# Patient Record
Sex: MALE | Race: AMERICAN INDIAN OR ALASKA NATIVE
[De-identification: names, ages, dates, MRNs, and addresses within clinical notes are randomized per-mention and may not be internally consistent; named-entity substitution may affect disease eponyms.]

---

## 2017-03-21 ENCOUNTER — HOSPITAL ENCOUNTER (INPATIENT)
Dept: HOSPITAL 42 - ED | Age: 22
LOS: 2 days | Discharge: HOME | DRG: 895 | End: 2017-03-23
Attending: INTERNAL MEDICINE | Admitting: INTERNAL MEDICINE
Payer: MEDICAID

## 2017-03-21 VITALS — RESPIRATION RATE: 20 BRPM

## 2017-03-21 VITALS — BODY MASS INDEX: 17.8 KG/M2

## 2017-03-21 DIAGNOSIS — K68.12: ICD-10-CM

## 2017-03-21 DIAGNOSIS — K50.90: ICD-10-CM

## 2017-03-21 DIAGNOSIS — D50.9: ICD-10-CM

## 2017-03-21 DIAGNOSIS — K63.2: ICD-10-CM

## 2017-03-21 DIAGNOSIS — K65.1: Primary | ICD-10-CM

## 2017-03-21 DIAGNOSIS — Z87.891: ICD-10-CM

## 2017-03-21 LAB
ADD MANUAL DIFF?: NO
ALBUMIN/GLOB SERPL: 0.9 {RATIO} (ref 1.1–1.8)
ALP SERPL-CCNC: 83 U/L (ref 38–133)
ALT SERPL-CCNC: 18 U/L (ref 7–56)
APTT BLD: 33.1 SECONDS (ref 23.7–30.8)
AST SERPL-CCNC: 46 U/L (ref 15–59)
BASOPHILS # BLD AUTO: 0.02 K/MM3 (ref 0–2)
BASOPHILS NFR BLD: 0.3 % (ref 0–3)
BILIRUB SERPL-MCNC: 0.4 MG/DL (ref 0.2–1.3)
BUN SERPL-MCNC: 8 MG/DL (ref 7–21)
CALCIUM SERPL-MCNC: 9 MG/DL (ref 8.4–10.5)
CHLORIDE SERPL-SCNC: 105 MMOL/L (ref 98–107)
CO2 SERPL-SCNC: 27 MMOL/L (ref 21–33)
EOSINOPHIL # BLD: 0.2 10*3/UL (ref 0–0.7)
EOSINOPHIL NFR BLD: 3.3 % (ref 1.5–5)
ERYTHROCYTE [DISTWIDTH] IN BLOOD BY AUTOMATED COUNT: 19 % (ref 11.5–14.5)
GLOBULIN SER-MCNC: 4 GM/DL
GLUCOSE SERPL-MCNC: 79 MG/DL (ref 70–110)
GRANULOCYTES # BLD: 3.74 10*3/UL (ref 1.4–6.5)
GRANULOCYTES NFR BLD: 64.5 % (ref 50–68)
HCT VFR BLD CALC: 26.7 % (ref 42–52)
INR PPP: 1.13 (ref 0.93–1.08)
LIPASE SERPL-CCNC: 111 U/L (ref 23–300)
LYMPHOCYTES # BLD: 1 10*3/UL (ref 1.2–3.4)
LYMPHOCYTES NFR BLD AUTO: 17.1 % (ref 22–35)
MCH RBC QN AUTO: 22 PG (ref 25–35)
MCHC RBC AUTO-ENTMCNC: 31.1 G/DL (ref 31–37)
MCV RBC AUTO: 70.8 FL (ref 80–105)
MONOCYTES # BLD AUTO: 0.9 10*3/UL (ref 0.1–0.6)
MONOCYTES NFR BLD: 14.8 % (ref 1–6)
PLATELET # BLD: 668 10^3/UL (ref 120–450)
PMV BLD AUTO: 8.2 FL (ref 7–11)
POTASSIUM SERPL-SCNC: 4.6 MMOL/L (ref 3.6–5)
PROT SERPL-MCNC: 7.5 G/DL (ref 5.8–8.3)
SODIUM SERPL-SCNC: 145 MMOL/L (ref 132–148)
WBC # BLD AUTO: 5.8 10^3/UL (ref 4.5–11)

## 2017-03-21 RX ADMIN — CEFTRIAXONE SCH MLS/HR: 1 INJECTION, SOLUTION INTRAVENOUS at 19:27

## 2017-03-21 RX ADMIN — HYDROMORPHONE HYDROCHLORIDE PRN MG: 1 INJECTION, SOLUTION INTRAMUSCULAR; INTRAVENOUS; SUBCUTANEOUS at 21:30

## 2017-03-21 RX ADMIN — METRONIDAZOLE SCH MLS/HR: 500 INJECTION, SOLUTION INTRAVENOUS at 19:59

## 2017-03-21 RX ADMIN — METRONIDAZOLE SCH MLS/HR: 500 INJECTION, SOLUTION INTRAVENOUS at 22:05

## 2017-03-21 NOTE — CP.PCM.HP
<Bryce Wan - Last Filed: 03/21/17 19:35>





History of Present Illness





- History of Present Illness


History of Present Illness: 


21 year old  male with a pmh of Crohn's and Fe def anemia 

presents to the the ED with three purulent draining wounds on the right lower 

quadrant and one wound on his right flank that appeared like a drain site from 

a previous procedure. This pt. has had multiple suregeris/procedures starting 

in Mar 2015 for abdominal abscesses, possibly 2/2 to his Crohn's. He currently 

not on any Crohn's regimine nor does he follow a GI doc for it. He has been to 

[facility] for his previous surgeries/procedures. His latest onset started 

approximately 2 weeks ago. He did not want to go to [facility] any longer, as they 

are no longer able to care for him.





PMD: Dr. Gary Colby


Surgeon from [facility]: Dr. Carter





PMH: Crohn's and Fe Def Anemia


PSH: 2 abdominal surgeries and 1 IR Incision and Drainage for abdominal 

abscesses


FH: sig for HTN and Arthritis


SH:


-lives with family


-does not work, is disables


-not recently sick


-Tob quit in 2015, used to smoke 5cigs/day/2years


-Alc denies


-Drugs denies


All: Shellfish


Meds: Fe pills





Present on Admission





- Present on Admission


Any Indicators Present on Admission: No





Review of Systems





- Constitutional


Constitutional: Chills.  absent: Fever, Headache





- EENT


Eyes: absent: Change in Vision


Ears: absent: Decreased Hearing


Nose/Mouth/Throat: Dry Mouth, Sore Throat, Neck Pain.  absent: Epistaxis, Nasal 

Congestion





- Cardiovascular


Cardiovascular: absent: Chest Pain, Chest Pain at Rest, Dyspnea





- Respiratory


Respiratory: absent: Cough, Dyspnea, Hemoptysis





- Gastrointestinal


Gastrointestinal: Abdominal Pain


Additional comments: 


3 RLQ wounds that are draining yellowish purulent fluid, 1 non healing wound on 

right flank, possibly site of drain port from previous surgery





- Genitourinary


Genitourinary: absent: Difficulty Urinating, Dysuria, Hematuria, Pyuria, 

Urinary Incontinence





- Musculoskeletal


Musculoskeletal: Back Pain, Neck Pain.  absent: Muscle Weakness, Numbness, 

Tingling





- Integumentary


Integumentary: Non-Healing Lesions (right flank), Wounds (3 RLQ yellowish, 

purulent wounds)





- Endocrine


Endocrine: Cold Intolorance.  absent: Fatigue, Heat Intolorance, Palpitations, 

Polydipsia, Polyphagia, Polyuria





Past Patient History





- Past Social History


Smoking Status: Former Smoker (quit in 2015, used to smoke 5cigs/day/2years)


Chewing Tobacco Use: No


Cigar Use: No


Alcohol: None


Drugs: Denies


Home Situation {Lives}: With Family





- CARDIAC


Hx Cardiac Disorders: No





- PULMONARY


Hx Respiratory Disorders: No





- NEUROLOGICAL


Hx Neurological Disorder: No





- HEENT


Hx HEENT Problems: No





- RENAL


Hx Chronic Kidney Disease: No





- ENDOCRINE/METABOLIC


Hx Endocrine Disorders: No





- HEMATOLOGICAL/ONCOLOGICAL


Hx Blood Disorders: No


Hx Anemia: Yes (Fe Def Anemia)





- INTEGUMENTARY


Hx Dermatological Problems: No





- MUSCULOSKELETAL/RHEUMATOLOGICAL


Hx Musculoskeletal Disorders: No





- GASTROINTESTINAL


Hx Crohn's Disease: Yes





- GENITOURINARY/GYNECOLOGICAL


Hx Genitourinary Disorders: No





- PSYCHIATRIC


Hx Psychophysiologic Disorder: No


Hx Substance Use: No





- SURGICAL HISTORY


Hx Surgeries: No





- ANESTHESIA


Hx Anesthesia: No





Meds


Allergies/Adverse Reactions: 


 Allergies











Allergy/AdvReac Type Severity Reaction Status Date / Time


 


FISH Allergy   Verified 03/12/17 13:50


 


shellfish derived AdvReac  ANGIOEDEMA Verified 03/21/17 10:53














Physical Exam





- Constitutional


Appears: No Acute Distress





- Head Exam


Head Exam: ATRAUMATIC





- Eye Exam


Eye Exam: EOMI





- ENT Exam


ENT Exam: Mucous Membranes Moist





- Neck Exam


Neck exam: Positive for: Full Rom.  Negative for: Lymphadenopathy





- Respiratory Exam


Respiratory Exam: Clear to Auscultation Bilateral, NORMAL BREATHING PATTERN.  

absent: Rhonchi, Wheezes





- Cardiovascular Exam


Cardiovascular Exam: REGULAR RHYTHM, RRR, +S1, +S2.  absent: JVD





- GI/Abdominal Exam


GI & Abdominal Exam: Normal Bowel Sounds, Tenderness


Additional comments: 


3 RLQ purulent abdominal wounds, 1 right flank wound, non healing, likely prior 

site of drain port 





- Extremities Exam


Extremities exam: Positive for: full ROM.  Negative for: joint swelling, pedal 

edema, tenderness





- Back Exam


Back exam: absent: CVA tenderness (L), CVA tenderness (R)





- Neurological Exam


Neurological exam: Alert, CN II-XII Intact, Oriented x3





Results





- Vital Signs


Recent Vital Signs: 





 Last Vital Signs











Temp  97.9 F   03/21/17 13:42


 


Pulse  76   03/21/17 17:13


 


Resp  18   03/21/17 17:13


 


BP  118/68   03/21/17 17:13


 


Pulse Ox  100   03/21/17 17:13














- Labs


Result Diagrams: 


 03/21/17 13:30





 03/21/17 13:30


Labs: 





 Laboratory Results - last 24 hr











  03/21/17 03/21/17 03/21/17





  13:30 13:51 14:55


 


WBC  5.8  


 


RBC  3.77  


 


Hgb  8.3 L  


 


Hct  26.7 L  


 


MCV  70.8 L  


 


MCH  22.0 L  


 


MCHC  31.1  


 


RDW  19.0 H  


 


Plt Count  668 H  


 


MPV  8.2  


 


Gran %  64.5  


 


Lymph % (Auto)  17.1 L  


 


Mono % (Auto)  14.8 H  


 


Eos % (Auto)  3.3  


 


Baso % (Auto)  0.3  


 


Gran #  3.74  


 


Lymph #  1.0 L  


 


Mono #  0.9 H  


 


Eos #  0.2  


 


Baso #  0.02  


 


PT  Cancelled   12.2 H


 


INR  Cancelled   1.13 H


 


APTT  Cancelled   33.1 H


 


Sodium  145  


 


Potassium  4.6  


 


Chloride  105  


 


Carbon Dioxide  27  


 


Anion Gap  18  


 


BUN  8  


 


Creatinine  0.8  


 


Est GFR ( Amer)  > 60  


 


Est GFR (Non-Af Amer)  > 60  


 


Random Glucose  79  


 


Calcium  9.0  


 


Total Bilirubin  0.4  


 


AST  46  


 


ALT  18  


 


Alkaline Phosphatase  83  


 


Total Protein  7.5  


 


Albumin  3.5  


 


Globulin  4.0  


 


Albumin/Globulin Ratio  0.9 L  


 


Lipase  111  


 


Blood Type  A POSITIVE  


 


Blood Type Confirm   A POSITIVE 


 


Antibody Screen  Negative  


 


BBK History Checked  No verified bt  














Assessment & Plan





- Assessment and Plan (Free Text)


Assessment: 


21M presents with untreated Crohn's and a abscesses/fistula malfomations in his 

right lower abdomen.


Plan: 


Abscesses/fistula


   -Surgical Consult: Dr. Snow


   -IR Consult: Dr. Gregory


   -Wound Culture


   -Blood Culture


   -CT Abd/Pelvis


   -suspicious for abscess formation at and adjacent to the right psoas


   muscle extednign ot the right lower abdomen and right pelvis/pelvic sidewall 

muscles


   -suspicious for fistula to the anterior abdominal wall to the right of the 

midline


   -Ceftriaxone


   -Flagyl


   -Dilaudid


   -IV NS


   -NPO





Anemia


   -Fr, Ferritin, TIBC


   -B12, Folate


   -UDS, UA


   -CBC, CMP





PPX


   -Nausea Zofran


   -GI Pepcid


   -DVT SCD's








- Date & Time


Date: 03/21/17


Time: 05:00





<Trever Buitrago - Last Filed: 03/22/17 16:16>





Results





- Vital Signs


Recent Vital Signs: 





 Last Vital Signs











Temp  99.2 F   03/22/17 08:33


 


Pulse  88   03/22/17 08:33


 


Resp  20   03/22/17 08:33


 


BP  115/66   03/22/17 08:33


 


Pulse Ox  100   03/22/17 08:33














- Labs


Result Diagrams: 


 03/22/17 07:00





 03/22/17 07:00


Labs: 





 Laboratory Results - last 24 hr











  03/22/17 03/22/17





  07:00 07:15


 


WBC  10.3  D 


 


RBC  4.01 


 


Hgb  8.7 L 


 


Hct  28.1 L 


 


MCV  70.1 L 


 


MCH  21.7 L 


 


MCHC  31.0 


 


RDW  18.7 H 


 


Plt Count  673 H 


 


MPV  8.1 


 


Gran %  78.8 H 


 


Lymph % (Auto)  8.8 L 


 


Mono % (Auto)  9.0 H 


 


Eos % (Auto)  3.1 


 


Baso % (Auto)  0.3 


 


Gran #  8.09 H 


 


Lymph #  0.9 L 


 


Mono #  0.9 H 


 


Eos #  0.3 


 


Baso #  0.03 


 


Sodium  141 


 


Potassium  3.2 L 


 


Chloride  99 


 


Carbon Dioxide  27 


 


Anion Gap  18 


 


BUN  5 L 


 


Creatinine  0.9 


 


Est GFR ( Amer)  > 60 


 


Est GFR (Non-Af Amer)  > 60 


 


Random Glucose  80 


 


Calcium  8.5 


 


Iron   10 L


 


TIBC   269


 


% Saturation   4 L


 


Ferritin  23.0 


 


Total Bilirubin  0.3 


 


AST  51 


 


ALT  17 


 


Alkaline Phosphatase  89 


 


Total Protein  7.7 


 


Albumin  3.4 


 


Globulin  4.3 


 


Albumin/Globulin Ratio  0.8 L 


 


Vitamin B12  729 


 


Folate  13.4 














Assessment & Plan





- Assessment and Plan (Free Text)


Assessment: 


attending note;





Patient seen and examined with resident in ER.





Patient is a 21 year old  male with a pmh of Crohn's ,multiple 

surgeries, recurrent abscess drainage, Fistula  and iron deficiency anemia


Is admitted to [facility] for the first time for recurrent nonhealing Crohn's/fistula 

infection.





Started on IV Rocephin and Flagyl.


Wound culture, blood culture sent.


CT showed abscess. Case discussed with surgery in detail.


Case discussed with IR Dr. Gregory in detail.





Will order CT with by mouth contrast tomorrow. Possible drainage by IR.





GI evaluation requested. Patient used to get Remicade injection as per patient. 

Currently not on any treatment.





 chronic iron deficiency anemia; iron studies ordered.





upon discharge the patient will follow-up with PMD .





patient needs close follow-up with GI and surgery as outpatient.





Diagnosis and follow-up plan discussed with patient and patient's father in 

detail.





Attending/Attestation





- Attestation


I have personally seen and examined this patient.: Yes


I have fully participated in the care of the patient.: Yes


I have reviewed all pertinent clinical information: Yes

## 2017-03-21 NOTE — CP.PCM.CON
<Adolfo Doshi - Last Filed: 03/21/17 17:01>





History of Present Illness





- History of Present Illness


History of Present Illness: 


Surgery: Dr. Knapp





CC: Abd pain and drainage





HPI: 21M w. pmh of Crohn's dx in 2015 and multiple abd operations for intra-

abdominal abscesses, presents w. diffuse abd pain with drainage for the past 2 

weeks. Pt states that he has had at least 7 operations for drainage of intra-

abdominal abscesses. The last one [place] [facility] Pt states 

that he was doing relatively well until 2 weeks ago when he began to develop R 

sided abd pain that was constant and described as a cramp/stabbing pain.  He 

states that the pain is worse w. activity.  The abd eventually began to drain 

pus at the umbilicus, RUQ, and RLQ. He reports having fevers and chills. He 

reports having diarrhea, which is non-bloody. He denies any changes in 

appetite. No N/V. He denies HA/blurred vision, no CP/palpitation, no SOB/cough, 

he reports some difficulty urinating, no hematuria. He states that he has bone 

pain in his legs, and has general feeling of fatigue. Pt was seeing Dr. Singh, 

GI doc at [facility] [place] time, but has not seen him since February. He 

currently is not taking any medication to manage his crohn's





PMH: crohn's


PSH: Multiple drainage's of intra-abdominal abscesses, questionable small bowel 

resection


Meds: none


ALL: shell fish


Social: No ETOH/tobacco/drugs


Fhx: none





Review of Systems





- Review of Systems


All systems: reviewed and no additional remarkable complaints except (HPI)





Past Patient History





- Past Social History


Smoking Status: Never Smoked





- CARDIAC


Hx Cardiac Disorders: No





- PULMONARY


Hx Respiratory Disorders: No





- NEUROLOGICAL


Hx Neurological Disorder: No





- HEENT


Hx HEENT Problems: No





- RENAL


Hx Chronic Kidney Disease: No





- ENDOCRINE/METABOLIC


Hx Endocrine Disorders: No





- HEMATOLOGICAL/ONCOLOGICAL


Hx Blood Disorders: No





- INTEGUMENTARY


Hx Dermatological Problems: No





- MUSCULOSKELETAL/RHEUMATOLOGICAL


Hx Musculoskeletal Disorders: No





- GASTROINTESTINAL


Hx Crohn's Disease: Yes





- GENITOURINARY/GYNECOLOGICAL


Hx Genitourinary Disorders: No





- PSYCHIATRIC


Hx Psychophysiologic Disorder: No


Hx Substance Use: No





- SURGICAL HISTORY


Hx Surgeries: No





- ANESTHESIA


Hx Anesthesia: No





Meds


Allergies/Adverse Reactions: 


 Allergies











Allergy/AdvReac Type Severity Reaction Status Date / Time


 


FISH Allergy   Verified 03/12/17 13:50


 


shellfish derived AdvReac  ANGIOEDEMA Verified 03/21/17 10:53














Physical Exam





- Constitutional


Appears: Non-toxic, No Acute Distress





- Head Exam


Head Exam: ATRAUMATIC, NORMOCEPHALIC





- Eye Exam


Eye Exam: EOMI.  absent: Scleral icterus





- ENT Exam


ENT Exam: Mucous Membranes Moist, Normal External Ear Exam





- Neck Exam


Neck exam: Positive for: Full Rom





- Respiratory Exam


Respiratory Exam: NORMAL BREATHING PATTERN.  absent: Accessory Muscle Use, 

Respiratory Distress





- GI/Abdominal Exam


Additional comments: 


soft, tender RUQ, RLQ, and umblical area, there is drainage of purulent fluid 

in the previously mentioned areas, drainage has no odor. No guarding, rebound, 

or rigidity





- Extremities Exam


Extremities exam: Negative for: calf tenderness, pedal edema





- Back Exam


Back exam: CVA tenderness (R).  absent: CVA tenderness (L)





- Neurological Exam


Neurological exam: Alert, Oriented x3





- Psychiatric Exam


Psychiatric exam: Normal Affect, Normal Mood





- Skin


Skin Exam: Dry, Normal Color, Warm





Results





- Vital Signs


Recent Vital Signs: 


 Last Vital Signs











Temp  97.9 F   03/21/17 13:42


 


Pulse  79   03/21/17 15:36


 


Resp  18   03/21/17 15:36


 


BP  121/70   03/21/17 15:36


 


Pulse Ox  100   03/21/17 15:36














- Labs


Result Diagrams: 


 03/21/17 13:30





 03/21/17 13:30


Labs: 


 Laboratory Results - last 24 hr











  03/21/17 03/21/17 03/21/17





  13:30 13:51 14:55


 


WBC  5.8  


 


RBC  3.77  


 


Hgb  8.3 L  


 


Hct  26.7 L  


 


MCV  70.8 L  


 


MCH  22.0 L  


 


MCHC  31.1  


 


RDW  19.0 H  


 


Plt Count  668 H  


 


MPV  8.2  


 


Gran %  64.5  


 


Lymph % (Auto)  17.1 L  


 


Mono % (Auto)  14.8 H  


 


Eos % (Auto)  3.3  


 


Baso % (Auto)  0.3  


 


Gran #  3.74  


 


Lymph #  1.0 L  


 


Mono #  0.9 H  


 


Eos #  0.2  


 


Baso #  0.02  


 


PT  Cancelled   12.2 H


 


INR  Cancelled   1.13 H


 


APTT  Cancelled   33.1 H


 


Sodium  145  


 


Potassium  4.6  


 


Chloride  105  


 


Carbon Dioxide  27  


 


Anion Gap  18  


 


BUN  8  


 


Creatinine  0.8  


 


Est GFR ( Amer)  > 60  


 


Est GFR (Non-Af Amer)  > 60  


 


Random Glucose  79  


 


Calcium  9.0  


 


Total Bilirubin  0.4  


 


AST  46  


 


ALT  18  


 


Alkaline Phosphatase  83  


 


Total Protein  7.5  


 


Albumin  3.5  


 


Globulin  4.0  


 


Albumin/Globulin Ratio  0.9 L  


 


Lipase  111  


 


Blood Type  A POSITIVE  


 


Blood Type Confirm   A POSITIVE 


 


Antibody Screen  Negative  


 


BBK History Checked  No verified bt  














- Imaging and Cardiology


  ** CT scan - abdomen


Status: Image reviewed by me, Report reviewed by me





Assessment & Plan





- Assessment and Plan (Free Text)


Assessment: 


21M w. Crohn's and intr-abdominal abscesses 


-recommend IR drainage, case d/w Dr. Gregory, will repeat CT in AM w. PO contrast


-recommend GI consult


-IVF


-pain meds


-abx


-NPO


-GI/DVT prophylaxis


-case d.w Dr. Knapp





Zemait[facility] PGY2





<Reji Knapp - Last Filed: 03/22/17 16:33>





Meds





- Medications


Medications: 


 Current Medications





Famotidine (Pepcid)  20 mg IVP Q12 [facility]


   Last Admin: 03/22/17 09:38 Dose:  20 mg


Hydromorphone HCl (Dilaudid)  0.5 mg IVP Q3H PRN


   PRN Reason: Pain, moderate (4-7)


   Last Admin: 03/22/17 15:05 Dose:  0.5 mg


Ceftriaxone Sodium (Rocephin 1 Gram Ivpb)  100 mls @ 100 mls/hr IVPB DAILY [facility]


   PRN Reason: Protocol


   Last Admin: 03/22/17 09:38 Dose:  100 mls/hr


Metronidazole (Flagyl)  100 mls @ 100 mls/hr IVPB Q8 [facility]


   PRN Reason: Protocol


   Last Admin: 03/22/17 14:13 Dose:  100 mls/hr


Sodium Chloride (Sodium Chloride 0.9%)  1,000 mls @ 100 mls/hr IV .Q10H [facility]


   Last Admin: 03/22/17 12:45 Dose:  100 mls/hr


Multivitamins/Minerals (Therapeutic-M Tab)  1 tab PO DAILY [facility]


   Last Admin: 03/22/17 12:06 Dose:  1 tab


Ondansetron HCl (Zofran Inj)  4 mg IVP Q4H PRN


   PRN Reason: Nausea/Vomiting











Results





- Vital Signs


Recent Vital Signs: 


 Last Vital Signs











Temp  99.2 F   03/22/17 08:33


 


Pulse  88   03/22/17 08:33


 


Resp  20   03/22/17 08:33


 


BP  115/66   03/22/17 08:33


 


Pulse Ox  100   03/22/17 08:33














- Labs


Result Diagrams: 


 03/22/17 07:00





 03/22/17 07:00


Labs: 


 Laboratory Results - last 24 hr











  03/22/17 03/22/17





  07:00 07:15


 


WBC  10.3  D 


 


RBC  4.01 


 


Hgb  8.7 L 


 


Hct  28.1 L 


 


MCV  70.1 L 


 


MCH  21.7 L 


 


MCHC  31.0 


 


RDW  18.7 H 


 


Plt Count  673 H 


 


MPV  8.1 


 


Gran %  78.8 H 


 


Lymph % (Auto)  8.8 L 


 


Mono % (Auto)  9.0 H 


 


Eos % (Auto)  3.1 


 


Baso % (Auto)  0.3 


 


Gran #  8.09 H 


 


Lymph #  0.9 L 


 


Mono #  0.9 H 


 


Eos #  0.3 


 


Baso #  0.03 


 


Sodium  141 


 


Potassium  3.2 L 


 


Chloride  99 


 


Carbon Dioxide  27 


 


Anion Gap  18 


 


BUN  5 L 


 


Creatinine  0.9 


 


Est GFR ( Amer)  > 60 


 


Est GFR (Non-Af Amer)  > 60 


 


Random Glucose  80 


 


Calcium  8.5 


 


Iron   10 L


 


TIBC   269


 


% Saturation   4 L


 


Ferritin  23.0 


 


Total Bilirubin  0.3 


 


AST  51 


 


ALT  17 


 


Alkaline Phosphatase  89 


 


Total Protein  7.7 


 


Albumin  3.4 


 


Globulin  4.3 


 


Albumin/Globulin Ratio  0.8 L 


 


Vitamin B12  729 


 


Folate  13.4 














Attending/Attestation





- Attestation


I have personally seen and examined this patient.: Yes


I have fully participated in the care of the patient.: Yes


I have reviewed all pertinent clinical information: Yes


Notes (Text): 





03/22/17 16:31


Pt was seen and examined at bedside on 03/22/17


Agree with above note and assessment.


Pt with Crohns dis and Intra abdominal abscess


IR for Drainage of abscess


C/w IV antibiotics


Plan d.w pt in detail


We will f.u

## 2017-03-21 NOTE — ED PDOC
Arrival/HPI





- General


Historian: Patient





- History of Present Illness


Time/Duration: Other (2 weeks)


Quality: Aching


Context: Home





<Shana Schofield P - Last Filed: 03/23/17 15:47>





<Lashon Bernal - Last Filed: 03/23/17 16:16>





- General


Chief Complaint: Abnormal Skin Integrity


Time Seen by Provider: 03/21/17 11:31





- History of Present Illness


Narrative History of Present Illness (Text): 


03/21/17 12:15   


This 22 yo male with pmh crohn's disease, anemia, presents to this ED c/o 

abdominal wound pain x 2 days.  Patient stated he seen drainage from incisional 

wound x 2 weeks.  Patient stated he had abdominal surgery in July 2016.  

Patient denies sob, cp, streaking erythema, fever, n/v/d, rectal bleeding, 

melena, hematochezia, urinary symptoms, dizziness, or abnormal gait.


Patient noted he has been seen @ [facility] [place] for previous visit.  Patient had I&D done 

3 weeks ago for right lower back abscess, and he was taking 10 days ABX 

treatment. (Shana Schofield)








Past Medical History





- Provider Review


Nursing Documentation Reviewed: Yes





- Cardiac


Hx Cardiac Disorders: No





- Pulmonary


Hx Respiratory Disorders: No





- Neurological


Hx Neurological Disorder: No





- HEENT


Hx HEENT Disorder: No





- Renal


Hx Renal Disorder: No





- Endocrine/Metabolic


Hx Endocrine Disorders: No





- Hematological/Oncological


Hx Blood Disorders: No





- Integumentary


Hx Dermatological Disorder: No





- Musculoskeletal/Rheumatological


Hx Musculoskeletal Disorders: No





- Gastrointestinal


Hx Crohn's Disease: Yes





- Genitourinary/Gynecological


Hx Genitourinary Disorders: No





- Psychiatric


Hx Psychophysiologic Disorder: No


Hx Substance Use: No





- Anesthesia


Hx Anesthesia: No





<Shana Schofield P - Last Filed: 03/23/17 15:47>





Family/Social History





- Physician Review


Nursing Documentation Reviewed: Yes


Family/Social History: No Known Family HX


Smoking Status: Never Smoked


Hx Alcohol Use: No


Hx Substance Use: No





<Shana Schofield P - Last Filed: 03/23/17 15:47>





Allergies/Home Meds





<Shana Schofield P - Last Filed: 03/23/17 15:47>





<Lashon Bernal - Last Filed: 03/23/17 16:16>


Allergies/Adverse Reactions: 


Allergies





FISH Allergy (Verified 03/12/17 13:50)


 


shellfish derived Adverse Reaction (Verified 03/21/17 10:53)


 ANGIOEDEMA











Review of Systems





- Review of Systems


Constitutional: Normal.  absent: Fatigue, Weight Change, Fevers, Night Sweats


Eyes: Normal


ENT: Normal


Respiratory: Normal.  absent: SOB, Cough


Cardiovascular: Normal


Gastrointestinal: Abdominal Pain, Other (no rectal bleeding).  absent: Stool 

Changes, Constipation, Diarrhea, Nausea, Vomiting, Appetite Changes, 

Hematochezia, Hematemesis, Food Intolerance


Genitourinary Male: Normal


Musculoskeletal: Normal


Skin: Abscess


Neurological: Normal


Endocrine: Normal


Hemo/Lymphatic: Normal


Psychiatric: Normal





<Schofield,Nahim P - Last Filed: 03/23/17 15:47>





Physical Exam


Temperature: Afebrile


Blood Pressure: Normal


Pulse: Regular


Respiratory Rate: Normal


Appearance: Positive for: Well-Appearing, Non-Toxic, Comfortable


Pain Distress: None


Mental Status: Positive for: Alert and Oriented X 3





- Systems Exam


Head: Present: Atraumatic, Normocephalic


Pupils: Present: PERRL


Extroacular Muscles: Present: EOMI


Conjunctiva: Present: Normal


Mouth: Present: Moist Mucous Membranes


Neck: Present: Normal Range of Motion.  No: Meningeal Signs


Respiratory/Chest: Present: Clear to Auscultation, Good Air Exchange.  No: 

Respiratory Distress, Accessory Muscle Use


Cardiovascular: Present: Regular Rate and Rhythm, Normal S1, S2.  No: Murmurs


Abdomen: Present: Tenderness (Mild incisional wound tenderness.  (+) mild 

purulent drainage), Normal Bowel Sounds.  No: Distention, Peritoneal Signs, 

Rebound, Guarding


Back: Present: Normal Inspection


Upper Extremity: Present: Normal Inspection, Normal ROM, Neurovascularly Intact

, Capillary Refill < 2s.  No: Cyanosis, Edema


Lower Extremity: Present: Normal Inspection, NORMAL PULSES, Normal ROM.  No: 

Edema


Neurological: Present: GCS=15, CN II-XII Intact, Speech Normal


Skin: Present: Warm, Dry, Normal Color.  No: Rashes


Psychiatric: Present: Alert, Oriented x 3, Normal Insight, Normal Concentration





<Schofield,Nahim P - Last Filed: 03/23/17 15:47>





Vital Signs











  Temp Pulse Resp BP Pulse Ox


 


 03/21/17 17:13   76  18  118/68  100


 


 03/21/17 15:36   79  18  121/70  100


 


 03/21/17 13:42  97.9 F  86  18  114/69  100


 


 03/21/17 12:00   95 H  18  112/68  100


 


 03/21/17 11:33   106 H  18  110/65  100


 


 03/21/17 10:49  97.2 F L  98 H  18  112/61  100

















Medical Decision Making


Re-evaluation Time: 17:54


Reassessment Condition: Re-examined, Improving,but remains with symptoms





- Lab Interpretations


I have reviewed the lab results: Yes


Interpretation: Abnormal lab values (abnormal PT/INR/PTT)





<Shana Schofield - Last Filed: 03/23/17 15:47>





<Lashon Bernal - Last Filed: 03/23/17 16:16>


ED Course and Treatment: 


03/21/17 13:20


I spoke with Dr. Bernal ED physician regarding abdominal abscess x 2 week.  

Patient was treated with lower back abscess x 3 weeks ago.  Patient has mild 

tenderness on incisional wound abscess.  Patient appears non-toxic, requesting 

pain medication.  


She recommended labs, UA, and CT abdomen / pelvis with IV contrast to better 

appreciate abscess.





03/21/17 16:53


Surgical resident examined patient and he recommended admission.  I spoke with 

Dr. Buitrago Hospitalist regarding patient complain, labs result, and ct scan 

report.  She agrees with admission.


 (Shana Schofield)








- Lab Interpretations


Microbiology Results: 





Microbiology Results





03/21/17 13:30   Blood   Blood Culture - Preliminary


                            NO GROWTH AFTER 48 HOURS


03/21/17 13:30   Blood   Blood Culture - Preliminary


                            NO GROWTH AFTER 48 HOURS











Lab Results: 











 03/21/17 13:30 





 03/21/17 13:30 





 Lab Results





03/21/17 14:55: PT 12.2 H, INR 1.13 H, APTT 33.1 H


03/21/17 13:51: Blood Type Confirm A POSITIVE


03/21/17 13:30: WBC 5.8, RBC 3.77, Hgb 8.3 L, Hct 26.7 L, MCV 70.8 L, MCH 22.0 L

, MCHC 31.1, RDW 19.0 H, Plt Count 668 H, MPV 8.2, Gran % 64.5, Lymph % (Auto) 

17.1 L, Mono % (Auto) 14.8 H, Eos % (Auto) 3.3, Baso % (Auto) 0.3, Gran # 3.74, 

Lymph # 1.0 L, Mono # 0.9 H, Eos # 0.2, Baso # 0.02, PT Cancelled, INR Cancelled

, APTT Cancelled, Sodium 145, Potassium 4.6, Chloride 105, Carbon Dioxide 27, 

Anion Gap 18, BUN 8, Creatinine 0.8, Est GFR (African Amer) > 60, Est GFR (Non-

Af Amer) > 60, Random Glucose 79, Calcium 9.0, Total Bilirubin 0.4, AST 46, ALT 

18, Alkaline Phosphatase 83, Total Protein 7.5, Albumin 3.5, Globulin 4.0, 

Albumin/Globulin Ratio 0.9 L, Lipase 111, Blood Type A POSITIVE, Antibody 

Screen Negative, BBK History Checked No verified bt














- RAD Interpretation


Narrative RAD Interpretations (Text): 





03/21/17 15:47


Patient Name / ID : BRENDA LUND  / [id number]


Exam Date : 03/21/2017 14:28:16 ( Approved )


Study Comment : 


Sex / Age : M  / 21Y





Creator : Brittani Michael


Dictator : Brittani Michael


 : 


Approver : Brittani Michael


Approver2 : 





Report Date : 03/21/2017 15:35:10


My Comment : 


********************************************************************************

***





PROCEDURE:  CT Abdomen and Pelvis with contrast





HISTORY:


abdominal drainage. 21 years old man with history of Crohn disease history of 

prior bowel surgery. 





COMPARISON:


None.





TECHNIQUE:


Contrast dose: 100 mL Omnipaque 350





Radiation dose:





Total exam DLP = 234.14 mGy-cm.





FINDINGS:





LOWER THORAX:


Unremarkable. 





LIVER:


Mild-to-moderate hepatomegaly seen.  No evidence of acute pathology or mass 

lesion in the liver. 





GALLBLADDER AND BILE DUCTS:


Unremarkable. 





PANCREAS:


Unremarkable. No gross lesion or ductal dilatation.





SPLEEN:


Unremarkable. 





ADRENALS:


Unremarkable. No mass. 





KIDNEYS AND URETERS:


Unremarkable. No hydronephrosis. No solid mass. 





VASCULATURE:


Unremarkable. No aortic aneurysm. 





BOWEL:


Suboptimal assessment of the GI without oral contrast administration.  There 

are surgical staples seen at the right mid and lower abdomen suggestive of 

prior bowel resection and bowel anastomosis. There is mild thickening and 

enhancement of the small and large bowel loops at the right mid and lower 

abdomen. No evidence of high-grade bowel obstruction. Mildly distended large 

bowel loops in the left mid and lower abdomen.





APPENDIX:


The appendix is not visualized. 





PERITONEUM:


There is fluid collection and possible abscess at and adjacent to the right 

psoas muscle extending to the right lower abdomen and right pelvic muscle.  

There is moderate enlargement of the right pelvic sidewall muscles likely due 

to intramuscular collection.  There are foci of air and foci of enhancing fluid 

collection contains fluid and air at the right mid and lower abdomen.  Findings 

suspicious for abscess formations. There is suspicious for fistula to the 

anterior abdominal wall to the right and slightly lower to the level of the 

umbilicus. The possibility of bowel skin fistula should be considered. 





LYMPH NODES:


Large enhancing lymph nodes seen at the right lower abdomen and right groin.  

Mildly enlarged lymph nodes seen at the left groin. 





BLADDER:


The bladder is mildly distended demonstrate mild-to-moderate wall thickening. 





REPRODUCTIVE:


Unremarkable. 





BONES:


No acute fracture. 





OTHER FINDINGS:


There is small amount of free fluid in the pelvis.





IMPRESSION:


Suboptimal assessment of the GI system without oral contrast administration.





Findings suspicious for abscess formation at and adjacent to the right psoas 

muscle extending to the right lower abdomen and right pelvis/pelvic sidewall 

muscles. 





Suspicious for fistula to the anterior abdominal wall to the right of the 

midline. 





Postsurgical changes at the right mid and lower abdomen. Enhancing slightly 

thick wall of the small and large bowel loops at the right mid and lower 

abdomen may represent enteritis/colitis. 





No evidence of high-grade bowel obstruction.  Mild-to-moderate constipation and 

dilated large bowel loops at the left abdomen. 





Hepatomegaly.








 (Shana Schofield)





Radiology Orders: 











03/21/17 12:25


ABD & PELVIS IV CONTRAST ONLY [CT] Stat 





03/22/17 07:00


ABD PELVIS PO & IV CONTRAST [CT] Routine 

















- Medication Orders


Current Medication Orders: 














Discontinued Medications





Barium Sulfate (Readi-Cat 2) Confirm Administered Dose 900 ml PO .STK-MED ONE


   Stop: 03/22/17 09:46


Diphenhydramine HCl (Benadryl)  25 mg IVP STAT STA


   Stop: 03/21/17 12:32


   Last Admin: 03/21/17 13:26  Dose: 25 MG





IVP Administration


 Document     03/21/17 13:26  OCS  (Rec: 03/21/17 13:27  OCS  [id number])


     Charges for Administration


      # of IVP Administrations                   1





Famotidine (Pepcid)  20 mg IVP Q12 BARBARA


   Last Admin: 03/23/17 09:04  Dose: 20 MG





IVP Administration


 Document     03/23/17 09:04  MLK  (Rec: 03/23/17 09:04  MLK  [id number])


     Charges for Administration


      # of IVP Administrations                   1





Home Med (*Refrigerator Open) Confirm Administered Dose 1 unit XX .STK-MED ONE


   Stop: 03/22/17 05:55


Home Med (*Refrigerator Open) Confirm Administered Dose 1 unit XX .STK-MED ONE


   Stop: 03/22/17 06:00


Home Med (*Refrigerator Open) Confirm Administered Dose 1 unit XX .STK-MED ONE


   Stop: 03/23/17 05:41


Hydromorphone HCl (Dilaudid)  1 mg IVP STAT STA


   Stop: 03/21/17 16:47


   Last Admin: 03/21/17 17:08  Dose: 1 MG





IVP Administration


 Document     03/21/17 17:08  OCS  (Rec: 03/21/17 17:08  OCS  [id number])


     Charges for Administration


      # of IVP Administrations                   1





Hydromorphone HCl (Dilaudid)  0.5 mg IVP Q4H PRN


   PRN Reason: Pain, moderate (4-7)


   Last Admin: 03/22/17 09:04  Dose: 0.5 MG





MAR Pain Assessment


 Document     03/22/17 09:04  CLR  (Rec: 03/22/17 09:04  CLR  [id number])


     Pain Reassessment


      Is this a pain reassessment?               No


     Presence of Pain


      Presence of Pain                           Yes


     Pain Scale Used


      Pain Scale Used                            Numeric


     Location


      Left, Right or Bilateral                   Right


      Upper or Lower                             Lower


      Pain Location Body Site                    Abdomen


                                                 Back


     Description


      Description                                Constant


      Intensity of Pain at present               7


      Aggravating Factors                        ADL's


                                                 Changing Position


      Alleviating Factors/Management             Medication


       Techniques                                


IVP Administration


 Document     03/22/17 09:04  CLR  (Rec: 03/22/17 09:04  CLR  [id number])


     Charges for Administration


      # of IVP Administrations                   1


Re-Assess: MAR Pain Assessment


 Document     03/22/17 10:04  CLR  (Rec: 03/22/17 11:31  CLR  [id number])


     Pain Reassessment


      Is this a pain reassessment?               Yes


     Presence of Pain


      Presence of Pain                           Yes


     Description


      Description                                Constant


      Intensity of Pain at present               5





Hydromorphone HCl (Dilaudid)  0.5 mg IVP Q3H PRN


   PRN Reason: Pain, moderate (4-7)


   Last Admin: 03/23/17 12:47  Dose: 0.5 MG





MAR Pain Assessment


 Document     03/23/17 12:47  MLK  (Rec: 03/23/17 12:47  MLK  [id number])


     Pain Reassessment


      Is this a pain reassessment?               No


     Presence of Pain


      Presence of Pain                           Yes


     Location


      Pain Location Body Site                    Abdomen


     Description


      Pain Behavior                              Irritability


      Alleviating Factors/Management             Medication


       Techniques                                


IVP Administration


 Document     03/23/17 12:47  MLK  (Rec: 03/23/17 12:47  [facility]K  [id number]


     Charges for Administration


      # of IVP Administrations                   1


Re-Assess: MAR Pain Assessment


 Document     03/23/17 13:47  MLK  (Rec: 03/23/17 14:08  MLK  [id number])


     Pain Reassessment


      Is this a pain reassessment?               Yes


     Presence of Pain


      Presence of Pain                           No





Sodium Chloride (Sodium Chloride 0.9%)  1,000 mls @ 1,000 mls/hr IV .Q1H STA


   Stop: 03/21/17 13:23


   Last Admin: 03/21/17 13:26  Dose: 1,000 MLS/HR





eMAR Start Stop


 Document     03/21/17 13:26  OCS  (Rec: 03/21/17 13:26  OCS  [id number])


     Intravenous Solution


      Start Date                                 03/21/17


      Start Time                                 13:26





Ceftriaxone Sodium (Rocephin 1 Gram Ivpb)  100 mls @ 100 mls/hr IVPB DAILY BARBARA


   PRN Reason: Protocol


   Last Admin: 03/23/17 09:05  Dose: 100 MLS/HR





eMAR Start Stop


 Document     03/23/17 09:05  MLK  (Rec: 03/23/17 09:05  MLK  [id number])


     Intravenous Solution


      Start Date                                 03/23/17


      Start Time                                 09:05


      End Date                                   03/23/17


      End time                                   10:05


      Total Infusion Time                        60





Metronidazole (Flagyl)  100 mls @ 100 mls/hr IVPB Q8 BARBARA


   PRN Reason: Protocol


   Last Admin: 03/23/17 05:28  Dose: 100 MLS/HR





eMAR Start Stop


 Document     03/23/17 05:28  SD  (Rec: 03/23/17 05:29  SD  [id number])


     Intravenous Solution


      Start Date                                 03/23/17


      Start Time                                 05:29





Sodium Chloride (Sodium Chloride 0.9%)  1,000 mls @ 100 mls/hr IV .Q10H BARBARA


   Last Admin: 03/23/17 08:00  Dose: 100 MLS/HR





eMAR Start Stop


 Document     03/23/17 08:00  MLK  (Rec: 03/23/17 12:27  MLK  [id number])


     Intravenous Solution


      Start Date                                 03/23/17


      Start Time                                 08:00


      End Date                                   03/23/17


      End time                                   18:00


      Total Infusion Time                        600





Iron Sucrose 200 mg/ Sodium (Chloride)  110 mls @ 110 mls/hr IVPB ONCE ONE


   Stop: 03/22/17 11:59


   Last Admin: 03/22/17 12:06  Dose: 110 MLS/HR





eMAR Start Stop


 Document     03/22/17 12:06  CLR  (Rec: 03/22/17 12:06  CLR  [id number])


     Intravenous Solution


      Start Date                                 03/22/17


      Start Time                                 12:06


      End Date                                   03/22/17


      End time                                   13:06


      Total Infusion Time                        60





Iohexol (Omnipaque 350 100 Ml) Confirm Administered Dose 350 mg .ROUTE .STK-MED 

ONE


   Stop: 03/21/17 13:54


Iohexol (Omnipaque 350 100 Ml) Confirm Administered Dose 350 mg .ROUTE .STK-MED 

ONE


   Stop: 03/22/17 09:49


Morphine Sulfate (Morphine)  2 mg IVP STAT STA


   Stop: 03/21/17 12:32


   Last Admin: 03/21/17 13:27  Dose: 2 MG





MAR Pain Assessment


 Document     03/21/17 13:27  OCS  (Rec: 03/21/17 13:27  OCS  [id number])


     Pain Reassessment


      Is this a pain reassessment?               Yes


     Sleep


      Is patient sleeping during reassessment?   No


     Presence of Pain


      Presence of Pain                           Yes


     Pain Scale Used


      Pain Scale Used                            Numeric


     Description


      Description                                Constant


      Intensity of Pain at present               10


IVP Administration


 Document     03/21/17 13:27  OCS  (Rec: 03/21/17 13:27  OCS  [id number])


     Charges for Administration


      # of IVP Administrations                   1





Multivitamins/Minerals (Therapeutic-M Tab)  1 tab PO DAILY BARBARA


   Last Admin: 03/23/17 12:07  Dose: 1 TAB





Ondansetron HCl (Zofran Inj)  4 mg IVP Q4H PRN


   PRN Reason: Nausea/Vomiting


Oxycodone/Acetaminophen (Percocet 5/325 Mg Tab)  1 tab PO Q4H PRN


   PRN Reason: Pain, moderate (4-7)


   Stop: 03/26/17 10:10


   Last Admin: 03/23/17 12:07  Dose: 1 TAB





MAR Pain Assessment


 Document     03/23/17 12:07  MLK  (Rec: 03/23/17 12:07  MLK  [id number])


     Pain Reassessment


      Is this a pain reassessment?               No


     Presence of Pain


      Presence of Pain                           Yes


     Location


      Pain Location Body Site                    Abdomen


     Description


      Pain Behavior                              Facial Grimacing


      Alleviating Factors/Management             Medication


       Techniques                                


Re-Assess: MAR Pain Assessment


 Document     03/23/17 13:07  MLK  (Rec: 03/23/17 14:08  MLK  [id number])


     Pain Reassessment


      Is this a pain reassessment?               Yes


     Presence of Pain


      Presence of Pain                           Yes





Potassium Chloride (K-Dur 20 Meq Er Tab)  20 meq PO ONCE ONE


   Stop: 03/22/17 08:27


   Last Admin: 03/22/17 09:38  Dose: 20 MEQ





Potassium Chloride (K-Dur 20 Meq Er Tab)  20 meq PO ONCE ONE


   Stop: 03/22/17 08:28


   Last Admin: 03/22/17 09:38  Dose: 20 MEQ





Potassium Chloride (K-Dur 20 Meq Er Tab)  40 meq PO STAT STA


   Stop: 03/22/17 10:51


   Last Admin: 03/22/17 11:30  Dose:  














- PA / NP / Resident Statement


MD/DO has examined the patient and agrees with the treatment plan.





<Lashon Bernal - Last Filed: 03/23/17 16:16>





Disposition/Present on Arrival





- Present on Arrival


Any Indicators Present on Arrival: No


History of DVT/PE: No


History of Uncontrolled Diabetes: No


Urinary Catheter: No


History of Decub. Ulcer: No


History Surgical Site Infection Following: None





- Disposition


Have Diagnosis and Disposition been Completed?: Yes


Disposition Time: 17:42


Patient Plan: Admission





<Shana Schofield - Last Filed: 03/23/17 15:47>





<Lashon Bernal - Last Filed: 03/23/17 16:16>





- Disposition


Diagnosis: 


 Intra-abdominal abscess


Disposition: HOSPITALIZED


Condition: STABLE

## 2017-03-21 NOTE — CT
PROCEDURE:  CT Abdomen and Pelvis with contrast



HISTORY:

abdominal drainage. 21 years old man with history of Crohn disease 

history of prior bowel surgery. 



COMPARISON:

None.



TECHNIQUE:

Contrast dose: 100 mL Omnipaque 350



Radiation dose:



Total exam DLP = 234.14 mGy-cm.



FINDINGS:



LOWER THORAX:

Unremarkable. 



LIVER:

Mild-to-moderate hepatomegaly seen.  No evidence of acute pathology 

or mass lesion in the liver. 



GALLBLADDER AND BILE DUCTS:

Unremarkable. 



PANCREAS:

Unremarkable. No gross lesion or ductal dilatation.



SPLEEN:

Unremarkable. 



ADRENALS:

Unremarkable. No mass. 



KIDNEYS AND URETERS:

Unremarkable. No hydronephrosis. No solid mass. 



VASCULATURE:

Unremarkable. No aortic aneurysm. 



BOWEL:

Suboptimal assessment of the GI without oral contrast administration. 

 There are surgical staples seen at the right mid and lower abdomen 

suggestive of prior bowel resection and bowel anastomosis. There is 

mild thickening and enhancement of the small and large bowel loops at 

the right mid and lower abdomen. No evidence of high-grade bowel 

obstruction. Mildly distended large bowel loops in the left mid and 

lower abdomen.



APPENDIX:

The appendix is not visualized. 



PERITONEUM:

There is fluid collection and possible abscess at and adjacent to the 

right psoas muscle extending to the right lower abdomen and right 

pelvic muscle.  There is moderate enlargement of the right pelvic 

sidewall muscles likely due to intramuscular collection.  There are 

foci of air and foci of enhancing fluid collection contains fluid and 

air at the right mid and lower abdomen.  Findings suspicious for 

abscess formations. There is suspicious for fistula to the anterior 

abdominal wall to the right and slightly lower to the level of the 

umbilicus. The possibility of bowel skin fistula should be 

considered. 



LYMPH NODES:

Large enhancing lymph nodes seen at the right lower abdomen and right 

groin.  Mildly enlarged lymph nodes seen at the left groin. 



BLADDER:

The bladder is mildly distended demonstrate mild-to-moderate wall 

thickening. 



REPRODUCTIVE:

Unremarkable. 



BONES:

No acute fracture. 



OTHER FINDINGS:

There is small amount of free fluid in the pelvis.



IMPRESSION:

Suboptimal assessment of the GI system without oral contrast 

administration.



Findings suspicious for abscess formation at and adjacent to the 

right psoas muscle extending to the right lower abdomen and right 

pelvis/pelvic sidewall muscles. 



Suspicious for fistula to the anterior abdominal wall to the right of 

the midline. 



Postsurgical changes at the right mid and lower abdomen. Enhancing 

slightly thick wall of the small and large bowel loops at the right 

mid and lower abdomen may represent enteritis/colitis. 



No evidence of high-grade bowel obstruction.  Mild-to-moderate 

constipation and dilated large bowel loops at the left abdomen. 



Hepatomegaly.

## 2017-03-22 VITALS — HEART RATE: 88 BPM

## 2017-03-22 LAB
ADD MANUAL DIFF?: NO
ALBUMIN/GLOB SERPL: 0.8 {RATIO} (ref 1.1–1.8)
ALP SERPL-CCNC: 89 U/L (ref 38–133)
ALT SERPL-CCNC: 17 U/L (ref 7–56)
AST SERPL-CCNC: 51 U/L (ref 15–59)
BASOPHILS # BLD AUTO: 0.03 K/MM3 (ref 0–2)
BASOPHILS NFR BLD: 0.3 % (ref 0–3)
BILIRUB SERPL-MCNC: 0.3 MG/DL (ref 0.2–1.3)
BUN SERPL-MCNC: 5 MG/DL (ref 7–21)
CALCIUM SERPL-MCNC: 8.5 MG/DL (ref 8.4–10.5)
CHLORIDE SERPL-SCNC: 99 MMOL/L (ref 98–107)
CO2 SERPL-SCNC: 27 MMOL/L (ref 21–33)
EOSINOPHIL # BLD: 0.3 10*3/UL (ref 0–0.7)
EOSINOPHIL NFR BLD: 3.1 % (ref 1.5–5)
ERYTHROCYTE [DISTWIDTH] IN BLOOD BY AUTOMATED COUNT: 18.7 % (ref 11.5–14.5)
FOLATE SERPL-MCNC: 13.4 NG/ML
GLOBULIN SER-MCNC: 4.3 GM/DL
GLUCOSE SERPL-MCNC: 80 MG/DL (ref 70–110)
GRANULOCYTES # BLD: 8.09 10*3/UL (ref 1.4–6.5)
GRANULOCYTES NFR BLD: 78.8 % (ref 50–68)
HCT VFR BLD CALC: 28.1 % (ref 42–52)
IRON SERPL-MCNC: 10 UG/DL (ref 45–180)
LYMPHOCYTES # BLD: 0.9 10*3/UL (ref 1.2–3.4)
LYMPHOCYTES NFR BLD AUTO: 8.8 % (ref 22–35)
MCH RBC QN AUTO: 21.7 PG (ref 25–35)
MCHC RBC AUTO-ENTMCNC: 31 G/DL (ref 31–37)
MCV RBC AUTO: 70.1 FL (ref 80–105)
MONOCYTES # BLD AUTO: 0.9 10*3/UL (ref 0.1–0.6)
MONOCYTES NFR BLD: 9 % (ref 1–6)
PLATELET # BLD: 673 10^3/UL (ref 120–450)
PMV BLD AUTO: 8.1 FL (ref 7–11)
POTASSIUM SERPL-SCNC: 3.2 MMOL/L (ref 3.6–5)
PROT SERPL-MCNC: 7.7 G/DL (ref 5.8–8.3)
SODIUM SERPL-SCNC: 141 MMOL/L (ref 132–148)
WBC # BLD AUTO: 10.3 10^3/UL (ref 4.5–11)

## 2017-03-22 RX ADMIN — HYDROMORPHONE HYDROCHLORIDE PRN MG: 1 INJECTION, SOLUTION INTRAMUSCULAR; INTRAVENOUS; SUBCUTANEOUS at 12:07

## 2017-03-22 RX ADMIN — HYDROMORPHONE HYDROCHLORIDE PRN MG: 1 INJECTION, SOLUTION INTRAMUSCULAR; INTRAVENOUS; SUBCUTANEOUS at 15:05

## 2017-03-22 RX ADMIN — METRONIDAZOLE SCH MLS/HR: 500 INJECTION, SOLUTION INTRAVENOUS at 14:13

## 2017-03-22 RX ADMIN — METRONIDAZOLE SCH MLS/HR: 500 INJECTION, SOLUTION INTRAVENOUS at 05:00

## 2017-03-22 RX ADMIN — HYDROMORPHONE HYDROCHLORIDE PRN MG: 1 INJECTION, SOLUTION INTRAMUSCULAR; INTRAVENOUS; SUBCUTANEOUS at 21:16

## 2017-03-22 RX ADMIN — HYDROMORPHONE HYDROCHLORIDE PRN MG: 1 INJECTION, SOLUTION INTRAMUSCULAR; INTRAVENOUS; SUBCUTANEOUS at 09:04

## 2017-03-22 RX ADMIN — MULTIPLE VITAMINS W/ MINERALS TAB SCH TAB: TAB at 12:06

## 2017-03-22 RX ADMIN — HYDROMORPHONE HYDROCHLORIDE PRN MG: 1 INJECTION, SOLUTION INTRAMUSCULAR; INTRAVENOUS; SUBCUTANEOUS at 18:08

## 2017-03-22 RX ADMIN — HYDROMORPHONE HYDROCHLORIDE PRN MG: 1 INJECTION, SOLUTION INTRAMUSCULAR; INTRAVENOUS; SUBCUTANEOUS at 04:58

## 2017-03-22 RX ADMIN — CEFTRIAXONE SCH MLS/HR: 1 INJECTION, SOLUTION INTRAVENOUS at 09:38

## 2017-03-22 RX ADMIN — HYDROMORPHONE HYDROCHLORIDE PRN MG: 1 INJECTION, SOLUTION INTRAMUSCULAR; INTRAVENOUS; SUBCUTANEOUS at 01:28

## 2017-03-22 RX ADMIN — METRONIDAZOLE SCH MLS/HR: 500 INJECTION, SOLUTION INTRAVENOUS at 21:17

## 2017-03-22 NOTE — CT
PROCEDURE:  CT Abdomen and Pelvis with contrast



HISTORY:

abscess



COMPARISON:

None.



TECHNIQUE:

Contrast dose: 1 bilateral Redi cat



Radiation dose:



Total exam DLP = 216 mGy-cm.



FINDINGS:



LOWER THORAX:

Unremarkable. 



LIVER:

The mild prominence of the liver is as before 



GALLBLADDER AND BILE DUCTS:

Unremarkable. 



PANCREAS:

Unremarkable. No gross lesion or ductal dilatation.



SPLEEN:

7 mm splenic cyst and/or hemangioma is suggested -unchanged 



ADRENALS:

Unremarkable. No mass. 



KIDNEYS AND URETERS:

Unremarkable. No hydronephrosis. No solid mass. 



VASCULATURE:

Unremarkable. No aortic aneurysm. 



BOWEL:

Again limited evaluation given the un even oral contrast 

administration. The prior right mid and lower abdominal surgical 

staples are noted consistent with prior bowel resection and 

anastomosis. No obstruction at this point appreciated. 



Right pericolic gutter fluid with blending phlegmons and coalescing 

small abscesses are suggested. The phlegmon extends into the right 

psoas musculature as before (gas droplet here is present) and into 

the posterior subcutaneous soft tissues at the iliac crest level 

where skin fistulization is suggested. (axial series 2, image 102) . 

The amorphous intramuscular phlegmonous  inflammatory process  

extends into the right iliacus muscle . Right gluteal subcutaneous 

coalescent small abscesses are suggested (axial series 2, image 124) 

. A discrete drainable abscess is not clearly defined 



The phlegmonous muscular inflammatory processes contiguous with the 

right lumbar vertebrae no gross intracanicular extension noted.  No 

gross lumbar osseous destruction appreciated. 



APPENDIX:

Not identified 



PERITONEUM:

Free-fluid



No free air. Please note above bowel description 



LYMPH NODES:

Unremarkable. No enlarged lymph nodes. 



BLADDER:

Distended 



REPRODUCTIVE:

Unremarkable. 



BONES:

No acute fracture. 



OTHER FINDINGS:

None.



IMPRESSION:

Extensive right geremias abdominal/geremias pelvic phlegmonous Clement tarry 

process with coalescing small abscesses. Extensions as detailed 

above. 



A discrete drainable abscess is difficult to discern. 



Postsurgical bowel changes. No high-grade bowel obstruction. The few 

extra luminal gas droplets present appear related to the phlegmon/ 

micro abscesses present.  No gross free air appreciated .

## 2017-03-22 NOTE — CP.PCM.PN
<Mable Chavez - Last Filed: 03/22/17 12:50>





Subjective





- Date & Time of Evaluation


Date of Evaluation: 03/22/17


Time of Evaluation: 08:28





- Subjective


Subjective: 


SURGERY PROGRESS NOTE FOR DR. KNAPP 





Pt is seen and examined at bedside.  Pt c/o of abdominal pain that is 

uncontrolled with the current analgesics regimen.  Pt also c/o of 2 episodes of 

diarrhea.  Denies having any fevers, chills N/V.  He is tolerating the liquid 

diet and would like it advanced.  Patient has pus draining from umbilical area 

and right lower back at site of yesterday's IR drainage.  





Objective





- Vital Signs/Intake and Output


Vital Signs (last 24 hours): 


 











Temp Pulse Resp BP Pulse Ox


 


 99.6 F   100 H  20   141/90   100 


 


 03/21/17 22:09  03/21/17 22:09  03/21/17 22:09  03/21/17 22:09  03/21/17 17:13








Intake and Output: 


 











 03/22/17 03/22/17





 06:59 18:59


 


Intake Total 180 1340


 


Output Total 2 


 


Balance 178 1340














- Medications


Medications: 


 Current Medications





Famotidine (Pepcid)  20 mg IVP Q12 [facility]


   Last Admin: 03/21/17 22:05 Dose:  20 mg


Hydromorphone HCl (Dilaudid)  0.5 mg IVP Q4H PRN


   PRN Reason: Pain, moderate (4-7)


   Last Admin: 03/22/17 04:58 Dose:  0.5 mg


Ceftriaxone Sodium (Rocephin 1 Gram Ivpb)  100 mls @ 100 mls/hr IVPB DAILY [facility]


   PRN Reason: Protocol


   Last Admin: 03/21/17 19:27 Dose:  100 mls/hr


Metronidazole (Flagyl)  100 mls @ 100 mls/hr IVPB Q8 [facility]


   PRN Reason: Protocol


   Last Admin: 03/22/17 05:00 Dose:  100 mls/hr


Sodium Chloride (Sodium Chloride 0.9%)  1,000 mls @ 100 mls/hr IV .Q10H [facility]


   Last Admin: 03/22/17 04:59 Dose:  100 mls/hr


Ondansetron HCl (Zofran Inj)  4 mg IVP Q4H PRN


   PRN Reason: Nausea/Vomiting


Potassium Chloride (K-Dur 20 Meq Er Tab)  20 meq PO ONCE ONE


   Stop: 03/22/17 08:27











- Labs


Labs: 


 





 03/22/17 07:00 





 03/22/17 07:00 





 











PT  12.2 Seconds (9.9-11.8)  H  03/21/17  14:55    


 


INR  1.13  (0.93-1.08)  H  03/21/17  14:55    


 


APTT  33.1 Seconds (23.7-30.8)  H  03/21/17  14:55    














- Constitutional


Appears: Non-toxic, No Acute Distress





- Head Exam


Head Exam: ATRAUMATIC, NORMAL INSPECTION





- ENT Exam


ENT Exam: Mucous Membranes Moist





- Respiratory Exam


Respiratory Exam: absent: Accessory Muscle Use, Respiratory Distress





- GI/Abdominal Exam


GI & Abdominal Exam: Guarding, Soft, Tenderness.  absent: Distended, Firm


Additional comments: 


purulent drainage from umblilicus and from R lower back region.  





- Extremities Exam


Extremities Exam: absent: Pedal Edema, Tenderness





- Neurological Exam


Neurological Exam: Alert, Awake, Oriented x3





- Psychiatric Exam


Psychiatric exam: Normal Affect, Normal Mood





- Skin


Skin Exam: Dry, Intact, Normal Color, Warm





Assessment and Plan





- Assessment and Plan (Free Text)


Assessment: 


21M w. Crohn's and intra-abdominal abscesses.  CT yesterday showed abscess on 

right psoas muscle extending to right lower abdomen and right pelvic side wall 

muscles.  Fistula to anterior abdominal wall to right of midline.  





-Will repeat CT of abd with PO and IV consult


-IR is consulted.     


-f/u GI recs 


-IVF


-pain management 


-abx


-tolerating diet 


-GI/DVT prophylaxis





Will d/w Dr. Knapp for further recs


Mable Chavez PGY1





<Reji Knapp B - Last Filed: 03/22/17 16:35>





Objective





- Vital Signs/Intake and Output


Vital Signs (last 24 hours): 


 











Temp Pulse Resp BP Pulse Ox


 


 99.2 F   88   20   115/66   100 


 


 03/22/17 08:33  03/22/17 08:33  03/22/17 08:33  03/22/17 08:33  03/22/17 08:33








Intake and Output: 


 











 03/22/17 03/22/17





 06:59 18:59


 


Intake Total 180 1940


 


Output Total 2 


 


Balance 178 1940














- Medications


Medications: 


 Current Medications





Famotidine (Pepcid)  20 mg IVP Q12 [facility]


   Last Admin: 03/22/17 09:38 Dose:  20 mg


Hydromorphone HCl (Dilaudid)  0.5 mg IVP Q3H PRN


   PRN Reason: Pain, moderate (4-7)


   Last Admin: 03/22/17 15:05 Dose:  0.5 mg


Ceftriaxone Sodium (Rocephin 1 Gram Ivpb)  100 mls @ 100 mls/hr IVPB DAILY [facility]


   PRN Reason: Protocol


   Last Admin: 03/22/17 09:38 Dose:  100 mls/hr


Metronidazole (Flagyl)  100 mls @ 100 mls/hr IVPB Q8 [facility]


   PRN Reason: Protocol


   Last Admin: 03/22/17 14:13 Dose:  100 mls/hr


Sodium Chloride (Sodium Chloride 0.9%)  1,000 mls @ 100 mls/hr IV .Q10H [facility]


   Last Admin: 03/22/17 12:45 Dose:  100 mls/hr


Multivitamins/Minerals (Therapeutic-M Tab)  1 tab PO DAILY [facility]


   Last Admin: 03/22/17 12:06 Dose:  1 tab


Ondansetron HCl (Zofran Inj)  4 mg IVP Q4H PRN


   PRN Reason: Nausea/Vomiting











- Labs


Labs: 


 





 03/22/17 07:00 





 03/22/17 07:00 





 











PT  12.2 Seconds (9.9-11.8)  H  03/21/17  14:55    


 


INR  1.13  (0.93-1.08)  H  03/21/17  14:55    


 


APTT  33.1 Seconds (23.7-30.8)  H  03/21/17  14:55    














Attending/Attestation





- Attestation


I have personally seen and examined this patient.: Yes


I have fully participated in the care of the patient.: Yes


I have reviewed all pertinent clinical information, including history, physical 

exam and plan: Yes


Notes (Text): 





03/22/17 16:34


Pt was seen and examined at bedside on 03/22/17


Agree with above note and assessment.


Pl see consult for more detail.


C/w current mx

## 2017-03-22 NOTE — CP.PCM.PN
<Bryce Wan - Last Filed: 03/22/17 17:46>





Subjective





- Date & Time of Evaluation


Date of Evaluation: 03/22/17


Time of Evaluation: 07:50





- Subjective


Subjective: 


21 year old  male with a pmh of Crohn's and Fe def anemia 

presents to the the ED with three purulent draining wounds on the right lower 

quadrant and one wound on his right flank that appeared like a drain site from 

a previous procedure. Today he complains of neck/back pain and diarrhea. He 

denies headache, dizziness, chest pain, shortness of breath, nausea or vomiting.





Objective





- Vital Signs/Intake and Output


Vital Signs (last 24 hours): 


 











Temp Pulse Resp BP Pulse Ox


 


 99.2 F   88   20   115/66   100 


 


 03/22/17 08:33  03/22/17 08:33  03/22/17 08:33  03/22/17 08:33  03/22/17 08:33








Intake and Output: 


 











 03/22/17 03/22/17





 06:59 18:59


 


Intake Total 180 1340


 


Output Total 2 


 


Balance 178 1340














- Medications


Medications: 


 Current Medications





Famotidine (Pepcid)  [facility]


   Last Admin: 03/22/17 09:38 Dose:  20 mg


Hydromorphone HCl (Dilaudid)  0.5 mg IVP Q3H PRN


   PRN Reason: Pain, moderate (4-7)


Ceftriaxone Sodium (Rocephin 1 Gram Ivpb)  100 mls @ 100 mls/hr IVPB DAILY [facility]


   PRN Reason: Protocol


   Last Admin: 03/22/17 09:38 Dose:  100 mls/hr


Metronidazole (Flagyl)  100 mls @ 100 mls/hr IVPB Q8 [facility]


   PRN Reason: Protocol


   Last Admin: 03/22/17 05:00 Dose:  100 mls/hr


Sodium Chloride (Sodium Chloride 0.9%)  1,000 mls @ 100 mls/hr IV [facility]


   Last Admin: 03/22/17 04:59 Dose:  100 mls/hr


Ondansetron HCl (Zofran Inj)  4 mg IVP Q4H PRN


   PRN Reason: Nausea/Vomiting











- Labs


Labs: 


 





 03/22/17 07:00 





 03/22/17 07:00 





 











PT  12.2 Seconds (9.9-11.8)  H  03/21/17  14:55    


 


INR  1.13  (0.93-1.08)  H  03/21/17  14:55    


 


APTT  33.1 Seconds (23.7-30.8)  H  03/21/17  14:55    














- Constitutional


Appears: Non-toxic, No Acute Distress





- Head Exam


Head Exam: ATRAUMATIC, NORMOCEPHALIC





- Eye Exam


Eye Exam: EOMI





- ENT Exam


ENT Exam: Mucous Membranes Moist





- Neck Exam


Neck Exam: Full ROM





- Respiratory Exam


Respiratory Exam: Clear to Ausculation Bilateral, NORMAL BREATHING PATTERN.  

absent: Rhonchi, Wheezes





- Cardiovascular Exam


Cardiovascular Exam: REGULAR RHYTHM, RRR, +S1, +S2.  absent: JVD





- GI/Abdominal Exam


GI & Abdominal Exam: Soft, Tenderness, Normal Bowel Sounds


Additional comments: 


3 Non-Healing Lesions in the RLQ of the abdomen have been cleaned from previous 

day.





- Extremities Exam


Extremities Exam: Full ROM.  absent: Joint Swelling, Tenderness





- Neurological Exam


Neurological Exam: Alert, Awake, Oriented x3





- Psychiatric Exam


Psychiatric exam: Normal Affect, Normal Mood





- Skin


Skin Exam: Dry, Intact, Normal Color, Warm





Assessment and Plan





- Assessment and Plan (Free Text)


Assessment: 


21M presents with untreated Crohn's and a abscesses/fistula malfomations in his 

right lower abdomen.


Plan: 


Abscesses/fistula


   -Surgical Consult: Dr. Snow


   -IR Consult: Dr. Gregory


   -possible procedure tomorrow


   -GI Consult: Dr. Conklin


   -rec surg/IR consult


   -benefit from steroid therapy and ultimately from immunosuppressive therapy 

if he follows up as outpatient


   -Wound Culture


   -Blood Culture [id number]


   -CT Abd/Pelvis 3/21


   -suspicious for abscess formation at and adjacent to the right psoas


   muscle extednign ot the right lower abdomen and right pelvis/pelvic sidewall 

muscles


   -suspicious for fistula to the anterior abdominal wall to the right of the 

midline


   -please see full report


   --CT Abd/Pelvis 3/22


   -extensive right geremias abdominal/geremias pelvic phlegmonous Clement tarry 

process with coalescing small abscesses


   -please see full report


   -Ceftriaxone


   -Flagyl


   -Dilaudid


   -IV NS


   -NPO





Anemia


   -Fe low


   -%Sat low


   -Ferritin normal


   -TIBC normal


   -B12 & Folate normal


   -UDS, UA


   -CBC, CMP





Hypokalemia


   -KCl given





PPX


   -Nausea Zofran


   -GI Pepcid


   -DVT SCD's





<Trever Buitrago - Last Filed: 03/22/17 18:19>





Objective





- Vital Signs/Intake and Output


Vital Signs (last 24 hours): 


 











Temp Pulse Resp BP Pulse Ox


 


 99.2 F   88   20   115/66   100 


 


 03/22/17 08:33  03/22/17 08:33  03/22/17 08:33  03/22/17 08:33  03/22/17 08:33








Intake and Output: 


 











 03/22/17 03/22/17





 06:59 18:59


 


Intake Total 180 1940


 


Output Total 2 


 


Balance 178 1940














- Medications


Medications: 


 Current Medications





Famotidine (Pepcid)  [facility] IVP [facility]


   Last Admin: 03/22/17 09:38 Dose:  20 mg


Hydromorphone HCl (Dilaudid)  0.5 mg IVP Q3H PRN


   PRN Reason: Pain, moderate (4-7)


   Last Admin: 03/22/17 18:08 Dose:  0.5 mg


Ceftriaxone Sodium (Rocephin 1 Gram Ivpb)  100 mls @ 100 mls/hr IVPB DAILY [facility]


   PRN Reason: Protocol


   Last Admin: 03/22/17 09:38 Dose:  100 mls/hr


Metronidazole (Flagyl)  100 mls @ 100 mls/hr IVPB Q8 [facility]


   PRN Reason: Protocol


   Last Admin: 03/22/17 14:13 Dose:  100 mls/hr


Sodium Chloride (Sodium Chloride 0.9%)  1,000 mls @ 100 mls/hr IV .Q10H [facility]


   Last Admin: 03/22/17 12:45 Dose:  100 mls/hr


Multivitamins/Minerals (Therapeutic-M Tab)  1 tab PO [facility]ILY [facility]


   Last Admin: 03/22/17 12:06 Dose:  1 tab


Ondansetron HCl (Zofran Inj)  4 mg IVP Q4H PRN


   PRN Reason: Nausea/Vomiting











- Labs


Labs: 


 





 03/22/17 07:00 





 03/22/17 07:00 





 











PT  12.2 Seconds (9.9-11.8)  H  03/21/17  14:55    


 


INR  1.13  (0.93-1.08)  H  03/21/17  14:55    


 


APTT  33.1 Seconds (23.7-30.8)  H  03/21/17  14:55    














Assessment and Plan





- Assessment and Plan (Free Text)


Assessment: 


attending note;





Patient seen and examined with resident.





Patient is a 21 year old  male with a pmh of Crohn's ,multiple 

surgeries, recurrent abscess drainage, Fistula  and iron deficiency anemia


Is admitted to [facility] for the first time for recurrent nonhealing Crohn's/fistula 

infection.





Started on IV Rocephin and Flagyl.


blood culture is negative so far. Patient is afebrile and nontoxic.


CT showed  multiple abscess  with chronic changes. Case discussed with surgery 

in detail.


Case discussed with IR Dr. Gregory in detail.





Will review the CT with intervention radiology tomorrow for possible need for 

drainage.





GI evaluation appreciated.





 chronic iron deficiency anemia; started on IV iron. Dietitian evaluation 

requested.





upon discharge the patient will follow-up with PMD .





patient needs close follow-up with GI and surgery as outpatient.











Attending/Attestation





- Attestation


I have personally seen and examined this patient.: Yes


I have fully participated in the care of the patient.: Yes


I have reviewed all pertinent clinical information, including history, physical 

exam and plan: Yes

## 2017-03-22 NOTE — CP.PCM.CON
<Ted Emanuel - Last Filed: 03/22/17 14:05>





History of Present Illness





- History of Present Illness


History of Present Illness: 


PGY4 GI Fellow Consult Note


Patient is a 22yo male with PMHx significant for Crohn's disease, diagnosed in 

2015, not currently on therapy who presented to the ED with complaint of 

abdominal pain and purulent abdominal wounds. The patient has an extensive 

history of abdominal surgeries for fistulas/abscesses related to his long 

standing CD with most interventions performed at [facility]. He is a poor historian 

and cannot recall his last endoscopy, believing it to have been ~8 months ago 

and cannot state what medications he was previously on (questionable history of 

Remicade infusions?). Patient has been nonadherent to therapy and currently 

does not follow with a GI physician. More concerning is his avidity for 

narcotic medications and frequent ER visits requesting prescription narcotics 

with history of early refills when looking in the [place] Rx registry.





The patient presented to our facility with complaint of abdominal pain for the 

past two weeks. He also notes purulent discharge from multiple open wounds on 

his abdomen. States he was previously evaluated and treated at [facility] but is 

seeking new opinions/physicians to care for him. He admits to fever/chills and 

diarrhea. Denies any weight loss, vision changes, rashes, hematochezia, melena.





PMHx: See HPI


PSHx: 7+ procedures for abscess/fistula formation, ? bowel resection (noted on 

CT but denied by patient)


FHx: Mother - OA, Father - HTN


Social: Denies tobacco, EtOH or illicit drug use


Endo: Unclear, admits to possibly having had colonoscopy 8 months ago





Review of Systems





- Constitutional


Constitutional: Chills, Fever, Malaise





- EENT


Eyes: absent: Change in Vision


Nose/Mouth/Throat: absent: Sore Throat





- Cardiovascular


Cardiovascular: absent: Chest Pain, Edema, Palpitations





- Respiratory


Respiratory: absent: Cough, Dyspnea, Excessive Mucous Production





- Gastrointestinal


Gastrointestinal: Abdominal Pain, Cramping, Loose Stools.  absent: Constipation

, Dyspepsia, Dysphagia, Hematemesis, Hematochezia, Melena, Nausea, Vomiting





- Genitourinary


Genitourinary: absent: Dysuria, Urinary Frequency, Urinary Urgency





- Musculoskeletal


Musculoskeletal: Back Pain.  absent: Neck Pain





- Integumentary


Integumentary: Wounds.  absent: New Lesions, Skin Pain





- Neurological


Neurological: absent: Dizziness, Numbness, Focal Weakness





- Psychiatric


Psychiatric: absent: Anxiety, Depression





- Endocrine


Endocrine: absent: Polydipsia, Polyphagia, Polyuria





- Hematologic/Lymphatic


Hematologic: absent: Easy Bleeding, Easy Bruising, Lymphadenopathy





Past Patient History





- Past Social History


Smoking Status: Never Smoked





- CARDIAC


Hx Cardiac Disorders: No





- PULMONARY


Hx Respiratory Disorders: No





- NEUROLOGICAL


Hx Neurological Disorder: No





- HEENT


Hx HEENT Problems: No





- RENAL


Hx Chronic Kidney Disease: No





- ENDOCRINE/METABOLIC


Hx Endocrine Disorders: No





- HEMATOLOGICAL/ONCOLOGICAL


Hx Blood Disorders: No


Hx Anemia: Yes (Fe Def Anemia)





- INTEGUMENTARY


Hx Dermatological Problems: No





- MUSCULOSKELETAL/RHEUMATOLOGICAL


Hx Falls: No





- GASTROINTESTINAL


Hx Crohn's Disease: Yes





- GENITOURINARY/GYNECOLOGICAL


Hx Genitourinary Disorders: No





- PSYCHIATRIC


Hx Psychophysiologic Disorder: No


Hx Substance Use: No





- SURGICAL HISTORY


Hx Surgeries: Yes


Other/Comment: Abd surg. for intraabd. abscesses x 7 last surgery 2/2017 at [facility]





- ANESTHESIA


Hx Anesthesia: No





Meds


Allergies/Adverse Reactions: 


 Allergies











Allergy/AdvReac Type Severity Reaction Status Date / Time


 


FISH Allergy   Verified 03/12/17 13:50


 


shellfish derived AdvReac  ANGIOEDEMA Verified 03/21/17 10:53














- Medications


Medications: 


 Current Medications





Famotidine (Pepcid)  20 mg IVP Q12 [facility]


   Last Admin: 03/22/17 09:38 Dose:  20 mg


Hydromorphone HCl (Dilaudid)  0.5 mg IVP Q4H PRN


   PRN Reason: Pain, moderate (4-7)


   Last Admin: 03/22/17 09:04 Dose:  0.5 mg


Ceftriaxone Sodium (Rocephin 1 Gram Ivpb)  100 mls @ 100 mls/hr IVPB DAILY [facility]


   PRN Reason: Protocol


   Last Admin: 03/22/17 09:38 Dose:  100 mls/hr


Metronidazole (Flagyl)  100 mls @ 100 mls/hr IVPB Q8 [facility]


   PRN Reason: Protocol


   Last Admin: 03/22/17 05:00 Dose:  100 mls/hr


Sodium Chloride (Sodium Chloride 0.9%)  1,000 mls @ 100 mls/hr IV .Q10H [facility]


   Last Admin: 03/22/17 04:59 Dose:  100 mls/hr


Ondansetron HCl (Zofran Inj)  4 mg IVP Q4H PRN


   PRN Reason: Nausea/Vomiting











Physical Exam





- Constitutional


Appears: Non-toxic, No Acute Distress





- Eye Exam


Eye Exam: EOMI, PERRL





- ENT Exam


ENT Exam: Mucous Membranes Moist





- Respiratory Exam


Respiratory Exam: Clear to Auscultation Bilateral.  absent: Rales, Rhonchi, 

Wheezes





- Cardiovascular Exam


Cardiovascular Exam: RRR, +S1, +S2





- GI/Abdominal Exam


GI & Abdominal Exam: Normal Bowel Sounds, Soft, Tenderness.  absent: Distended, 

Firm, Guarding


Additional comments: 


open sores on abdomen with purulent discharge





- Extremities Exam


Extremities exam: Positive for: normal inspection.  Negative for: pedal edema





- Neurological Exam


Neurological exam: Alert, Oriented x3





- Psychiatric Exam


Psychiatric exam: Normal Affect, Normal Mood





- Skin


Skin Exam: Dry, Warm





Results





- Vital Signs


Recent Vital Signs: 


 Last Vital Signs











Temp  99.2 F   03/22/17 08:33


 


Pulse  88   03/22/17 08:33


 


Resp  20   03/22/17 08:33


 


BP  115/66   03/22/17 08:33


 


Pulse Ox  100   03/22/17 08:33














- Labs


Result Diagrams: 


 03/22/17 07:00





 03/22/17 07:00


Labs: 


 Laboratory Results - last 24 hr











  03/22/17 03/22/17





  07:00 07:15


 


WBC  10.3  D 


 


RBC  4.01 


 


Hgb  8.7 L 


 


Hct  28.1 L 


 


MCV  70.1 L 


 


MCH  21.7 L 


 


MCHC  31.0 


 


RDW  18.7 H 


 


Plt Count  673 H 


 


MPV  8.1 


 


Gran %  78.8 H 


 


Lymph % (Auto)  8.8 L 


 


Mono % (Auto)  9.0 H 


 


Eos % (Auto)  3.1 


 


Baso % (Auto)  0.3 


 


Gran #  8.09 H 


 


Lymph #  0.9 L 


 


Mono #  0.9 H 


 


Eos #  0.3 


 


Baso #  0.03 


 


Sodium  141 


 


Potassium  3.2 L 


 


Chloride  99 


 


Carbon Dioxide  27 


 


Anion Gap  18 


 


BUN  5 L 


 


Creatinine  0.9 


 


Est GFR ( Amer)  > 60 


 


Est GFR (Non-Af Amer)  > 60 


 


Random Glucose  80 


 


Calcium  8.5 


 


Iron   10 L


 


TIBC   269


 


% Saturation   4 L


 


Total Bilirubin  0.3 


 


AST  51 


 


ALT  17 


 


Alkaline Phosphatase  89 


 


Total Protein  7.7 


 


Albumin  3.4 


 


Globulin  4.3 


 


Albumin/Globulin Ratio  0.8 L 














Assessment & Plan





- Assessment and Plan (Free Text)


Assessment: 


Patient is a 22yo male with PMHx significant for Crohn's disease, diagnosed in 

2015, not currently on therapy who presented to the ED with complaint of 

abdominal pain and purulent abdominal wounds.


-Crohn's disease, uncontrolled, nonadherent to therapy and outpatient follow up


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


-Psoas abscess


-Cheilitis


-JUVENTINO


Plan: 


-Recommend IR and surgical evaluations for fistulizing CD/abscess formation


-CT A/P with IV/PO ordered


-Patient has been nonadherent to therapy and does not follow up


-Pain seeking behavior previously, cautious use of narcotic medications


-Cheilitis possibly related to iron deficiency


-Patient would not benefit from steroid therapy given suspicion of ongoing 

infectious process; will not heal fistulous disease


-Would ultimately benefit from immunosuppressive therapy if he follows up as 

outpatient





- Date & Time


Date: 03/22/17


Time: 07:20





<Ban Conklin MD - Last Filed: 03/22/17 17:58>





Meds





- Medications


Medications: 


 Current Medications





Famotidine (Pepcid)  20 mg IVP Q12 [facility]


   Last Admin: 03/22/17 09:38 Dose:  20 mg


Hydromorphone HCl (Dilaudid)  0.5 mg IVP Q3H PRN


   PRN Reason: Pain, moderate (4-7)


   Last Admin: 03/22/17 15:05 Dose:  0.5 mg


Ceftriaxone Sodium (Rocephin 1 Gram Ivpb)  100 mls @ 100 mls/hr IVPB DAILY [facility]


   PRN Reason: Protocol


   Last Admin: 03/22/17 09:38 Dose:  100 mls/hr


Metronidazole (Flagyl)  100 mls @ 100 mls/hr IVPB Q8 [facility]


   PRN Reason: Protocol


   Last Admin: 03/22/17 14:13 Dose:  100 mls/hr


Sodium Chloride (Sodium Chloride 0.9%)  1,000 mls @ 100 mls/hr IV .Q10H [facility]


   Last Admin: 03/22/17 12:45 Dose:  100 mls/hr


Multivitamins/Minerals (Therapeutic-M Tab)  1 tab PO DAILY [facility]


   Last Admin: 03/22/17 12:06 Dose:  1 tab


Ondansetron HCl (Zofran Inj)  4 mg IVP Q4H PRN


   PRN Reason: Nausea/Vomiting











Results





- Vital Signs


Recent Vital Signs: 


 Last Vital Signs











Temp  99.2 F   03/22/17 08:33


 


Pulse  88   03/22/17 08:33


 


Resp  20   03/22/17 08:33


 


BP  115/66   03/22/17 08:33


 


Pulse Ox  100   03/22/17 08:33














- Labs


Result Diagrams: 


 03/22/17 07:00





 03/22/17 07:00


Labs: 


 Laboratory Results - last 24 hr











  03/22/17 03/22/17





  07:00 07:15


 


WBC  10.3  D 


 


RBC  4.01 


 


Hgb  8.7 L 


 


Hct  28.1 L 


 


MCV  70.1 L 


 


MCH  21.7 L 


 


MCHC  31.0 


 


RDW  18.7 H 


 


Plt Count  673 H 


 


MPV  8.1 


 


Gran %  78.8 H 


 


Lymph % (Auto)  8.8 L 


 


Mono % (Auto)  9.0 H 


 


Eos % (Auto)  3.1 


 


Baso % (Auto)  0.3 


 


Gran #  8.09 H 


 


Lymph #  0.9 L 


 


Mono #  0.9 H 


 


Eos #  0.3 


 


Baso #  0.03 


 


Sodium  141 


 


Potassium  3.2 L 


 


Chloride  99 


 


Carbon Dioxide  27 


 


Anion Gap  18 


 


BUN  5 L 


 


Creatinine  0.9 


 


Est GFR ( Amer)  > 60 


 


Est GFR (Non-Af Amer)  > 60 


 


Random Glucose  80 


 


Calcium  8.5 


 


Iron   10 L


 


TIBC   269


 


% Saturation   4 L


 


Ferritin  23.0 


 


Total Bilirubin  0.3 


 


AST  51 


 


ALT  17 


 


Alkaline Phosphatase  89 


 


Total Protein  7.7 


 


Albumin  3.4 


 


Globulin  4.3 


 


Albumin/Globulin Ratio  0.8 L 


 


Vitamin B12  729 


 


Folate  13.4 














Attending/Attestation





- Attestation


I have personally seen and examined this patient.: Yes


I have fully participated in the care of the patient.: Yes


I have reviewed all pertinent clinical information: Yes


Notes (Text): 





03/22/17 17:56


Pt seen and examined with GI fellow on rounds. This is a 22 yo male with PMHx 

significant for Crohn's disease, diagnosed in 2015, not currently on therapy 

who presented to the ED with complaint of abdominal pain and purulent abdominal 

wounds. CT abdomen with po contrast and drainage of fistula. Continue broad 

spectrum antibiotics. Will benefit from biologicals if he establishes 

outpatient care. Needs small bowel imaging- CTE griffith MRE and repeat colonoscopy. 

Will follow

## 2017-03-23 VITALS — OXYGEN SATURATION: 98 % | TEMPERATURE: 98.7 F | SYSTOLIC BLOOD PRESSURE: 131 MMHG | DIASTOLIC BLOOD PRESSURE: 73 MMHG

## 2017-03-23 RX ADMIN — HYDROMORPHONE HYDROCHLORIDE PRN MG: 1 INJECTION, SOLUTION INTRAMUSCULAR; INTRAVENOUS; SUBCUTANEOUS at 06:03

## 2017-03-23 RX ADMIN — HYDROMORPHONE HYDROCHLORIDE PRN MG: 1 INJECTION, SOLUTION INTRAMUSCULAR; INTRAVENOUS; SUBCUTANEOUS at 12:47

## 2017-03-23 RX ADMIN — HYDROMORPHONE HYDROCHLORIDE PRN MG: 1 INJECTION, SOLUTION INTRAMUSCULAR; INTRAVENOUS; SUBCUTANEOUS at 03:02

## 2017-03-23 RX ADMIN — METRONIDAZOLE SCH MLS/HR: 500 INJECTION, SOLUTION INTRAVENOUS at 05:28

## 2017-03-23 RX ADMIN — HYDROMORPHONE HYDROCHLORIDE PRN MG: 1 INJECTION, SOLUTION INTRAMUSCULAR; INTRAVENOUS; SUBCUTANEOUS at 09:04

## 2017-03-23 RX ADMIN — MULTIPLE VITAMINS W/ MINERALS TAB SCH TAB: TAB at 12:07

## 2017-03-23 RX ADMIN — CEFTRIAXONE SCH MLS/HR: 1 INJECTION, SOLUTION INTRAVENOUS at 09:05

## 2017-03-23 NOTE — PN
DATE: 03/23/2017



I was asked to review the patient's previous 2 abdominal/pelvic CT scans for 
possible abscess drainage.



The oral contrast remains suboptimal on the second scan.  There is incomplete 
bowel opacification.  There is enlargement and inflammatory change in the right 
iliopsoas area.  No obvious drainable abscess is seen.  No signs of free air or 
obstruction are appreciated.  There may be a punctate area of air in the right 
psoas muscle.



Given the appearance at this time, the patient is not a candidate for 
percutaneous drainage.  He may benefit from evaluation by a colorectal 
specialist and compliance in the treatment of his Crohn's disease.





__________________________________________

Jorge Luis Gregory MD







cc:   



DD: 03/23/2017 09:04:33  [id number]

TT: 03/23/2017 09:46:04

Confirmation # [id number]

Dictation # [id number]

tn

MTDD

## 2017-03-23 NOTE — CP.PCM.PN
Subjective





- Date & Time of Evaluation


Date of Evaluation: 03/23/17


Time of Evaluation: 09:45





Objective





- Vital Signs/Intake and Output


Vital Signs (last 24 hours): 


 











Temp Pulse Resp BP Pulse Ox


 


 98.7 F   88   20   131/73   98 


 


 03/23/17 09:00  03/23/17 09:00  03/23/17 09:00  03/23/17 09:00  03/23/17 09:00








Intake and Output: 


 











 03/23/17 03/23/17





 06:59 18:59


 


Intake Total 600 


 


Balance 600 














- Medications


Medications: 


 Current Medications





Famotidine (Pepcid)  20 mg IVP Q12 [facility]


   Last Admin: 03/23/17 09:04 Dose:  20 mg


Hydromorphone HCl (Dilaudid)  0.5 mg IVP Q3H PRN


   PRN Reason: Pain, moderate (4-7)


   Last Admin: 03/23/17 09:04 Dose:  0.5 mg


Ceftriaxone Sodium (Rocephin 1 Gram Ivpb)  100 mls @ 100 mls/hr IVPB DAILY [facility]


   PRN Reason: Protocol


   Last Admin: 03/23/17 09:05 Dose:  100 mls/hr


Metronidazole (Flagyl)  100 mls @ 100 mls/hr IVPB Q8 [facility]


   PRN Reason: Protocol


   Last Admin: 03/23/17 05:28 Dose:  100 mls/hr


Sodium Chloride (Sodium Chloride 0.9%)  1,000 mls @ 100 mls/hr IV .Q10H [facility]


   Last Admin: 03/22/17 23:30 Dose:  100 mls/hr


Multivitamins/Minerals (Therapeutic-M Tab)  1 tab PO DAILY [facility]


   Last Admin: 03/22/17 12:06 Dose:  1 tab


Ondansetron HCl (Zofran Inj)  4 mg IVP Q4H PRN


   PRN Reason: Nausea/Vomiting











- Labs


Labs: 


 





 03/22/17 07:00 





 03/22/17 07:00 





 











PT  12.2 Seconds (9.9-11.8)  H  03/21/17  14:55    


 


INR  1.13  (0.93-1.08)  H  03/21/17  14:55    


 


APTT  33.1 Seconds (23.7-30.8)  H  03/21/17  14:55    














- Constitutional


Appears: Non-toxic, No Acute Distress





- Head Exam


Head Exam: ATRAUMATIC, NORMOCEPHALIC





- Eye Exam


Eye Exam: EOMI





- ENT Exam


ENT Exam: Mucous Membranes Moist





- Neck Exam


Neck Exam: Full ROM





- Respiratory Exam


Respiratory Exam: Clear to Ausculation Bilateral, NORMAL BREATHING PATTERN





- Cardiovascular Exam


Cardiovascular Exam: REGULAR RHYTHM, RRR, +S1, +S2.  absent: JVD





- GI/Abdominal Exam


GI & Abdominal Exam: Firm (right side), Soft (left side), Tenderness (right side

), Normal Bowel Sounds


Additional comments: 


clean dressings applied to his wounds today





- Extremities Exam


Extremities Exam: Full ROM.  absent: Joint Swelling, Pedal Edema, Tenderness





- Neurological Exam


Neurological Exam: Alert, Awake





- Psychiatric Exam


Psychiatric exam: Normal Affect, Normal Mood





- Skin


Skin Exam: Dry, Intact, Normal Color, Warm

## 2017-03-23 NOTE — CP.PCM.DIS
Provider





- Provider


Date of Admission: 


03/21/17 17:08





Attending physician: 


Trever Buitrago MD





Primary care physician: 


Gary Colby MD





Time Spent in preparation of Discharge (in minutes): 35





Hospital Course





- Lab Results


Lab Results: 


 Micro Results





03/22/17 18:38   Abdomen   Gram Stain - Final





 Most Recent Lab Values











WBC  10.3 10^3/ul (4.5-11.0)  D 03/22/17  07:00    


 


RBC  4.01 10^6/uL (3.5-6.1)   03/22/17  07:00    


 


Hgb  8.7 gm/dL (14.0-18.0)  L  03/22/17  07:00    


 


Hct  28.1 % (42.0-52.0)  L  03/22/17  07:00    


 


MCV  70.1 fL (80.0-105.0)  L  03/22/17  07:00    


 


MCH  21.7 pg (25.0-35.0)  L  03/22/17  07:00    


 


MCHC  31.0 g/dl (31.0-37.0)   03/22/17  07:00    


 


RDW  18.7 % (11.5-14.5)  H  03/22/17  07:00    


 


Plt Count  673 10^3/uL (120.0-450.0)  H  03/22/17  07:00    


 


MPV  8.1 fl (7.0-11.0)   03/22/17  07:00    


 


Gran %  78.8 % (50.0-68.0)  H  03/22/17  07:00    


 


Lymph % (Auto)  8.8 % (22.0-35.0)  L  03/22/17  07:00    


 


Mono % (Auto)  9.0 % (1.0-6.0)  H  03/22/17  07:00    


 


Eos % (Auto)  3.1 % (1.5-5.0)   03/22/17  07:00    


 


Baso % (Auto)  0.3 % (0.0-3.0)   03/22/17  07:00    


 


Gran #  8.09  (1.4-6.5)  H  03/22/17  07:00    


 


Lymph #  0.9  (1.2-3.4)  L  03/22/17  07:00    


 


Mono #  0.9  (0.1-0.6)  H  03/22/17  07:00    


 


Eos #  0.3  (0.0-0.7)   03/22/17  07:00    


 


Baso #  0.03 K/mm3 (0.0-2.0)   03/22/17  07:00    


 


PT  12.2 Seconds (9.9-11.8)  H  03/21/17  14:55    


 


INR  1.13  (0.93-1.08)  H  03/21/17  14:55    


 


APTT  33.1 Seconds (23.7-30.8)  H  03/21/17  14:55    


 


Sodium  141 mmol/L (132-148)   03/22/17  07:00    


 


Potassium  3.2 mmol/L (3.6-5.0)  L  03/22/17  07:00    


 


Chloride  99 mmol/L ()   03/22/17  07:00    


 


Carbon Dioxide  27 mmol/L (21-33)   03/22/17  07:00    


 


Anion Gap  18  (10-20)   03/22/17  07:00    


 


BUN  5 mg/dL (7-21)  L  03/22/17  07:00    


 


Creatinine  0.9 mg/dL (0.5-1.4)   03/22/17  07:00    


 


Est GFR ( Amer)  > 60   03/22/17  07:00    


 


Est GFR (Non-Af Amer)  > 60   03/22/17  07:00    


 


POC Glucose (mg/dL)  117 mg/dL ()  H  03/23/17  07:32    


 


Random Glucose  80 mg/dL ()   03/22/17  07:00    


 


Calcium  8.5 mg/dL (8.4-10.5)   03/22/17  07:00    


 


Iron  10 ug/dL ()  L  03/22/17  07:15    


 


TIBC  269 ug/dL (261-462)   03/22/17  07:15    


 


% Saturation  4 % (20-55)  L  03/22/17  07:15    


 


Ferritin  23.0 ng/mL  03/22/17  07:00    


 


Total Bilirubin  0.3 mg/dL (0.2-1.3)   03/22/17  07:00    


 


AST  51 U/L (15-59)   03/22/17  07:00    


 


ALT  17 U/L (7-56)   03/22/17  07:00    


 


Alkaline Phosphatase  89 U/L ()   03/22/17  07:00    


 


Total Protein  7.7 g/dL (5.8-8.3)   03/22/17  07:00    


 


Albumin  3.4 g/dL (3.0-4.8)   03/22/17  07:00    


 


Globulin  4.3 gm/dL  03/22/17  07:00    


 


Albumin/Globulin Ratio  0.8  (1.1-1.8)  L  03/22/17  07:00    


 


Lipase  111 U/L ()   03/21/17  13:30    


 


Vitamin B12  729 pg/mL (239-931)   03/22/17  07:00    


 


Folate  13.4 ng/mL  03/22/17  07:00    


 


Blood Type  A POSITIVE   03/21/17  13:30    


 


Blood Type Confirm  A POSITIVE   03/21/17  13:51    


 


Antibody Screen  Negative   03/21/17  13:30    


 


BBK History Checked  No verified bt   03/21/17  13:30    














- Date & Time of H&P


Date of H&P: 03/23/17


Time of H&P: 10:00





Discharge Exam





- Head Exam


Head Exam: ATRAUMATIC, NORMAL INSPECTION





Discharge Plan





- Discharge Medications


Prescriptions: 


Amoxicillin/Clavulanate [Augmentin 875 MG-125 MG] 1 tab PO BID #10 tab


oxyCODONE/Acetaminophen [Percocet 5/325 mg Tab] 1 ea PO Q6 #10 tab





- Follow Up Plan


Condition: STABLE


Disposition: HOME/ ROUTINE

## 2017-03-23 NOTE — CP.PCM.PN
<Ted Emanuel - Last Filed: 03/23/17 12:13>





Subjective





- Date & Time of Evaluation


Date of Evaluation: 03/23/17


Time of Evaluation: 07:30





- Subjective


Subjective: 


PGY4 GI Fellow Progress Note - LATE ENTRY


Patient seen and examined bedside this morning. The patient continues to 

endorse right lower back pain and abdominal cramping despite tolerating diet 

without issue and requesting more food. Denies fever, chills, nausea, vomiting.





12 system ROS performed and negative except where stated.





Objective





- Vital Signs/Intake and Output


Vital Signs (last 24 hours): 


 











Temp Pulse Resp BP Pulse Ox


 


 98.7 F   88   20   131/73   98 


 


 03/23/17 09:00  03/23/17 09:00  03/23/17 09:00  03/23/17 09:00  03/23/17 09:00








Intake and Output: 


 











 03/23/17 03/23/17





 06:59 18:59


 


Intake Total 600 


 


Balance 600 














- Medications


Medications: 


 Current Medications





Famotidine (Pepcid)  20 [facility]P [facility]


   Last Admin: 03/23/17 09:04 Dose:  20 mg


Hydromorphone HCl (Dilaudid)  0.5 mg IVP Q3H PRN


   PRN Reason: Pain, moderate (4-7)


   Last Admin: 03/23/17 09:04 Dose:  0.5 mg


Ceftriaxone Sodium (Rocephin 1 Gram Ivpb)  100 mls @ 100 mls/hr IVPB DAILY [facility]


   PRN Reason: Protocol


   Last Admin: 03/23/17 09:05 Dose:  100 mls/hr


Metronidazole (Flagyl)  100 mls @ 100 mls/hr IVPB Q8 [facility]


   PRN Reason: Protocol


   Last Admin: 03/23/17 05:28 Dose:  100 mls/hr


Sodium Chloride (Sodium Chloride 0.9%)  1,000 mls @ 100 mls/hr IV [facility]


   Last Admin: 03/22/17 23:30 Dose:  100 mls/hr


Multivitamins/Minerals (Therapeutic-M Tab)  1 tab PO DAILY [facility]


   Last Admin: 03/22/17 12:06 Dose:  1 tab


Ondansetron HCl (Zofran Inj)  4 mg IVP Q4H PRN


   PRN Reason: Nausea/Vomiting


Oxycodone/Acetaminophen (Percocet 5/325 Mg Tab)  1 tab PO Q4H PRN


   PRN Reason: Pain, moderate (4-7)


   Stop: 03/26/17 10:10











- Labs


Labs: 


 





 03/22/17 07:00 





 03/22/17 07:00 





 











PT  12.2 Seconds (9.9-11.8)  H  03/21/17  14:55    


 


INR  1.13  (0.93-1.08)  H  03/21/17  14:55    


 


APTT  33.1 Seconds (23.7-30.8)  H  03/21/17  14:55    














- Constitutional


Appears: Non-toxic, No Acute Distress





- Eye Exam


Eye Exam: EOMI, PERRL





- ENT Exam


ENT Exam: Mucous Membranes Dry


Additional comments: 


cheilitis





- Respiratory Exam


Respiratory Exam: Clear to Ausculation Bilateral.  absent: Rales, Rhonchi, 

Wheezes





- Cardiovascular Exam


Cardiovascular Exam: RRR, +S1, +S2





- GI/Abdominal Exam


GI & Abdominal Exam: Soft, Tenderness (diffuse), Normal Bowel Sounds.  absent: 

Distended, Firm, Guarding, Rigid, Organomegaly


Additional comments: 


open wounds with purulent drainage





- Extremities Exam


Extremities Exam: Normal Inspection.  absent: Pedal Edema





- Neurological Exam


Neurological Exam: Alert, Awake, Oriented x3





- Psychiatric Exam


Psychiatric exam: Normal Affect, Normal Mood





- Skin


Skin Exam: Warm


Additional comments: 


multiple abdominal wounds





Assessment and Plan





- Assessment and Plan (Free Text)


Assessment: 


Patient is a 20yo male with PMHx significant for Crohn's disease, diagnosed in 

2015, not currently on therapy who presented to the ED with complaint of 

abdominal pain and purulent abdominal wounds.


-Crohn's disease, uncontrolled, nonadherent to therapy and outpatient follow up


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


-Psoas abscess


-Cheilitis


-JUVENTINO


Plan: 


-Patient to be D/C on antibiotics per primary service


-IR and surgery have evaluated patient; recommend colorectal surgical 

evaluation which is not available at [facility] [place]


-We too recommend that patient follow up at [facility] for ongoing care of his CD 

and strongly urge patient to comply with outpatient follow up and initiation of 

immunosuppresive medications to treat his disease





<Slava Amezcua - Last Filed: 03/23/17 15:38>





Objective





- Vital Signs/Intake and Output


Vital Signs (last 24 hours): 


 











Temp Pulse Resp BP Pulse Ox


 


 98.7 F   88   20   131/73   98 


 


 03/23/17 09:00  03/23/17 09:00  03/23/17 09:00  03/23/17 09:00  03/23/17 09:00








Intake and Output: 


 











 03/23/17 03/23/17





 06:59 18:59


 


Intake Total 600 900


 


Output Total  1200


 


Balance 600 -300














- Labs


Labs: 


 





 03/22/17 07:00 





 03/22/17 07:00 





 











PT  12.2 Seconds (9.9-11.8)  H  03/21/17  14:55    


 


INR  1.13  (0.93-1.08)  H  03/21/17  14:55    


 


APTT  33.1 Seconds (23.7-30.8)  H  03/21/17  14:55    














Attending/Attestation





- Attestation


I have personally seen and examined this patient.: Yes


I have fully participated in the care of the patient.: Yes


I have reviewed all pertinent clinical information, including history, physical 

exam and plan: Yes


Notes (Text): 


Patient seen and examined with GI fellow.  Agree with his note as documented 

above with the following additions/exceptions.  This is a 20 yo male with PMHx 

significant for fistulizing Crohn's disease not currently on therapy who 

presented to the ED with complaint of abdominal pain and purulent abdominal 

wounds.  CT with PO/IV contrast with small abscesses, possible enterocutaneous 

fistula.  IR/surgery following, no drainable collection.  Continue antibiotic 

therapy.  Patient is tolerating diet without nausea or vomiting.  Pain control 

as needed, antiemetic therapy as needed.  He will likely require biologic 

therapy for penetrating Crohn's disease, but will need to maintain good 

outpatient follow up.  





03/23/17 15:34

## 2017-03-25 ENCOUNTER — HOSPITAL ENCOUNTER (INPATIENT)
Dept: HOSPITAL 42 - ED | Age: 22
LOS: 4 days | Discharge: LEFT BEFORE DISCHARGE | DRG: 584 | End: 2017-03-29
Attending: HOSPITALIST | Admitting: INTERNAL MEDICINE
Payer: MEDICAID

## 2017-03-25 VITALS — BODY MASS INDEX: 17.8 KG/M2

## 2017-03-25 DIAGNOSIS — Z16.21: ICD-10-CM

## 2017-03-25 DIAGNOSIS — K50.913: ICD-10-CM

## 2017-03-25 DIAGNOSIS — Z76.5: ICD-10-CM

## 2017-03-25 DIAGNOSIS — K65.1: ICD-10-CM

## 2017-03-25 DIAGNOSIS — A41.9: Primary | ICD-10-CM

## 2017-03-25 DIAGNOSIS — D50.9: ICD-10-CM

## 2017-03-25 DIAGNOSIS — Z91.19: ICD-10-CM

## 2017-03-25 DIAGNOSIS — B95.2: ICD-10-CM

## 2017-03-25 DIAGNOSIS — Z91.14: ICD-10-CM

## 2017-03-25 DIAGNOSIS — L02.212: ICD-10-CM

## 2017-03-25 LAB
ADD MANUAL DIFF?: NO
ALBUMIN/GLOB SERPL: 0.8 {RATIO} (ref 1.1–1.8)
ALP SERPL-CCNC: 80 U/L (ref 38–133)
ALT SERPL-CCNC: 14 U/L (ref 7–56)
APTT BLD: 33.8 SECONDS (ref 23.7–30.8)
AST SERPL-CCNC: 60 U/L (ref 15–59)
BASOPHILS # BLD AUTO: 0.01 K/MM3 (ref 0–2)
BASOPHILS NFR BLD: 0.1 % (ref 0–3)
BILIRUB SERPL-MCNC: 0.3 MG/DL (ref 0.2–1.3)
BUN SERPL-MCNC: 5 MG/DL (ref 7–21)
CALCIUM SERPL-MCNC: 8.5 MG/DL (ref 8.4–10.5)
CHLORIDE SERPL-SCNC: 95 MMOL/L (ref 98–107)
CO2 SERPL-SCNC: 32 MMOL/L (ref 21–33)
EOSINOPHIL # BLD: 0.1 10*3/UL (ref 0–0.7)
EOSINOPHIL NFR BLD: 2.1 % (ref 1.5–5)
ERYTHROCYTE [DISTWIDTH] IN BLOOD BY AUTOMATED COUNT: 18.8 % (ref 11.5–14.5)
GLOBULIN SER-MCNC: 4.1 GM/DL
GLUCOSE SERPL-MCNC: 84 MG/DL (ref 70–110)
GRANULOCYTES # BLD: 4.45 10*3/UL (ref 1.4–6.5)
GRANULOCYTES NFR BLD: 65.8 % (ref 50–68)
HCT VFR BLD CALC: 24.9 % (ref 42–52)
INR PPP: 1.14 (ref 0.93–1.08)
LIPASE SERPL-CCNC: 26 U/L (ref 23–300)
LYMPHOCYTES # BLD: 1.4 10*3/UL (ref 1.2–3.4)
LYMPHOCYTES NFR BLD AUTO: 20.8 % (ref 22–35)
MCH RBC QN AUTO: 21.3 PG (ref 25–35)
MCHC RBC AUTO-ENTMCNC: 30.5 G/DL (ref 31–37)
MCV RBC AUTO: 69.9 FL (ref 80–105)
MONOCYTES # BLD AUTO: 0.8 10*3/UL (ref 0.1–0.6)
MONOCYTES NFR BLD: 11.2 % (ref 1–6)
PLATELET # BLD: 672 10^3/UL (ref 120–450)
PMV BLD AUTO: 8 FL (ref 7–11)
POTASSIUM SERPL-SCNC: 3.8 MMOL/L (ref 3.6–5)
PROT SERPL-MCNC: 7.4 G/DL (ref 5.8–8.3)
SODIUM SERPL-SCNC: 137 MMOL/L (ref 132–148)
WBC # BLD AUTO: 6.8 10^3/UL (ref 4.5–11)

## 2017-03-25 NOTE — ED PDOC
Arrival/HPI





- General


Historian: Patient





- History of Present Illness


Time/Duration: Other (see HPI)


Context: Home





<Shana Schofield - Last Filed: 03/25/17 23:16>





<Liu Meléndez - Last Filed: 03/26/17 04:10>





- General


Chief Complaint: Abdominal Pain


Time Seen by Provider: 03/25/17 22:25





- History of Present Illness


Narrative History of Present Illness (Text): 


03/25/17 22:59


This 22 yo male with pmh crohn's disease, presents to this ED c/o exacerbation 

of abdominal pain since this morning.  Patient stated he was seen in this ED x 

5 days ago, and he was admitted for 2 days.   Patient was recommended to /u 

[facility] clinic.  He was prescribed Augmentin, and Percocet.   He said he is 

taking his medication but they don't seem to be working.  Patient has not made 

arrangement to f/[facility] clinic, because this hospital is too far for him.   (

Shana Schofield)








Past Medical History





- Provider Review


Nursing Documentation Reviewed: Yes





- Infectious Disease


Hx of Infectious Diseases: None





- Cardiac


Hx Cardiac Disorders: No





- Pulmonary


Hx Respiratory Disorders: No





- Neurological


Hx Neurological Disorder: No





- HEENT


Hx HEENT Disorder: No





- Renal


Hx Renal Disorder: No





- Endocrine/Metabolic


Hx Endocrine Disorders: No





- Hematological/Oncological


Hx Blood Disorders: No





- Integumentary


Hx Dermatological Disorder: No





- Musculoskeletal/Rheumatological


Hx Musculoskeletal Disorders: No





- Gastrointestinal


Hx Crohn's Disease: Yes





- Genitourinary/Gynecological


Hx Genitourinary Disorders: No





- Psychiatric


Hx Psychophysiologic Disorder: No


Hx Substance Use: No





- Surgical History


Other/Comment: Abd surg. for intraabd. abscesses x 7 last surgery 2/2017 at [facility]





- Anesthesia


Hx Anesthesia: No


Hx Anesthesia Reactions: No


Hx Malignant Hyperthermia: No





- Suicidal Assessment


Feels Threatened In Home Enviroment: No





<Shana Schofield - Last Filed: 03/25/17 23:16>





Family/Social History





- Physician Review


Nursing Documentation Reviewed: Yes


Family/Social History: No Known Family HX


Smoking Status: Never Smoked


Hx Alcohol Use: No


Hx Substance Use: No





<Shana Schofield - Last Filed: 03/25/17 23:16>





Allergies/Home Meds





<Shana Schofield - Last Filed: 03/25/17 23:16>





<Liu Meléndez - Last Filed: 03/26/17 04:10>


Allergies/Adverse Reactions: 


Allergies





FISH Allergy (Verified 03/12/17 13:50)


 


shellfish derived Adverse Reaction (Verified 03/21/17 10:53)


 ANGIOEDEMA











Review of Systems





- Review of Systems


Constitutional: Normal.  absent: Fatigue, Fevers


Eyes: Normal


ENT: Normal


Respiratory: Normal.  absent: SOB


Cardiovascular: Normal.  absent: Chest Pain, Palpitations


Gastrointestinal: Abdominal Pain.  absent: Nausea, Vomiting


Genitourinary Male: Normal.  absent: Dysuria, Frequency, Hematuria


Musculoskeletal: Normal


Skin: Normal


Neurological: Normal.  absent: Headache, Focal Weakness, Gait Changes


Endocrine: Normal


Hemo/Lymphatic: Normal


Psychiatric: Normal





<Shana Schofield - Last Filed: 03/25/17 23:16>





Physical Exam


Temperature: Afebrile


Blood Pressure: Normal


Pulse: Regular


Respiratory Rate: Normal


Appearance: Positive for: Well-Appearing, Non-Toxic, Comfortable


Pain Distress: None


Mental Status: Positive for: Alert and Oriented X 3





- Systems Exam


Head: Present: Atraumatic, Normocephalic


Pupils: Present: PERRL


Extroacular Muscles: Present: EOMI


Conjunctiva: Present: Normal


Mouth: Present: Moist Mucous Membranes


Neck: Present: Normal Range of Motion


Respiratory/Chest: Present: Clear to Auscultation, Good Air Exchange.  No: 

Respiratory Distress, Accessory Muscle Use


Cardiovascular: Present: Regular Rate and Rhythm, Normal S1, S2.  No: Murmurs


Abdomen: Present: Tenderness ((+) mild generalized tenderness.  No discharge 

visualized.  No ), Normal Bowel Sounds.  No: Distention, Peritoneal Signs


Back: Present: Normal Inspection


Upper Extremity: Present: Normal Inspection, Normal ROM, NORMAL PULSES, 

Capillary Refill < 2s.  No: Cyanosis, Edema


Lower Extremity: Present: Normal Inspection, NORMAL PULSES, Normal ROM, 

Neurovascularly Intact, Capillary Refill < 2 s.  No: Edema


Neurological: Present: GCS=15, CN II-XII Intact, Speech Normal


Skin: Present: Warm, Dry, Normal Color.  No: Rashes


Psychiatric: Present: Alert, Oriented x 3





<Shana Schofield - Last Filed: 03/25/17 23:16>


Vital Signs











  Temp Pulse Resp BP Pulse Ox


 


 03/26/17 03:36  97.8 F  88  17  114/57 L  100


 


 03/25/17 22:27  98.5 F  98 H  18  125/64  99

















Medical Decision Making





<Shana Schofield - Last Filed: 03/25/17 23:16>





<Liu Meléndez - Last Filed: 03/26/17 04:10>


ED Course and Treatment: 


03/26/17 03:57


Case discussed with Dr. Spence, who is aware and agrees with plan. Accepts pt in 

to hospitalist service. Pt will go to [facility] observation for abdominal pain, 

intraabdominal abscesses, and Crohn's disease. Medical resident notified.








 (Liu Meléndez)








- Lab Interpretations


Lab Results: 








 03/25/17 23:20 





 03/25/17 23:20 





 Lab Results





03/26/17 02:03: Urine Color Yellow, Urine Appearance Clear, Urine pH 7.0, Ur 

Specific Gravity 1.010, Urine Protein 30 H, Urine Glucose (UA) Negative, Urine 

Ketones Negative, Urine Blood Negative, Urine Nitrate Negative, Urine Bilirubin 

Negative, Urine Urobilinogen 0.2, Ur Leukocyte Esterase Negative, Urine RBC 0 - 

2, Urine WBC 0 - 2, Ur Epithelial Cells 0 - 2, Urine Bacteria Rare


03/25/17 23:20: WBC 6.8  D, RBC 3.56, Hgb 7.6 L, Hct 24.9 L, MCV 69.9 L, MCH 

21.3 L, MCHC 30.5 L, RDW 18.8 H, Plt Count 672 H, MPV 8.0, Gran % 65.8, Lymph % 

(Auto) 20.8 L, Mono % (Auto) 11.2 H, Eos % (Auto) 2.1, Baso % (Auto) 0.1, Gran 

# 4.45, Lymph # 1.4, Mono # 0.8 H, Eos # 0.1, Baso # 0.01, PT 12.3 H, INR 1.14 H

, APTT 33.8 H, Sodium 137, Potassium 3.8, Chloride 95 L, Carbon Dioxide 32, 

Anion Gap 14, BUN 5 L, Creatinine 0.7, Est GFR (African Amer) > 60, Est GFR (Non

-Af Amer) > 60, Random Glucose 84, Calcium 8.5, Total Bilirubin 0.3, AST 60 H, 

ALT 14, Alkaline Phosphatase 80, Total Protein 7.4, Albumin 3.3, Globulin 4.1, 

Albumin/Globulin Ratio 0.8 L, Lipase 26














- RAD Interpretation


Radiology Orders: 








03/25/17 22:56


ABD PELVIS PO & IV CONTRAST [CT] Stat 

















- Medication Orders


Current Medication Orders: 








Piperacillin Sod/Tazobactam Sod (Zosyn 3.375 In Ns 100ml)  100 mls @ 200 mls/hr 

IV STAT STA


   PRN Reason: Protocol


   Stop: 03/26/17 04:33


Metronidazole (Flagyl)  100 mls @ 100 mls/hr IVPB STAT STA


   PRN Reason: Protocol


   Stop: 03/26/17 05:04





Discontinued Medications





Sodium Chloride (Sodium Chloride 0.9%)  1,000 mls @ 1,000 mls/hr IV .Q1H STA


   Stop: 03/25/17 23:55


   Last Admin: 03/25/17 23:32  Dose: 1,000 MLS/HR





eMAR Start Stop


 Document     03/25/17 23:32    (Rec: 03/25/17 23:33  [facility])


     Intravenous Solution


      Start Date                                 03/25/17


      Start Time                                 23:32


      End Date                                   03/26/17


      End time                                   00:32


      Total Infusion Time                        60





Iohexol (Omnipaque 240 (50 Ml)) Confirm Administered Dose 50 ml .ROUTE .STK-MED 

ONE


   Stop: 03/25/17 23:20


Iohexol (Omnipaque 350 150 Ml) Confirm Administered Dose 150 ml .ROUTE .STK-MED 

ONE


   Stop: 03/26/17 01:19


Iohexol (Omnipaque 350 100 Ml) Confirm Administered Dose 350 mg .ROUTE .STK-MED 

ONE


   Stop: 03/26/17 01:20


Morphine Sulfate (Morphine)  2 mg IVP STAT STA


   Stop: 03/25/17 22:59


   Last Admin: 03/25/17 23:33  Dose: 2 MG





MAR Pain Assessment


 Document     03/25/17 23:33    (Rec: 03/25/17 23:33  [facility])


     Pain Reassessment


      Is this a pain reassessment?               No


     Sleep


      Is patient sleeping during reassessment?   No


     Presence of Pain


      Presence of Pain                           Yes


     Pain Scale Used


      Pain Scale Used                            Numeric


     Location


      Left, Right or Bilateral                   Right


      Upper or Lower                             Lower


      Pain Location Body Site                    Abdomen


                                                 Back


     Description


      Description                                Stabbing


      Intensity of Pain at present               10


      Pain Behavior                              Withdrawal from Touch


                                                 Rubbing Site


                                                 Facial Grimacing


      Aggravating Factors                        ADL's


                                                 Changing Position


                                                 Sitting


                                                 Walking


      Alleviating Factors/Management             Medication


       Techniques                                


      Alleviating Factors                        Medication


IVP Administration


 Document     03/25/17 23:33  MR  (Rec: 03/25/17 23:33    [facility]


     Charges for Administration


      # of IVP Administrations                   1





Morphine Sulfate (Morphine)  2 mg IVP STAT STA


   Stop: 03/26/17 02:17


   Last Admin: 03/26/17 02:43  Dose: 2 MG





MAR Pain Assessment


 Document     03/26/17 02:43  MR  (Rec: 03/26/17 02:43    [facility]


     Pain Reassessment


      Is this a pain reassessment?               Yes


     Sleep


      Is patient sleeping during reassessment?   No


     Presence of Pain


      Presence of Pain                           Yes


     Pain Scale Used


      Pain Scale Used                            Numeric


     Location


      Left, Right or Bilateral                   Right


      Upper or Lower                             Lower


      Pain Location Body Site                    Back


     Description


      Description                                Sharp


      Intensity of Pain at present               9


      Pain Behavior                              Facial Grimacing


      Alleviating Factors/Management             Medication


       Techniques                                


      Alleviating Factors                        Medication


IVP Administration


 Document     03/26/17 02:43  MR  (Rec: 03/26/17 02:43    [facility]


     Charges for Administration


      # of IVP Administrations                   1

















- PA / NP / Resident Statement


MD/ has reviewed & agrees with the documentation as recorded.


MD/ has examined the patient and agrees with the treatment plan.





<Liu Meléndez - Last Filed: 03/26/17 04:10>





Disposition/Present on Arrival





- Present on Arrival


History of DVT/PE: No


History of Uncontrolled Diabetes: No


Urinary Catheter: No


History of Decub. Ulcer: No


History Surgical Site Infection Following: None





<Shana Schofield - Last Filed: 03/25/17 23:16>





- Present on Arrival


Any Indicators Present on Arrival: No


History of DVT/PE: No


History of Uncontrolled Diabetes: No


Urinary Catheter: No


History of Decub. Ulcer: No


History Surgical Site Infection Following: None





- Disposition


Have Diagnosis and Disposition been Completed?: Yes


Disposition Time: 04:08


Patient Plan: Observation





<Liu Meléndez - Last Filed: 03/26/17 04:10>





- Disposition


Diagnosis: 


 Intra-abdominal abscess, Crohn's disease, Abdominal pain


Disposition: HOSPITALIZED


Patient Problems: 


 Current Active Problems











Problem Status Diagnosed


 


Abdominal pain Acute 


 


Crohn's disease Acute 


 


Intra-abdominal abscess Acute 











Referrals: 


PCP,NO [Primary Care Provider] - Follow up with primary

## 2017-03-26 LAB
ADD MANUAL DIFF?: NO
ALBUMIN/GLOB SERPL: 0.8 {RATIO} (ref 1.1–1.8)
ALP SERPL-CCNC: 85 U/L (ref 38–133)
ALT SERPL-CCNC: 19 U/L (ref 7–56)
APPEARANCE UR: CLEAR
AST SERPL-CCNC: 45 U/L (ref 15–59)
BACTERIA #/AREA URNS HPF: (no result) /[HPF]
BASOPHILS # BLD AUTO: 0.01 K/MM3 (ref 0–2)
BASOPHILS NFR BLD: 0.2 % (ref 0–3)
BILIRUB SERPL-MCNC: 0.3 MG/DL (ref 0.2–1.3)
BILIRUB UR-MCNC: NEGATIVE MG/DL
BUN SERPL-MCNC: 3 MG/DL (ref 7–21)
CALCIUM SERPL-MCNC: 8.2 MG/DL (ref 8.4–10.5)
CHLORIDE SERPL-SCNC: 101 MMOL/L (ref 98–107)
CO2 SERPL-SCNC: 26 MMOL/L (ref 21–33)
COLOR UR: YELLOW
EOSINOPHIL # BLD: 0.2 10*3/UL (ref 0–0.7)
EOSINOPHIL NFR BLD: 2.8 % (ref 1.5–5)
EPI CELLS #/AREA URNS HPF: (no result) /HPF (ref 0–5)
ERYTHROCYTE [DISTWIDTH] IN BLOOD BY AUTOMATED COUNT: 19 % (ref 11.5–14.5)
FOLATE SERPL-MCNC: 17.5 NG/ML
GLOBULIN SER-MCNC: 3.9 GM/DL
GLUCOSE SERPL-MCNC: 87 MG/DL (ref 70–110)
GLUCOSE UR STRIP-MCNC: NEGATIVE MG/DL
GRANULOCYTES # BLD: 3.84 10*3/UL (ref 1.4–6.5)
GRANULOCYTES NFR BLD: 66.6 % (ref 50–68)
HCT VFR BLD CALC: 24.9 % (ref 42–52)
IRON SERPL-MCNC: 13 UG/DL (ref 45–180)
KETONES UR STRIP-MCNC: NEGATIVE MG/DL
LEUKOCYTE ESTERASE UR-ACNC: NEGATIVE LEU/UL
LYMPHOCYTES # BLD: 1.2 10*3/UL (ref 1.2–3.4)
LYMPHOCYTES NFR BLD AUTO: 20.5 % (ref 22–35)
MAGNESIUM SERPL-MCNC: 2 MG/DL (ref 1.7–2.2)
MCH RBC QN AUTO: 21.2 PG (ref 25–35)
MCHC RBC AUTO-ENTMCNC: 30.5 G/DL (ref 31–37)
MCV RBC AUTO: 69.4 FL (ref 80–105)
MONOCYTES # BLD AUTO: 0.6 10*3/UL (ref 0.1–0.6)
MONOCYTES NFR BLD: 9.9 % (ref 1–6)
PH UR STRIP: 7 [PH] (ref 4.7–8)
PHOSPHATE SERPL-MCNC: 3.7 MG/DL (ref 2.5–4.5)
PLATELET # BLD: 645 10^3/UL (ref 120–450)
PMV BLD AUTO: 7.8 FL (ref 7–11)
POTASSIUM SERPL-SCNC: 4 MMOL/L (ref 3.6–5)
PROT SERPL-MCNC: 6.9 G/DL (ref 5.8–8.3)
PROT UR STRIP-MCNC: 30 MG/DL
RBC # UR STRIP: NEGATIVE /UL
RBC #/AREA URNS HPF: (no result) /HPF (ref 0–2)
SODIUM SERPL-SCNC: 136 MMOL/L (ref 132–148)
SP GR UR STRIP: 1.01 (ref 1–1.03)
UROBILINOGEN UR STRIP-ACNC: 0.2 E.U./DL
WBC # BLD AUTO: 5.8 10^3/UL (ref 4.5–11)
WBC #/AREA URNS HPF: (no result) /HPF (ref 0–6)

## 2017-03-26 RX ADMIN — MORPHINE SULFATE PRN MG: 2 INJECTION, SOLUTION INTRAMUSCULAR; INTRAVENOUS at 11:27

## 2017-03-26 RX ADMIN — MORPHINE SULFATE PRN MG: 4 INJECTION, SOLUTION INTRAMUSCULAR; INTRAVENOUS at 15:36

## 2017-03-26 RX ADMIN — PIPERACILLIN AND TAZOBACTAM SCH MLS/HR: 3; .375 INJECTION, POWDER, LYOPHILIZED, FOR SOLUTION INTRAVENOUS; PARENTERAL at 23:55

## 2017-03-26 RX ADMIN — PIPERACILLIN AND TAZOBACTAM SCH MLS/HR: 3; .375 INJECTION, POWDER, LYOPHILIZED, FOR SOLUTION INTRAVENOUS; PARENTERAL at 12:02

## 2017-03-26 RX ADMIN — MORPHINE SULFATE PRN MG: 4 INJECTION, SOLUTION INTRAMUSCULAR; INTRAVENOUS at 19:57

## 2017-03-26 RX ADMIN — MORPHINE SULFATE PRN MG: 2 INJECTION, SOLUTION INTRAMUSCULAR; INTRAVENOUS at 07:25

## 2017-03-26 RX ADMIN — PIPERACILLIN AND TAZOBACTAM SCH MLS/HR: 3; .375 INJECTION, POWDER, LYOPHILIZED, FOR SOLUTION INTRAVENOUS; PARENTERAL at 18:12

## 2017-03-26 RX ADMIN — MORPHINE SULFATE PRN MG: 4 INJECTION, SOLUTION INTRAMUSCULAR; INTRAVENOUS at 23:56

## 2017-03-26 NOTE — CP.PCM.CON
History of Present Illness





- History of Present Illness


History of Present Illness: 


Surgery: Dr. Mcnally





CC: Abd pain





HPI: 21M w. pmh of Crohn's dx in 2015, who is non-compliant w. medical 

management, and subsequently has had multiple abd operations for intra-

abdominal abscesses.  Pt states that he has had at least 7 operations for 

drainage of intra-abdominal abscesses in the past. The last one being in 

[place] [facility]. Pt also has drug seeking behavior. Pt presents to ED w. R 

sided abd / flank pain w. multiple draining enterocutaneous fistulas. Pt 

initially presented to ED on 3/21 for similar complaints w. CT showing R sided 

psoas abscess/collection. Pt underwent workup by surgery, IR, and GI. Per IR 

collection was not drainable. Per GI the pt needs to start medical management 

of his Crohn's and needs to be compliant. From surgical standpoint, it was 

recommended that pt follow up w. colorectal specialist at [facility]. Pt was D/C on 3

/23, he did not follow up on recommendations for medical management. He states 

that he was unable to arrange transportation, and as a result he has returned 

to [facility].





PMH: crohn's


PSH: Multiple drainage's of intra-abdominal abscesses, questionable small bowel 

resection


Meds: none


ALL: shell fish


Social: No ETOH/tobacco, drug seeking behavior


Fhx: none





Review of Systems





- Review of Systems


All systems: reviewed and no additional remarkable complaints except (HPI)





Past Patient History





- Infectious Disease


Hx of Infectious Diseases: None





- Past Social History


Smoking Status: Never Smoked





- CARDIAC


Hx Cardiac Disorders: No


Hx Peripheral Vascular Disease: No





- PULMONARY


Hx Respiratory Disorders: No


Hx Tuberculosis: No





- NEUROLOGICAL


Hx Neurological Disorder: No


Hx Transient Ischemic Attacks (TIA): No





- HEENT


Hx HEENT Problems: No


Hx Macular Degeneration: No





- RENAL


Hx Chronic Kidney Disease: No


Hx Renal Failure: No





- ENDOCRINE/METABOLIC


Hx Endocrine Disorders: No


Hx Systemic Lupus Erythematosus: No





- HEMATOLOGICAL/ONCOLOGICAL


Hx Blood Disorders: No


Hx Unexplained Bleeding: No





- INTEGUMENTARY


Hx Dermatological Problems: No


Hx Squamous Cell: No





- MUSCULOSKELETAL/RHEUMATOLOGICAL


Hx Musculoskeletal Disorders: No


Hx Falls: No


Hx Unsteady Gait: No





- GASTROINTESTINAL


Hx Crohn's Disease: Yes





- GENITOURINARY/GYNECOLOGICAL


Hx Genitourinary Disorders: No


Hx Urinary Tract Infection: No





- PSYCHIATRIC


Hx Psychophysiologic Disorder: No


Hx Sexual Abuse: No





- SURGICAL HISTORY


Hx Surgeries: Yes (abscess drain)





- ANESTHESIA


Hx Anesthesia: No


Hx Anesthesia Reactions: No


Hx Malignant Hyperthermia: No





Meds


Allergies/Adverse Reactions: 


 Allergies











Allergy/AdvReac Type Severity Reaction Status Date / Time


 


FISH Allergy   Verified 03/12/17 13:50


 


shellfish derived AdvReac  ANGIOEDEMA Verified 03/21/17 10:53














- Medications


Medications: 


 Current Medications





Sodium Chloride (Sodium Chloride 0.9%)  1,000 mls @ 100 mls/hr IV .Q10H [facility]


   Last Admin: 03/26/17 09:24 Dose:  100 mls/hr


Piperacillin Sod/Tazobactam Sod (Zosyn 3.375 In Ns 100ml)  100 mls @ 200 mls/hr 

IVPB Q6 [facility]BARA


   PRN Reason: Protocol


   Stop: 04/09/17 12:01


   Last Admin: 03/26/17 12:02 Dose:  200 mls/hr


Metronidazole (Flagyl)  500 mg PO Q8 [facility]BARA


   PRN Reason: Protocol


   Stop: 04/09/17 14:01


   Last Admin: 03/26/17 13:17 Dose:  500 mg


Morphine Sulfate (Morphine)  4 mg IVP Q4H PRN


   PRN Reason: Pain, severe (8-10)


   Last Admin: 03/26/17 15:36 Dose:  4 mg


Oxycodone/Acetaminophen (Percocet 5/325 Mg Tab)  1 tab PO Q6H PRN


   PRN Reason: Pain, moderate (4-7)


   Stop: 03/29/17 14:05


Pantoprazole Sodium (Protonix Inj)  40 mg IVP Q12 [facility]


   Last Admin: 03/26/17 09:28 Dose:  40 mg











Physical Exam





- Constitutional


Appears: Non-toxic, No Acute Distress, Other (underweight)





- Head Exam


Head Exam: ATRAUMATIC, NORMOCEPHALIC





- Eye Exam


Eye Exam: EOMI.  absent: Scleral icterus





- ENT Exam


ENT Exam: Mucous Membranes Moist, Normal External Ear Exam





- Neck Exam


Neck exam: Positive for: Full Rom





- Respiratory Exam


Respiratory Exam: NORMAL BREATHING PATTERN.  absent: Accessory Muscle Use, 

Respiratory Distress





- GI/Abdominal Exam


GI & Abdominal Exam: Soft, Tenderness (R side / Flank).  absent: Distended, Firm

, Guarding, Rebound


Additional comments: 


fistula w. purulent drainage at umbilicus, RLQ, RUQ, and R flank, no odor noted

, tender to palpation





- Extremities Exam


Extremities exam: Negative for: calf tenderness, pedal edema





- Neurological Exam


Neurological exam: Alert, Oriented x3





Results





- Vital Signs


Recent Vital Signs: 


 Last Vital Signs











Temp  98.5 F   03/26/17 07:49


 


Pulse  92 H  03/26/17 07:49


 


Resp  17   03/26/17 13:29


 


BP  110/63   03/26/17 07:49


 


Pulse Ox  100   03/26/17 07:49














- Labs


Result Diagrams: 


 03/26/17 07:30





 03/26/17 07:30


Labs: 


 Laboratory Results - last 24 hr











  03/26/17 03/26/17





  05:30 07:30


 


WBC   5.8


 


RBC   3.59


 


Hgb   7.6 L


 


Hct   24.9 L


 


MCV   69.4 L


 


MCH   21.2 L


 


MCHC   30.5 L


 


RDW   19.0 H


 


Plt Count   645 H


 


MPV   7.8


 


Gran %   66.6


 


Lymph % (Auto)   20.5 L


 


Mono % (Auto)   9.9 H


 


Eos % (Auto)   2.8


 


Baso % (Auto)   0.2


 


Gran #   3.84


 


Lymph #   1.2


 


Mono #   0.6


 


Eos #   0.2


 


Baso #   0.01


 


Sodium   136


 


Potassium   4.0


 


Chloride   101


 


Carbon Dioxide   26


 


Anion Gap   13


 


BUN   3 L


 


Creatinine   0.7


 


Est GFR ( Amer)   > 60


 


Est GFR (Non-Af Amer)   > 60


 


Random Glucose   87


 


Calcium   8.2 L


 


Phosphorus   3.7


 


Magnesium   2.0


 


Iron   13 L


 


TIBC   223 L


 


% Saturation   6 L


 


Ferritin   140.0


 


Total Bilirubin   0.3


 


AST   45


 


ALT   19


 


Alkaline Phosphatase   85


 


Total Protein   6.9


 


Albumin   3.0


 


Globulin   3.9


 


Albumin/Globulin Ratio   0.8 L


 


Folate   17.5


 


Blood Type  A POSITIVE 


 


Antibody Screen  Negative 


 


BBK History Checked  Patient has bt 














- Imaging and Cardiology


  ** CT scan - abdomen


Status: Image reviewed by me, Report reviewed by me





Assessment & Plan





- Assessment and Plan (Free Text)


Assessment: 


21M w. 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

s non-compliance


-abx per ID


-pain management


-no plans for surgical intervention


-strongly recommend that pt follow up w. specialist at [facility]


-will d/w attending





[place] PGY2

## 2017-03-26 NOTE — CT
EXAM:

  CT Abdomen and Pelvis With Intravenous Contrast.



CLINICAL HISTORY:

  21 years old, male; Pain; Abdominal pain; Generalized; Prior surgery; Surgery 

type: Surgery for crohns disease; Additional info: Diffused abdominal pain



TECHNIQUE:

  Axial computed tomography images of the abdomen and pelvis with intravenous 

contrast.  This CT exam was performed using one or more of the following dose 

reduction techniques:  automated exposure control, adjustment of the mA and/or 

kV according to patient size, and/or use of iterative reconstruction technique.

  Coronal and sagittal reformatted images were created and reviewed.



CONTRAST:

  100 mL of omni 350 administered intravenously.



EXAM DATE/TIME:

  Exam ordered 3/25/2017 10:56 PM



COMPARISON:

  CT - ABD PELVIS PO   IV CONTRAST 3/22/2017 2:35:57 PM



FINDINGS:

  Lower thorax: No acute findings.



 ABDOMEN:

  Liver:  Unremarkable.  No mass.

  Gallbladder and bile ducts:  Gallbladder is collapsed.  No biliary dilatation 

seen.  No calcified stones.

  Pancreas:  Unremarkable.  No mass.  No ductal dilation.

  Spleen:  Redemonstration of small hypoattenuating finding in the spleen, not 

fully characterized.

  Adrenals:  Unremarkable.  No mass.

  Kidneys and ureters:  Unremarkable.  No solid mass.  No hydronephrosis.

  Stomach and bowel:  There is redemonstration of a bowel anastomosis.  There 

is no finding to suggest bowel obstruction.  There is no specific finding of 

bowel perforation.  There is diffuse thickening of the colon from the distal 

sigmoid to the rectum, this could be related to Crohn's flare versus 

inflammatory change secondary to the other abdominal pathology, and other such 

as antibiotic associated colitis also in the differential.

  Appendix:  No findings to suggest acute appendicitis.



 PELVIS:

  Bladder:  Bladder is partly collapsed limiting evaluation, favor wall is 

thick, correlate for infection.

  Reproductive:  Unremarkable as visualized.



 ABDOMEN and PELVIS:

  Intraperitoneal space: There is no definite free intraperitoneal air and no 

evidence to suggest intraperitoneal contrast.  There is redemonstration of free 

fluid in the right paracolic gutter, and inseparable from the phlegmon and 

fluid associated with the right psoas and right lateral abdominal wall.

  Bones/joints:  No acute fracture.  No dislocation.

  Soft tissues:  Series 2 image [id number], in the right lower abdomen, new dots of 

air are seen in the fluid between the layers of the abdominal wall musculature. 

 The posterior right subcutaneous tissues show fluid tracking  to the skin 

surface series 2 image 150, less well defined than previously, again associated 

with fluid tracking deeper / laterally as well as deeper and medial   but 

without a definite enhancing rim.  Please note that because of poor enhancement 

it is believed that this does not exclude a drainable component   particularly 

in the posterior off midline on the right.



 [id number], im84, new dots of air within right psoas where there was a previous 

microabscess.  



Favor that there is interval increase in edema of the right posterior 

paraspinous muscles, noting that tissue contrast is somewhat less clear on the 

present study than on the previous study.







  Vasculature:  The portal venous system is noted patent, there is no evidence 

to suggest portal vein thrombosis complicating the present pathology.  No 

abdominal aortic aneurysm.

  Lymph nodes:  Enlarged right inguinal and right external iliac nodes are 

favored to be reactive.  Clarification after the present acute process is 

resolved is recommended.

  Other findings:  As described previously, there is not definitive extension 

into the spinal canal, noting that this study is not optimal for that purpose.



IMPRESSION:     

  Redemonstration of previous extensive abnormalities in the pelvis, with 

pelvic ascites presumed infected, phlegmon and microabscesses in the right 

pelvis, psoas and right paraspinous muscles, and both anterior and posterior 

subcutaneous tissues, with apparent skin fistulization.



  There are new dots of air as described within   previously seen abscesses.  



  Noted that although there is not a discrete rim-enhancing collection, 

contrast opacification is suboptimal, and drainable collections particularly 

where there is now air, and in the right anterior abdominal wall, not excluded. 

 



  New colonic wall thickening, differential as above.



  New bladder wall thickening, consider infection.

## 2017-03-26 NOTE — CON
DATE: 03/26/2017



The patient is seen earlier today in room 570, bed 2.



CHIEF COMPLAINT:  Abdominal pain times several days.



HISTORY OF PRESENT ILLNESS:  This is a 21-year-old male with past medical history significant for 
hn's disease.  The patient was generally followed at [facility] in [place] and the patient wa
s also seen in [facility] for multiple admissions and patient also had been going to 
[facility] for multiple admissions and has had multiple abdominal surgeries for his Crohn's dise
ase and has had multiple intra-abdominal abscesses, mostly followed at [facility].  H
e states he was unhappy with the care and the amount of pain medications he was receiving and therefo
re he has come to the Emergency Room and [facility] seeking further care for his Crohn's 
disease or abdominal pain.  In the Emergency Room, the patient said that his abdominal pain had been 
worse, it is diffuse in nature.  He has not had any nausea or vomiting, no fevers or chills and no ch
est pain.  No diarrhea or constipation, it is new.



PAST MEDICAL HISTORY:  Significant for Crohn's disease, iron deficiency anemia.



PAST SURGICAL HISTORY:  Significant for multiple abdominal surgeries.



ALLERGIES:  FISH, AND SHELLFISH.



MEDICATIONS AT HOME:  Include oxycodone, naproxen and patient was on Augmentin.



PHYSICAL EXAMINATION:

GENERAL:  He is in bed, appearing chronically ill, cachectic.

VITAL SIGNS:  Temperature of 98, heart rate of also 92-98, respiratory rate of 18-20, blood pressure 
is 114/50.

HEENT:  Unremarkable.

NECK:  Supple.

LUNGS:  Have decreased breath sounds.

HEART:  Normal S1, S2.

ABDOMEN:  Soft, nontender.  No rebound, no guarding.  There is diffuse mild tenderness, but not on de
ep palpation.



LABORATORY EXAMINATION:  Reveals a white count of 6.8, hemoglobin of 7.6, platelets of 672.  Coagulat
ion is noted.  Chemistries reveal a BUN of 5, creatinine of 0.7.  Urinalysis reveals unremarkable.  



The Emergency Room chart is reviewed.  Dr. Lemus ____history and physical examination is also reviewed
.  The patient had a CAT scan of the abdomen and pelvis which revealed pelvic ascites, presumed infec
artis phlegmon microabscesses and right pelvis psoas in the right paraspinous muscles in both anterior 
and posterior subcutaneous tissue, fistula formation and new colonic wall thickening and new bladder 
wall thickening.



Review of the orders reveals the patient's blood cultures have been ordered, wound culture has been o
rdered, not collected.  The patient is on Flagyl and ceftriaxone.  The patient was given a dose of Zy
vox and a dose of Zosyn.



ASSESSMENT AND PLAN:  This is a 21-year-old with iron deficiency anemia and Crohn's disease, now pres
enting with abdominal pain with Crohn's disease with fistula and possible intra-abdominal abscesses. 
 GI consultation, surgical consultation.  We will start the patient on IV Zosyn and p.o..  And furthe
r recommendations pending GI and surgical consultation regarding patient's care.  We will follow with
 you.





__________________________________________

Chirag Mckee MD







cc:



DD: 03/26/2017 11:07:39  350

TT: 03/26/2017 11:35:20

Confirmation # [id number]

Dictation # [id number]

fabricio

## 2017-03-26 NOTE — CP.PCM.CON
<Ted Emanuel - Last Filed: 03/26/17 13:46>





History of Present Illness





- History of Present Illness


History of Present Illness: 


PGY4 GI Fellow Consult Note


Patient is a 20yo male with PMHx significant for Crohn's disease, diagnosed in 

2015, not currently on therapy who presented to the ED with complaint of 

abdominal pain and purulent abdominal wounds. The patient has an extensive 

history of abdominal surgeries for fistulas/abscesses related to his long 

standing CD with most interventions performed at [facility]. He is a poor historian 

and cannot recall his last endoscopy, believing it to have been ~8 months ago 

and cannot state what medications he was previously on (Remicade infusions). 

Patient has been nonadherent to therapy and currently does not follow with a GI 

physician. More concerning is his avidity for narcotic medications and frequent 

ER visits requesting prescription narcotics with history of early refills when 

looking in the [place] Rx registry.





The patient was recently admitted to our facility for identical complaints 

pertaining to abdominal cramping pain and abdominal wound drainage and was 

subsequently discharged with instructions to follow up at [facility] for ongoing 

care given the complicated nature of his disease. He was given prescription for 

Augmentin and narcotic pain medications at time of D/C and states he has run 

out of them. He admits to right flank and back pain along with persistent 

drainage from abdominal wounds/fistulas. He has not followed up with any 

physicians in the interim. Of note, culture of his abdominal wound did reveal 

VRE. At present, he is requesting more pain medication and a regular diet order.





PMHx: See HPI


PSHx: 7+ procedures for abscess/fistula formation, ? bowel resection (noted on 

CT but denied by patient)


FHx: Mother - OA, Father - HTN


Social: Denies tobacco, EtOH or illicit drug use


Endo: Unclear, admits to possibly having had colonoscopy 8 months ago





Review of Systems





- Constitutional


Constitutional: Fatigue, Malaise.  absent: Anorexia, Chills, Fever, Weight Loss





- EENT


Eyes: absent: Change in Vision


Nose/Mouth/Throat: absent: Sore Throat





- Cardiovascular


Cardiovascular: absent: Chest Pain, Dyspnea, Dyspnea on Exertion





- Respiratory


Respiratory: absent: Cough, Dyspnea, Excessive Mucous Production





- Gastrointestinal


Gastrointestinal: Abdominal Pain, Bloating, Cramping.  absent: Constipation, 

Diarrhea, Dyspepsia, Dysphagia, Hematemesis, Hematochezia, Melena, Nausea, 

Vomiting





- Genitourinary


Genitourinary: absent: Dysuria, Urinary Frequency, Urinary Urgency





- Integumentary


Integumentary: absent: New Lesions, Rash





- Neurological


Neurological: absent: Dizziness, Numbness, Focal Weakness





- Psychiatric


Psychiatric: absent: Anxiety, Depression





- Endocrine


Endocrine: absent: Polydipsia, Polyphagia, Polyuria





- Hematologic/Lymphatic


Hematologic: absent: Easy Bleeding, Easy Bruising, Lymphadenopathy





Past Patient History





- Infectious Disease


Hx of Infectious Diseases: None





- Past Social History


Smoking Status: Never Smoked





- CARDIAC


Hx Cardiac Disorders: No


Hx Peripheral Vascular Disease: No





- PULMONARY


Hx Respiratory Disorders: No


Hx Tuberculosis: No





- NEUROLOGICAL


Hx Neurological Disorder: No


Hx Transient Ischemic Attacks (TIA): No





- HEENT


Hx HEENT Problems: No


Hx Macular Degeneration: No





- RENAL


Hx Chronic Kidney Disease: No


Hx Renal Failure: No





- ENDOCRINE/METABOLIC


Hx Endocrine Disorders: No


Hx Systemic Lupus Erythematosus: No





- HEMATOLOGICAL/ONCOLOGICAL


Hx Blood Disorders: No


Hx Unexplained Bleeding: No





- INTEGUMENTARY


Hx Dermatological Problems: No


Hx Squamous Cell: No





- MUSCULOSKELETAL/RHEUMATOLOGICAL


Hx Musculoskeletal Disorders: No


Hx Falls: No


Hx Unsteady Gait: No





- GASTROINTESTINAL


Hx Crohn's Disease: Yes





- GENITOURINARY/GYNECOLOGICAL


Hx Genitourinary Disorders: No


Hx Urinary Tract Infection: No





- PSYCHIATRIC


Hx Psychophysiologic Disorder: No


Hx Sexual Abuse: No





- SURGICAL HISTORY


Hx Surgeries: Yes (abscess drain)





- ANESTHESIA


Hx Anesthesia: No


Hx Anesthesia Reactions: No


Hx Malignant Hyperthermia: No





Meds


Allergies/Adverse Reactions: 


 Allergies











Allergy/AdvReac Type Severity Reaction Status Date / Time


 


FISH Allergy   Verified 03/12/17 13:50


 


shellfish derived AdvReac  ANGIOEDEMA Verified 03/21/17 10:53














- Medications


Medications: 


 Current Medications





Sodium Chloride (Sodium Chloride 0.9%)  1,000 mls @ 100 mls/hr IV .Q10H BARBARA


   Last Admin: 03/26/17 09:24 Dose:  100 mls/hr


Piperacillin Sod/Tazobactam Sod (Zosyn 3.375 In Ns 100ml)  100 mls @ 200 mls/hr 

IVPB Q6 BARBARA


   PRN Reason: Protocol


   Stop: 04/09/17 12:01


   Last Admin: 03/26/17 12:02 Dose:  200 mls/hr


Metronidazole (Flagyl)  500 mg PO Q8 BARBARA


   PRN Reason: Protocol


   Stop: 04/09/17 14:01


   Last Admin: 03/26/17 13:17 Dose:  500 mg


Morphine Sulfate (Morphine)  2 mg IVP Q4H PRN


   PRN Reason: Pain, severe (8-10)


   Last Admin: 03/26/17 11:27 Dose:  2 mg


Pantoprazole Sodium (Protonix Inj)  40 mg IVP Q12 BARBARA


   Last Admin: 03/26/17 09:28 Dose:  40 mg











Physical Exam





- Constitutional


Appears: Non-toxic, No Acute Distress





- Eye Exam


Eye Exam: EOMI, PERRL





- ENT Exam


ENT Exam: Mucous Membranes Moist





- Respiratory Exam


Respiratory Exam: Clear to Auscultation Bilateral.  absent: Rales, Rhonchi, 

Wheezes





- Cardiovascular Exam


Cardiovascular Exam: RRR, +S1, +S2





- GI/Abdominal Exam


GI & Abdominal Exam: Normal Bowel Sounds, Soft, Tenderness (RLQ, RUQ).  absent: 

Distended, Firm, Guarding, Rigid


Additional comments: 


draining lesions on abdominal wall





- Extremities Exam


Extremities exam: Positive for: normal inspection.  Negative for: pedal edema





- Neurological Exam


Neurological exam: Alert, Oriented x3





- Psychiatric Exam


Psychiatric exam: Normal Affect, Normal Mood





- Skin


Skin Exam: Dry, Warm





Results





- Vital Signs


Recent Vital Signs: 


 Last Vital Signs











Temp  98.5 F   03/26/17 07:49


 


Pulse  92 H  03/26/17 07:49


 


Resp  17   03/26/17 13:29


 


BP  110/63   03/26/17 07:49


 


Pulse Ox  100   03/26/17 07:49














- Labs


Result Diagrams: 


 03/26/17 07:30





 03/26/17 07:30


Labs: 


 Laboratory Results - last 24 hr











  03/26/17 03/26/17





  05:30 07:30


 


WBC   5.8


 


RBC   3.59


 


Hgb   7.6 L


 


Hct   24.9 L


 


MCV   69.4 L


 


MCH   21.2 L


 


MCHC   30.5 L


 


RDW   19.0 H


 


Plt Count   645 H


 


MPV   7.8


 


Gran %   66.6


 


Lymph % (Auto)   20.5 L


 


Mono % (Auto)   9.9 H


 


Eos % (Auto)   2.8


 


Baso % (Auto)   0.2


 


Gran #   3.84


 


Lymph #   1.2


 


Mono #   0.6


 


Eos #   0.2


 


Baso #   0.01


 


Sodium   136


 


Potassium   4.0


 


Chloride   101


 


Carbon Dioxide   26


 


Anion Gap   13


 


BUN   3 L


 


Creatinine   0.7


 


Est GFR ( Amer)   > 60


 


Est GFR (Non-Af Amer)   > 60


 


Random Glucose   87


 


Calcium   8.2 L


 


Phosphorus   3.7


 


Magnesium   2.0


 


Iron   13 L


 


TIBC   223 L


 


% Saturation   6 L


 


Ferritin   140.0


 


Total Bilirubin   0.3


 


AST   45


 


ALT   19


 


Alkaline Phosphatase   85


 


Total Protein   6.9


 


Albumin   3.0


 


Globulin   3.9


 


Albumin/Globulin Ratio   0.8 L


 


Folate   17.5


 


Blood Type  A POSITIVE 


 


Antibody Screen  Negative 


 


BBK History Checked  Patient has bt 














Assessment & Plan





- Assessment and Plan (Free Text)


Assessment: 


Patient is a 20yo male with PMHx significant for Crohn's disease, diagnosed in 

2015, not currently on therapy who presented to the ED with complaint of 

abdominal pain and purulent abdominal wounds.


-Fistulizing Crohn's disease, uncontrolled, nonadherent to therapy and 

outpatient follow up


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


-Pelvic fluid collection, concern for abscess


-Cheilitis


Plan: 


-CT repeated on admission, results noted


-ID following, apreciate antibiotic recommendations in setting of recent VRE+ 

culture


-Cannot initiate steroid therapy given ongoing infections; would not heal 

fistulous disease


-Patient has been nonadherent to outpatient TNFa therapy which has been shown 

to improve fistulous CD


-Recommend re-evaluation by surgery and IR


-No plan for endoscopic intervention at this time


-Will follow





- Date & Time


Date: 03/26/17


Time: 07:30





<Slava Amezcua - Last Filed: 03/26/17 14:35>





Meds





- Medications


Medications: 


 Current Medications





Sodium Chloride (Sodium Chloride 0.9%)  1,000 mls @ 100 mls/hr IV .Q10H [facility]


   Last Admin: 03/26/17 09:24 Dose:  100 mls/hr


Piperacillin Sod/Tazobactam Sod (Zosyn 3.375 In Ns 100ml)  100 mls @ 200 mls/hr 

IVPB Q6 BARBARA


   PRN Reason: Protocol


   Stop: 04/09/17 12:01


   Last Admin: 03/26/17 12:02 Dose:  200 mls/hr


Metronidazole (Flagyl)  500 mg PO Q8 BARBARA


   PRN Reason: Protocol


   Stop: 04/09/17 14:01


   Last Admin: 03/26/17 13:17 Dose:  500 mg


Morphine Sulfate (Morphine)  4 mg IVP Q4H PRN


   PRN Reason: Pain, severe (8-10)


Oxycodone/Acetaminophen (Percocet 5/325 Mg Tab)  1 tab PO Q6H PRN


   PRN Reason: Pain, moderate (4-7)


   Stop: 03/29/17 14:05


Pantoprazole Sodium (Protonix Inj)  40 mg IVP Q12 BARBARA


   Last Admin: 03/26/17 09:28 Dose:  40 mg











Results





- Vital Signs


Recent Vital Signs: 


 Last Vital Signs











Temp  98.5 F   03/26/17 07:49


 


Pulse  92 H  03/26/17 07:49


 


Resp  17   03/26/17 13:29


 


BP  110/63   03/26/17 07:49


 


Pulse Ox  100   03/26/17 07:49














- Labs


Result Diagrams: 


 03/26/17 07:30





 03/26/17 07:30


Labs: 


 Laboratory Results - last 24 hr











  03/26/17 03/26/17





  05:30 07:30


 


WBC   5.8


 


RBC   3.59


 


Hgb   7.6 L


 


Hct   24.9 L


 


MCV   69.4 L


 


MCH   21.2 L


 


MCHC   30.5 L


 


RDW   19.0 H


 


Plt Count   645 H


 


MPV   7.8


 


Gran %   66.6


 


Lymph % (Auto)   20.5 L


 


Mono % (Auto)   9.9 H


 


Eos % (Auto)   2.8


 


Baso % (Auto)   0.2


 


Gran #   3.84


 


Lymph #   1.2


 


Mono #   0.6


 


Eos #   0.2


 


Baso #   0.01


 


Sodium   136


 


Potassium   4.0


 


Chloride   101


 


Carbon Dioxide   26


 


Anion Gap   13


 


BUN   3 L


 


Creatinine   0.7


 


Est GFR ( Amer)   > 60


 


Est GFR (Non-Af Amer)   > 60


 


Random Glucose   87


 


Calcium   8.2 L


 


Phosphorus   3.7


 


Magnesium   2.0


 


Iron   13 L


 


TIBC   223 L


 


% Saturation   6 L


 


Ferritin   140.0


 


Total Bilirubin   0.3


 


AST   45


 


ALT   19


 


Alkaline Phosphatase   85


 


Total Protein   6.9


 


Albumin   3.0


 


Globulin   3.9


 


Albumin/Globulin Ratio   0.8 L


 


Folate   17.5


 


Blood Type  A POSITIVE 


 


Antibody Screen  Negative 


 


BBK History Checked  Patient has bt 














Attending/Attestation





- Attestation


I have personally seen and examined this patient.: Yes


I have fully participated in the care of the patient.: Yes


I have reviewed all pertinent clinical information: Yes


Notes (Text): 


Patient seen and examined with GI fellow.  Agree with his note as documented 

above with the following additions/exceptions.  This is a 21 year old male with 

h/o fistulizing Crohn's disease not currently on any maintenance medication, h/

o poor follow up/non compliance who presents to hospital with complaint of 

worsening abdominal pain.  He was recently at [facility] with similar complaint found 

to have possible enterocutaneous fistula, pelvic fluid collection not amenable 

to IR guided drainage.  Continue antibiotic therapy as per infectious disease. 

Follow up cultures, +VRE.  Surgery/IR evaluation.  Will make recommendations 

pending clinical course.





03/26/17 14:29

## 2017-03-26 NOTE — CP.PCM.HP
<Ted Olivera - Last Filed: 03/26/17 04:48>





History of Present Illness





- History of Present Illness


History of Present Illness: 


CC: Abdominal pain





HPI: This is a 22 yo AA M with PMH of Crohn's, Iron-deficiency anemia, and 

possible drug-seeking behavior who presents with complaint of lower abdominal 

pain and multiple draining abscesses on abdomen and back.  Patient reports that 

the abdominal pain acutely worsened today, and he feels that the medications he 

was given after his recent discharge from [facility] (on 3/23/17, Augmentin and 

Percocet) are not helping.  He was instructed to follow at [facility] after last 

admission, but admits he has not done so because he has not been able to 

arrange for transportation there.  He also states that if he represented to [facility]

, he would be able to have his abscesses drained, but review of Surgery's and IR

's notes at the time do not mention this.  Patient reports 1x episode of NBNB 

emesis 1 day prior, but remains able to tolerate PO intake since that time.  

Complains of abdominal pain diffusely and at site of his two draining abscesses

, as well as at site on draining abscess on the back.  Denies bloody drainage, 

chest pain, shortness of breath, dizziness/lightheadedness/vertiginous sx.  

Admits to chronic watery diarrhea, denies any bloody diarrhea.





PMH: as above


PSH: 2 abdominal surgeries and 1 IR Incision and Drainage for abdominal 

abscesses


FH: HTN, Arthritis


SH: lives with family, disabled/not working


      Admits former cigarette use, (1/4 ppd x 2 years, quit 2 years prior), 

denies EtOH/Illicits





Present on Admission





- Present on Admission


Any Indicators Present on Admission: No


History of DVT/PE: No


History of Uncontrolled Diabetes: No


Urinary Catheter: No





Review of Systems





- Constitutional


Constitutional: Chills, Fever (subjective, never measured), Malaise.  absent: 

Weakness





- EENT


Eyes: absent: Blurred Vision, Change in Vision, Loss of Vision


Ears: absent: Dizziness


Nose/Mouth/Throat: absent: Dysphagia, Neck Pain





- Cardiovascular


Cardiovascular: absent: Chest Pain, Dyspnea, Pain Radiating to Arm/Neck/Jaw, 

Lightheadedness, Palpitations, Rapid Heart Rate, Syncope





- Respiratory


Respiratory: absent: Cough, Dyspnea, Hemoptysis





- Gastrointestinal


Gastrointestinal: Abdominal Pain (diffuse, worse at sites of drainage), 

Diarrhea (watery, denies bloody), Vomiting (1x episode 1 day prior, NBNB).  

absent: Constipation, Dysphagia, Nausea





- Genitourinary


Genitourinary: Dysuria.  absent: Difficulty Urinating, Flank Pain, Hematuria





- Musculoskeletal


Musculoskeletal: Back Pain (at site of drainage).  absent: Muscle Weakness, 

Neck Pain, Numbness, Radiating Pain into Limb





- Integumentary


Integumentary: absent: Pruritus, Rash


Additional comments: 


multiple draining abscess, non-bloody drainage, sites acutely painful, 2 on abd 

and 1 on back





- Neurological


Neurological: absent: Dizziness, Numbness, Focal Weakness, Loss of Vision, 

Syncope, Vertigo, Weakness, Other Visual Disturbances





- Psychiatric


Psychiatric: absent: Anxiety





- Endocrine


Endocrine: absent: Fatigue, Palpitations





Past Patient History





- Infectious Disease


Hx of Infectious Diseases: None





- Past Social History


Smoking Status: Never Smoked





- CARDIAC


Hx Cardiac Disorders: No





- PULMONARY


Hx Respiratory Disorders: No





- NEUROLOGICAL


Hx Neurological Disorder: No





- HEENT


Hx HEENT Problems: No





- RENAL


Hx Chronic Kidney Disease: No





- ENDOCRINE/METABOLIC


Hx Endocrine Disorders: No





- HEMATOLOGICAL/ONCOLOGICAL


Hx Blood Disorders: No





- INTEGUMENTARY


Hx Dermatological Problems: No





- MUSCULOSKELETAL/RHEUMATOLOGICAL


Hx Musculoskeletal Disorders: No





- GASTROINTESTINAL


Hx Crohn's Disease: Yes





- GENITOURINARY/GYNECOLOGICAL


Hx Genitourinary Disorders: No





- PSYCHIATRIC


Hx Psychophysiologic Disorder: No


Hx Substance Use: No





- SURGICAL HISTORY


Other/Comment: Abd surg. for intraabd. abscesses x 7 last surgery 2/2017 at [facility]





- ANESTHESIA


Hx Anesthesia: No


Hx Anesthesia Reactions: No


Hx Malignant Hyperthermia: No





Meds


Allergies/Adverse Reactions: 


 Allergies











Allergy/AdvReac Type Severity Reaction Status Date / Time


 


FISH Allergy   Verified 03/12/17 13:50


 


shellfish derived AdvReac  ANGIOEDEMA Verified 03/21/17 10:53














Physical Exam





- Constitutional


Appears: Non-toxic, No Acute Distress, Chronically Ill





- Head Exam


Head Exam: ATRAUMATIC, NORMAL INSPECTION, NORMOCEPHALIC





- Eye Exam


Eye Exam: EOMI, Normal appearance.  absent: Conjunctival injection, Scleral 

icterus


Pupil Exam: absent: Irregular, Unequal





- ENT Exam


ENT Exam: Mucous Membranes Moist





- Neck Exam


Neck exam: Positive for: Full Rom





- Respiratory Exam


Respiratory Exam: Clear to Auscultation Bilateral, NORMAL BREATHING PATTERN.  

absent: Accessory Muscle Use, Chest Wall Tenderness, Decreased Breath Sounds, 

Rales, Rhonchi, Wheezes





- Cardiovascular Exam


Cardiovascular Exam: REGULAR RHYTHM, RRR, +S1, +S2.  absent: Bradycardia, 

Tachycardia, Clicks, Diastolic murmur, Irregular Rhythm, +S4, Systolic Murmur





- GI/Abdominal Exam


GI & Abdominal Exam: Diminished Bowel Sounds, Soft (except at sites of drainage

, as detailed in integumentary exam).  absent: Hyperactive Bowel Sounds, 

Hypoactive Bowel Sounds, Normal Bowel Sounds


Additional comments: 


surgical scar running laterally 4-5cm, terminating at umbilicus





- Rectal Exam


Rectal Exam: NORMAL INSPECTION


Additional comments: 


no external hemorrhoids visualized, no active bloody or pus discharge from anus

, no visible fissure





- Extremities Exam


Extremities exam: Positive for: normal inspection, pedal pulses present.  

Negative for: calf tenderness, pedal edema, tenderness





- Back Exam


Back exam: absent: CVA tenderness (L), CVA tenderness (R)


Additional comments: 


site of drainage along right lateral mid-back, as described in integumentary 

exam





- Neurological Exam


Neurological exam: Alert, Oriented x3


Additional comments: 


moving extremities spontaneously, able to follow all commands





- Psychiatric Exam


Psychiatric exam: Anxious, Normal Affect





- Skin


Additional comments: 


3x draining abscesses


1) Right lateral mid back, clear drainage, drainage site ~1cm in circumference 

with 2-3 cm circumeference of induration surrounding, acutely tender to touch


2) Right abd, 4-5cm lateral to umbilicus, clear drainage, drainage site ~2cm in 

circumference with 4-5 cm circumeference of induration surrounding, acutely 

tender to touch


3) Right groin, 2-3 cm above and lateral to genital region, clear/whitish 

discharge, ~1cm in circumference with 1-2 cm circumeference of induration 

surrounding, acutely tender to touch





Results





- Vital Signs


Recent Vital Signs: 





 Last Vital Signs











Temp  97.8 F   03/26/17 03:36


 


Pulse  88   03/26/17 03:36


 


Resp  17   03/26/17 03:36


 


BP  114/57 L  03/26/17 03:36


 


Pulse Ox  100   03/26/17 03:36














- Labs


Result Diagrams: 


 03/25/17 23:20





 03/25/17 23:20





Assessment & Plan





- Assessment and Plan (Free Text)


Assessment: 


This is a 22 yo AA M with PMH of Crohn's, Iron-deficiency anemia, and possible 

drug-seeking behavior who presents with complaint of lower abdominal pain and 

multiple draining abscesses on abdomen and back.  He is presenting for 

worsening abdominal pain and pain with urination.


Plan: 


1) Worsening abdominal pain


-Untreated Crohn's with Abscesses/fistula vs UTI, may be both


-Surgery and GI consulted, appreciate any recs


-CT abd/pelvis notes new colonic wall thickening and bladder wall thickening


-Covering for abdominal infxns and UTI with IV rocephin and flagyl, got 1x 

Zosyn in ED


-UA not indicative of UTI, but given untreated Crohns, risk for possible 

fistula to bladder


-NPO, IVF NS 150cc/hr


-Pain control with Morphine IV q4 PRN


-Was previously instructed to follow up at [facility], has not done so





2) Anemia


-Iron deficiency likely 2/2 Crohn's, vs bleeding lesions 2/2 crohn's


-Iron, Ferritin, TIBC ordered, Folate ordered


-Type and screen ordered





3) Draining abscesses


-likely 2/2 Crohns, non-compliance with follow up


-Wound cultures ordered


-Surgery consulted, appreciate any recs





Dispo: Med/Surg obs, pending GI and Surgery's input


FEN: NPO, NS IVF 150cc/hr


Access: Peripheral IV


Consults: GI, Surgery


Ppx: Protonix for GI, SCDs for DVT





Patient reviewed and discussed with attending, Dr. Spence.





- Date & Time


Date: 03/26/17


Time: 05:34





Decision To Admit





- Pt Status Changed To:


Hospital Disposition Of: Observation





- .


Bed Request Type: Med/Surg





<Alva Spence - Last Filed: 03/26/17 21:00>





Results





- Vital Signs


Recent Vital Signs: 





 Last Vital Signs











Temp  98.6 F   03/26/17 16:48


 


Pulse  88   03/26/17 16:48


 


Resp  18   03/26/17 16:48


 


BP  103/60   03/26/17 16:48


 


Pulse Ox  100   03/26/17 16:48














- Labs


Result Diagrams: 


 03/26/17 07:30





 03/26/17 07:30


Labs: 





 Laboratory Results - last 24 hr











  03/26/17 03/26/17





  05:30 07:30


 


WBC   5.8


 


RBC   3.59


 


Hgb   7.6 L


 


Hct   24.9 L


 


MCV   69.4 L


 


MCH   21.2 L


 


MCHC   30.5 L


 


RDW   19.0 H


 


Plt Count   645 H


 


MPV   7.8


 


Gran %   66.6


 


Lymph % (Auto)   20.5 L


 


Mono % (Auto)   9.9 H


 


Eos % (Auto)   2.8


 


Baso % (Auto)   0.2


 


Gran #   3.84


 


Lymph #   1.2


 


Mono #   0.6


 


Eos #   0.2


 


Baso #   0.01


 


Sodium   136


 


Potassium   4.0


 


Chloride   101


 


Carbon Dioxide   26


 


Anion Gap   13


 


BUN   3 L


 


Creatinine   0.7


 


Est GFR ( Amer)   > 60


 


Est GFR (Non-Af Amer)   > 60


 


Random Glucose   87


 


Calcium   8.2 L


 


Phosphorus   3.7


 


Magnesium   2.0


 


Iron   13 L


 


TIBC   223 L


 


% Saturation   6 L


 


Ferritin   140.0


 


Total Bilirubin   0.3


 


AST   45


 


ALT   19


 


Alkaline Phosphatase   85


 


Total Protein   6.9


 


Albumin   3.0


 


Globulin   3.9


 


Albumin/Globulin Ratio   0.8 L


 


Folate   17.5


 


Blood Type  A POSITIVE 


 


Antibody Screen  Negative 


 


BBK History Checked  Patient has bt 














Attending/Attestation





- Attestation


I have personally seen and examined this patient.: Yes


I have fully participated in the care of the patient.: Yes


I have reviewed all pertinent clinical information: Yes


Notes (Text): 





03/26/17 20:59


Agree with history, physical examination, assessment and plan.


Patient was seen when he was in the ER.

## 2017-03-27 LAB
ADD MANUAL DIFF?: NO
BASOPHILS # BLD AUTO: 0.03 K/MM3 (ref 0–2)
BASOPHILS NFR BLD: 0.2 % (ref 0–3)
BUN SERPL-MCNC: 3 MG/DL (ref 7–21)
CALCIUM SERPL-MCNC: 8.7 MG/DL (ref 8.4–10.5)
CHLORIDE SERPL-SCNC: 103 MMOL/L (ref 95–110)
CO2 SERPL-SCNC: 27 MMOL/L (ref 21–33)
EOSINOPHIL # BLD: 0.1 10*3/UL (ref 0–0.7)
EOSINOPHIL NFR BLD: 0.4 % (ref 1.5–5)
ERYTHROCYTE [DISTWIDTH] IN BLOOD BY AUTOMATED COUNT: 19 % (ref 11.5–14.5)
GLUCOSE SERPL-MCNC: 79 MG/DL (ref 70–110)
GRANULOCYTES # BLD: 9.88 10*3/UL (ref 1.4–6.5)
GRANULOCYTES NFR BLD: 77.9 % (ref 50–68)
HCT VFR BLD CALC: 26.2 % (ref 42–52)
LYMPHOCYTES # BLD: 1.6 10*3/UL (ref 1.2–3.4)
LYMPHOCYTES NFR BLD AUTO: 12.7 % (ref 22–35)
MCH RBC QN AUTO: 21.8 PG (ref 25–35)
MCHC RBC AUTO-ENTMCNC: 31.3 G/DL (ref 31–37)
MCV RBC AUTO: 69.5 FL (ref 80–105)
MONOCYTES # BLD AUTO: 1.1 10*3/UL (ref 0.1–0.6)
MONOCYTES NFR BLD: 8.8 % (ref 1–6)
PLATELET # BLD: 820 10^3/UL (ref 120–450)
PMV BLD AUTO: 7.9 FL (ref 7–11)
POTASSIUM SERPL-SCNC: 3.9 MMOL/L (ref 3.6–5)
SODIUM SERPL-SCNC: 142 MMOL/L (ref 132–148)
WBC # BLD AUTO: 12.7 10^3/UL (ref 4.5–11)

## 2017-03-27 RX ADMIN — HYDROMORPHONE HYDROCHLORIDE PRN MG: 1 INJECTION, SOLUTION INTRAMUSCULAR; INTRAVENOUS; SUBCUTANEOUS at 21:12

## 2017-03-27 RX ADMIN — PIPERACILLIN AND TAZOBACTAM SCH MLS/HR: 3; .375 INJECTION, POWDER, LYOPHILIZED, FOR SOLUTION INTRAVENOUS; PARENTERAL at 23:46

## 2017-03-27 RX ADMIN — MORPHINE SULFATE PRN MG: 4 INJECTION, SOLUTION INTRAMUSCULAR; INTRAVENOUS at 03:58

## 2017-03-27 RX ADMIN — MORPHINE SULFATE PRN MG: 4 INJECTION, SOLUTION INTRAMUSCULAR; INTRAVENOUS at 08:10

## 2017-03-27 RX ADMIN — OXYCODONE HYDROCHLORIDE AND ACETAMINOPHEN PRN TAB: 5; 325 TABLET ORAL at 20:41

## 2017-03-27 RX ADMIN — PIPERACILLIN AND TAZOBACTAM SCH MLS/HR: 3; .375 INJECTION, POWDER, LYOPHILIZED, FOR SOLUTION INTRAVENOUS; PARENTERAL at 11:58

## 2017-03-27 RX ADMIN — Medication SCH ML: at 17:02

## 2017-03-27 RX ADMIN — PIPERACILLIN AND TAZOBACTAM SCH MLS/HR: 3; .375 INJECTION, POWDER, LYOPHILIZED, FOR SOLUTION INTRAVENOUS; PARENTERAL at 17:03

## 2017-03-27 RX ADMIN — HYDROMORPHONE HYDROCHLORIDE PRN MG: 1 INJECTION, SOLUTION INTRAMUSCULAR; INTRAVENOUS; SUBCUTANEOUS at 13:02

## 2017-03-27 RX ADMIN — PIPERACILLIN AND TAZOBACTAM SCH MLS/HR: 3; .375 INJECTION, POWDER, LYOPHILIZED, FOR SOLUTION INTRAVENOUS; PARENTERAL at 05:29

## 2017-03-27 RX ADMIN — LINEZOLID SCH MLS/HR: 600 INJECTION, SOLUTION INTRAVENOUS at 09:34

## 2017-03-27 RX ADMIN — HYDROMORPHONE HYDROCHLORIDE PRN MG: 1 INJECTION, SOLUTION INTRAMUSCULAR; INTRAVENOUS; SUBCUTANEOUS at 17:02

## 2017-03-27 RX ADMIN — LINEZOLID SCH MLS/HR: 600 INJECTION, SOLUTION INTRAVENOUS at 21:15

## 2017-03-27 RX ADMIN — MORPHINE SULFATE PRN MG: 4 INJECTION, SOLUTION INTRAMUSCULAR; INTRAVENOUS at 11:59

## 2017-03-27 NOTE — CP.PCM.PN
Subjective





- Date & Time of Evaluation


Date of Evaluation: 03/27/17


Time of Evaluation: 07:53





- Subjective


Subjective: 


SURGERY PROGRESS NOTE FOR DR. BAUGH





Patient is seen and examined at bedside.  No acute events overnight.  Patient 

is still c/o RLQ abd pain although slightly improved from before.  Denies 

having any N/V/D/C, fevers, chills.  Purulent drainage still present from 

umbilicus.  





Objective





- Vital Signs/Intake and Output


Vital Signs (last 24 hours): 


 











Temp Pulse Resp BP Pulse Ox


 


 98.6 F   88   18   103/60   100 


 


 03/26/17 16:48  03/26/17 16:48  03/26/17 16:48  03/26/17 16:48  03/26/17 16:48








Intake and Output: 


 











 03/27/17 03/27/17





 06:59 18:59


 


Intake Total 1440 


 


Output Total 1100 


 


Balance 340 














- Medications


Medications: 


 Current Medications





Sodium Chloride (Sodium Chloride 0.9%)  1,000 mls @ 100 mls/hr IV .Q10H [facility]


   Last Admin: 03/27/17 05:36 Dose:  100 mls/hr


Piperacillin Sod/Tazobactam Sod (Zosyn 3.375 In Ns 100ml)  100 mls @ 200 mls/hr 

IVPB Q6 [facility]


   PRN Reason: Protocol


   Stop: 04/09/17 12:01


   Last Admin: 03/27/17 05:29 Dose:  200 mls/hr


Metronidazole (Flagyl)  500 mg PO Q8 [facility]


   PRN Reason: Protocol


   Stop: 04/09/17 14:01


   Last Admin: 03/27/17 05:29 Dose:  500 mg


Morphine Sulfate (Morphine)  4 mg IVP Q4H PRN


   PRN Reason: Pain, severe (8-10)


   Last Admin: 03/27/17 03:58 Dose:  4 mg


Oxycodone/Acetaminophen (Percocet 5/325 Mg Tab)  1 tab PO Q6H PRN


   PRN Reason: Pain, moderate (4-7)


   Stop: 03/29/17 14:05


Pantoprazole Sodium (Protonix Inj)  40 mg IVP Q12 [facility]


   Last Admin: 03/26/17 21:40 Dose:  40 mg











- Labs


Labs: 


 





 03/27/17 06:45 





 03/26/17 07:30 





 











PT  12.3 Seconds (9.9-11.8)  H  03/25/17  23:20    


 


INR  1.14  (0.93-1.08)  H  03/25/17  23:20    


 


APTT  33.8 Seconds (23.7-30.8)  H  03/25/17  23:20    














- Constitutional


Appears: Non-toxic, No Acute Distress





- Head Exam


Head Exam: ATRAUMATIC, NORMAL INSPECTION





- ENT Exam


ENT Exam: Mucous Membranes Moist





- Respiratory Exam


Respiratory Exam: absent: Accessory Muscle Use, Chest Wall Tenderness, 

Respiratory Distress





- GI/Abdominal Exam


GI & Abdominal Exam: Soft, Tenderness (RLQ).  absent: Distended, Firm


Additional comments: 


drainage 





- Neurological Exam


Neurological Exam: Alert, Awake, Oriented x3





- Psychiatric Exam


Psychiatric exam: Normal Affect, Normal Mood





- Skin


Skin Exam: Dry, Normal Color, Warm





Assessment and Plan





- Assessment and Plan (Free Text)


Assessment: 


21M w. 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

s non-compliance


-abx per ID


-GI is consulted 


-pain management


-no plans for surgical intervention


-strongly recommend that pt follow up w. specialist at [facility]





Will discuss with attending Dr. Uli Chavez PGY1

## 2017-03-27 NOTE — CP.PCM.PN
<Ted Emanuel - Last Filed: 03/27/17 08:56>





Subjective





- Date & Time of Evaluation


Date of Evaluation: 03/27/17


Time of Evaluation: 07:25





- Subjective


Subjective: 


PGY4 GI Fellow Progress Note


Patient seen and examined bedside this morning. The patient denies any new 

complaints today and continues to have abdominal/back discomfort. Eating well 

with no pain during/following meals. No nausea, vomiting. Passing 2-3 stool per 

day.





12 system ROS performed and negative except where stated.





Objective





- Vital Signs/Intake and Output


Vital Signs (last 24 hours): 


 











Temp Pulse Resp BP Pulse Ox


 


 98.6 F   88   18   103/60   100 


 


 03/26/17 16:48  03/26/17 16:48  03/26/17 16:48  03/26/17 16:48  03/26/17 16:48








Intake and Output: 


 











 03/27/17 03/27/17





 06:59 18:59


 


Intake Total 1440 


 


Output Total 1100 


 


Balance 340 














- Medications


Medications: 


 Current Medications





Sodium Chloride (Sodium Chloride 0.9%)  1,000 mls @ 100 mls/hr IV .Q10H [facility]


   Last Admin: 03/27/17 05:36 Dose:  100 mls/hr


Piperacillin Sod/Tazobactam Sod (Zosyn 3.375 In Ns 100ml)  100 mls @ 200 mls/hr 

IVPB Q6 [facility]


   PRN Reason: Protocol


   Stop: 04/09/17 12:01


   Last Admin: 03/27/17 05:29 Dose:  200 mls/hr


Metronidazole (Flagyl)  500 mg PO Q8 [facility]


   PRN Reason: Protocol


   Stop: 04/09/17 14:01


   Last Admin: 03/27/17 05:29 Dose:  500 mg


Morphine Sulfate (Morphine)  4 mg IVP Q4H PRN


   PRN Reason: Pain, severe (8-10)


   Last Admin: 03/27/17 03:58 Dose:  4 mg


Oxycodone/Acetaminophen (Percocet 5/325 Mg Tab)  1 tab PO Q6H PRN


   PRN Reason: Pain, moderate (4-7)


   Stop: 03/29/17 14:05


Pantoprazole Sodium (Protonix Inj)  40 mg IVP Q12 [facility]


   Last Admin: 03/26/17 21:40 Dose:  40 mg











- Labs


Labs: 


 





 03/26/17 07:30 





 03/26/17 07:30 





 











PT  12.3 Seconds (9.9-11.8)  H  03/25/17  23:20    


 


INR  1.14  (0.93-1.08)  H  03/25/17  23:20    


 


APTT  33.8 Seconds (23.7-30.8)  H  03/25/17  23:20    














- Constitutional


Appears: Non-toxic, No Acute Distress





- Eye Exam


Eye Exam: EOMI, PERRL





- ENT Exam


ENT Exam: Mucous Membranes Moist





- Respiratory Exam


Respiratory Exam: Clear to Ausculation Bilateral.  absent: Rales, Rhonchi, 

Wheezes





- Cardiovascular Exam


Cardiovascular Exam: RRR, +S1, +S2





- GI/Abdominal Exam


GI & Abdominal Exam: Soft, Tenderness, Normal Bowel Sounds.  absent: Distended, 

Firm, Guarding, Rigid


Additional comments: 


draining abdominal wounds





- Extremities Exam


Extremities Exam: Normal Inspection.  absent: Pedal Edema





- Neurological Exam


Neurological Exam: Alert, Awake, Oriented x3





- Psychiatric Exam


Psychiatric exam: Normal Affect, Normal Mood





- Skin


Skin Exam: Dry, Warm





Assessment and Plan





- Assessment and Plan (Free Text)


Assessment: 


Patient is a 22yo male with PMHx significant for Crohn's disease, diagnosed in 

2015, not currently on therapy who presented to the ED with complaint of 

abdominal pain and purulent abdominal wounds.


-Fistulizing Crohn's disease, uncontrolled, nonadherent to therapy and 

outpatient follow up


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


-Pelvic fluid collection, concern for abscess


-Cheilitis


Plan: 


-Case discussed with ID, ABX to be changed given culture positive for VRE on 

prior admission


-Cannot initiate steroid therapy given ongoing infections; would not heal 

fistulous disease


-Patient has been nonadherent to outpatient TNFa therapy which has been shown 

to improve fistulous CD


-Surgical consultation noted, no intervention planned


-Patient agrees to follow up with his primary GI doctor in [place] on D/C; 

recommend scheduling an appointment prior to D/C to ensure continuity of care


-No plan for endoscopic intervention at this time





<[place] - Last Filed: 03/27/17 10:19>





Objective





- Vital Signs/Intake and Output


Vital Signs (last 24 hours): 


 











Temp Pulse Resp BP Pulse Ox


 


 98.8 F   89   18   104/64   100 


 


 03/27/17 07:30  03/27/17 07:30  03/27/17 07:30  03/27/17 07:30  03/27/17 07:30








Intake and Output: 


 











 03/27/17 03/27/17





 06:59 18:59


 


Intake Total 1440 


 


Output Total 1100 


 


Balance 340 














- Medications


Medications: 


 Current Medications





Sodium Chloride (Sodium Chloride 0.9%)  1,000 mls @ 100 mls/hr IV .Q10H [facility]


   Last Admin: 03/27/17 05:36 Dose:  100 mls/hr


Piperacillin Sod/Tazobactam Sod (Zosyn 3.375 In Ns 100ml)  100 mls @ 200 mls/hr 

IVPB Q6 [facility]


   PRN Reason: Protocol


   Stop: 04/09/17 12:01


   Last Admin: 03/27/17 05:29 Dose:  200 mls/hr


Linezolid (Zyvox 600mg/300ml D5w)  300 mls @ 200 mls/hr IVPB Q12 [facility]


   PRN Reason: Protocol


   Stop: 04/03/17 10:01


   Last Admin: 03/27/17 09:34 Dose:  200 mls/hr


Metronidazole (Flagyl)  500 mg PO Q8 [facility]


   PRN Reason: Protocol


   Stop: 04/09/17 14:01


   Last Admin: 03/27/17 05:29 Dose:  500 mg


Morphine Sulfate (Morphine)  4 mg IVP Q4H PRN


   PRN Reason: Pain, severe (8-10)


   Last Admin: 03/27/17 08:10 Dose:  4 mg


Oxycodone/Acetaminophen (Percocet 5/325 Mg Tab)  1 tab PO Q6H PRN


   PRN Reason: Pain, moderate (4-7)


   Stop: 03/29/17 14:05


Pantoprazole Sodium (Protonix Inj)  40 mg IVP Q12 [facility]


   Last Admin: 03/27/17 09:34 Dose:  40 mg











- Labs


Labs: 


 





 03/27/17 06:45 





 03/27/17 06:45 





 











PT  12.3 Seconds (9.9-11.8)  H  03/25/17  23:20    


 


INR  1.14  (0.93-1.08)  H  03/25/17  23:20    


 


APTT  33.8 Seconds (23.7-30.8)  H  03/25/17  23:20    














Attending/Attestation





- Attestation


I have personally seen and examined this patient.: Yes


I have fully participated in the care of the patient.: Yes


I have reviewed all pertinent clinical information, including history, physical 

exam and plan: Yes


Notes (Text): 


03/27/17 10:12


I have seen and examined patient with GI fellow.  No acute events overnight.  

He continues to endorse generalized abdominal discomfort though had two normal 

bowel movements yesterday.  No reported vomiting, fever/chills.  Tolerating PO 

diet without difficulty, appetite good.  





Crohn's disease with fistulizing component


Sepsis, +VRE


Pelvic fluid collection, abscess





- Diet as tolerated


- Continue with antibiotic therapy as per ID, would suggest broadening coverage 

given presence of VRE


- Follow up surgical recommendations


- Patient will require additional biologic therapy given presence of 

fistulizing disease, however he has been non-compliant with therapy in the 

past.  Upon hospital discharge he will follow up with his primary 

gastroenterologist Dr. Meraz for further management.  Discussion had with 

patient regarding importance of timely follow up and need for close monitoring 

of aggressive disease, he understands and agrees with plan.


- No planned GI intervention, will sign off case.  Please reconsult as necessary

, thank you.

## 2017-03-27 NOTE — CP.PCM.PN
Subjective





- Date & Time of Evaluation


Date of Evaluation: 03/27/17


Time of Evaluation: 11:35





- Subjective


Subjective: 


Comfortable, afebrile, less pain the abdomen.





Objective





- Vital Signs/Intake and Output


Vital Signs (last 24 hours): 


 











Temp Pulse Resp BP Pulse Ox


 


 98.8 F   89   18   104/64   100 


 


 03/27/17 07:30  03/27/17 07:30  03/27/17 07:30  03/27/17 07:30  03/27/17 07:30











- Medications


Medications: 


 Current Medications





Ascorbic Acid (Vitamin C 500 Mg Tab)  500 mg PO DAILY [facility]


Ferrous Sulfate (Feosol)  324 mg PO TID [facility]


   Last Admin: 03/27/17 14:00 Dose:  Not Given


Hydromorphone HCl (Dilaudid)  0.5 mg IVP Q4H PRN


   PRN Reason: Pain, severe (8-10)


   Last Admin: 03/27/17 13:02 Dose:  0.5 mg


Sodium Chloride (Sodium Chloride 0.9%)  1,000 mls @ 100 mls/hr IV .Q10H [facility]


   Last Admin: 03/27/17 05:36 Dose:  100 mls/hr


Piperacillin Sod/Tazobactam Sod (Zosyn 3.375 In Ns 100ml)  100 mls @ 200 mls/hr 

IVPB Q6 [facility]


   PRN Reason: Protocol


   Stop: 04/09/17 12:01


   Last Admin: 03/27/17 11:58 Dose:  200 mls/hr


Linezolid (Zyvox 600mg/300ml D5w)  300 mls @ 200 mls/hr IVPB Q12 [facility]


   PRN Reason: Protocol


   Stop: 04/03/17 10:01


   Last Admin: 03/27/17 09:34 Dose:  200 mls/hr


Metronidazole (Flagyl)  500 mg PO Q8 [facility]


   PRN Reason: Protocol


   Stop: 04/09/17 14:01


   Last Admin: 03/27/17 13:02 Dose:  500 mg


Multivitamins/Vitamin C (Multi-Delyn Liquid)  15 ml PO DAILY [facility]


Oxycodone/Acetaminophen (Percocet 5/325 Mg Tab)  1 tab PO Q6H PRN


   PRN Reason: Pain, moderate (4-7)


   Stop: 03/29/17 14:05


Pantoprazole Sodium (Protonix Inj)  40 mg IVP Q12 [facility]


   Last Admin: 03/27/17 09:34 Dose:  40 mg











- Labs


Labs: 


 











PT  12.3 Seconds (9.9-11.8)  H  03/25/17  23:20    


 


INR  1.14  (0.93-1.08)  H  03/25/17  23:20    


 


APTT  33.8 Seconds (23.7-30.8)  H  03/25/17  23:20    














- Constitutional


Appears: Non-toxic, No Acute Distress





- Head Exam


Head Exam: NORMAL INSPECTION





- ENT Exam


ENT Exam: Mucous Membranes Moist





- Neck Exam


Neck Exam: absent: Lymphadenopathy, Meningismus





- Respiratory Exam


Respiratory Exam: Decreased Breath Sounds





- Cardiovascular Exam


Cardiovascular Exam: +S1, +S2





- GI/Abdominal Exam


GI & Abdominal Exam: Soft.  absent: Tenderness


Additional comments: 


multiple areas with wounds





Assessment and Plan





- Assessment and Plan (Free Text)


Plan: 


Assessment


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 

Enterococcus faecium, in a patient with poorly-controlled Crohn's disease, with 

associated pelvic abscess





Plan


Added Zyvox on top of Zosyn; follow up plan for the pelvic abscess


Discussed with GI fellow


Will monitor clinically

## 2017-03-27 NOTE — CP.PCM.PN
<Bryce Wan - Last Filed: 03/27/17 18:36>





Subjective





- Date & Time of Evaluation


Date of Evaluation: 03/27/17


Time of Evaluation: 09:20





- Subjective


Subjective: 


22 yo AA M with PMH of Crohn's, Iron-deficiency anemia, and possible drug-

seeking behavior who presents with complaint of lower abdominal pain and 

multiple draining abscesses on abdomen and back.  Today he is only complaining 

of his abdominal pain. He denies headache, fever, chills, chest pain, dyspnea, 

nausea, vomiting, diarrhea. 








Objective





- Vital Signs/Intake and Output


Vital Signs (last 24 hours): 


 











Temp Pulse Resp BP Pulse Ox


 


 98.8 F   89   18   104/64   100 


 


 03/27/17 07:30  03/27/17 07:30  03/27/17 07:30  03/27/17 07:30  03/27/17 07:30








Intake and Output: 


 











 03/27/17 03/27/17





 06:59 18:59


 


Intake Total 1440 


 


Output Total 1100 


 


Balance 340 














- Medications


Medications: 


 Current Medications





Hydromorphone HCl (Dilaudid)  0.5 mg IVP Q4H PRN


   PRN Reason: Pain, severe (8-10)


   Last Admin: 03/27/17 13:02 Dose:  0.5 mg


Sodium Chloride (Sodium Chloride 0.9%)  1,000 mls @ 100 mls/hr IV .Q10H [facility]


   Last Admin: 03/27/17 05:36 Dose:  100 mls/hr


Piperacillin Sod/Tazobactam Sod (Zosyn 3.375 In Ns 100ml)  100 mls @ 200 mls/hr 

IVPB Q6 [facility]


   PRN Reason: Protocol


   Stop: 04/09/17 12:01


   Last Admin: 03/27/17 11:58 Dose:  200 mls/hr


Linezolid (Zyvox 600mg/300ml D5w)  300 mls @ 200 mls/hr IVPB Q12 [facility]


   PRN Reason: Protocol


   Stop: 04/03/17 10:01


   Last Admin: 03/27/17 09:34 Dose:  200 mls/hr


Metronidazole (Flagyl)  500 mg PO Q8 [facility]


   PRN Reason: Protocol


   Stop: 04/09/17 14:01


   Last Admin: 03/27/17 13:02 Dose:  500 mg


Oxycodone/Acetaminophen (Percocet 5/325 Mg Tab)  1 tab PO Q6H PRN


   PRN Reason: Pain, moderate (4-7)


   Stop: 03/29/17 14:05


Pantoprazole Sodium (Protonix Inj)  40 mg IVP Q12 [facility]


   Last Admin: 03/27/17 09:34 Dose:  40 mg











- Labs


Labs: 


 





 03/27/17 06:45 





 03/27/17 06:45 





 











PT  12.3 Seconds (9.9-11.8)  H  03/25/17  23:20    


 


INR  1.14  (0.93-1.08)  H  03/25/17  23:20    


 


APTT  33.8 Seconds (23.7-30.8)  H  03/25/17  23:20    














- Constitutional


Appears: Non-toxic, No Acute Distress





- Head Exam


Head Exam: ATRAUMATIC, NORMOCEPHALIC





- Eye Exam


Eye Exam: EOMI





- ENT Exam


ENT Exam: Mucous Membranes Moist





- Neck Exam


Neck Exam: Full ROM.  absent: Lymphadenopathy





- Respiratory Exam


Respiratory Exam: Clear to Ausculation Bilateral, NORMAL BREATHING PATTERN





- Cardiovascular Exam


Cardiovascular Exam: REGULAR RHYTHM, RRR, +S1, +S2.  absent: JVD





- GI/Abdominal Exam


GI & Abdominal Exam: Guarding, Soft, Tenderness, Normal Bowel Sounds


Additional comments: 


multiple draingin abscesses in RLQ





- Extremities Exam


Extremities Exam: Full ROM.  absent: Joint Swelling





- Back Exam


Back Exam: CVA tenderness (R).  absent: CVA tenderness (L), rash noted





- Neurological Exam


Neurological Exam: Alert, Awake





- Psychiatric Exam


Psychiatric exam: Normal Affect, Normal Mood





- Skin


Skin Exam: Dry, Intact, Normal Color, Warm





Assessment and Plan





- Assessment and Plan (Free Text)


Assessment: 


This is a 22 yo AA M with PMH of Crohn's, Iron-deficiency anemia, and possible 

drug-seeking behavior who presents with complaint of lower abdominal pain and 

multiple draining abscesses on abdomen and back. 





Plan: 


Worsening abdominal pain likely 2/2 untreated Crohn's with Abscesses/fistula vs 

UTI, may be both:


-Was previously instructed to follow up at [facility], has not done so


-Consult Surgery - Dr. Mcnally appreciate recs


   -abx per ID


   -pain management


   -no plans for surgical intervention


   -strongly recommend that pt follow up w. specialist at [facility]


   -recommend Flagyl





-Consult GI - Dr. Amezcua


   -Case discussed with ID, ABX to be changed given culture positive for VRE on 

prior admission


   -Cannot initiate steroid therapy given ongoing infections; would not heal 

fistulous disease


   -Patient has been nonadherent to outpatient TNFa therapy which has been 

shown to improve fistulous CD


   -Surgical consultation noted, no intervention planned


   -follow up with his primary GI doctor in [place] on D/C


   -No plan for endoscopic intervention at this time





-Consult ID - Dr. Mckee


   -Flagyl 500mg po q8


   -Zosyn 3.375 IV q6


   -Linezolid 600mg IV Q12





-Wound cultures No Growth after 24hrs


-Urine Cultures - ordered


-CT abd/pelvis 


   -notes new colonic wall thickening and bladder wall thickening


-NPO, IVF NS 150cc/hr


-Pain control with Percocet 5/325 for mod pain


-Pain control with Diluadid .5mg q4 prn for severe pain





Anemia:


-Iron deficiency likely 2/2 Crohn's, vs bleeding lesions 2/2 crohn's


-Iron, Ferritin, TIBC ordered, both low


-Folate normal


-Type and screen ordered


-Feosol TID


-Multivitamin


-Vit C





PPX: 


Protonix for GI, SCDs for DVT








<Belia Graham - Last Filed: 03/29/17 14:51>





Objective





- Vital Signs/Intake and Output


Vital Signs (last 24 hours): 


 











Temp Pulse Resp BP Pulse Ox


 


 97.9 F   93 H  20   115/46 L  94 L


 


 03/29/17 07:30  03/29/17 07:30  03/29/17 07:30  03/29/17 07:30  03/29/17 07:30








Intake and Output: 


 











 03/29/17 03/29/17





 06:59 18:59


 


Intake Total 1680 


 


Output Total 1000 


 


Balance 680 














- Labs


Labs: 


 





 03/28/17 07:00 





 03/28/17 07:00 





 











PT  12.3 Seconds (9.9-11.8)  H  03/25/17  23:20    


 


INR  1.14  (0.93-1.08)  H  03/25/17  23:20    


 


APTT  33.8 Seconds (23.7-30.8)  H  03/25/17  23:20    














Attending/Attestation





- Attestation


I have personally seen and examined this patient.: Yes


I have fully participated in the care of the patient.: Yes


I have reviewed all pertinent clinical information, including history, physical 

exam and plan: Yes


Notes (Text): 


I have seen and examined patient with the resident. I agree with the above note 

with the following additions/ exceptions: 22 yo AA M with PMH of Crohn's disease

, non compliance with medications and possible drug-seeking behavior who 

presents with complaint of lower abdominal pain and multiple draining abscesses

/ Fistula on abdomen and inguinal area.  Today he is only complaining of his 

abdominal pain. He denies headache, fever, chills, chest pain, dyspnea, nausea, 

vomiting, diarrhea. GI and surgery recommended to follow up with outpatient GI 

and Colorectal surgeon in [facility].  Wound cultures are growing VRE. ID on board 

who recommended zosyn and zyvox. 


Dr Belia Graham

## 2017-03-28 VITALS — RESPIRATION RATE: 20 BRPM

## 2017-03-28 LAB
ADD MANUAL DIFF?: NO
ALBUMIN/GLOB SERPL: 0.8 {RATIO} (ref 1.1–1.8)
ALP SERPL-CCNC: 83 U/L (ref 38–133)
ALT SERPL-CCNC: 8 U/L (ref 7–56)
AST SERPL-CCNC: 35 U/L (ref 15–59)
BASOPHILS # BLD AUTO: 0.02 K/MM3 (ref 0–2)
BASOPHILS NFR BLD: 0.2 % (ref 0–3)
BILIRUB SERPL-MCNC: 0.3 MG/DL (ref 0.2–1.3)
BUN SERPL-MCNC: 4 MG/DL (ref 7–21)
CALCIUM SERPL-MCNC: 8.5 MG/DL (ref 8.4–10.5)
CHLORIDE SERPL-SCNC: 101 MMOL/L (ref 95–110)
CO2 SERPL-SCNC: 26 MMOL/L (ref 21–33)
EOSINOPHIL # BLD: 0.1 10*3/UL (ref 0–0.7)
EOSINOPHIL NFR BLD: 0.5 % (ref 1.5–5)
ERYTHROCYTE [DISTWIDTH] IN BLOOD BY AUTOMATED COUNT: 19.1 % (ref 11.5–14.5)
GLOBULIN SER-MCNC: 4.2 GM/DL
GLUCOSE SERPL-MCNC: 80 MG/DL (ref 70–110)
GRANULOCYTES # BLD: 9.63 10*3/UL (ref 1.4–6.5)
GRANULOCYTES NFR BLD: 73.6 % (ref 50–68)
HCT VFR BLD CALC: 25.8 % (ref 42–52)
LYMPHOCYTES # BLD: 1.8 10*3/UL (ref 1.2–3.4)
LYMPHOCYTES NFR BLD AUTO: 13.5 % (ref 22–35)
MCH RBC QN AUTO: 21.5 PG (ref 25–35)
MCHC RBC AUTO-ENTMCNC: 31 G/DL (ref 31–37)
MCV RBC AUTO: 69.4 FL (ref 80–105)
MONOCYTES # BLD AUTO: 1.6 10*3/UL (ref 0.1–0.6)
MONOCYTES NFR BLD: 12.2 % (ref 1–6)
PLATELET # BLD: 790 10^3/UL (ref 120–450)
PMV BLD AUTO: 8.1 FL (ref 7–11)
POTASSIUM SERPL-SCNC: 3.8 MMOL/L (ref 3.6–5)
PROT SERPL-MCNC: 7.4 G/DL (ref 5.8–8.3)
SODIUM SERPL-SCNC: 139 MMOL/L (ref 132–148)
WBC # BLD AUTO: 13.1 10^3/UL (ref 4.5–11)

## 2017-03-28 RX ADMIN — PIPERACILLIN AND TAZOBACTAM SCH MLS/HR: 3; .375 INJECTION, POWDER, LYOPHILIZED, FOR SOLUTION INTRAVENOUS; PARENTERAL at 12:52

## 2017-03-28 RX ADMIN — LINEZOLID SCH MLS/HR: 600 INJECTION, SOLUTION INTRAVENOUS at 22:31

## 2017-03-28 RX ADMIN — PANTOPRAZOLE SODIUM SCH MG: 40 TABLET, DELAYED RELEASE ORAL at 18:27

## 2017-03-28 RX ADMIN — OXYCODONE HYDROCHLORIDE AND ACETAMINOPHEN PRN TAB: 5; 325 TABLET ORAL at 08:33

## 2017-03-28 RX ADMIN — PIPERACILLIN AND TAZOBACTAM SCH MLS/HR: 3; .375 INJECTION, POWDER, LYOPHILIZED, FOR SOLUTION INTRAVENOUS; PARENTERAL at 05:23

## 2017-03-28 RX ADMIN — HYDROMORPHONE HYDROCHLORIDE PRN MG: 1 INJECTION, SOLUTION INTRAMUSCULAR; INTRAVENOUS; SUBCUTANEOUS at 05:22

## 2017-03-28 RX ADMIN — HYDROMORPHONE HYDROCHLORIDE PRN MG: 1 INJECTION, SOLUTION INTRAMUSCULAR; INTRAVENOUS; SUBCUTANEOUS at 22:31

## 2017-03-28 RX ADMIN — HYDROMORPHONE HYDROCHLORIDE PRN MG: 1 INJECTION, SOLUTION INTRAMUSCULAR; INTRAVENOUS; SUBCUTANEOUS at 09:49

## 2017-03-28 RX ADMIN — HYDROMORPHONE HYDROCHLORIDE PRN MG: 1 INJECTION, SOLUTION INTRAMUSCULAR; INTRAVENOUS; SUBCUTANEOUS at 14:04

## 2017-03-28 RX ADMIN — PIPERACILLIN AND TAZOBACTAM SCH MLS/HR: 3; .375 INJECTION, POWDER, LYOPHILIZED, FOR SOLUTION INTRAVENOUS; PARENTERAL at 18:32

## 2017-03-28 RX ADMIN — OXYCODONE HYDROCHLORIDE AND ACETAMINOPHEN PRN TAB: 5; 325 TABLET ORAL at 21:08

## 2017-03-28 RX ADMIN — OXYCODONE HYDROCHLORIDE AND ACETAMINOPHEN PRN TAB: 5; 325 TABLET ORAL at 15:04

## 2017-03-28 RX ADMIN — HYDROMORPHONE HYDROCHLORIDE PRN MG: 1 INJECTION, SOLUTION INTRAMUSCULAR; INTRAVENOUS; SUBCUTANEOUS at 01:24

## 2017-03-28 RX ADMIN — HYDROMORPHONE HYDROCHLORIDE PRN MG: 1 INJECTION, SOLUTION INTRAMUSCULAR; INTRAVENOUS; SUBCUTANEOUS at 18:44

## 2017-03-28 RX ADMIN — Medication SCH ML: at 11:15

## 2017-03-28 RX ADMIN — LINEZOLID SCH MLS/HR: 600 INJECTION, SOLUTION INTRAVENOUS at 11:00

## 2017-03-28 RX ADMIN — OXYCODONE HYDROCHLORIDE AND ACETAMINOPHEN PRN TAB: 5; 325 TABLET ORAL at 02:50

## 2017-03-28 NOTE — CP.PCM.PN
<WanBryce - Last Filed: 03/28/17 17:16>





Subjective





- Date & Time of Evaluation


Date of Evaluation: 03/28/17


Time of Evaluation: 07:40





- Subjective


Subjective: 


22 yo AA M with PMH of Crohn's, Iron-deficiency anemia, and possible drug-

seeking behavior who presented with complaint of lower abdominal pain and 

multiple draining abscesses on his abdomen and back. Today his abdominal pain 

is slightly better. He denies headache, fever, chills, chest pain, dyspnea, 

nausea, vomiting, or diarrhea. 





Objective





- Vital Signs/Intake and Output


Vital Signs (last 24 hours): 


 











Temp Pulse Resp BP Pulse Ox


 


 98.7 F   86   20   101/59 L  100 


 


 03/28/17 07:30  03/28/17 07:30  03/28/17 07:30  03/28/17 07:30  03/28/17 07:30








Intake and Output: 


 











 03/28/17 03/28/17





 06:59 18:59


 


Intake Total 1200 


 


Balance 1200 














- Medications


Medications: 


 Current Medications





Ascorbic Acid (Vitamin C 500 Mg Tab)  [facility]ILY [facility]


   Last Admin: 03/28/17 10:59 Dose:  500 mg


Ferrous Sulfate (Feosol)  324 mg PO TID [facility]


   Last Admin: 03/28/17 10:59 Dose:  324 mg


Hydromorphone HCl (Dilaudid)  0.5 mg IVP Q4H PRN


   PRN Reason: Pain, severe (8-10)


   Last Admin: 03/28/17 09:49 Dose:  0.5 mg


Sodium Chloride (Sodium Chloride 0.9%)  1,000 mls @ 100 mls/hr IV .Q10H [facility]


   Last Admin: 03/28/17 01:27 Dose:  100 mls/hr


Piperacillin Sod/Tazobactam Sod (Zosyn 3.375 In Ns 100ml)  100 mls @ 200 mls/hr 

IVPB Q6 [facility]


   PRN Reason: Protocol


   Stop: 04/09/17 12:01


   Last Admin: 03/28/17 12:52 Dose:  200 mls/hr


Linezolid (Zyvox 600mg/300ml D5w)  300 mls @ 200 mls/hr IVPB Q12 [facility]


   PRN Reason: Protocol


   Stop: 04/03/17 10:01


   Last Admin: 03/28/17 11:00 Dose:  200 mls/hr


Micafungin Sodium 100 mg/ (Sodium Chloride)  100 mls @ 100 mls/hr IV DAILY [facility]


   PRN Reason: Protocol


   Stop: 04/04/17 13:31


Multivitamins/Vitamin C (Multi-Delyn Liquid)  15 ml PO DAILY [facility]


   Last Admin: 03/28/17 11:15 Dose:  15 ml


Oxycodone/Acetaminophen (Percocet 5/325 Mg Tab)  1 tab PO Q6H PRN


   PRN Reason: Pain, moderate (4-7)


   Stop: 03/29/17 14:05


   Last Admin: 03/28/17 08:33 Dose:  1 tab


Pantoprazole Sodium (Protonix Ec Tab)  40 mg PO 0730,1630 [facility]











- Labs


Labs: 


 





 03/28/17 07:00 





 03/28/17 07:00 





 











PT  12.3 Seconds (9.9-11.8)  H  03/25/17  23:20    


 


INR  1.14  (0.93-1.08)  H  03/25/17  23:20    


 


APTT  33.8 Seconds (23.7-30.8)  H  03/25/17  23:20    














- Constitutional


Appears: Non-toxic, No Acute Distress





- Head Exam


Head Exam: ATRAUMATIC, NORMOCEPHALIC





- Eye Exam


Eye Exam: EOMI





- ENT Exam


ENT Exam: Mucous Membranes Moist





- Neck Exam


Neck Exam: Full ROM, Normal Inspection





- Respiratory Exam


Respiratory Exam: Clear to Ausculation Bilateral, NORMAL BREATHING PATTERN





- Cardiovascular Exam


Cardiovascular Exam: REGULAR RHYTHM





- GI/Abdominal Exam


GI & Abdominal Exam: Guarding, Soft, Tenderness, Normal Bowel Sounds





- Extremities Exam


Extremities Exam: Full ROM.  absent: Joint Swelling





- Back Exam


Back Exam: CVA tenderness (R), tenderness





- Neurological Exam


Neurological Exam: Alert, Awake





- Psychiatric Exam


Psychiatric exam: Normal Affect, Normal Mood





- Skin


Skin Exam: Dry, Intact, Normal Color, Warm


Additional comments: 


multiple draining abdominal abscesses in RLQ





Assessment and Plan





- Assessment and Plan (Free Text)


Assessment: 


This is a 22 yo AA M with PMH of Crohn's, Iron-deficiency anemia, and possible 

drug-seeking behavior who presents with complaint of lower abdominal pain and 

multiple draining abscesses on abdomen and back. 


Plan: 


Worsening abdominal pain likely 2/2 untreated Crohn's with Abscesses/fistula vs 

UTI, may be both:


-Was previously instructed to follow up at [facility], has not done so


   -will follow up with them this Thursday


   -has appt with his primary care next week


-Consult Surgery - Dr. Mcnally appreciate recs


   -abx per ID


   -pain management


   -strongly recommend that pt follow up w. specialist at [facility]


   -recommend Flagyl


   -no plans for surgical intervention





-Consult GI - Dr. Amezcua


   -Case discussed with ID, ABX to be changed given culture positive for VRE on 

prior admission


   -Cannot initiate steroid therapy given ongoing infections; would not heal 

fistulous disease


   -Patient has been nonadherent to outpatient TNFa therapy which has been 

shown to improve fistulous CD


   -Surgical consultation noted, no intervention planned


   -follow up with his primary GI doctor in [place] on D/C


   -No plan for endoscopic intervention at this time





-Consult ID - Dr. Mckee


   -recommends IV antibiotics


   -Flagyl 500mg po q8


   -Zosyn 3.375 IV q6


   -Linezolid 600mg IV Q12


   -added Mycamine





-Wound cultures 


   -Gram Positive Cocci light growth


   -Yeast Species light growth





-Urine Cultures - received


-Stool Cultures - uncollected


-CT abd/pelvis 


   -notes new colonic wall thickening and bladder wall thickening


-normal diet


-IVF NS decreased to 100cc/hr


-Pain control with Percocet 5/325 for mod pain


-Pain control with Diluadid .5mg q4 prn for severe pain





Anemia:


-Iron deficiency likely 2/2 Crohn's, vs bleeding lesions 2/2 crohn's


-Iron, Ferritin, TIBC ordered, both low


-Folate normal


-Type and screen received


-Feosol TID


-Multivitamin


-Vit C





PPX: 


Protonix for GI, SCDs for DVT








<Belia Graham - Last Filed: 03/29/17 14:55>





Objective





- Vital Signs/Intake and Output


Vital Signs (last 24 hours): 


 











Temp Pulse Resp BP Pulse Ox


 


 97.9 F   93 H  20   115/46 L  94 L


 


 03/29/17 07:30  03/29/17 07:30  03/29/17 07:30  03/29/17 07:30  03/29/17 07:30








Intake and Output: 


 











 03/29/17 03/29/17





 06:59 18:59


 


Intake Total 1680 


 


Output Total 1000 


 


Balance 680 














- Labs


Labs: 


 





 03/28/17 07:00 





 03/28/17 07:00 





 











PT  12.3 Seconds (9.9-11.8)  H  03/25/17  23:20    


 


INR  1.14  (0.93-1.08)  H  03/25/17  23:20    


 


APTT  33.8 Seconds (23.7-30.8)  H  03/25/17  23:20    














Attending/Attestation





- Attestation


I have personally seen and examined this patient.: Yes


I have fully participated in the care of the patient.: Yes


I have reviewed all pertinent clinical information, including history, physical 

exam and plan: Yes


Notes (Text): 


I have seen and examined patient with the resident. I agree with the above note 

with the following additions/ exceptions: 22 yo AA M with PMH of Crohn's disease

, non compliance with medications and possible drug-seeking behavior who got 

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 

which is growing VRE and yeast. Denies any new complaints. Requesting to be 

discharged tomorrow morning. States that he has a ride to go to [facility] tomorrow. 

GI and surgery recommended to follow up with outpatient GI and Colorectal 

surgeon in [facility].  Wound cultures are growing VRE. ID on board who recommended 

zosyn and zyvox. 


Dr Belia Graham

## 2017-03-28 NOTE — CP.PCM.PN
Subjective





- Date & Time of Evaluation


Date of Evaluation: 03/28/17


Time of Evaluation: 07:51





- Subjective


Subjective: 


SURGICAL PROGRESS NOTE FOR DR. BAUGH





Patient is seen and examined at bedside.  No acute events overnight.  Patient 

states pain is improving. He is tolerating diet.  Patient states that he is 

passing gas and had a BM.  Denies having dysuria, fevers or chills.  





Objective





- Vital Signs/Intake and Output


Vital Signs (last 24 hours): 


 











Temp Pulse Resp BP Pulse Ox


 


 97 F L  84   18   98/72 L  100 


 


 03/27/17 16:00  03/27/17 16:00  03/27/17 16:00  03/27/17 16:00  03/27/17 16:00








Intake and Output: 


 











 03/28/17 03/28/17





 06:59 18:59


 


Intake Total 1200 


 


Balance 1200 














- Medications


Medications: 


 Current Medications





Ascorbic Acid (Vitamin C 500 Mg Tab)  500 mg PO DAILY [facility]


   Last Admin: 03/27/17 17:01 Dose:  500 mg


Ferrous Sulfate (Feosol)  324 mg PO TID [facility]


   Last Admin: 03/27/17 17:01 Dose:  324 mg


Hydromorphone HCl (Dilaudid)  0.5 mg IVP Q4H PRN


   PRN Reason: Pain, severe (8-10)


   Last Admin: 03/28/17 05:22 Dose:  0.5 mg


Sodium Chloride (Sodium Chloride 0.9%)  1,000 mls @ 100 mls/hr IV .Q10H [facility]


   Last Admin: 03/28/17 01:27 Dose:  100 mls/hr


Piperacillin Sod/Tazobactam Sod (Zosyn 3.375 In Ns 100ml)  100 mls @ 200 mls/hr 

IVPB Q6 [facility]


   PRN Reason: Protocol


   Stop: 04/09/17 12:01


   Last Admin: 03/28/17 05:23 Dose:  200 mls/hr


Linezolid (Zyvox 600mg/300ml D5w)  300 mls @ 200 mls/hr IVPB Q12 [facility]


   PRN Reason: Protocol


   Stop: 04/03/17 10:01


   Last Admin: 03/27/17 21:15 Dose:  200 mls/hr


Multivitamins/Vitamin C (Multi-Delyn Liquid)  15 ml PO DAILY [facility]


   Last Admin: 03/27/17 17:02 Dose:  15 ml


Oxycodone/Acetaminophen (Percocet 5/325 Mg Tab)  1 tab PO Q6H PRN


   PRN Reason: Pain, moderate (4-7)


   Stop: 03/29/17 14:05


   Last Admin: 03/28/17 02:50 Dose:  1 tab


Pantoprazole Sodium (Protonix Inj)  40 mg IVP Q12 [facility]


   Last Admin: 03/27/17 21:13 Dose:  40 mg











- Labs


Labs: 


 





 03/28/17 07:00 





 03/28/17 07:00 





 











PT  12.3 Seconds (9.9-11.8)  H  03/25/17  23:20    


 


INR  1.14  (0.93-1.08)  H  03/25/17  23:20    


 


APTT  33.8 Seconds (23.7-30.8)  H  03/25/17  23:20    














- Constitutional


Appears: Non-toxic, No Acute Distress





- Head Exam


Head Exam: ATRAUMATIC





- ENT Exam


ENT Exam: Mucous Membranes Moist





- GI/Abdominal Exam


GI & Abdominal Exam: Soft, Tenderness.  absent: Distended, Firm, Guarding


Additional comments: 


umbilical fistula is still draining 





- Neurological Exam


Neurological Exam: Alert, Awake, Oriented x3





- Psychiatric Exam


Psychiatric exam: Normal Affect, Normal Mood





- Skin


Skin Exam: Dry, Intact, Normal Color, Warm





Assessment and Plan





- Assessment and Plan (Free Text)


Assessment: 


21M w. 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

s non-compliance





-recommend PO flagyl


-GI is following 


-pain management: dilaudid 


-no plans for surgical intervention


-strongly recommend that pt follow up w. specialist at [facility]


-Surgery will sign off.  Please feel free to consult again if necessary. 





Will discuss with attending Dr. Uli Chavez PGY1

## 2017-03-28 NOTE — CP.PCM.PN
Subjective





- Date & Time of Evaluation


Date of Evaluation: 03/28/17


Time of Evaluation: 10:00





- Subjective


Subjective: 


Comfortable in bed, less abdominal pain, afebrile overnight.





Objective





- Vital Signs/Intake and Output


Vital Signs (last 24 hours): 


 











Temp Pulse Resp BP Pulse Ox


 


 97 F L  84   18   98/72 L  100 


 


 03/27/17 16:00  03/27/17 16:00  03/27/17 16:00  03/27/17 16:00  03/27/17 16:00








Intake and Output: 


 











 03/28/17 03/28/17





 06:59 18:59


 


Intake Total 1200 


 


Balance 1200 














- Medications


Medications: 


 Current Medications





Ascorbic Acid (Vitamin C 500 Mg Tab)  500 mg PO DAILY [facility]


   Last Admin: 03/27/17 17:01 Dose:  500 mg


Ferrous Sulfate (Feosol)  324 mg PO TID [facility]


   Last Admin: 03/27/17 17:01 Dose:  324 mg


Hydromorphone HCl (Dilaudid)  0.5 mg IVP Q4H PRN


   PRN Reason: Pain, severe (8-10)


   Last Admin: 03/28/17 05:22 Dose:  0.5 mg


Sodium Chloride (Sodium Chloride 0.9%)  1,000 mls @ 100 mls/hr IV .Q10H [facility]


   Last Admin: 03/28/17 01:27 Dose:  100 mls/hr


Piperacillin Sod/Tazobactam Sod (Zosyn 3.375 In Ns 100ml)  100 mls @ 200 mls/hr 

IVPB Q6 [facility]


   PRN Reason: Protocol


   Stop: 04/09/17 12:01


   Last Admin: 03/28/17 05:23 Dose:  200 mls/hr


Linezolid (Zyvox 600mg/300ml D5w)  300 mls @ 200 mls/hr IVPB Q12 [facility]


   PRN Reason: Protocol


   Stop: 04/03/17 10:01


   Last Admin: 03/27/17 21:15 Dose:  200 mls/hr


Multivitamins/Vitamin C (Multi-Delyn Liquid)  15 ml PO DAILY [facility]


   Last Admin: 03/27/17 17:02 Dose:  15 ml


Oxycodone/Acetaminophen (Percocet 5/325 Mg Tab)  1 tab PO Q6H PRN


   PRN Reason: Pain, moderate (4-7)


   Stop: 03/29/17 14:05


   Last Admin: 03/28/17 02:50 Dose:  1 tab


Pantoprazole Sodium (Protonix Inj)  40 mg IVP Q12 [facility]


   Last Admin: 03/27/17 21:13 Dose:  40 mg











- Labs


Labs: 


 





 03/28/17 07:00 





 











PT  12.3 Seconds (9.9-11.8)  H  03/25/17  23:20    


 


INR  1.14  (0.93-1.08)  H  03/25/17  23:20    


 


APTT  33.8 Seconds (23.7-30.8)  H  03/25/17  23:20    














- Constitutional


Appears: Non-toxic, No Acute Distress





- Head Exam


Head Exam: NORMAL INSPECTION





- ENT Exam


ENT Exam: Mucous Membranes Moist





- Neck Exam


Neck Exam: absent: Lymphadenopathy, Meningismus





- Respiratory Exam


Respiratory Exam: Decreased Breath Sounds





- Cardiovascular Exam


Cardiovascular Exam: +S1, +S2





- GI/Abdominal Exam


GI & Abdominal Exam: Soft.  absent: Tenderness





Assessment and Plan





- Assessment and Plan (Free Text)


Plan: 


Assessment


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 

and microabscesses, growing Vancomycin-resistant Enterococcus faecium from 3/23

, growing gram positive cocci and yeast from 3/26, in a patient with poorly-

controlled Crohn's disease





Plan


Added Zyvox on top of Zosyn as well as Mycamine; follow up identification and 

sensitivities of the gram positive cocci and yeast in the new cultures; since 

these are fistulas, patient will need long-term antibiotic therapy (ideally 

until the fistulas heal, but as long as no more drainage occurs, then it can be 

observed carefully)


Discussed with GI fellow


Will continue to monitor clinically

## 2017-03-29 VITALS
OXYGEN SATURATION: 94 % | HEART RATE: 93 BPM | SYSTOLIC BLOOD PRESSURE: 115 MMHG | DIASTOLIC BLOOD PRESSURE: 46 MMHG | TEMPERATURE: 97.9 F

## 2017-03-29 RX ADMIN — HYDROMORPHONE HYDROCHLORIDE PRN MG: 1 INJECTION, SOLUTION INTRAMUSCULAR; INTRAVENOUS; SUBCUTANEOUS at 07:00

## 2017-03-29 RX ADMIN — HYDROMORPHONE HYDROCHLORIDE PRN MG: 1 INJECTION, SOLUTION INTRAMUSCULAR; INTRAVENOUS; SUBCUTANEOUS at 02:54

## 2017-03-29 RX ADMIN — PIPERACILLIN AND TAZOBACTAM SCH MLS/HR: 3; .375 INJECTION, POWDER, LYOPHILIZED, FOR SOLUTION INTRAVENOUS; PARENTERAL at 00:32

## 2017-03-29 RX ADMIN — PIPERACILLIN AND TAZOBACTAM SCH MLS/HR: 3; .375 INJECTION, POWDER, LYOPHILIZED, FOR SOLUTION INTRAVENOUS; PARENTERAL at 05:31

## 2017-03-29 RX ADMIN — PANTOPRAZOLE SODIUM SCH MG: 40 TABLET, DELAYED RELEASE ORAL at 08:08

## 2017-03-29 RX ADMIN — OXYCODONE HYDROCHLORIDE AND ACETAMINOPHEN PRN TAB: 5; 325 TABLET ORAL at 03:23

## 2017-03-29 NOTE — CP.PCM.DIS
<WanBryce - Last Filed: 03/29/17 16:59>





Provider





- Provider


Date of Admission: 


03/27/17 15:25





Attending physician: 


Belia Graham MD





Primary care physician: 


NO PRIMARY CARE PROVIDER





Time Spent in preparation of Discharge (in minutes): 35





Diagnosis





- Discharge Diagnosis


(1) Crohn's disease


Status: Acute





(2) Intra-abdominal abscess


Status: Acute








Hospital Course





- Lab Results


Lab Results: 


 Micro Results





03/28/17 01:20   Urine   Urine Culture - Final


                            No Growth (<1,000 CFU/ML)





 Most Recent Lab Values











WBC  13.1 10^3/ul (4.5-11.0)  H  03/28/17  07:00    


 


RBC  3.72 10^6/uL (3.5-6.1)   03/28/17  07:00    


 


Hgb  8.0 gm/dL (14.0-18.0)  L  03/28/17  07:00    


 


Hct  25.8 % (42.0-52.0)  L  03/28/17  07:00    


 


MCV  69.4 fL (80.0-105.0)  L  03/28/17  07:00    


 


MCH  21.5 pg (25.0-35.0)  L  03/28/17  07:00    


 


MCHC  31.0 g/dl (31.0-37.0)   03/28/17  07:00    


 


RDW  19.1 % (11.5-14.5)  H  03/28/17  07:00    


 


Plt Count  790 10^3/uL (120.0-450.0)  H*  03/28/17  07:00    


 


MPV  8.1 fl (7.0-11.0)   03/28/17  07:00    


 


Gran %  73.6 % (50.0-68.0)  H  03/28/17  07:00    


 


Lymph % (Auto)  13.5 % (22.0-35.0)  L  03/28/17  07:00    


 


Mono % (Auto)  12.2 % (1.0-6.0)  H  03/28/17  07:00    


 


Eos % (Auto)  0.5 % (1.5-5.0)  L  03/28/17  07:00    


 


Baso % (Auto)  0.2 % (0.0-3.0)   03/28/17  07:00    


 


Gran #  9.63  (1.4-6.5)  H  03/28/17  07:00    


 


Lymph #  1.8  (1.2-3.4)   03/28/17  07:00    


 


Mono #  1.6  (0.1-0.6)  H  03/28/17  07:00    


 


Eos #  0.1  (0.0-0.7)   03/28/17  07:00    


 


Baso #  0.02 K/mm3 (0.0-2.0)   03/28/17  07:00    


 


PT  12.3 Seconds (9.9-11.8)  H  03/25/17  23:20    


 


INR  1.14  (0.93-1.08)  H  03/25/17  23:20    


 


APTT  33.8 Seconds (23.7-30.8)  H  03/25/17  23:20    


 


Sodium  139 mmol/L (132-148)   03/28/17  07:00    


 


Potassium  3.8 mmol/L (3.6-5.0)   03/28/17  07:00    


 


Chloride  101 mmol/L ()   03/28/17  07:00    


 


Carbon Dioxide  26 mmol/L (21-33)   03/28/17  07:00    


 


Anion Gap  16  (10-20)   03/28/17  07:00    


 


BUN  4 mg/dL (7-21)  L  03/28/17  07:00    


 


Creatinine  0.8 mg/dL (0.5-1.4)   03/28/17  07:00    


 


Est GFR ( Amer)  > 60   03/28/17  07:00    


 


Est GFR (Non-Af Amer)  > 60   03/28/17  07:00    


 


Random Glucose  80 mg/dL ()   03/28/17  07:00    


 


Calcium  8.5 mg/dL (8.4-10.5)   03/28/17  07:00    


 


Phosphorus  3.7 mg/dL (2.5-4.5)   03/26/17  07:30    


 


Magnesium  2.0 mg/dL (1.7-2.2)   03/26/17  07:30    


 


Iron  13 ug/dL ()  L  03/26/17  07:30    


 


TIBC  223 ug/dL (261-462)  L  03/26/17  07:30    


 


% Saturation  6 % (20-55)  L  03/26/17  07:30    


 


Ferritin  140.0 ng/mL  03/26/17  07:30    


 


Total Bilirubin  0.3 mg/dL (0.2-1.3)   03/28/17  07:00    


 


AST  35 U/L (15-59)   03/28/17  07:00    


 


ALT  8 U/L (7-56)   03/28/17  07:00    


 


Alkaline Phosphatase  83 U/L ()   03/28/17  07:00    


 


Total Protein  7.4 g/dL (5.8-8.3)   03/28/17  07:00    


 


Albumin  3.2 g/dL (3.0-4.8)   03/28/17  07:00    


 


Globulin  4.2 gm/dL  03/28/17  07:00    


 


Albumin/Globulin Ratio  0.8  (1.1-1.8)  L  03/28/17  07:00    


 


Lipase  26 U/L ()   03/25/17  23:20    


 


Folate  17.5 ng/mL  03/26/17  07:30    


 


Urine Color  Yellow  (YELLOW)   03/26/17  02:03    


 


Urine Appearance  Clear  (CLEAR)   03/26/17  02:03    


 


Urine pH  7.0  (4.7-8.0)   03/26/17  02:03    


 


Ur Specific Gravity  1.010  (1.005-1.035)   03/26/17  02:03    


 


Urine Protein  30 mg/dL (<30 mg/dL)  H  03/26/17  02:03    


 


Urine Glucose (UA)  Negative mg/dL (NEGATIVE)   03/26/17  02:03    


 


Urine Ketones  Negative mg/dL (NEGATIVE)   03/26/17  02:03    


 


Urine Blood  Negative  (NEGATIVE)   03/26/17  02:03    


 


Urine Nitrate  Negative  (NEGATIVE)   03/26/17  02:03    


 


Urine Bilirubin  Negative  (NEGATIVE)   03/26/17  02:03    


 


Urine Urobilinogen  0.2 E.U./dL (<1 E.U./dL)   03/26/17  02:03    


 


Ur Leukocyte Esterase  Negative Robson/uL (NEGATIVE)   03/26/17  02:03    


 


Urine RBC  0 - 2 /hpf (0-2)   03/26/17  02:03    


 


Urine WBC  0 - 2 /hpf (0-6)   03/26/17  02:03    


 


Ur Epithelial Cells  0 - 2 /hpf (0-5)   03/26/17  02:03    


 


Urine Bacteria  Rare  (NEG)   03/26/17  02:03    


 


HIV 1&2 Ag/Ab, 4th Gen  Nonreactive  (Nonreactive)   03/26/17  11:20    


 


Blood Type  A POSITIVE   03/26/17  05:30    


 


Antibody Screen  Negative   03/26/17  05:30    


 


BBK History Checked  Patient has bt   03/26/17  05:30    














- Hospital Course


Hospital Course: 


21M with pmh of Crohn's and multiple draining abdominal abscesses/fistulas 

likely 2/2 to his untreated Crohn's disease, was being treated for his 

infection. His wound cultures were growing VRE and fungus. He was on IV 

Vancomycin, Linezolid, Zosyn and Micafungin with symptoms improving. Today, pt. 

was feeling better compared to yesterday and wanted to leave. It was explained 

that he needed to stay in the hospital to receive appropriate IV anitbiotics to 

treat his infection and he signed out AMA. He was given a prescription for 

Augmentin and Zyvox before he left. 





- Date & Time of H&P


Date of H&P: 03/29/17


Time of H&P: 07:15





Discharge Exam





- Head Exam


Head Exam: ATRAUMATIC, NORMOCEPHALIC





- Eye Exam


Eye Exam: EOMI





- ENT Exam


ENT Exam: Mucous Membranes Moist





- Neck Exam


Neck exam: Full Rom





- Respiratory Exam


Respiratory Exam: Clear to PA & Lateral, NORMAL BREATHING PATTERN, UNREMARKABLE





- Cardiovascular Exam


Cardiovascular Exam: REGULAR RHYTHM





- GI/Abdominal Exam


GI & Abdominal Exam: Normal Bowel Sounds, Unremarkable





- Extremities Exam


Extremities exam: full ROM





- Back Exam


Back exam: FULL ROM.  absent: rash noted





- Neurological Exam


Neurological exam: Alert, Oriented x3





- Psychiatric Exam


Psychiatric exam: Normal Affect, Normal Mood





- Skin


Skin Exam: Dry, Intact, Normal Color, Warm





Discharge Plan





- Follow Up Plan


Condition: GOOD


Disposition: AGAINST MEDICAL ADVICE


Instructions:  Abscess (GEN)





<Belia Graham - Last Filed: 03/30/17 14:59>





Provider





- Provider


Date of Admission: 


03/27/17 15:25





Attending physician: 


Belia Graham MD





Primary care physician: 


NO PRIMARY CARE PROVIDER








Hospital Course





- Lab Results


Lab Results: 


 Micro Results





03/28/17 01:20   Urine   Urine Culture - Final


                            No Growth (<1,000 CFU/ML)





 Most Recent Lab Values











WBC  13.1 10^3/ul (4.5-11.0)  H  03/28/17  07:00    


 


RBC  3.72 10^6/uL (3.5-6.1)   03/28/17  07:00    


 


Hgb  8.0 gm/dL (14.0-18.0)  L  03/28/17  07:00    


 


Hct  25.8 % (42.0-52.0)  L  03/28/17  07:00    


 


MCV  69.4 fL (80.0-105.0)  L  03/28/17  07:00    


 


MCH  21.5 pg (25.0-35.0)  L  03/28/17  07:00    


 


MCHC  31.0 g/dl (31.0-37.0)   03/28/17  07:00    


 


RDW  19.1 % (11.5-14.5)  H  03/28/17  07:00    


 


Plt Count  790 10^3/uL (120.0-450.0)  H*  03/28/17  07:00    


 


MPV  8.1 fl (7.0-11.0)   03/28/17  07:00    


 


Gran %  73.6 % (50.0-68.0)  H  03/28/17  07:00    


 


Lymph % (Auto)  13.5 % (22.0-35.0)  L  03/28/17  07:00    


 


Mono % (Auto)  12.2 % (1.0-6.0)  H  03/28/17  07:00    


 


Eos % (Auto)  0.5 % (1.5-5.0)  L  03/28/17  07:00    


 


Baso % (Auto)  0.2 % (0.0-3.0)   03/28/17  07:00    


 


Gran #  9.63  (1.4-6.5)  H  03/28/17  07:00    


 


Lymph #  1.8  (1.2-3.4)   03/28/17  07:00    


 


Mono #  1.6  (0.1-0.6)  H  03/28/17  07:00    


 


Eos #  0.1  (0.0-0.7)   03/28/17  07:00    


 


Baso #  0.02 K/mm3 (0.0-2.0)   03/28/17  07:00    


 


PT  12.3 Seconds (9.9-11.8)  H  03/25/17  23:20    


 


INR  1.14  (0.93-1.08)  H  03/25/17  23:20    


 


APTT  33.8 Seconds (23.7-30.8)  H  03/25/17  23:20    


 


Sodium  139 mmol/L (132-148)   03/28/17  07:00    


 


Potassium  3.8 mmol/L (3.6-5.0)   03/28/17  07:00    


 


Chloride  101 mmol/L ()   03/28/17  07:00    


 


Carbon Dioxide  26 mmol/L (21-33)   03/28/17  07:00    


 


Anion Gap  16  (10-20)   03/28/17  07:00    


 


BUN  4 mg/dL (7-21)  L  03/28/17  07:00    


 


Creatinine  0.8 mg/dL (0.5-1.4)   03/28/17  07:00    


 


Est GFR ( Amer)  > 60   03/28/17  07:00    


 


Est GFR (Non-Af Amer)  > 60   03/28/17  07:00    


 


Random Glucose  80 mg/dL ()   03/28/17  07:00    


 


Calcium  8.5 mg/dL (8.4-10.5)   03/28/17  07:00    


 


Phosphorus  3.7 mg/dL (2.5-4.5)   03/26/17  07:30    


 


Magnesium  2.0 mg/dL (1.7-2.2)   03/26/17  07:30    


 


Iron  13 ug/dL ()  L  03/26/17  07:30    


 


TIBC  223 ug/dL (261-462)  L  03/26/17  07:30    


 


% Saturation  6 % (20-55)  L  03/26/17  07:30    


 


Ferritin  140.0 ng/mL  03/26/17  07:30    


 


Total Bilirubin  0.3 mg/dL (0.2-1.3)   03/28/17  07:00    


 


AST  35 U/L (15-59)   03/28/17  07:00    


 


ALT  8 U/L (7-56)   03/28/17  07:00    


 


Alkaline Phosphatase  83 U/L ()   03/28/17  07:00    


 


Total Protein  7.4 g/dL (5.8-8.3)   03/28/17  07:00    


 


Albumin  3.2 g/dL (3.0-4.8)   03/28/17  07:00    


 


Globulin  4.2 gm/dL  03/28/17  07:00    


 


Albumin/Globulin Ratio  0.8  (1.1-1.8)  L  03/28/17  07:00    


 


Lipase  26 U/L ()   03/25/17  23:20    


 


Folate  17.5 ng/mL  03/26/17  07:30    


 


Urine Color  Yellow  (YELLOW)   03/26/17  02:03    


 


Urine Appearance  Clear  (CLEAR)   03/26/17  02:03    


 


Urine pH  7.0  (4.7-8.0)   03/26/17  02:03    


 


Ur Specific Gravity  1.010  (1.005-1.035)   03/26/17  02:03    


 


Urine Protein  30 mg/dL (<30 mg/dL)  H  03/26/17  02:03    


 


Urine Glucose (UA)  Negative mg/dL (NEGATIVE)   03/26/17  02:03    


 


Urine Ketones  Negative mg/dL (NEGATIVE)   03/26/17  02:03    


 


Urine Blood  Negative  (NEGATIVE)   03/26/17  02:03    


 


Urine Nitrate  Negative  (NEGATIVE)   03/26/17  02:03    


 


Urine Bilirubin  Negative  (NEGATIVE)   03/26/17  02:03    


 


Urine Urobilinogen  0.2 E.U./dL (<1 E.U./dL)   03/26/17  02:03    


 


Ur Leukocyte Esterase  Negative Robson/uL (NEGATIVE)   03/26/17  02:03    


 


Urine RBC  0 - 2 /hpf (0-2)   03/26/17  02:03    


 


Urine WBC  0 - 2 /hpf (0-6)   03/26/17  02:03    


 


Ur Epithelial Cells  0 - 2 /hpf (0-5)   03/26/17  02:03    


 


Urine Bacteria  Rare  (NEG)   03/26/17  02:03    


 


HIV 1&2 Ag/Ab, 4th Gen  Nonreactive  (Nonreactive)   03/26/17  11:20    


 


Blood Type  A POSITIVE   03/26/17  05:30    


 


Antibody Screen  Negative   03/26/17  05:30    


 


BBK History Checked  Patient has bt   03/26/17  05:30    














Attending/Attestation





- Attestation


I have personally seen and examined this patient.: Yes


I have fully participated in the care of the patient.: Yes


I have reviewed all pertinent clinical information, including history, physical 

exam and plan: Yes


Notes (Text): 


I have seen and examined patient with the resident. I agree with the above note 

with the following additions/ exceptions: 20 yo AA M with PMH of Crohn's disease

, non compliance with medications and possible drug-seeking behavior who got 

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 

which is growing VRE and yeast. Denies any new complaints. Requesting to be 

discharged today. States that he has a ride to go to [facility] today. No further 

work up planned by GI or surgery. Wound cultures are growing VRE. ID on board 

who recommended zosyn and zyvox however patient wants to leave AMA. Patient was 

advised to stay for IV antibiotics. All the risks and complications were 

explained to him and he verbalized understanding. Upon discharge patient will 

follow up with outpatient GI and Colorectal surgeon in [facility].  


Dr Belia Graham

## 2017-04-02 ENCOUNTER — HOSPITAL ENCOUNTER (INPATIENT)
Dept: HOSPITAL 42 - ED | Age: 22
LOS: 4 days | Discharge: HOME | DRG: 584 | End: 2017-04-06
Attending: INTERNAL MEDICINE | Admitting: HOSPITALIST
Payer: MEDICAID

## 2017-04-02 VITALS — BODY MASS INDEX: 17.8 KG/M2

## 2017-04-02 DIAGNOSIS — A41.9: Primary | ICD-10-CM

## 2017-04-02 DIAGNOSIS — B95.2: ICD-10-CM

## 2017-04-02 DIAGNOSIS — K50.913: ICD-10-CM

## 2017-04-02 DIAGNOSIS — D50.9: ICD-10-CM

## 2017-04-02 DIAGNOSIS — Z87.891: ICD-10-CM

## 2017-04-02 DIAGNOSIS — Z16.21: ICD-10-CM

## 2017-04-02 DIAGNOSIS — K65.1: ICD-10-CM

## 2017-04-02 LAB
ADD MANUAL DIFF?: NO
ALBUMIN/GLOB SERPL: 0.8 {RATIO} (ref 1.1–1.8)
ALP SERPL-CCNC: 109 U/L (ref 38–133)
ALT SERPL-CCNC: 8 U/L (ref 7–56)
APTT BLD: 31.9 SECONDS (ref 23.7–30.8)
AST SERPL-CCNC: 37 U/L (ref 15–59)
BASOPHILS # BLD AUTO: 0.02 K/MM3 (ref 0–2)
BASOPHILS NFR BLD: 0.2 % (ref 0–3)
BILIRUB SERPL-MCNC: 0.4 MG/DL (ref 0.2–1.3)
BUN SERPL-MCNC: 4 MG/DL (ref 7–21)
CALCIUM SERPL-MCNC: 9.3 MG/DL (ref 8.4–10.5)
CHLORIDE SERPL-SCNC: 99 MMOL/L (ref 98–107)
CO2 SERPL-SCNC: 26 MMOL/L (ref 21–33)
EOSINOPHIL # BLD: 0.1 10*3/UL (ref 0–0.7)
EOSINOPHIL NFR BLD: 0.6 % (ref 1.5–5)
ERYTHROCYTE [DISTWIDTH] IN BLOOD BY AUTOMATED COUNT: 20 % (ref 11.5–14.5)
GLOBULIN SER-MCNC: 4.8 GM/DL
GLUCOSE SERPL-MCNC: 81 MG/DL (ref 70–110)
GRANULOCYTES # BLD: 7.84 10*3/UL (ref 1.4–6.5)
GRANULOCYTES NFR BLD: 81.7 % (ref 50–68)
HCT VFR BLD CALC: 26.9 % (ref 42–52)
INR PPP: 1.13 (ref 0.93–1.08)
LIPASE SERPL-CCNC: 52 U/L (ref 23–300)
LYMPHOCYTES # BLD: 1.1 10*3/UL (ref 1.2–3.4)
LYMPHOCYTES NFR BLD AUTO: 11 % (ref 22–35)
MCH RBC QN AUTO: 21.6 PG (ref 25–35)
MCHC RBC AUTO-ENTMCNC: 31.2 G/DL (ref 31–37)
MCV RBC AUTO: 69.3 FL (ref 80–105)
MONOCYTES # BLD AUTO: 0.6 10*3/UL (ref 0.1–0.6)
MONOCYTES NFR BLD: 6.5 % (ref 1–6)
PLATELET # BLD: 962 10^3/UL (ref 120–450)
PMV BLD AUTO: 7.9 FL (ref 7–11)
POTASSIUM SERPL-SCNC: 3.6 MMOL/L (ref 3.6–5)
PROT SERPL-MCNC: 8.7 G/DL (ref 5.8–8.3)
SODIUM SERPL-SCNC: 139 MMOL/L (ref 132–148)
WBC # BLD AUTO: 9.6 10^3/UL (ref 4.5–11)

## 2017-04-02 RX ADMIN — OXYCODONE HYDROCHLORIDE AND ACETAMINOPHEN PRN TAB: 5; 325 TABLET ORAL at 21:03

## 2017-04-02 RX ADMIN — LINEZOLID SCH MLS/HR: 600 INJECTION, SOLUTION INTRAVENOUS at 22:09

## 2017-04-02 RX ADMIN — PIPERACILLIN AND TAZOBACTAM SCH MLS/HR: 3; .375 INJECTION, POWDER, LYOPHILIZED, FOR SOLUTION INTRAVENOUS; PARENTERAL at 23:29

## 2017-04-02 RX ADMIN — PIPERACILLIN AND TAZOBACTAM SCH MLS/HR: 3; .375 INJECTION, POWDER, LYOPHILIZED, FOR SOLUTION INTRAVENOUS; PARENTERAL at 17:34

## 2017-04-02 RX ADMIN — HYDROMORPHONE HYDROCHLORIDE PRN MG: 1 INJECTION, SOLUTION INTRAMUSCULAR; INTRAVENOUS; SUBCUTANEOUS at 16:26

## 2017-04-02 RX ADMIN — HYDROMORPHONE HYDROCHLORIDE PRN MG: 1 INJECTION, SOLUTION INTRAMUSCULAR; INTRAVENOUS; SUBCUTANEOUS at 20:15

## 2017-04-02 NOTE — ED PDOC
Arrival/HPI





- General


Chief Complaint: Abdominal Pain


Time Seen by Provider: 04/02/17 12:59


Historian: Patient





- History of Present Illness


Narrative History of Present Illness (Text): 


04/02/17 13:27


Lawrence Wen is a 21 year old male, with a history of Crohn's disease, 

presents to the emergency department for evaluation of multiple purulent 

draining abscesses on abdomen and back. Patient was admitted to the hospital 

for these symptoms for IV antibiotics, but left against medical advice 3/29/17. 

Reports of pain to the surrounding area and is requesting pain medication. Also 

notes of subjective fever. Denies headache, difficulty breathing, nausea, 

vomiting, diarrhea, urinary symptoms or any other complaints at this time.








Time/Duration: > month


Symptom Onset: Gradual


Symptom Course: Worsening


Severity Level: Moderate


Activities at Onset: Light





Past Medical History





- Provider Review


Nursing Documentation Reviewed: Yes





- Infectious Disease


Hx of Infectious Diseases: None





- Cardiac


Hx Cardiac Disorders: No





- Pulmonary


Hx Respiratory Disorders: No





- Neurological


Hx Neurological Disorder: No





- HEENT


Hx HEENT Disorder: No





- Renal


Hx Renal Disorder: No





- Endocrine/Metabolic


Hx Endocrine Disorders: No





- Hematological/Oncological


Hx Blood Disorders: No





- Integumentary


Hx Dermatological Disorder: No





- Musculoskeletal/Rheumatological


Hx Musculoskeletal Disorders: No





- Gastrointestinal


Hx Crohn's Disease: Yes





- Genitourinary/Gynecological


Hx Genitourinary Disorders: No





- Psychiatric


Hx Psychophysiologic Disorder: No


Hx Substance Use: No





- Surgical History


Other/Comment: Abd surg. for intraabd. abscesses x 7 last surgery 2/2017 at [facility] [place]





- Anesthesia


Hx Anesthesia: No


Hx Anesthesia Reactions: No


Hx Malignant Hyperthermia: No





- Suicidal Assessment


Feels Threatened In Home Enviroment: No





Family/Social History





- Physician Review


Nursing Documentation Reviewed: Yes


Family/Social History: No Known Family HX


Smoking Status: Never Smoked


Hx Alcohol Use: No


Hx Substance Use: No





Allergies/Home Meds


Allergies/Adverse Reactions: 


Allergies





FISH Allergy (Verified 04/02/17 13:24)


 RASH


shellfish derived Adverse Reaction (Verified 04/02/17 13:24)


 ANGIOEDEMA








Home Medications: 


 Home Meds











 Medication  Instructions  Recorded  Confirmed


 


Cephalexin [cephalexin] 500 mg PO BID 04/02/17 04/02/17














Review of Systems





- Review of Systems


Constitutional: Fevers


Respiratory: absent: SOB, Cough


Cardiovascular: absent: Chest Pain


Gastrointestinal: Abdominal Pain (pain surrounding drainining abscesses. 

Purulent draining abscesses in abdomen and back. ).  absent: Diarrhea, Nausea, 

Vomiting


Musculoskeletal: Back Pain


Neurological: Normal


Psychiatric: Normal





Physical Exam


Vital Signs Reviewed: Yes


Vital Signs











  Temp Pulse Resp BP Pulse Ox


 


 04/02/17 13:20  98 F  102 H  20  123/80  99


 


 04/02/17 13:19  98.5 F  86  18  126/73  97











Temperature: Afebrile


Blood Pressure: Normal


Pulse: Regular


Respiratory Rate: Normal


Appearance: Positive for: Non-Toxic


Pain Distress: None


Mental Status: Positive for: Alert and Oriented X 3





- Systems Exam


Head: Present: Atraumatic, Normocephalic


Pupils: Present: PERRL


Conjunctiva: Present: Normal


Respiratory/Chest: Present: Clear to Auscultation, Good Air Exchange.  No: 

Respiratory Distress, Accessory Muscle Use


Cardiovascular: Present: Regular Rate and Rhythm, Normal S1, S2.  No: Murmurs


Abdomen: Present: Normal Bowel Sounds, Other (multiple purulent draining open 

wound around the umbilicus. ).  No: Distention, Peritoneal Signs


Neurological: Present: GCS=15, CN II-XII Intact, Speech Normal


Skin: Present: Warm, Dry, Normal Color.  No: Rashes


Psychiatric: Present: Alert, Oriented x 3, Normal Insight, Normal Concentration





Medical Decision Making


ED Course and Treatment: 


04/02/17 13:35


Impression: A 21 year old male who presents to the ed complaining of multiple 

purulent draining abscesses on abdomen and back. pt refusing ct imaging. noted 

recent ama








Plan:


-- Labs


-- Toradol


-- Vancomycin


-- Zosyn


-- Blood culture


-- wound culture


-- Urinalysis


-- Reassess and disposition








Progress Notes:


04/02/17 15:13


Patient is refusing CT scan. Case discussed with hospitalist, Dr. Graham who 

accepts patient to hospital under his service. 








04/02/17 17:35








- Lab Interpretations


Lab Results: 








 04/02/17 14:00 





 04/02/17 14:00 





 Lab Results





04/02/17 14:00: WBC 9.6  D, RBC 3.88, Hgb 8.4 L, Hct 26.9 L, MCV 69.3 L, MCH 

21.6 L, MCHC 31.2, RDW 20.0 H, Plt Count 962 H* D, MPV 7.9, Gran % 81.7 H, 

Lymph % (Auto) 11.0 L, Mono % (Auto) 6.5 H, Eos % (Auto) 0.6 L, Baso % (Auto) 

0.2, Gran # 7.84 H, Lymph # 1.1 L, Mono # 0.6, Eos # 0.1, Baso # 0.02, PT 12.2 H

, INR 1.13 H, APTT 31.9 H, Sodium 139, Potassium 3.6, Chloride 99, Carbon 

Dioxide 26, Anion Gap 18, BUN 4 L, Creatinine 0.8, Est GFR (African Amer) > 60, 

Est GFR (Non-Af Amer) > 60, Random Glucose 81, Calcium 9.3, Total Bilirubin 0.4

, AST 37, ALT 8, Alkaline Phosphatase 109, Total Protein 8.7 H, Albumin 3.9, 

Globulin 4.8, Albumin/Globulin Ratio 0.8 L, Lipase 52











- Medication Orders


Current Medication Orders: 








Enoxaparin Sodium (Lovenox)  40 mg SC DAILY [facility]


   PRN Reason: Protocol


Ferrous Sulfate (Feosol)  324 mg PO TID [facility]


   Last Admin: 04/02/17 17:34  Dose: 324 MG





Hydromorphone HCl (Dilaudid)  0.5 mg IVP Q4H PRN


   PRN Reason: Pain, moderate (4-7)


   Last Admin: 04/02/17 16:26  Dose: 0.5 MG





MAR Pain Assessment


 Document     04/02/17 16:26  ML  (Rec: 04/02/17 16:27  ML  [id number])


     Pain Reassessment


      Is this a pain reassessment?               No


     Presence of Pain


      Presence of Pain                           Yes


     Location


      Left, Right or Bilateral                   Bilateral


      Pain Location Body Site                    Abdomen


     Description


      Description                                Constant


      Intensity of Pain at present               9


IVP Administration


 Document     04/02/17 16:26  ML  (Rec: 04/02/17 16:27  ML  [id number])


     Charges for Administration


      # of IVP Administrations                   1





Sodium Chloride (Sodium Chloride 0.9%)  1,000 mls @ 100 mls/hr IV .Q10H BARBARA


Micafungin Sodium 100 mg/ (Sodium Chloride)  100 mls @ 100 mls/hr IV DAILY [facility]


   PRN Reason: Protocol


   Stop: 04/13/17 10:01


Linezolid (Zyvox 600mg/300ml D5w)  300 mls @ 200 mls/hr IVPB Q12 BARBARA


   PRN Reason: Protocol


   Stop: 04/16/17 22:01


Piperacillin Sod/Tazobactam Sod (Zosyn 3.375 In Ns 100ml)  100 mls @ 200 mls/hr 

IVPB Q6 BARBARA


   PRN Reason: Protocol


   Stop: 04/16/17 18:01


   Last Admin: 04/02/17 17:34  Dose: 200 MLS/HR





eMAR Start Stop


 Document     04/02/17 17:34  MLK  (Rec: 04/02/17 17:34  MLK  [id number])


     Intravenous Solution


      Start Date                                 04/02/17


      Start Time                                 17:34


      End Date                                   04/02/17


      End time                                   18:04


      Total Infusion Time                        30





Ondansetron HCl (Zofran Inj)  4 mg IVP Q4H PRN


   PRN Reason: Nausea/Vomiting


   Last Admin: 04/02/17 16:26  Dose: 4 MG





IVP Administration


 Document     04/02/17 16:26  ML  (Rec: 04/02/17 16:26  ML  [id number])


     Charges for Administration


      # of IVP Administrations                   1





Pantoprazole Sodium (Protonix Inj)  40 mg IVP DAILY BARBARA





Discontinued Medications





Piperacillin Sod/Tazobactam Sod (Zosyn 3.375 In Ns 100ml)  100 mls @ 200 mls/hr 

IVPB STAT STA


   PRN Reason: Protocol


   Stop: 04/02/17 13:46


   Last Admin: 04/02/17 14:15  Dose: 200 MLS/HR





eMAR Start Stop


 Document     04/02/17 14:15  KASIE  (Rec: 04/02/17 14:19  KASIE  [id number])


     Intravenous Solution


      Start Date                                 04/02/17


      Start Time                                 14:15


      End Date                                   04/02/17


      End time                                   14:45


      Total Infusion Time                        30





Vancomycin HCl (Vancomycin 1gm)  250 mls @ 167 mls/hr IVPB STAT STA


   PRN Reason: Protocol


   Stop: 04/02/17 14:46


   Last Admin: 04/02/17 15:00  Dose: 167 MLS/HR





eMAR Start Stop


 Document     04/02/17 15:00  KASIE  (Rec: 04/02/17 15:04  KASIE  [id number])


     Intravenous Solution


      Start Date                                 04/02/17


      Start Time                                 15:00


      End Date                                   04/02/17


      End time                                   16:30


      Total Infusion Time                        90





Ketorolac Tromethamine (Toradol)  30 mg IVP STAT STA


   Stop: 04/02/17 13:20


   Last Admin: 04/02/17 14:10  Dose: 30 MG





IVP Administration


 Document     04/02/17 14:10  KASIE  (Rec: 04/02/17 14:19  KASIE  [id number])


     Charges for Administration


      # of IVP Administrations                   1














- Scribe Statement


The provider has reviewed the documentation as recorded by the Amy Marroquin 


Provider Attestation: 





All medical record entries made by the Amy were at my direction and 

personally dictated by me. I have reviewed the chart and agree that the record 

accurately reflects my personal performance of the history, physical exam, 

medical decision making, and the department course for this patient. I have 

also personally directed, reviewed, and agree with the discharge instructions 

and disposition.





Disposition/Present on Arrival





- Present on Arrival


Any Indicators Present on Arrival: No


History of DVT/PE: No


History of Uncontrolled Diabetes: No


Urinary Catheter: No


History of Decub. Ulcer: No


History Surgical Site Infection Following: None





- Disposition


Have Diagnosis and Disposition been Completed?: Yes


Diagnosis: 


 Intra-abdominal abscess


Disposition: HOSPITALIZED


Disposition Time: 01:00


Patient Problems: 


 Current Active Problems











Problem Status Diagnosed


 


Abdominal pain Acute 


 


Crohn's disease Acute 


 


Intra-abdominal abscess Acute 











Condition: FAIR

## 2017-04-02 NOTE — CP.PCM.HP
History of Present Illness





- History of Present Illness


History of Present Illness: 


20 y/o M with PMH of Crohn's disease, iron-def anemia, and drug-seeking 

behavior presents to the ED with abdominal pain for the past 4 days. Pt was 

recently admitted for multiple microabscesses in lower right pelvic area, 

culture positive for VRE and Candida albicans. Pt was placed on abx at time, 

but signed out AMA. Pt was told at that time to follow up with [facility], but did 

not. Pt returns today with right sided abdominal pain that has been worsening 

over the past 4 days. Pt states he has not taken any medication for it. Pt 

states pain is made worse by movement. He reports having multiple bouts of 

diarrhea, nonbloody. Pt denies CP, SOB, N/V, headache, fever, chills, dizziness

, dysuria. 





PMH: As above


PSH: Abdominal surgery x2, I and D


FH: HTN, Arthritis


Allergies: Fish, shellfish


Meds: None





Present on Admission





- Present on Admission


Any Indicators Present on Admission: No





Review of Systems





- Constitutional


Constitutional: Fatigue, Lethargy





- EENT


Eyes: absent: Blurred Vision, Change in Vision


Nose/Mouth/Throat: absent: Nasal Congestion, Nasal Discharge





- Cardiovascular


Cardiovascular: absent: Chest Pain, Irregular Heart Rhythm





- Respiratory


Respiratory: absent: Cough, Dyspnea





- Gastrointestinal


Gastrointestinal: Abdominal Pain, Diarrhea.  absent: Vomiting





- Genitourinary


Genitourinary: absent: Dysuria, Hematuria





- Integumentary


Integumentary: absent: New Lesions, Rash





- Neurological


Neurological: absent: Syncope, Tingling, Tremor





- Psychiatric


Psychiatric: absent: Anxiety, Depression





- Hematologic/Lymphatic


Hematologic: absent: Easy Bleeding, Easy Bruising





Past Patient History





- Infectious Disease


Hx of Infectious Diseases: None





- Past Social History


Smoking Status: Never Smoked





- CARDIAC


Hx Cardiac Disorders: No





- PULMONARY


Hx Respiratory Disorders: No





- NEUROLOGICAL


Hx Neurological Disorder: No





- HEENT


Hx HEENT Problems: No





- RENAL


Hx Chronic Kidney Disease: No





- ENDOCRINE/METABOLIC


Hx Endocrine Disorders: No





- HEMATOLOGICAL/ONCOLOGICAL


Hx Blood Disorders: No





- INTEGUMENTARY


Hx Dermatological Problems: No





- MUSCULOSKELETAL/RHEUMATOLOGICAL


Hx Musculoskeletal Disorders: No





- GASTROINTESTINAL


Hx Crohn's Disease: Yes





- GENITOURINARY/GYNECOLOGICAL


Hx Genitourinary Disorders: No





- PSYCHIATRIC


Hx Psychophysiologic Disorder: No


Hx Substance Use: No





- SURGICAL HISTORY


Other/Comment: Abd surg. for intraabd. abscesses x 7 last surgery 2/2017 at [facility] [place]





- ANESTHESIA


Hx Anesthesia: No


Hx Anesthesia Reactions: No


Hx Malignant Hyperthermia: No





Meds


Allergies/Adverse Reactions: 


 Allergies











Allergy/AdvReac Type Severity Reaction Status Date / Time


 


FISH Allergy  RASH Verified 04/02/17 13:24


 


shellfish derived AdvReac  ANGIOEDEMA Verified 04/02/17 13:24














Physical Exam





- Constitutional


Appears: Well, No Acute Distress





- Head Exam


Head Exam: ATRAUMATIC, NORMAL INSPECTION, NORMOCEPHALIC





- Eye Exam


Eye Exam: EOMI, Normal appearance, PERRL





- ENT Exam


ENT Exam: Mucous Membranes Moist, Normal Exam





- Neck Exam


Neck exam: Positive for: Normal Inspection.  Negative for: Lymphadenopathy





- Respiratory Exam


Respiratory Exam: Clear to Auscultation Bilateral, NORMAL BREATHING PATTERN.  

absent: Rhonchi, Wheezes





- Cardiovascular Exam


Cardiovascular Exam: RRR, +S1, +S2





- GI/Abdominal Exam


GI & Abdominal Exam: Normal Bowel Sounds, Soft, Tenderness.  absent: Distended, 

Guarding


Additional comments: 


Tenderness to palpation. Open wound, no draining. 





- Extremities Exam


Extremities exam: Positive for: normal inspection.  Negative for: calf 

tenderness, pedal edema





- Neurological Exam


Neurological exam: Alert, CN II-XII Intact, Oriented x3





- Psychiatric Exam


Psychiatric exam: Normal Affect, Normal Mood





- Skin


Skin Exam: Normal Color, Warm


Additional comments: 


Open wound on left lower abdomen. 





Results





- Vital Signs


Recent Vital Signs: 





 Last Vital Signs











Temp  98 F   04/02/17 13:20


 


Pulse  102 H  04/02/17 13:20


 


Resp  20   04/02/17 13:20


 


BP  123/80   04/02/17 13:20


 


Pulse Ox  99   04/02/17 13:20














- Labs


Result Diagrams: 


 04/02/17 14:00





 04/02/17 14:00





Assessment & Plan





- Assessment and Plan (Free Text)


Plan: 


20 y/o M with PMH of Crohn's disease, iron-def anemia, and drug-seeking 

behavior presents to the ED with abdominal pain for the past 4 days. Pt 

recently admitted for same complaints and was started on appropriate abx. Pt 

will have cultures drawn and will continue abx at this time. ID is consulted. 





1. Pelvic Microabscesses/Fistula


- Wound culture 


- Blood culture


- Procal ordered


- Continue ABX: Linezolid, Zosyn, Micafungin, 


- IVF


- Pain control Dilaudid 0.5 mg q4h


- ID consulted, Dr. Tejada





2. Iron-def anemia


- Continue IV iron


- Monitor Hg


- Monitor for constipation





3. PPX


- Zofran


- Protonix


- Dilaudid


- Lovenox





Seen, reviewed, and discussed with attending.





Meryl, PGY-1

## 2017-04-03 LAB
ADD MANUAL DIFF?: NO
BASOPHILS # BLD AUTO: 0.02 K/MM3 (ref 0–2)
BASOPHILS NFR BLD: 0.2 % (ref 0–3)
BUN SERPL-MCNC: 7 MG/DL (ref 7–21)
CALCIUM SERPL-MCNC: 8.7 MG/DL (ref 8.4–10.5)
CHLORIDE SERPL-SCNC: 99 MMOL/L (ref 95–110)
CO2 SERPL-SCNC: 34 MMOL/L (ref 21–33)
EOSINOPHIL # BLD: 0.1 10*3/UL (ref 0–0.7)
EOSINOPHIL NFR BLD: 1 % (ref 1.5–5)
ERYTHROCYTE [DISTWIDTH] IN BLOOD BY AUTOMATED COUNT: 20.2 % (ref 11.5–14.5)
GLUCOSE SERPL-MCNC: 93 MG/DL (ref 70–110)
GRANULOCYTES # BLD: 8.88 10*3/UL (ref 1.4–6.5)
GRANULOCYTES NFR BLD: 78.4 % (ref 50–68)
HCT VFR BLD CALC: 25.7 % (ref 42–52)
LYMPHOCYTES # BLD: 1.2 10*3/UL (ref 1.2–3.4)
LYMPHOCYTES NFR BLD AUTO: 10.9 % (ref 22–35)
MCH RBC QN AUTO: 21.5 PG (ref 25–35)
MCHC RBC AUTO-ENTMCNC: 30.7 G/DL (ref 31–37)
MCV RBC AUTO: 69.8 FL (ref 80–105)
MONOCYTES # BLD AUTO: 1.1 10*3/UL (ref 0.1–0.6)
MONOCYTES NFR BLD: 9.5 % (ref 1–6)
PLATELET # BLD: 873 10^3/UL (ref 120–450)
PMV BLD AUTO: 7.8 FL (ref 7–11)
POTASSIUM SERPL-SCNC: 3.8 MMOL/L (ref 3.6–5)
SODIUM SERPL-SCNC: 143 MMOL/L (ref 132–148)
WBC # BLD AUTO: 11.3 10^3/UL (ref 4.5–11)

## 2017-04-03 RX ADMIN — HYDROMORPHONE HYDROCHLORIDE PRN MG: 1 INJECTION, SOLUTION INTRAMUSCULAR; INTRAVENOUS; SUBCUTANEOUS at 00:33

## 2017-04-03 RX ADMIN — LINEZOLID SCH MLS/HR: 600 INJECTION, SOLUTION INTRAVENOUS at 10:17

## 2017-04-03 RX ADMIN — HYDROMORPHONE HYDROCHLORIDE PRN MG: 1 INJECTION, SOLUTION INTRAMUSCULAR; INTRAVENOUS; SUBCUTANEOUS at 04:11

## 2017-04-03 RX ADMIN — HYDROMORPHONE HYDROCHLORIDE PRN MG: 1 INJECTION, SOLUTION INTRAMUSCULAR; INTRAVENOUS; SUBCUTANEOUS at 19:58

## 2017-04-03 RX ADMIN — ENOXAPARIN SODIUM SCH MG: 40 INJECTION SUBCUTANEOUS at 10:16

## 2017-04-03 RX ADMIN — HYDROMORPHONE HYDROCHLORIDE PRN MG: 1 INJECTION, SOLUTION INTRAMUSCULAR; INTRAVENOUS; SUBCUTANEOUS at 08:13

## 2017-04-03 RX ADMIN — PIPERACILLIN AND TAZOBACTAM SCH MLS/HR: 3; .375 INJECTION, POWDER, LYOPHILIZED, FOR SOLUTION INTRAVENOUS; PARENTERAL at 12:26

## 2017-04-03 RX ADMIN — OXYCODONE HYDROCHLORIDE AND ACETAMINOPHEN PRN TAB: 5; 325 TABLET ORAL at 13:53

## 2017-04-03 RX ADMIN — PIPERACILLIN AND TAZOBACTAM SCH MLS/HR: 3; .375 INJECTION, POWDER, LYOPHILIZED, FOR SOLUTION INTRAVENOUS; PARENTERAL at 18:53

## 2017-04-03 RX ADMIN — ENOXAPARIN SODIUM SCH: 40 INJECTION SUBCUTANEOUS at 10:30

## 2017-04-03 RX ADMIN — OXYCODONE HYDROCHLORIDE AND ACETAMINOPHEN PRN TAB: 5; 325 TABLET ORAL at 21:09

## 2017-04-03 RX ADMIN — HYDROMORPHONE HYDROCHLORIDE PRN MG: 1 INJECTION, SOLUTION INTRAMUSCULAR; INTRAVENOUS; SUBCUTANEOUS at 16:28

## 2017-04-03 RX ADMIN — OXYCODONE HYDROCHLORIDE AND ACETAMINOPHEN PRN TAB: 5; 325 TABLET ORAL at 06:06

## 2017-04-03 RX ADMIN — LINEZOLID SCH MLS/HR: 600 INJECTION, SOLUTION INTRAVENOUS at 22:01

## 2017-04-03 RX ADMIN — HYDROMORPHONE HYDROCHLORIDE PRN MG: 1 INJECTION, SOLUTION INTRAMUSCULAR; INTRAVENOUS; SUBCUTANEOUS at 12:27

## 2017-04-03 RX ADMIN — PIPERACILLIN AND TAZOBACTAM SCH MLS/HR: 3; .375 INJECTION, POWDER, LYOPHILIZED, FOR SOLUTION INTRAVENOUS; PARENTERAL at 05:23

## 2017-04-03 NOTE — CP.PCM.PN
<WanBryce ugarte - Last Filed: 04/03/17 18:30>





Subjective





- Date & Time of Evaluation


Date of Evaluation: 04/03/17


Time of Evaluation: 07:10





- Subjective


Subjective: 


20 y/o M with PMH of recent multiple abdominal abscesses/fistulas likely 2/2 to 

Crohn's disease that were VRE/Candida positive, iron-def anemia, and possible 

drug-seeking behavior presented to this hospital with right sided abdominal 

pain that has been worsening over the past 4 days. During his prior admission(4 

days ago) he was on IV antibiotics and left AMA and was given prescriptions for 

antibiotics. Pt was told at that time to follow up with [facility], but did not. Pt 

states he has not taken any medication for it. Pt states pain is made worse by 

movement. Today, pt. is resting comfortably, in no acute distress and did not 

have a bowel movement last night or today. He denies headaches, fevers, chills, 

chest pain, or difficulty breathing.





Objective





- Vital Signs/Intake and Output


Vital Signs (last 24 hours): 


 











Temp Pulse Resp BP Pulse Ox


 


 97.9 F   96 H  20   107/63   99 


 


 04/03/17 06:00  04/03/17 06:00  04/03/17 06:00  04/03/17 06:00  04/03/17 06:00








Intake and Output: 


 











 04/03/17 04/03/17





 06:59 18:59


 


Intake Total 1780 1280


 


Output Total 0 


 


Balance 1780 1280














- Medications


Medications: 


 Current Medications





Enoxaparin Sodium (Lovenox)  40 mg SC DAILY [facility]


   PRN Reason: Protocol


   Last Admin: 04/03/17 10:30 Dose:  Not Given


Ferrous Sulfate (Feosol)  324 mg PO TID [facility]


   Last Admin: 04/03/17 10:17 Dose:  324 mg


Hydromorphone HCl (Dilaudid)  0.5 mg IVP Q4H PRN


   PRN Reason: Pain, moderate (4-7)


   Last Admin: 04/03/17 12:27 Dose:  0.5 mg


Sodium Chloride (Sodium Chloride 0.9%)  1,000 mls @ 100 mls/hr IV .Q10H [facility]


   Last Admin: 04/02/17 18:53 Dose:  100 mls/hr


Micafungin Sodium 100 mg/ (Sodium Chloride)  100 mls @ 100 mls/hr IV DAILY [facility]


   PRN Reason: Protocol


   Stop: 04/13/17 10:01


   Last Admin: 04/03/17 10:17 Dose:  100 mls/hr


Linezolid (Zyvox 600mg/300ml D5w)  300 mls @ 200 mls/hr IVPB Q12 [facility]


   PRN Reason: Protocol


   Stop: 04/16/17 22:01


   Last Admin: 04/03/17 10:17 Dose:  200 mls/hr


Piperacillin Sod/Tazobactam Sod (Zosyn 3.375 In Ns 100ml)  100 mls @ 200 mls/hr 

IVPB Q6 [facility]


   PRN Reason: Protocol


   Stop: 04/16/17 18:01


   Last Admin: 04/03/17 12:26 Dose:  200 mls/hr


Ondansetron HCl (Zofran Inj)  4 mg IVP Q4H PRN


   PRN Reason: Nausea/Vomiting


   Last Admin: 04/03/17 00:33 Dose:  4 mg


Oxycodone/Acetaminophen (Percocet 5/325 Mg Tab)  1 tab PO Q6H PRN


   PRN Reason: Pain, moderate (4-7)


   Stop: 04/05/17 20:19


   Last Admin: 04/03/17 06:06 Dose:  1 tab


Pantoprazole Sodium (Protonix Inj)  40 mg IVP DAILY [facility]


   Last Admin: 04/03/17 10:16 Dose:  40 mg











- Labs


Labs: 


 





 04/03/17 06:00 





 04/03/17 06:00 





 











PT  12.2 Seconds (9.9-11.8)  H  04/02/17  14:00    


 


INR  1.13  (0.93-1.08)  H  04/02/17  14:00    


 


APTT  31.9 Seconds (23.7-30.8)  H  04/02/17  14:00    














- Constitutional


Appears: Non-toxic, No Acute Distress





- Head Exam


Head Exam: ATRAUMATIC, NORMOCEPHALIC





- Eye Exam


Eye Exam: EOMI





- ENT Exam


ENT Exam: Mucous Membranes Moist





- Neck Exam


Neck Exam: Full ROM.  absent: Lymphadenopathy





- Respiratory Exam


Respiratory Exam: Clear to Ausculation Bilateral, NORMAL BREATHING PATTERN.  

absent: Rhonchi, Wheezes





- Cardiovascular Exam


Cardiovascular Exam: REGULAR RHYTHM, RRR, +S1, +S2.  absent: JVD





- GI/Abdominal Exam


GI & Abdominal Exam: Guarding, Soft, Tenderness, Normal Bowel Sounds.  absent: 

Rigid


Additional comments: 


multiple draining abdominal abscesses in RLQ








- Back Exam


Back Exam: CVA tenderness (R).  absent: rash noted





- Neurological Exam


Neurological Exam: Alert, Awake





- Psychiatric Exam


Psychiatric exam: Normal Affect, Normal Mood





- Skin


Skin Exam: Dry, Intact, Normal Color, Warm





Assessment and Plan





- Assessment and Plan (Free Text)


Assessment: 


20 y/o M with PMH of Crohn's disease, iron-def anemia, and possible drug-

seeking behavior presents to the ED with abdominal pain for the past 4 days. Pt 

recently admitted for same complaints from previous visit 4 days ago and was 

started on IV abx. Pt will have cultures drawn and will continue abx at this 

time. ID is consulted. 





Plan: 


1. Pelvic Microabscesses/Fistula


- Wound culture - rare gram + in chains


- Blood culture - received


- Procal negative


- Continue ABX: Linezolid, Zosyn, Micafungin, 


- IVF


- Pain control Dilaudid 0.5 mg q4h & Percocet 5/325 q6prn


- ID consulted, Dr. Tejada


   -help appreciated





2. Iron-def anemia


- Continue PO iron Feosol


- Monitor Hgb, currently 7.9


- Monitor for constipation





3. PPX


- Zofran


- Protonix


- Dilaudid


- Lovenox





Seen, reviewed, and discussed with attending.








<Trever Buitrago - Last Filed: 04/04/17 15:17>





Objective





- Vital Signs/Intake and Output


Vital Signs (last 24 hours): 


 











Temp Pulse Resp BP Pulse Ox


 


 98.8 F   78   18   116/75   100 


 


 04/04/17 06:00  04/04/17 06:00  04/04/17 06:00  04/04/17 06:00  04/04/17 06:00








Intake and Output: 


 











 04/04/17 04/04/17





 06:59 18:59


 


Intake Total 420 


 


Output Total 2 


 


Balance 418 














- Medications


Medications: 


 Current Medications





Enoxaparin Sodium (Lovenox)  40 mg SC DAILY [facility]


   PRN Reason: Protocol


   Last Admin: 04/04/17 09:51 Dose:  Not Given


Ferrous Sulfate (Feosol)  324 mg PO TID [facility]


   Last Admin: 04/04/17 09:47 Dose:  324 mg


Hydromorphone HCl (Dilaudid)  0.5 mg IVP Q4H PRN


   PRN Reason: Pain, moderate (4-7)


   Last Admin: 04/04/17 08:21 Dose:  0.5 mg


Sodium Chloride (Sodium Chloride 0.9%)  1,000 mls @ 100 mls/hr IV .Q10H [facility]


   Last Admin: 04/04/17 04:51 Dose:  Not Given


Micafungin Sodium 100 mg/ (Sodium Chloride)  100 mls @ 100 mls/hr IV DAILY [facility]


   PRN Reason: Protocol


   Stop: 04/13/17 10:01


   Last Admin: 04/04/17 10:35 Dose:  100 mls/hr


Linezolid (Zyvox 600mg/300ml D5w)  300 mls @ 200 mls/hr IVPB Q12 [facility]


   PRN Reason: Protocol


   Stop: 04/16/17 22:01


   Last Admin: 04/04/17 12:10 Dose:  200 mls/hr


Piperacillin Sod/Tazobactam Sod (Zosyn 3.375 In Ns 100ml)  100 mls @ 200 mls/hr 

IVPB Q6 [facility]


   PRN Reason: Protocol


   Stop: 04/16/17 18:01


   Last Admin: 04/04/17 13:38 Dose:  200 mls/hr


Ondansetron HCl (Zofran Inj)  4 mg IVP Q4H PRN


   PRN Reason: Nausea/Vomiting


   Last Admin: 04/03/17 00:33 Dose:  4 mg


Oxycodone/Acetaminophen (Percocet 5/325 Mg Tab)  1 tab PO Q4H PRN


   PRN Reason: Pain, moderate (4-7)


   Stop: 04/05/17 20:19


   Last Admin: 04/04/17 13:00 Dose:  1 tab


Pantoprazole Sodium (Protonix Inj)  40 mg IVP DAILY [facility]


   Last Admin: 04/04/17 09:47 Dose:  40 mg











- Labs


Labs: 


 





 04/04/17 06:45 





 04/04/17 06:45 





 











PT  12.2 Seconds (9.9-11.8)  H  04/02/17  14:00    


 


INR  1.13  (0.93-1.08)  H  04/02/17  14:00    


 


APTT  31.9 Seconds (23.7-30.8)  H  04/02/17  14:00    














Assessment and Plan





- Assessment and Plan (Free Text)


Assessment: 


attending note;





Patient seen and examined with resident.





Patient is a 21 year old  male with a pmh of Crohn's ,multiple 

surgeries, recurrent abscess drainage, Fistula  and iron deficiency anemia


Is admitted for recurrent nonhealing Crohn's/fistula infection. Patient 

recently signed AMA.





cultures grew VRE. Currently on Zyvox, Zosyn and Micafungin.





 chronic iron deficiency anemia; Got IV iron during last admission.





upon discharge the patient will follow-up with PMD .





patient needs close follow-up with GI and colorectal surgery as outpatient.








Attending/Attestation





- Attestation


I have personally seen and examined this patient.: Yes


I have fully participated in the care of the patient.: Yes


I have reviewed all pertinent clinical information, including history, physical 

exam and plan: Yes

## 2017-04-03 NOTE — CP.PCM.CON
History of Present Illness





- History of Present Illness


History of Present Illness: 


21 year old male with PMH of poorly-controlled Crohn's disease due to non-

compliance was admitted last week in [facility] because of 

microabscesses in the abdomen and fistulas. He grew VRE and Candida then from 

the fistulous tracts. He signed out against medical advice and did not complete 

his antibiotic course. He now comes back complaining of worsening abdominal 

pain for the past 4-5 days, associated with nausea as well as episodes of 

diarrhea. The patient denies fever or chills, no vomiting, no headache or 

dizziness, no chest pain, no SOB, no cough or colds, no sore throat, no penile 

discharge. Infectious diseases consult is requested to further evaluate and 

manage.





Review of Systems





- Review of Systems


All systems: reviewed and no additional remarkable complaints except (as per HPI

)





Past Patient History





- Infectious Disease


Hx of Infectious Diseases: None





- Past Medical History & Family History


Past Medical History?: Yes


Past Family History: Reviewed and not pertinent





- Past Social History


Smoking Status: Former Smoker





- CARDIAC


Hx Cardiac Disorders: No





- PULMONARY


Hx Respiratory Disorders: No





- NEUROLOGICAL


Hx Neurological Disorder: No





- HEENT


Hx HEENT Problems: No





- RENAL


Hx Chronic Kidney Disease: No





- ENDOCRINE/METABOLIC


Hx Endocrine Disorders: No





- HEMATOLOGICAL/ONCOLOGICAL


Hx Blood Disorders: No





- INTEGUMENTARY


Hx Dermatological Problems: No





- MUSCULOSKELETAL/RHEUMATOLOGICAL


Hx Falls: No





- GASTROINTESTINAL


Hx Crohn's Disease: Yes





- GENITOURINARY/GYNECOLOGICAL


Hx Genitourinary Disorders: No





- PSYCHIATRIC


Hx Psychophysiologic Disorder: No





- SURGICAL HISTORY


Other/Comment: Abd surg. for intraabd. abscesses x 7 [place] [facility] [place]





- ANESTHESIA


Hx Anesthesia: No


Hx Anesthesia Reactions: No


Hx Malignant Hyperthermia: No





Meds


Allergies/Adverse Reactions: 


 Allergies











Allergy/AdvReac Type Severity Reaction Status Date / Time


 


FISH Allergy  RASH Verified 04/02/17 13:24


 


shellfish derived AdvReac  ANGIOEDEMA Verified 04/02/17 13:24














- Medications


Medications: 


 Current Medications





Enoxaparin Sodium (Lovenox)  40 mg SC DAILY [facility]


   PRN Reason: Protocol


Ferrous Sulfate (Feosol)  324 mg PO TID Replaced by [facility]


   Last Admin: 04/02/17 17:34 Dose:  324 mg


Hydromorphone HCl (Dilaudid)  0.5 mg IVP Q4H PRN


   PRN Reason: Pain, moderate (4-7)


   Last Admin: 04/03/17 04:11 Dose:  0.5 mg


Sodium Chloride (Sodium Chloride 0.9%)  1,000 mls @ 100 mls/hr IV .Q10H [facility]


   Last Admin: 04/02/17 18:53 Dose:  100 mls/hr


Micafungin Sodium 100 mg/ (Sodium Chloride)  100 mls @ 100 mls/hr IV DAILY [facility]


   PRN Reason: Protocol


   Stop: 04/13/17 10:01


Linezolid (Zyvox 600mg/300ml D5w)  300 mls @ 200 mls/hr IVPB Q12 [facility]


   PRN Reason: Protocol


   Stop: 04/16/17 22:01


   Last Admin: 04/02/17 22:09 Dose:  200 mls/hr


Piperacillin Sod/Tazobactam Sod (Zosyn 3.375 In Ns 100ml)  100 mls @ 200 mls/hr 

IVPB Q6 [facility]


   PRN Reason: Protocol


   Stop: 04/16/17 18:01


   Last Admin: 04/03/17 05:23 Dose:  200 mls/hr


Ondansetron HCl (Zofran Inj)  4 mg IVP Q4H PRN


   PRN Reason: Nausea/Vomiting


   Last Admin: 04/03/17 00:33 Dose:  4 mg


Oxycodone/Acetaminophen (Percocet 5/325 Mg Tab)  1 tab PO Q6H PRN


   PRN Reason: Pain, moderate (4-7)


   Stop: 04/05/17 20:19


   Last Admin: 04/03/17 06:06 Dose:  1 tab


Pantoprazole Sodium (Protonix Inj)  40 mg IVP DAILY Replaced by [facility]











Physical Exam





- Constitutional


Appears: Non-toxic, No Acute Distress





- Head Exam


Head Exam: NORMAL INSPECTION





- Neck Exam


Neck exam: Negative for: Lymphadenopathy, Meningismus





- Respiratory Exam


Respiratory Exam: Decreased Breath Sounds





- Cardiovascular Exam


Cardiovascular Exam: +S1, +S2





- GI/Abdominal Exam


GI & Abdominal Exam: Soft, Tenderness (mild).  absent: Distended, Firm, Guarding

, Mass





Results





- Vital Signs


Recent Vital Signs: 


 Last Vital Signs











Temp  98 F   04/02/17 19:41


 


Pulse  102 H  04/02/17 13:20


 


Resp  18   04/02/17 19:41


 


BP  123/80   04/02/17 13:20


 


Pulse Ox  99   04/02/17 13:20














- Labs


Result Diagrams: 


 04/03/17 06:00





 04/03/17 06:00





Assessment & Plan





- Assessment and Plan (Free Text)


Plan: 


Assessment


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 

and microabscesses, growing Vancomycin-resistant Enterococcus faecium from 3/23

, growing gram positive cocci and yeast from 3/26, in a patient with poorly-

controlled Crohn's disease





Plan


started Zyvox, Zosyn and Mycamine; follow up GI evaluation and recommendations; 

would continue antibiotics while the fistulous tracts are open and draining


Will continue to monitor clinically

## 2017-04-04 LAB
ADD MANUAL DIFF?: NO
BASOPHILS # BLD AUTO: 0.01 K/MM3 (ref 0–2)
BASOPHILS NFR BLD: 0.1 % (ref 0–3)
BUN SERPL-MCNC: 6 MG/DL (ref 7–21)
CALCIUM SERPL-MCNC: 8 MG/DL (ref 8.4–10.5)
CHLORIDE SERPL-SCNC: 102 MMOL/L (ref 98–107)
CO2 SERPL-SCNC: 28 MMOL/L (ref 21–33)
EOSINOPHIL # BLD: 0.2 10*3/UL (ref 0–0.7)
EOSINOPHIL NFR BLD: 3 % (ref 1.5–5)
ERYTHROCYTE [DISTWIDTH] IN BLOOD BY AUTOMATED COUNT: 20.2 % (ref 11.5–14.5)
GLUCOSE SERPL-MCNC: 85 MG/DL (ref 70–110)
GRANULOCYTES # BLD: 5.93 10*3/UL (ref 1.4–6.5)
GRANULOCYTES NFR BLD: 73.1 % (ref 50–68)
HCT VFR BLD CALC: 26.9 % (ref 42–52)
LYMPHOCYTES # BLD: 1 10*3/UL (ref 1.2–3.4)
LYMPHOCYTES NFR BLD AUTO: 12.5 % (ref 22–35)
MCH RBC QN AUTO: 21.4 PG (ref 25–35)
MCHC RBC AUTO-ENTMCNC: 30.5 G/DL (ref 31–37)
MCV RBC AUTO: 70.2 FL (ref 80–105)
MONOCYTES # BLD AUTO: 0.9 10*3/UL (ref 0.1–0.6)
MONOCYTES NFR BLD: 11.3 % (ref 1–6)
PLATELET # BLD: 779 10^3/UL (ref 120–450)
PMV BLD AUTO: 7.8 FL (ref 7–11)
POTASSIUM SERPL-SCNC: 3.9 MMOL/L (ref 3.6–5)
SODIUM SERPL-SCNC: 137 MMOL/L (ref 132–148)
WBC # BLD AUTO: 8.1 10^3/UL (ref 4.5–11)

## 2017-04-04 RX ADMIN — PIPERACILLIN AND TAZOBACTAM SCH MLS/HR: 3; .375 INJECTION, POWDER, LYOPHILIZED, FOR SOLUTION INTRAVENOUS; PARENTERAL at 06:37

## 2017-04-04 RX ADMIN — HYDROMORPHONE HYDROCHLORIDE PRN MG: 1 INJECTION, SOLUTION INTRAMUSCULAR; INTRAVENOUS; SUBCUTANEOUS at 15:58

## 2017-04-04 RX ADMIN — PIPERACILLIN AND TAZOBACTAM SCH MLS/HR: 3; .375 INJECTION, POWDER, LYOPHILIZED, FOR SOLUTION INTRAVENOUS; PARENTERAL at 13:38

## 2017-04-04 RX ADMIN — OXYCODONE HYDROCHLORIDE AND ACETAMINOPHEN PRN TAB: 5; 325 TABLET ORAL at 08:52

## 2017-04-04 RX ADMIN — HYDROMORPHONE HYDROCHLORIDE PRN MG: 1 INJECTION, SOLUTION INTRAMUSCULAR; INTRAVENOUS; SUBCUTANEOUS at 08:21

## 2017-04-04 RX ADMIN — HYDROMORPHONE HYDROCHLORIDE PRN MG: 1 INJECTION, SOLUTION INTRAMUSCULAR; INTRAVENOUS; SUBCUTANEOUS at 12:15

## 2017-04-04 RX ADMIN — ENOXAPARIN SODIUM SCH: 40 INJECTION SUBCUTANEOUS at 09:51

## 2017-04-04 RX ADMIN — OXYCODONE HYDROCHLORIDE AND ACETAMINOPHEN PRN TAB: 5; 325 TABLET ORAL at 16:57

## 2017-04-04 RX ADMIN — OXYCODONE HYDROCHLORIDE AND ACETAMINOPHEN PRN TAB: 5; 325 TABLET ORAL at 21:05

## 2017-04-04 RX ADMIN — HYDROMORPHONE HYDROCHLORIDE PRN MG: 1 INJECTION, SOLUTION INTRAMUSCULAR; INTRAVENOUS; SUBCUTANEOUS at 20:00

## 2017-04-04 RX ADMIN — PIPERACILLIN AND TAZOBACTAM SCH MLS/HR: 3; .375 INJECTION, POWDER, LYOPHILIZED, FOR SOLUTION INTRAVENOUS; PARENTERAL at 16:59

## 2017-04-04 RX ADMIN — HYDROMORPHONE HYDROCHLORIDE PRN MG: 1 INJECTION, SOLUTION INTRAMUSCULAR; INTRAVENOUS; SUBCUTANEOUS at 00:39

## 2017-04-04 RX ADMIN — PIPERACILLIN AND TAZOBACTAM SCH MLS/HR: 3; .375 INJECTION, POWDER, LYOPHILIZED, FOR SOLUTION INTRAVENOUS; PARENTERAL at 00:40

## 2017-04-04 RX ADMIN — OXYCODONE HYDROCHLORIDE AND ACETAMINOPHEN PRN TAB: 5; 325 TABLET ORAL at 13:00

## 2017-04-04 RX ADMIN — LINEZOLID SCH MLS/HR: 600 INJECTION, SOLUTION INTRAVENOUS at 12:10

## 2017-04-04 RX ADMIN — OXYCODONE HYDROCHLORIDE AND ACETAMINOPHEN PRN TAB: 5; 325 TABLET ORAL at 03:18

## 2017-04-04 RX ADMIN — HYDROMORPHONE HYDROCHLORIDE PRN MG: 1 INJECTION, SOLUTION INTRAMUSCULAR; INTRAVENOUS; SUBCUTANEOUS at 04:24

## 2017-04-04 RX ADMIN — LINEZOLID SCH MLS/HR: 600 INJECTION, SOLUTION INTRAVENOUS at 21:06

## 2017-04-04 NOTE — CP.PCM.PN
<WanBryce ugarte - Last Filed: 04/04/17 22:13>





Subjective





- Date & Time of Evaluation


Date of Evaluation: 04/04/17


Time of Evaluation: 07:40





- Subjective


Subjective: 


20 y/o M with PMH of recent multiple abdominal abscesses/fistulas likely 2/2 to 

Crohn's disease that were VRE/Candida positive, iron-def anemia, curently on IV 

antibiotcs. He was previously admitted to this hospital last week for the same 

presentation. Today, pt. is resting comfortably, in no acute distress. He 

complains of headache and increased back pain. He denies fevers, chills, chest 

pain, or difficulty breathing, nausea, vomiting or diarrhea.








Objective





- Vital Signs/Intake and Output


Vital Signs (last 24 hours): 


 











Temp Pulse Resp BP Pulse Ox


 


 98.8 F   78   18   116/75   100 


 


 04/04/17 06:00  04/04/17 06:00  04/04/17 06:00  04/04/17 06:00  04/04/17 06:00








Intake and Output: 


 











 04/04/17 04/04/17





 06:59 18:59


 


Intake Total 420 


 


Output Total 2 


 


Balance 418 














- Medications


Medications: 


 Current Medications





Enoxaparin Sodium (Lovenox)  40 mg SC DAILY [facility]


   PRN Reason: Protocol


   Last Admin: 04/04/17 09:51 Dose:  Not Given


Ferrous Sulfate (Feosol)  324 mg PO TID [facility]


   Last Admin: 04/04/17 09:47 Dose:  324 mg


Hydromorphone HCl (Dilaudid)  0.5 mg IVP Q4H PRN


   PRN Reason: Pain, moderate (4-7)


   Last Admin: 04/04/17 08:21 Dose:  0.5 mg


Sodium Chloride (Sodium Chloride 0.9%)  1,000 mls @ 100 mls/hr IV .Q10H [facility]


   Last Admin: 04/04/17 04:51 Dose:  Not Given


Micafungin Sodium 100 mg/ (Sodium Chloride)  100 mls @ 100 mls/hr IV DAILY [facility]


   PRN Reason: Protocol


   Stop: 04/13/17 10:01


   Last Admin: 04/04/17 10:35 Dose:  100 mls/hr


Linezolid (Zyvox 600mg/300ml D5w)  300 mls @ 200 mls/hr IVPB Q12 [facility]


   PRN Reason: Protocol


   Stop: 04/16/17 22:01


   Last Admin: 04/03/17 22:01 Dose:  200 mls/hr


Piperacillin Sod/Tazobactam Sod (Zosyn 3.375 In Ns 100ml)  100 mls @ 200 mls/hr 

IVPB Q6 [facility]


   PRN Reason: Protocol


   Stop: 04/16/17 18:01


   Last Admin: 04/04/17 06:37 Dose:  200 mls/hr


Ondansetron HCl (Zofran Inj)  4 mg IVP Q4H PRN


   PRN Reason: Nausea/Vomiting


   Last Admin: 04/03/17 00:33 Dose:  4 mg


Oxycodone/Acetaminophen (Percocet 5/325 Mg Tab)  1 tab PO Q4H PRN


   PRN Reason: Pain, moderate (4-7)


   Stop: 04/05/17 20:19


Pantoprazole Sodium (Protonix Inj)  40 mg IVP DAILY [facility]


   Last Admin: 04/04/17 09:47 Dose:  40 mg











- Labs


Labs: 


 





 04/04/17 06:45 





 04/04/17 06:45 





 











PT  12.2 Seconds (9.9-11.8)  H  04/02/17  14:00    


 


INR  1.13  (0.93-1.08)  H  04/02/17  14:00    


 


APTT  31.9 Seconds (23.7-30.8)  H  04/02/17  14:00    














- Constitutional


Appears: Non-toxic, No Acute Distress





- Head Exam


Head Exam: ATRAUMATIC, NORMOCEPHALIC





- Eye Exam


Eye Exam: EOMI





- ENT Exam


ENT Exam: Mucous Membranes Moist





- Neck Exam


Neck Exam: Full ROM.  absent: Lymphadenopathy





- Respiratory Exam


Respiratory Exam: Clear to Ausculation Bilateral, NORMAL BREATHING PATTERN.  

absent: Rhonchi, Wheezes





- Cardiovascular Exam


Cardiovascular Exam: REGULAR RHYTHM, +S1, +S2.  absent: JVD





- GI/Abdominal Exam


GI & Abdominal Exam: Soft, Tenderness, Normal Bowel Sounds


Additional comments: 


multiple draining purulent abscesses in RLQ.





- Extremities Exam


Extremities Exam: Full ROM.  absent: Joint Swelling, Pedal Edema





- Back Exam


Back Exam: CVA tenderness (R)


Additional comments: 


purulent draining wound on right flank area





- Neurological Exam


Neurological Exam: Alert, Awake, CN II-XII Intact, Oriented x3





- Psychiatric Exam


Psychiatric exam: Normal Affect, Normal Mood





- Skin


Skin Exam: Dry, Intact, Normal Color, Warm





Assessment and Plan





- Assessment and Plan (Free Text)


Assessment: 


20 y/o M with PMH of Crohn's disease, iron-def anemia, on IV abx for multiple 

abdominal abscesses. 


Plan: 


1. Pelvic Microabscesses/Fistula


- Wound culture - Gram + cocci & Coag - Staph


- Blood culture - no growth after 48hrs


- Procal negative


- IVF 100ml/hr


- Pain control Dilaudid 0.5 mg q4h & Percocet 5/325 q4prn


- ID consulted, Dr. Tejada


   -help appreciated


   -ABX: Linezolid, Zosyn, Micafungin, 


2. Iron-def anemia


- Continue PO iron Feosol


- Monitor Hgb, currently 7.9


- Monitor for constipation





3. PPX


- Zofran


- Protonix


- Dilaudid


- Lovenox





Seen, reviewed, and discussed with attending.








<Trever Buitrago - Last Filed: 04/05/17 14:40>





Objective





- Vital Signs/Intake and Output


Vital Signs (last 24 hours): 


 











Temp Pulse Resp BP Pulse Ox


 


 98.9 F   83   19   122/82   98 


 


 04/05/17 06:00  04/05/17 06:00  04/05/17 06:00  04/05/17 06:00  04/05/17 06:00








Intake and Output: 


 











 04/05/17 04/05/17





 06:59 18:59


 


Intake Total 480 


 


Balance 480 














- Medications


Medications: 


 Current Medications





Enoxaparin Sodium (Lovenox)  40 mg SC DAILY [facility]


   PRN Reason: Protocol


   Last Admin: 04/05/17 09:40 Dose:  Not Given


Ferrous Sulfate (Feosol)  324 mg PO TID [facility]


   Last Admin: 04/05/17 09:36 Dose:  324 mg


Hydromorphone HCl (Dilaudid)  0.5 mg IVP Q4H PRN


   PRN Reason: Pain, moderate (4-7)


   Last Admin: 04/05/17 12:01 Dose:  0.5 mg


Sodium Chloride (Sodium Chloride 0.9%)  1,000 mls @ 100 mls/hr IV .Q10H [facility]


   Last Admin: 04/05/17 05:39 Dose:  100 mls/hr


Micafungin Sodium 100 mg/ (Sodium Chloride)  100 mls @ 100 mls/hr IV DAILY [facility]


   PRN Reason: Protocol


   Stop: 04/13/17 10:01


   Last Admin: 04/05/17 09:39 Dose:  100 mls/hr


Linezolid (Zyvox 600mg/300ml D5w)  300 mls @ 200 mls/hr IVPB Q12 [facility]


   PRN Reason: Protocol


   Stop: 04/16/17 22:01


   Last Admin: 04/05/17 11:11 Dose:  200 mls/hr


Piperacillin Sod/Tazobactam Sod (Zosyn 3.375 In Ns 100ml)  100 mls @ 200 mls/hr 

IVPB Q6 [facility]


   PRN Reason: Protocol


   Stop: 04/16/17 18:01


   Last Admin: 04/05/17 12:01 Dose:  200 mls/hr


Ondansetron HCl (Zofran Inj)  4 mg IVP Q4H PRN


   PRN Reason: Nausea/Vomiting


   Last Admin: 04/03/17 00:33 Dose:  4 mg


Oxycodone/Acetaminophen (Percocet 5/325 Mg Tab)  1 tab PO Q4H PRN


   PRN Reason: Pain, moderate (4-7)


   Stop: 04/05/17 20:19


   Last Admin: 04/05/17 09:20 Dose:  1 tab


Pantoprazole Sodium (Protonix Inj)  40 mg IVP DAILY [facility]


   Last Admin: 04/05/17 09:39 Dose:  40 mg











- Labs


Labs: 


 





 04/05/17 07:30 





 04/05/17 07:30 





 











PT  12.2 Seconds (9.9-11.8)  H  04/02/17  14:00    


 


INR  1.13  (0.93-1.08)  H  04/02/17  14:00    


 


APTT  31.9 Seconds (23.7-30.8)  H  04/02/17  14:00    














Assessment and Plan





- Assessment and Plan (Free Text)


Assessment: 


attending note;





Patient seen and examined with resident.





Patient is a 21 year old  male with a pmh of Crohn's ,multiple 

surgeries, recurrent abscess drainage, Fistula  and iron deficiency anemia


Is admitted for recurrent nonhealing Crohn's/fistula infection. Patient 

recently signed AMA.





cultures grew VRE. Currently on Zyvox, Zosyn and Micafungin. we will follow up 

with ID closely.





 chronic iron deficiency anemia;stable.





upon discharge the patient will follow-up with PMD .





patient needs close follow-up with GI and colorectal surgery as outpatient.








Attending/Attestation





- Attestation


I have personally seen and examined this patient.: Yes


I have fully participated in the care of the patient.: Yes


I have reviewed all pertinent clinical information, including history, physical 

exam and plan: Yes

## 2017-04-04 NOTE — CP.PCM.PN
Subjective





- Date & Time of Evaluation


Date of Evaluation: 04/04/17


Time of Evaluation: 09:15





- Subjective


Subjective: 


Comfortable in bed, not in distress, no fevers, less abdominal pain.





Objective





- Vital Signs/Intake and Output


Vital Signs (last 24 hours): 


 











Temp Pulse Resp BP Pulse Ox


 


 98.8 F   78   18   116/75   100 


 


 04/04/17 06:00  04/04/17 06:00  04/04/17 06:00  04/04/17 06:00  04/04/17 06:00








Intake and Output: 


 











 04/04/17 04/04/17





 06:59 18:59


 


Intake Total 420 


 


Output Total 2 


 


Balance 418 














- Medications


Medications: 


 Current Medications





Enoxaparin Sodium (Lovenox)  40 mg SC DAILY [facility]


   PRN Reason: Protocol


   Last Admin: 04/04/17 09:51 Dose:  Not Given


Ferrous Sulfate (Feosol)  324 mg PO TID [facility]


   Last Admin: 04/04/17 16:59 Dose:  324 mg


Hydromorphone HCl (Dilaudid)  0.5 mg IVP Q4H PRN


   PRN Reason: Pain, moderate (4-7)


   Last Admin: 04/04/17 15:58 Dose:  0.5 mg


Sodium Chloride (Sodium Chloride 0.9%)  1,000 mls @ 100 mls/hr IV .Q10H [facility]


   Last Admin: 04/04/17 04:51 Dose:  Not Given


Micafungin Sodium 100 mg/ (Sodium Chloride)  100 mls @ 100 mls/hr IV DAILY [facility]


   PRN Reason: Protocol


   Stop: 04/13/17 10:01


   Last Admin: 04/04/17 10:35 Dose:  100 mls/hr


Linezolid (Zyvox 600mg/300ml D5w)  300 mls @ 200 mls/hr IVPB Q12 [facility]


   PRN Reason: Protocol


   Stop: 04/16/17 22:01


   Last Admin: 04/04/17 12:10 Dose:  200 mls/hr


Piperacillin Sod/Tazobactam Sod (Zosyn 3.375 In Ns 100ml)  100 mls @ 200 mls/hr 

IVPB Q6 [facility]


   PRN Reason: Protocol


   Stop: 04/16/17 18:01


   Last Admin: 04/04/17 16:59 Dose:  200 mls/hr


Ondansetron HCl (Zofran Inj)  4 mg IVP Q4H PRN


   PRN Reason: Nausea/Vomiting


   Last Admin: 04/03/17 00:33 Dose:  4 mg


Oxycodone/Acetaminophen (Percocet 5/325 Mg Tab)  1 tab PO Q4H PRN


   PRN Reason: Pain, moderate (4-7)


   Stop: 04/05/17 20:19


   Last Admin: 04/04/17 16:57 Dose:  1 tab


Pantoprazole Sodium (Protonix Inj)  40 mg IVP DAILY [facility]


   Last Admin: 04/04/17 09:47 Dose:  40 mg











- Labs


Labs: 


 





 04/04/17 06:45 





 04/04/17 06:45 





 











PT  12.2 Seconds (9.9-11.8)  H  04/02/17  14:00    


 


INR  1.13  (0.93-1.08)  H  04/02/17  14:00    


 


APTT  31.9 Seconds (23.7-30.8)  H  04/02/17  14:00    














- Constitutional


Appears: Non-toxic, No Acute Distress





- Head Exam


Head Exam: NORMAL INSPECTION





- ENT Exam


ENT Exam: Mucous Membranes Moist





- Neck Exam


Neck Exam: absent: Lymphadenopathy, Meningismus





- Respiratory Exam


Respiratory Exam: Decreased Breath Sounds





- Cardiovascular Exam


Cardiovascular Exam: +S1, +S2





- GI/Abdominal Exam


GI & Abdominal Exam: Soft.  absent: Tenderness





Assessment and Plan





- Assessment and Plan (Free Text)


Plan: 


Assessment


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 

and microabscesses, growing Vancomycin-resistant Enterococcus faecium from 3/23

, growing gram positive cocci and yeast from 3/26, in a patient with poorly-

controlled Crohn's disease, now growing gram positive cocci





Plan


started Zyvox, Zosyn and Mycamine pending identification of the gram positive 

cocci in the wound; follow up GI evaluation and recommendations; would continue 

antibiotics while the fistulous tracts are open and draining


Will continue to monitor clinically

## 2017-04-05 LAB
ADD MANUAL DIFF?: NO
BASOPHILS # BLD AUTO: 0.02 K/MM3 (ref 0–2)
BASOPHILS NFR BLD: 0.2 % (ref 0–3)
BUN SERPL-MCNC: 5 MG/DL (ref 7–21)
CALCIUM SERPL-MCNC: 8.1 MG/DL (ref 8.4–10.5)
CHLORIDE SERPL-SCNC: 102 MMOL/L (ref 95–110)
CO2 SERPL-SCNC: 27 MMOL/L (ref 21–33)
EOSINOPHIL # BLD: 0.3 10*3/UL (ref 0–0.7)
EOSINOPHIL NFR BLD: 3.2 % (ref 1.5–5)
ERYTHROCYTE [DISTWIDTH] IN BLOOD BY AUTOMATED COUNT: 20 % (ref 11.5–14.5)
GLUCOSE SERPL-MCNC: 73 MG/DL (ref 70–110)
GRANULOCYTES # BLD: 7.32 10*3/UL (ref 1.4–6.5)
GRANULOCYTES NFR BLD: 75.4 % (ref 50–68)
HCT VFR BLD CALC: 25.6 % (ref 42–52)
LYMPHOCYTES # BLD: 1.2 10*3/UL (ref 1.2–3.4)
LYMPHOCYTES NFR BLD AUTO: 12.5 % (ref 22–35)
MCH RBC QN AUTO: 21.5 PG (ref 25–35)
MCHC RBC AUTO-ENTMCNC: 30.5 G/DL (ref 31–37)
MCV RBC AUTO: 70.5 FL (ref 80–105)
MONOCYTES # BLD AUTO: 0.8 10*3/UL (ref 0.1–0.6)
MONOCYTES NFR BLD: 8.7 % (ref 1–6)
PLATELET # BLD: 792 10^3/UL (ref 120–450)
PMV BLD AUTO: 7.9 FL (ref 7–11)
POTASSIUM SERPL-SCNC: 3.6 MMOL/L (ref 3.6–5)
SODIUM SERPL-SCNC: 138 MMOL/L (ref 132–148)
WBC # BLD AUTO: 9.7 10^3/UL (ref 4.5–11)

## 2017-04-05 RX ADMIN — HYDROMORPHONE HYDROCHLORIDE PRN MG: 1 INJECTION, SOLUTION INTRAMUSCULAR; INTRAVENOUS; SUBCUTANEOUS at 08:13

## 2017-04-05 RX ADMIN — OXYCODONE HYDROCHLORIDE AND ACETAMINOPHEN PRN TAB: 5; 325 TABLET ORAL at 17:24

## 2017-04-05 RX ADMIN — HYDROMORPHONE HYDROCHLORIDE PRN MG: 1 INJECTION, SOLUTION INTRAMUSCULAR; INTRAVENOUS; SUBCUTANEOUS at 20:17

## 2017-04-05 RX ADMIN — HYDROMORPHONE HYDROCHLORIDE PRN MG: 1 INJECTION, SOLUTION INTRAMUSCULAR; INTRAVENOUS; SUBCUTANEOUS at 00:14

## 2017-04-05 RX ADMIN — PIPERACILLIN AND TAZOBACTAM SCH MLS/HR: 3; .375 INJECTION, POWDER, LYOPHILIZED, FOR SOLUTION INTRAVENOUS; PARENTERAL at 12:01

## 2017-04-05 RX ADMIN — LINEZOLID SCH MLS/HR: 600 INJECTION, SOLUTION INTRAVENOUS at 21:02

## 2017-04-05 RX ADMIN — OXYCODONE HYDROCHLORIDE AND ACETAMINOPHEN PRN TAB: 5; 325 TABLET ORAL at 09:20

## 2017-04-05 RX ADMIN — HYDROMORPHONE HYDROCHLORIDE PRN MG: 1 INJECTION, SOLUTION INTRAMUSCULAR; INTRAVENOUS; SUBCUTANEOUS at 23:59

## 2017-04-05 RX ADMIN — PIPERACILLIN AND TAZOBACTAM SCH MLS/HR: 3; .375 INJECTION, POWDER, LYOPHILIZED, FOR SOLUTION INTRAVENOUS; PARENTERAL at 23:54

## 2017-04-05 RX ADMIN — HYDROMORPHONE HYDROCHLORIDE PRN MG: 1 INJECTION, SOLUTION INTRAMUSCULAR; INTRAVENOUS; SUBCUTANEOUS at 04:00

## 2017-04-05 RX ADMIN — PIPERACILLIN AND TAZOBACTAM SCH MLS/HR: 3; .375 INJECTION, POWDER, LYOPHILIZED, FOR SOLUTION INTRAVENOUS; PARENTERAL at 06:19

## 2017-04-05 RX ADMIN — HYDROMORPHONE HYDROCHLORIDE PRN MG: 1 INJECTION, SOLUTION INTRAMUSCULAR; INTRAVENOUS; SUBCUTANEOUS at 12:01

## 2017-04-05 RX ADMIN — ENOXAPARIN SODIUM SCH: 40 INJECTION SUBCUTANEOUS at 09:40

## 2017-04-05 RX ADMIN — HYDROMORPHONE HYDROCHLORIDE PRN MG: 1 INJECTION, SOLUTION INTRAMUSCULAR; INTRAVENOUS; SUBCUTANEOUS at 16:33

## 2017-04-05 RX ADMIN — PIPERACILLIN AND TAZOBACTAM SCH MLS/HR: 3; .375 INJECTION, POWDER, LYOPHILIZED, FOR SOLUTION INTRAVENOUS; PARENTERAL at 00:14

## 2017-04-05 RX ADMIN — PIPERACILLIN AND TAZOBACTAM SCH MLS/HR: 3; .375 INJECTION, POWDER, LYOPHILIZED, FOR SOLUTION INTRAVENOUS; PARENTERAL at 17:28

## 2017-04-05 RX ADMIN — OXYCODONE HYDROCHLORIDE AND ACETAMINOPHEN PRN TAB: 5; 325 TABLET ORAL at 13:20

## 2017-04-05 RX ADMIN — LINEZOLID SCH MLS/HR: 600 INJECTION, SOLUTION INTRAVENOUS at 11:11

## 2017-04-05 RX ADMIN — OXYCODONE HYDROCHLORIDE AND ACETAMINOPHEN PRN TAB: 5; 325 TABLET ORAL at 05:36

## 2017-04-05 RX ADMIN — OXYCODONE HYDROCHLORIDE AND ACETAMINOPHEN PRN TAB: 5; 325 TABLET ORAL at 01:32

## 2017-04-05 NOTE — CP.PCM.PN
<Bryce Wan - Last Filed: 04/05/17 15:38>





Subjective





- Date & Time of Evaluation


Date of Evaluation: 04/05/17


Time of Evaluation: 07:30





- Subjective


Subjective: 


20 y/o M with PMH of recent multiple abdominal abscesses/fistulas likely 2/2 to 

Crohn's disease that were VRE/Candida positive, iron-def anemia, curently on IV 

antibiotcs. He was previously admitted to this hospital last week for the same 

presentation. Today, pt. is resting comfortably and in no acute distress. He 

complains of diarrhea. He denies fevers, chills, chest pain, or difficulty 

breathing, nausea, or vomiting.





Objective





- Vital Signs/Intake and Output


Vital Signs (last 24 hours): 


 











Temp Pulse Resp BP Pulse Ox


 


 98.9 F   83   19   122/82   98 


 


 04/05/17 06:00  04/05/17 06:00  04/05/17 06:00  04/05/17 06:00  04/05/17 06:00








Intake and Output: 


 











 04/05/17 04/05/17





 06:59 18:59


 


Intake Total 480 


 


Balance 480 














- Medications


Medications: 


 Current Medications





Enoxaparin Sodium (Lovenox)  40 mg SC DAILY [facility]


   PRN Reason: Protocol


   Last Admin: 04/05/17 09:40 Dose:  Not Given


Ferrous Sulfate (Feosol)  324 mg PO TID [facility]


   Last Admin: 04/05/17 09:36 Dose:  324 mg


Hydromorphone HCl (Dilaudid)  0.5 mg IVP Q4H PRN


   PRN Reason: Pain, moderate (4-7)


   Last Admin: 04/05/17 08:13 Dose:  0.5 mg


Sodium Chloride (Sodium Chloride 0.9%)  1,000 mls @ 100 mls/hr IV .Q10H [facility]


   Last Admin: 04/05/17 05:39 Dose:  100 mls/hr


Micafungin Sodium 100 mg/ (Sodium Chloride)  100 mls @ 100 mls/hr IV DAILY [facility]


   PRN Reason: Protocol


   Stop: 04/13/17 10:01


   Last Admin: 04/05/17 09:39 Dose:  100 mls/hr


Linezolid (Zyvox 600mg/300ml D5w)  300 mls @ 200 mls/hr IVPB Q12 [facility]


   PRN Reason: Protocol


   Stop: 04/16/17 22:01


   Last Admin: 04/05/17 11:11 Dose:  200 mls/hr


Piperacillin Sod/Tazobactam Sod (Zosyn 3.375 In Ns 100ml)  100 mls @ 200 mls/hr 

IVPB Q6 [facility]


   PRN Reason: Protocol


   Stop: 04/16/17 18:01


   Last Admin: 04/05/17 06:19 Dose:  200 mls/hr


Ondansetron HCl (Zofran Inj)  4 mg IVP Q4H PRN


   PRN Reason: Nausea/Vomiting


   Last Admin: 04/03/17 00:33 Dose:  4 mg


Oxycodone/Acetaminophen (Percocet 5/325 Mg Tab)  1 tab PO Q4H PRN


   PRN Reason: Pain, moderate (4-7)


   Stop: 04/05/17 20:19


   Last Admin: 04/05/17 09:20 Dose:  1 tab


Pantoprazole Sodium (Protonix Inj)  40 mg IVP DAILY [facility]


   Last Admin: 04/05/17 09:39 Dose:  40 mg











- Labs


Labs: 


 





 04/05/17 07:30 





 04/05/17 07:30 





 











PT  12.2 Seconds (9.9-11.8)  H  04/02/17  14:00    


 


INR  1.13  (0.93-1.08)  H  04/02/17  14:00    


 


APTT  31.9 Seconds (23.7-30.8)  H  04/02/17  14:00    














- Constitutional


Appears: No Acute Distress





- Head Exam


Head Exam: ATRAUMATIC, NORMOCEPHALIC





- Eye Exam


Eye Exam: EOMI





- ENT Exam


ENT Exam: Mucous Membranes Moist





- Neck Exam


Neck Exam: Full ROM.  absent: Lymphadenopathy





- Respiratory Exam


Respiratory Exam: Clear to Ausculation Bilateral, NORMAL BREATHING PATTERN.  

absent: Rales, Rhonchi





- Cardiovascular Exam


Cardiovascular Exam: REGULAR RHYTHM, +S1, +S2.  absent: JVD





- GI/Abdominal Exam


GI & Abdominal Exam: Soft, Tenderness


Additional comments: 


multiple draining abscesses in his abdomen, RLQ and on his back in the right 

flank area. 





- Extremities Exam


Extremities Exam: Full ROM.  absent: Joint Swelling, Pedal Edema





- Back Exam


Back Exam: absent: CVA tenderness (L), CVA tenderness (R), NORMAL INSPECTION, 

paraspinal tenderness





- Neurological Exam


Neurological Exam: Alert, Awake, Oriented x3





- Psychiatric Exam


Psychiatric exam: Normal Affect, Normal Mood





- Skin


Skin Exam: Dry, Intact, Normal Color, Warm





Assessment and Plan





- Assessment and Plan (Free Text)


Assessment: 


20 y/o M with PMH of Crohn's disease, iron-def anemia, on IV abx for multiple 

abdominal abscesses containing VRE.


Plan: 


1. Pelvic Microabscesses/Fistula


- Wound culture - Gram + cocci(VRE) & Coag "-" Staph


- Blood culture - no growth after 48hrs


- Procal negative


- IVF 100ml/hr


- Pain control Dilaudid 0.5 mg q4h & Percocet 5/325 q4prn


- ID consulted, Dr. Tejada


   -help appreciated


   -ABX: Linezolid, Zosyn, Micafungin


 


2. Iron-def anemia


- Continue PO iron Feosol


- Monitor Hgb, currently 7.8


- Monitor for constipation





3. Diarrhea


- C.diff ag 





4. PPX


- Zofran


- Protonix


- Dilaudid


- Lovenox





Seen, reviewed, and discussed with attending.








<Trever Buitrago - Last Filed: 04/05/17 17:41>





Objective





- Vital Signs/Intake and Output


Vital Signs (last 24 hours): 


 











Temp Pulse Resp BP Pulse Ox


 


 98.6 F   79   20   118/78   100 


 


 04/05/17 16:00  04/05/17 16:00  04/05/17 16:00  04/05/17 16:00  04/05/17 16:00








Intake and Output: 


 











 04/05/17 04/05/17





 06:59 18:59


 


Intake Total 480 


 


Balance 480 














- Medications


Medications: 


 Current Medications





Enoxaparin Sodium (Lovenox)  40 mg SC DAILY [facility]


   PRN Reason: Protocol


   Last Admin: 04/05/17 09:40 Dose:  Not Given


Ferrous Sulfate (Feosol)  324 mg PO TID [facility]


   Last Admin: 04/05/17 17:25 Dose:  324 mg


Hydromorphone HCl (Dilaudid)  0.5 mg IVP Q4H PRN


   PRN Reason: Pain, moderate (4-7)


   Last Admin: 04/05/17 16:33 Dose:  0.5 mg


Micafungin Sodium 100 mg/ (Sodium Chloride)  100 mls @ 100 mls/hr IV DAILY [facility]


   PRN Reason: Protocol


   Stop: 04/13/17 10:01


   Last Admin: 04/05/17 09:39 Dose:  100 mls/hr


Linezolid (Zyvox 600mg/300ml D5w)  300 mls @ 200 mls/hr IVPB Q12 [facility]


   PRN Reason: Protocol


   Stop: 04/16/17 22:01


   Last Admin: 04/05/17 11:11 Dose:  200 mls/hr


Piperacillin Sod/Tazobactam Sod (Zosyn 3.375 In Ns 100ml)  100 mls @ 200 mls/hr 

IVPB Q6 [facility]


   PRN Reason: Protocol


   Stop: 04/16/17 18:01


   Last Admin: 04/05/17 17:28 Dose:  200 mls/hr


Ondansetron HCl (Zofran Inj)  4 mg IVP Q4H PRN


   PRN Reason: Nausea/Vomiting


   Last Admin: 04/03/17 00:33 Dose:  4 mg


Oxycodone/Acetaminophen (Percocet 5/325 Mg Tab)  1 tab PO Q4H PRN


   PRN Reason: Pain, moderate (4-7)


   Stop: 04/05/17 20:19


   Last Admin: 04/05/17 17:24 Dose:  1 tab


Pantoprazole Sodium (Protonix Inj)  40 mg IVP DAILY [facility]


   Last Admin: 04/05/17 09:39 Dose:  40 mg











- Labs


Labs: 


 





 04/05/17 07:30 





 04/05/17 07:30 





 











PT  12.2 Seconds (9.9-11.8)  H  04/02/17  14:00    


 


INR  1.13  (0.93-1.08)  H  04/02/17  14:00    


 


APTT  31.9 Seconds (23.7-30.8)  H  04/02/17  14:00    














Assessment and Plan





- Assessment and Plan (Free Text)


Assessment: 


attending note;





Patient seen and examined with resident.





Patient is a 21 year old  male with a pmh of Crohn's ,multiple 

surgeries, recurrent abscess drainage, Fistula  and iron deficiency anemia


Is admitted for recurrent nonhealing Crohn's/fistula infection.





cultures grew VRE. Currently on Zyvox, Zosyn and Micafungin. we will follow up 

with ID closely.





 chronic iron deficiency anemia;stable.





upon discharge the patient will follow-up with PMD .





patient needs close follow-up with GI and colorectal surgery as outpatient.





Attending/Attestation





- Attestation


I have personally seen and examined this patient.: Yes


I have fully participated in the care of the patient.: Yes


I have reviewed all pertinent clinical information, including history, physical 

exam and plan: Yes

## 2017-04-06 VITALS
HEART RATE: 97 BPM | DIASTOLIC BLOOD PRESSURE: 70 MMHG | OXYGEN SATURATION: 99 % | TEMPERATURE: 98.2 F | RESPIRATION RATE: 18 BRPM | SYSTOLIC BLOOD PRESSURE: 114 MMHG

## 2017-04-06 LAB
ADD MANUAL DIFF?: NO
BASOPHILS # BLD AUTO: 0.02 K/MM3 (ref 0–2)
BASOPHILS NFR BLD: 0.2 % (ref 0–3)
EOSINOPHIL # BLD: 0.1 10*3/UL (ref 0–0.7)
EOSINOPHIL NFR BLD: 1 % (ref 1.5–5)
ERYTHROCYTE [DISTWIDTH] IN BLOOD BY AUTOMATED COUNT: 19.7 % (ref 11.5–14.5)
GRANULOCYTES # BLD: 9.69 10*3/UL (ref 1.4–6.5)
GRANULOCYTES NFR BLD: 81.5 % (ref 50–68)
HCT VFR BLD CALC: 28.2 % (ref 42–52)
IRON SERPL-MCNC: 10 UG/DL (ref 45–180)
LYMPHOCYTES # BLD: 1.3 10*3/UL (ref 1.2–3.4)
LYMPHOCYTES NFR BLD AUTO: 10.8 % (ref 22–35)
MCH RBC QN AUTO: 22 PG (ref 25–35)
MCHC RBC AUTO-ENTMCNC: 31.2 G/DL (ref 31–37)
MCV RBC AUTO: 70.5 FL (ref 80–105)
MONOCYTES # BLD AUTO: 0.8 10*3/UL (ref 0.1–0.6)
MONOCYTES NFR BLD: 6.5 % (ref 1–6)
PLATELET # BLD: 739 10^3/UL (ref 120–450)
PMV BLD AUTO: 7.7 FL (ref 7–11)
WBC # BLD AUTO: 11.9 10^3/UL (ref 4.5–11)

## 2017-04-06 RX ADMIN — HYDROMORPHONE HYDROCHLORIDE PRN MG: 1 INJECTION, SOLUTION INTRAMUSCULAR; INTRAVENOUS; SUBCUTANEOUS at 11:50

## 2017-04-06 RX ADMIN — PIPERACILLIN AND TAZOBACTAM SCH MLS/HR: 3; .375 INJECTION, POWDER, LYOPHILIZED, FOR SOLUTION INTRAVENOUS; PARENTERAL at 11:04

## 2017-04-06 RX ADMIN — HYDROMORPHONE HYDROCHLORIDE PRN MG: 1 INJECTION, SOLUTION INTRAMUSCULAR; INTRAVENOUS; SUBCUTANEOUS at 07:54

## 2017-04-06 RX ADMIN — HYDROMORPHONE HYDROCHLORIDE PRN MG: 1 INJECTION, SOLUTION INTRAMUSCULAR; INTRAVENOUS; SUBCUTANEOUS at 03:55

## 2017-04-06 RX ADMIN — PIPERACILLIN AND TAZOBACTAM SCH: 3; .375 INJECTION, POWDER, LYOPHILIZED, FOR SOLUTION INTRAVENOUS; PARENTERAL at 05:39

## 2017-04-06 RX ADMIN — LINEZOLID SCH MLS/HR: 600 INJECTION, SOLUTION INTRAVENOUS at 10:29

## 2017-04-06 RX ADMIN — ENOXAPARIN SODIUM SCH: 40 INJECTION SUBCUTANEOUS at 10:24

## 2017-04-06 NOTE — CP.PCM.DIS
<Bryce Wan - Last Filed: 04/07/17 09:05>





Provider





- Provider


Date of Admission: 


04/02/17 15:11





Attending physician: 


Trever Buitrago MD





Primary care physician: 


Gary Colby MD





Time Spent in preparation of Discharge (in minutes): 35





Hospital Course





- Lab Results


Lab Results: 


 Most Recent Lab Values











WBC  11.9 10^3/ul (4.5-11.0)  H D 04/06/17  09:00    


 


RBC  4.00 10^6/uL (3.5-6.1)   04/06/17  09:00    


 


Hgb  8.8 gm/dL (14.0-18.0)  L  04/06/17  09:00    


 


Hct  28.2 % (42.0-52.0)  L  04/06/17  09:00    


 


MCV  70.5 fL (80.0-105.0)  L  04/06/17  09:00    


 


MCH  22.0 pg (25.0-35.0)  L  04/06/17  09:00    


 


MCHC  31.2 g/dl (31.0-37.0)   04/06/17  09:00    


 


RDW  19.7 % (11.5-14.5)  H  04/06/17  09:00    


 


Plt Count  739 10^3/uL (120.0-450.0)  H*  04/06/17  09:00    


 


MPV  7.7 fl (7.0-11.0)   04/06/17  09:00    


 


Gran %  81.5 % (50.0-68.0)  H  04/06/17  09:00    


 


Lymph % (Auto)  10.8 % (22.0-35.0)  L  04/06/17  09:00    


 


Mono % (Auto)  6.5 % (1.0-6.0)  H  04/06/17  09:00    


 


Eos % (Auto)  1.0 % (1.5-5.0)  L  04/06/17  09:00    


 


Baso % (Auto)  0.2 % (0.0-3.0)   04/06/17  09:00    


 


Gran #  9.69  (1.4-6.5)  H  04/06/17  09:00    


 


Lymph #  1.3  (1.2-3.4)   04/06/17  09:00    


 


Mono #  0.8  (0.1-0.6)  H  04/06/17  09:00    


 


Eos #  0.1  (0.0-0.7)   04/06/17  09:00    


 


Baso #  0.02 K/mm3 (0.0-2.0)   04/06/17  09:00    


 


PT  12.2 Seconds (9.9-11.8)  H  04/02/17  14:00    


 


INR  1.13  (0.93-1.08)  H  04/02/17  14:00    


 


APTT  31.9 Seconds (23.7-30.8)  H  04/02/17  14:00    


 


Sodium  138 mmol/L (132-148)   04/05/17  07:30    


 


Potassium  3.6 mmol/L (3.6-5.0)   04/05/17  07:30    


 


Chloride  102 mmol/L ()   04/05/17  07:30    


 


Carbon Dioxide  27 mmol/L (21-33)   04/05/17  07:30    


 


Anion Gap  13  (10-20)   04/05/17  07:30    


 


BUN  5 mg/dL (7-21)  L  04/05/17  07:30    


 


Creatinine  0.8 mg/dL (0.5-1.4)   04/05/17  07:30    


 


Est GFR ( Amer)  > 60   04/05/17  07:30    


 


Est GFR (Non-Af Amer)  > 60   04/05/17  07:30    


 


Random Glucose  73 mg/dL ()   04/05/17  07:30    


 


Calcium  8.1 mg/dL (8.4-10.5)  L  04/05/17  07:30    


 


Iron  10 ug/dL ()  L  04/06/17  09:00    


 


TIBC  244 ug/dL (261-462)  L  04/06/17  09:00    


 


% Saturation  4 % (20-55)  L  04/06/17  09:00    


 


Total Bilirubin  0.4 mg/dL (0.2-1.3)   04/02/17  14:00    


 


AST  37 U/L (15-59)   04/02/17  14:00    


 


ALT  8 U/L (7-56)   04/02/17  14:00    


 


Alkaline Phosphatase  109 U/L ()   04/02/17  14:00    


 


Total Protein  8.7 g/dL (5.8-8.3)  H  04/02/17  14:00    


 


Albumin  3.9 g/dL (3.0-4.8)   04/02/17  14:00    


 


Globulin  4.8 gm/dL  04/02/17  14:00    


 


Albumin/Globulin Ratio  0.8  (1.1-1.8)  L  04/02/17  14:00    


 


Lipase  52 U/L ()   04/02/17  14:00    


 


Procalcitonin  0.06 NG/ML (0.19-0.49)  L  04/02/17  14:00    














- Hospital Course


Hospital Course: 


21 year old  male with a pmh of Crohn's and Fe def anemia 

presents to the the ED for the third time with same complaint. He has multiple 

draining abdominal abscesses/fistulas in his right lower abomdinal area and one 

on his right flank. He was started on antibiotics including IV Vancomycin, 

Linezolid, Zosyn and given fluids. His wounds improved and he was medically 

stable for discharge. He was advised (for a third time) to follow up with his 

primary care doctor, his GI doc or Dr. Conklin, and a colorectal specialist at 

[facility]. He was also advised to have his Crohn's managed by an appropriate doctor 

and that recurrent infections will likely get worse and resistant to treatment.





- Date & Time of H&P


Date of H&P: 04/06/17


Time of H&P: 07:45





Discharge Exam





- Head Exam


Head Exam: ATRAUMATIC, NORMOCEPHALIC





Discharge Plan





- Discharge Medications


Prescriptions: 


oxyCODONE/Acetaminophen [Percocet 5/325 mg Tab] 1 ea PO Q6H PRN #10 tab


 PRN Reason: Pain, Severe (8-10)


Linezolid [Zyvox] 600 mg PO BID #20 tab





- Follow Up Plan


Condition: FAIR


Disposition: HOME/ ROUTINE


Instructions:  Narcotic Pain Management (DC), Acute Abdominal Pain (DC), Opioid 

Dependence (DC), Opioid Pain Management (DC)


Additional Instructions: 


Patient is medically stable for discharge.





1. Please follow up with pmd Dr. Colby as soon as possible.


2. Please follow up with your GI doctor or Dr. Ban Conklin. [phone]


3. Please follow up with colorectal surgery in [facility]. Call  for 

appointment.-


4. Please take Zyvox 600mg bid for 10 days-








Please do all of the above to maximize your health.


Referrals: 


Gary Colby MD [Primary Care Provider] - 





<Trever Buitrago - Last Filed: 04/07/17 16:51>





Provider





- Provider


Date of Admission: 


04/02/17 15:11





Attending physician: 


Trever Buitrago MD





Primary care physician: 


Gary Colby MD





Time Spent in preparation of Discharge (in minutes): 35





Hospital Course





- Lab Results


Lab Results: 


 Most Recent Lab Values











WBC  11.9 10^3/ul (4.5-11.0)  H D 04/06/17  09:00    


 


RBC  4.00 10^6/uL (3.5-6.1)   04/06/17  09:00    


 


Hgb  8.8 gm/dL (14.0-18.0)  L  04/06/17  09:00    


 


Hct  28.2 % (42.0-52.0)  L  04/06/17  09:00    


 


MCV  70.5 fL (80.0-105.0)  L  04/06/17  09:00    


 


MCH  22.0 pg (25.0-35.0)  L  04/06/17  09:00    


 


MCHC  31.2 g/dl (31.0-37.0)   04/06/17  09:00    


 


RDW  19.7 % (11.5-14.5)  H  04/06/17  09:00    


 


Plt Count  739 10^3/uL (120.0-450.0)  H*  04/06/17  09:00    


 


MPV  7.7 fl (7.0-11.0)   04/06/17  09:00    


 


Gran %  81.5 % (50.0-68.0)  H  04/06/17  09:00    


 


Lymph % (Auto)  10.8 % (22.0-35.0)  L  04/06/17  09:00    


 


Mono % (Auto)  6.5 % (1.0-6.0)  H  04/06/17  09:00    


 


Eos % (Auto)  1.0 % (1.5-5.0)  L  04/06/17  09:00    


 


Baso % (Auto)  0.2 % (0.0-3.0)   04/06/17  09:00    


 


Gran #  9.69  (1.4-6.5)  H  04/06/17  09:00    


 


Lymph #  1.3  (1.2-3.4)   04/06/17  09:00    


 


Mono #  0.8  (0.1-0.6)  H  04/06/17  09:00    


 


Eos #  0.1  (0.0-0.7)   04/06/17  09:00    


 


Baso #  0.02 K/mm3 (0.0-2.0)   04/06/17  09:00    


 


PT  12.2 Seconds (9.9-11.8)  H  04/02/17  14:00    


 


INR  1.13  (0.93-1.08)  H  04/02/17  14:00    


 


APTT  31.9 Seconds (23.7-30.8)  H  04/02/17  14:00    


 


Sodium  138 mmol/L (132-148)   04/05/17  07:30    


 


Potassium  3.6 mmol/L (3.6-5.0)   04/05/17  07:30    


 


Chloride  102 mmol/L ()   04/05/17  07:30    


 


Carbon Dioxide  27 mmol/L (21-33)   04/05/17  07:30    


 


Anion Gap  13  (10-20)   04/05/17  07:30    


 


BUN  5 mg/dL (7-21)  L  04/05/17  07:30    


 


Creatinine  0.8 mg/dL (0.5-1.4)   04/05/17  07:30    


 


Est GFR ( Amer)  > 60   04/05/17  07:30    


 


Est GFR (Non-Af Amer)  > 60   04/05/17  07:30    


 


Random Glucose  73 mg/dL ()   04/05/17  07:30    


 


Calcium  8.1 mg/dL (8.4-10.5)  L  04/05/17  07:30    


 


Iron  10 ug/dL ()  L  04/06/17  09:00    


 


TIBC  244 ug/dL (261-462)  L  04/06/17  09:00    


 


% Saturation  4 % (20-55)  L  04/06/17  09:00    


 


Ferritin  83.6 ng/mL  04/06/17  09:00    


 


Total Bilirubin  0.4 mg/dL (0.2-1.3)   04/02/17  14:00    


 


AST  37 U/L (15-59)   04/02/17  14:00    


 


ALT  8 U/L (7-56)   04/02/17  14:00    


 


Alkaline Phosphatase  109 U/L ()   04/02/17  14:00    


 


Total Protein  8.7 g/dL (5.8-8.3)  H  04/02/17  14:00    


 


Albumin  3.9 g/dL (3.0-4.8)   04/02/17  14:00    


 


Globulin  4.8 gm/dL  04/02/17  14:00    


 


Albumin/Globulin Ratio  0.8  (1.1-1.8)  L  04/02/17  14:00    


 


Lipase  52 U/L ()   04/02/17  14:00    


 


Procalcitonin  0.06 NG/ML (0.19-0.49)  L  04/02/17  14:00    














- Hospital Course


Hospital Course: 


attending note;





Patient seen and examined with resident.





Patient is a 21 year old  male with a pmh of Crohn's ,multiple 

surgeries, recurrent abscess drainage, Fistula  and iron deficiency anemia


Is admitted for recurrent nonhealing Crohn's/fistula infection.





cultures grew VRE. Treated with IV Zyvox, Zosyn and Micafungin.  will be 

discharged home with Po zyvox.





 chronic iron deficiency anemia;stable.





upon discharge the patient will follow-up with PMD .





patient needs close follow-up with GI and colorectal surgery as outpatient.





 opiate abuse: Upon further review with ZAINA RODRIGUEZ aware  the patient is getting 

prescriptions for percocet  from multiple doctors.  getting opiates from DR. Morales Bishop.





 Pharmacy informed about this patient's opiate abuse/overuse behaviour. asked 

pharmacy not to refill additional prescription given by multiple physicians.





Diagnosis;


 Crohn's  disease


 recurrent abscess drainage, 


Fistula 


 VRE infection


anemia


opiate abuse

## 2017-04-06 NOTE — CP.PCM.PN
Subjective





- Date & Time of Evaluation


Date of Evaluation: 04/06/17


Time of Evaluation: 09:45





- Subjective


Subjective: 


Patient is comfortable in bed, not in distress. Less pain in the abdomen, no 

fevers overnight, no diarrhea.





Objective





- Vital Signs/Intake and Output


Vital Signs (last 24 hours): 


 











Temp Pulse Resp BP Pulse Ox


 


 98.2 F   97 H  18   114/70   99 


 


 04/06/17 06:00  04/06/17 06:00  04/06/17 06:00  04/06/17 06:00  04/06/17 06:00








Intake and Output: 


 











 04/06/17 04/06/17





 06:59 18:59


 


Intake Total 540 1000


 


Output Total 600 2500


 


Balance -60 -1500














- Labs


Labs: 


 





 04/06/17 09:00 





 04/05/17 07:30 





 











PT  12.2 Seconds (9.9-11.8)  H  04/02/17  14:00    


 


INR  1.13  (0.93-1.08)  H  04/02/17  14:00    


 


APTT  31.9 Seconds (23.7-30.8)  H  04/02/17  14:00    














- Constitutional


Appears: Non-toxic, No Acute Distress





- Head Exam


Head Exam: NORMAL INSPECTION





- Neck Exam


Neck Exam: absent: Lymphadenopathy, Meningismus





- Respiratory Exam


Respiratory Exam: Decreased Breath Sounds





- Cardiovascular Exam


Cardiovascular Exam: +S1, +S2





- GI/Abdominal Exam


GI & Abdominal Exam: Soft.  absent: Tenderness





Assessment and Plan





- Assessment and Plan (Free Text)


Plan: 


Assessment


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 

and microabscesses, growing Vancomycin-resistant Enterococcus faecium from 3/23

, growing gram positive cocci and yeast from 3/26, in a patient with poorly-

controlled Crohn's disease, now growing VRE again





Plan


continue Zyvox, Zosyn - would continue antibiotics while the fistulous tracts 

are open and draining - discussed with Dr. Buitrago


Will continue to monitor clinically while the patient is in the hospital

## 2017-04-09 ENCOUNTER — HOSPITAL ENCOUNTER (INPATIENT)
Dept: HOSPITAL 42 - ED | Age: 22
LOS: 2 days | Discharge: HOME | DRG: 296 | End: 2017-04-11
Attending: HOSPITALIST | Admitting: INTERNAL MEDICINE
Payer: MEDICAID

## 2017-04-09 VITALS — BODY MASS INDEX: 17 KG/M2

## 2017-04-09 DIAGNOSIS — Z91.013: ICD-10-CM

## 2017-04-09 DIAGNOSIS — G89.29: ICD-10-CM

## 2017-04-09 DIAGNOSIS — Z91.81: ICD-10-CM

## 2017-04-09 DIAGNOSIS — K50.914: ICD-10-CM

## 2017-04-09 DIAGNOSIS — R55: ICD-10-CM

## 2017-04-09 DIAGNOSIS — Z93.3: ICD-10-CM

## 2017-04-09 DIAGNOSIS — D50.9: ICD-10-CM

## 2017-04-09 DIAGNOSIS — Z91.19: ICD-10-CM

## 2017-04-09 DIAGNOSIS — Z82.49: ICD-10-CM

## 2017-04-09 DIAGNOSIS — F11.10: ICD-10-CM

## 2017-04-09 DIAGNOSIS — K50.913: ICD-10-CM

## 2017-04-09 DIAGNOSIS — E86.0: Primary | ICD-10-CM

## 2017-04-09 LAB
ALBUMIN/GLOB SERPL: 0.8 {RATIO} (ref 1.1–1.8)
ALP SERPL-CCNC: 89 U/L (ref 38–133)
ALT SERPL-CCNC: 26 U/L (ref 7–56)
AMYLASE SERPL-CCNC: 92 U/L (ref 35–125)
AST SERPL-CCNC: 55 U/L (ref 15–59)
BASOPHILS # BLD AUTO: 0.02 K/MM3 (ref 0–2)
BASOPHILS NFR BLD: 0.3 % (ref 0–3)
BILIRUB SERPL-MCNC: 0.3 MG/DL (ref 0.2–1.3)
BUN SERPL-MCNC: 6 MG/DL (ref 7–21)
CALCIUM SERPL-MCNC: 9.2 MG/DL (ref 8.4–10.5)
CHLORIDE SERPL-SCNC: 102 MMOL/L (ref 98–107)
CO2 SERPL-SCNC: 30 MMOL/L (ref 21–33)
EOSINOPHIL # BLD: 0.1 10*3/UL (ref 0–0.7)
EOSINOPHIL NFR BLD: 1.2 % (ref 1.5–5)
ERYTHROCYTE [DISTWIDTH] IN BLOOD BY AUTOMATED COUNT: 20.1 % (ref 11.5–14.5)
GLOBULIN SER-MCNC: 4.6 GM/DL
GLUCOSE SERPL-MCNC: 85 MG/DL (ref 70–110)
GRANULOCYTES # BLD: 5.58 10*3/UL (ref 1.4–6.5)
GRANULOCYTES NFR BLD: 72.6 % (ref 50–68)
HCT VFR BLD CALC: 25.2 % (ref 42–52)
LIPASE SERPL-CCNC: 62 U/L (ref 23–300)
LYMPHOCYTES # BLD: 1.2 10*3/UL (ref 1.2–3.4)
LYMPHOCYTES NFR BLD AUTO: 15 % (ref 22–35)
MCH RBC QN AUTO: 21.6 PG (ref 25–35)
MCHC RBC AUTO-ENTMCNC: 30.6 G/DL (ref 31–37)
MCV RBC AUTO: 70.6 FL (ref 80–105)
MONOCYTES # BLD AUTO: 0.8 10*3/UL (ref 0.1–0.6)
MONOCYTES NFR BLD: 10.9 % (ref 1–6)
PLATELET # BLD: 685 10^3/UL (ref 120–450)
PMV BLD AUTO: 7.6 FL (ref 7–11)
POTASSIUM SERPL-SCNC: 3.8 MMOL/L (ref 3.6–5)
PROT SERPL-MCNC: 8.3 G/DL (ref 5.8–8.3)
SODIUM SERPL-SCNC: 143 MMOL/L (ref 132–148)
WBC # BLD AUTO: 7.7 10^3/UL (ref 4.5–11)

## 2017-04-10 LAB
ADD MANUAL DIFF?: NO
ADD MANUAL DIFF?: NO
ALBUMIN/GLOB SERPL: 0.8 {RATIO} (ref 1.1–1.8)
ALP SERPL-CCNC: 75 U/L (ref 38–133)
ALT SERPL-CCNC: 30 U/L (ref 7–56)
AST SERPL-CCNC: 28 U/L (ref 15–59)
BASOPHILS # BLD AUTO: 0.02 K/MM3 (ref 0–2)
BASOPHILS NFR BLD: 0.3 % (ref 0–3)
BILIRUB SERPL-MCNC: 0.3 MG/DL (ref 0.2–1.3)
BUN SERPL-MCNC: 4 MG/DL (ref 7–21)
CALCIUM SERPL-MCNC: 8.4 MG/DL (ref 8.4–10.5)
CHLORIDE SERPL-SCNC: 102 MMOL/L (ref 98–107)
CO2 SERPL-SCNC: 28 MMOL/L (ref 21–33)
EOSINOPHIL # BLD: 0.1 10*3/UL (ref 0–0.7)
EOSINOPHIL NFR BLD: 1.8 % (ref 1.5–5)
ERYTHROCYTE [DISTWIDTH] IN BLOOD BY AUTOMATED COUNT: 21.2 % (ref 11.5–14.5)
GLOBULIN SER-MCNC: 4.2 GM/DL
GLUCOSE SERPL-MCNC: 95 MG/DL (ref 70–110)
GRANULOCYTES # BLD: 5.29 10*3/UL (ref 1.4–6.5)
GRANULOCYTES NFR BLD: 78 % (ref 50–68)
HCT VFR BLD CALC: 27.6 % (ref 42–52)
INR PPP: 1.11 (ref 0.93–1.08)
LYMPHOCYTES # BLD: 0.7 10*3/UL (ref 1.2–3.4)
LYMPHOCYTES NFR BLD AUTO: 10.8 % (ref 22–35)
MCH RBC QN AUTO: 22.1 PG (ref 25–35)
MCHC RBC AUTO-ENTMCNC: 30.8 G/DL (ref 31–37)
MCV RBC AUTO: 71.7 FL (ref 80–105)
MONOCYTES # BLD AUTO: 0.6 10*3/UL (ref 0.1–0.6)
MONOCYTES NFR BLD: 9.1 % (ref 1–6)
PLATELET # BLD: 580 10^3/UL (ref 120–450)
PMV BLD AUTO: 7.7 FL (ref 7–11)
POTASSIUM SERPL-SCNC: 3.3 MMOL/L (ref 3.6–5)
PROT SERPL-MCNC: 7.4 G/DL (ref 5.8–8.3)
SODIUM SERPL-SCNC: 140 MMOL/L (ref 132–148)
TROPONIN I SERPL-MCNC: < 0.01 NG/ML
TROPONIN I SERPL-MCNC: < 0.01 NG/ML
WBC # BLD AUTO: 6.8 10^3/UL (ref 4.5–11)

## 2017-04-10 PROCEDURE — 30233N1 TRANSFUSION OF NONAUTOLOGOUS RED BLOOD CELLS INTO PERIPHERAL VEIN, PERCUTANEOUS APPROACH: ICD-10-PCS | Performed by: HOSPITALIST

## 2017-04-10 RX ADMIN — HYDROMORPHONE HYDROCHLORIDE PRN MG: 1 INJECTION, SOLUTION INTRAMUSCULAR; INTRAVENOUS; SUBCUTANEOUS at 05:05

## 2017-04-10 RX ADMIN — PIPERACILLIN AND TAZOBACTAM SCH MLS/HR: 3; .375 INJECTION, POWDER, LYOPHILIZED, FOR SOLUTION INTRAVENOUS; PARENTERAL at 11:06

## 2017-04-10 RX ADMIN — HYDROMORPHONE HYDROCHLORIDE PRN MG: 1 INJECTION, SOLUTION INTRAMUSCULAR; INTRAVENOUS; SUBCUTANEOUS at 20:02

## 2017-04-10 RX ADMIN — HYDROMORPHONE HYDROCHLORIDE PRN MG: 1 INJECTION, SOLUTION INTRAMUSCULAR; INTRAVENOUS; SUBCUTANEOUS at 08:03

## 2017-04-10 RX ADMIN — HYDROMORPHONE HYDROCHLORIDE PRN MG: 1 INJECTION, SOLUTION INTRAMUSCULAR; INTRAVENOUS; SUBCUTANEOUS at 11:05

## 2017-04-10 RX ADMIN — HYDROMORPHONE HYDROCHLORIDE PRN MG: 1 INJECTION, SOLUTION INTRAMUSCULAR; INTRAVENOUS; SUBCUTANEOUS at 02:35

## 2017-04-10 RX ADMIN — PIPERACILLIN AND TAZOBACTAM SCH MLS/HR: 3; .375 INJECTION, POWDER, LYOPHILIZED, FOR SOLUTION INTRAVENOUS; PARENTERAL at 23:05

## 2017-04-10 RX ADMIN — HYDROMORPHONE HYDROCHLORIDE PRN MG: 1 INJECTION, SOLUTION INTRAMUSCULAR; INTRAVENOUS; SUBCUTANEOUS at 17:02

## 2017-04-10 RX ADMIN — PIPERACILLIN AND TAZOBACTAM SCH MLS/HR: 3; .375 INJECTION, POWDER, LYOPHILIZED, FOR SOLUTION INTRAVENOUS; PARENTERAL at 06:17

## 2017-04-10 RX ADMIN — PANTOPRAZOLE SODIUM SCH MG: 40 TABLET, DELAYED RELEASE ORAL at 06:18

## 2017-04-10 RX ADMIN — HYDROMORPHONE HYDROCHLORIDE PRN MG: 1 INJECTION, SOLUTION INTRAMUSCULAR; INTRAVENOUS; SUBCUTANEOUS at 23:05

## 2017-04-10 RX ADMIN — HYDROMORPHONE HYDROCHLORIDE PRN MG: 1 INJECTION, SOLUTION INTRAMUSCULAR; INTRAVENOUS; SUBCUTANEOUS at 14:01

## 2017-04-10 RX ADMIN — PIPERACILLIN AND TAZOBACTAM SCH MLS/HR: 3; .375 INJECTION, POWDER, LYOPHILIZED, FOR SOLUTION INTRAVENOUS; PARENTERAL at 17:02

## 2017-04-10 NOTE — CARD
--------------- APPROVED REPORT --------------





EKG Measurement

Heart [id number]

[id number]

[id number]

[id number]

[id number]



<Conclusion>

Normal sinus rhythm

Minimal voltage criteria for LVH, may be normal variant

Borderline ECG

## 2017-04-10 NOTE — CP.PCM.HP
<Issac Romero - Last Filed: 04/10/17 04:59>





History of Present Illness





- History of Present Illness


History of Present Illness: 





This is a 22 yo male with past medical hx Crohn's dx (diagnosed 2 years ago), 

iron deficiency anemia presenting with syncopal episode. Pt was at home when 

episode occurred. It was witnessed by his mother. It took place when the pt was 

standing upright, he had recently stood up. He had recently returned from 

walking to the store. He says it has happened before- 2 x before. One in summer 

2016, one [place] 2016, [place] [facility] [place] was a 

result of dehydration. He was evaluated by a cardiologist at that time who said 

it was not cardiac related. This time was different because he had visual sx 

and saw stars before the episode. He reports he lost postural tone and he felt 

dizzy prior. He dropped to the ground. His mother said he was out for 1 minute. 

He still felt dizzy and lightheaded for 15 mins afterwards. He was transported 

to hospital by ambulance. He reports eating 3 full meals throughout the day. 

Denies seizure activity, tongue biting, bladder, bowel incontinence. Denies 

chest pain and sob. Denies cough and vomiting. Reports 3 episodes of non bloody 

diarrhea today. Last colonoscopy approximately 8 months ago. 











PMH: Crohn's disease (diagnosed 2 years ago), f/u with GI q 3-4 weeks for 

remicade





PSH:  multiple sx on abscesses and fistulas





Allergies: shellfish





FH: Arthritis-mother; HTN- father; denies FH of MI, cancer, other diseases





Home meds: cipro (reported by patient, although previous notes show pt was 

discharged on zyvox), remicade, percocet, senna, iron pills (non compliant)





Social hx: Denies smoking. Denies drinking. Denies current drug use. Says he 

has tried marijuana. Lives with mother in . Unemployed. Born in .














Present on Admission





- Present on Admission


Any Indicators Present on Admission: No


History of DVT/PE: No


History of Uncontrolled Diabetes: No


Urinary Catheter: No


Decubitus Ulcer Present: No





Review of Systems





- Review of Systems


All systems: reviewed and no additional remarkable complaints except


Review of Systems: 














Negative except as stated in HPI.





Past Patient History





- Infectious Disease


Hx of Infectious Diseases: None





- Past Medical History & Family History


Past Medical History?: Yes


Past Family History: Reviewed and not pertinent





- Past Social History


Smoking Status: Never Smoked


Chewing Tobacco Use: No


Cigar Use: No


Alcohol: None


Drugs: Denies


Home Situation {Lives}: With Family


Domestic Violence: Negative





- CARDIAC


Hx Cardiac Disorders: No





- PULMONARY


Hx Respiratory Disorders: No





- NEUROLOGICAL


Hx Neurological Disorder: No





- HEENT


Hx HEENT Problems: No





- RENAL


Hx Chronic Kidney Disease: No





- ENDOCRINE/METABOLIC


Hx Endocrine Disorders: No





- HEMATOLOGICAL/ONCOLOGICAL


Hx Blood Disorders: No





- INTEGUMENTARY


Hx Dermatological Problems: No





- MUSCULOSKELETAL/RHEUMATOLOGICAL


Hx Falls: No





- GASTROINTESTINAL


Hx Colostomy: Yes (R back)


Hx Crohn's Disease: Yes


Other/Comment: GI surgeries





- GENITOURINARY/GYNECOLOGICAL


Hx Genitourinary Disorders: No





- PSYCHIATRIC


Hx Psychophysiologic Disorder: No


Hx Substance Use: No





- SURGICAL HISTORY


Other/Comment: Abd surg. for intraabd. abscesses x 7 [place] [facility] [place]





- ANESTHESIA


Hx Anesthesia: Yes


Hx Anesthesia Reactions: No


Hx Malignant Hyperthermia: No





Meds


Allergies/Adverse Reactions: 


 Allergies











Allergy/AdvReac Type Severity Reaction Status Date / Time


 


FISH Allergy  RASH Verified 04/02/17 13:24


 


shellfish derived AdvReac  ANGIOEDEMA Verified 04/02/17 13:24














Physical Exam





- Constitutional


Appears: No Acute Distress





- Head Exam


Head Exam: ATRAUMATIC, NORMAL INSPECTION, NORMOCEPHALIC





- Eye Exam


Eye Exam: EOMI





- ENT Exam


ENT Exam: Mucous Membranes Moist





- Neck Exam


Neck exam: Positive for: Normal Inspection





- Respiratory Exam


Respiratory Exam: Clear to Auscultation Bilateral, NORMAL BREATHING PATTERN





- Cardiovascular Exam


Cardiovascular Exam: REGULAR RHYTHM, +S1, +S2





- GI/Abdominal Exam


GI & Abdominal Exam: Normal Bowel Sounds, Soft, Tenderness


Additional comments: 











Open wounds RUQ and periumbilical oozing purulent material 2 cm in diameter, 

tender to touch





- Extremities Exam


Extremities exam: Positive for: normal inspection





- Back Exam


Back exam: NORMAL INSPECTION





- Neurological Exam


Neurological exam: Alert, CN II-XII Intact, Oriented x3





- Psychiatric Exam


Psychiatric exam: Normal Affect, Normal Mood





- Skin


Skin Exam: Warm





Results





- Vital Signs


Recent Vital Signs: 





 Last Vital Signs











Temp  97.6 F   04/09/17 22:33


 


Pulse  72   04/09/17 22:33


 


Resp  18   04/09/17 22:33


 


BP  118/76   04/09/17 22:33


 


Pulse Ox  100   04/09/17 22:33














- Labs


Result Diagrams: 


 04/09/17 22:58





 04/09/17 22:58





Assessment & Plan





- Assessment and Plan (Free Text)


Assessment: 


This is a 22 yo male with pmh of Crohn's disease, abscess/fistula presenting 

with syncopal episode











1. Syncopal episode








-EKG shows NSR


-QT not prolonged


-pt reports he recently had echo- call [facility] during daytime to confirm to avoid 

repeat testing


-orthostatics q 8


-head CT negative


-hold off on neuro/cardio consult for now


-admit to telemetry














2. Open wounds/abscesses








-chart reviewed from previous admission


-did not complete course of abx


-will resume abx tx


-blood culture pending


-ID consult. recs appreciated.


-sx consult. recs appreciated.














3. Iron deficiency anemia








-pt getting transfused 1 episode, he is symptomatic


-check cbc in am


-peripheral blood smear per am team











4. hx of Crohn's disease





-currently no flare


-regular diet


-dilaudid prn














5. GI/DVT ppx








-SCDs


-protonix











discussed with Dr. Jennings





<Marty Jennings Q - Last Filed: 04/10/17 06:58>





Results





- Vital Signs


Recent Vital Signs: 





 Last Vital Signs











Temp  98.8 F   04/10/17 06:00


 


Pulse  80   04/10/17 06:00


 


Resp  19   04/10/17 06:00


 


BP  143/85   04/10/17 06:00


 


Pulse Ox  100   04/10/17 00:56














- Labs


Result Diagrams: 


 04/09/17 22:58





 04/09/17 22:58





Attending/Attestation





- Attestation


I have personally seen and examined this patient.: Yes


I have fully participated in the care of the patient.: Yes


I have reviewed all pertinent clinical information: Yes

## 2017-04-10 NOTE — RAD
PROCEDURE:  Radiographs of the right elbow.



HISTORY:

elbow pain s/p fall  



COMPARISON:

No prior.



FINDINGS:



BONES:

Bone alignment and mineralization are normal. No acute fracture.



JOINTS:

Normal. 



SOFT TISSUES:

Normal.



JOINT EFFUSION:

None.



OTHER FINDINGS:

None.



IMPRESSION:

No acute fracture or dislocation.

## 2017-04-10 NOTE — ED PDOC
Arrival/HPI





<RikaLiu - Last Filed: 04/10/17 01:05>





- General


Historian: Patient





<Alejandra Cruz - Last Filed: 04/10/17 02:22>





- General


Chief Complaint: Weakness/Neurological Deficit


Time Seen by Provider: 04/09/17 22:38





- History of Present Illness


Narrative History of Present Illness (Text): 





04/10/17 00:35


21-year-old male with a history of Crohn's disease with multiple intra-

abdominal fistulas presents today status post syncopal episode. Patient states 

he just finished eating dinner walked to his bedroom sat down for second and 

when he stood up he felt as if everything went black tried to take a few more 

steps and fell to the ground landing on the left side. Patient is not sure if 

he had complete loss of consciousness but states he had a very strange feeling 

and blackness came over his vision. He is complaining of chronic abdominal 

pain. He is complaining of right hip and right elbow and right sided rib pain. 

He is complaining of chronic back pain. Complaining of dizziness at present 

time. Denies headache. No chest pain or shortness of breath. (Alejandra Cruz)








Past Medical History





- Provider Review


Nursing Documentation Reviewed: Yes





- Travel History


Have you recently traveled outside [place] w/in the past 3 mons?: No





- Infectious Disease


Hx of Infectious Diseases: None





- Cardiac


Hx Cardiac Disorders: No





- Pulmonary


Hx Respiratory Disorders: No





- Neurological


Hx Neurological Disorder: No





- HEENT


Hx HEENT Disorder: No





- Renal


Hx Renal Disorder: No





- Endocrine/Metabolic


Hx Endocrine Disorders: No





- Hematological/Oncological


Hx Blood Disorders: No





- Integumentary


Hx Dermatological Disorder: No





- Musculoskeletal/Rheumatological


Hx Falls: No





- Gastrointestinal


Hx Colostomy: Yes (R back)


Hx Crohn's Disease: Yes


Other/Comment: GI surgeries





- Genitourinary/Gynecological


Hx Genitourinary Disorders: No





- Psychiatric


Hx Psychophysiologic Disorder: No


Hx Substance Use: No





- Surgical History


Other/Comment: Abd surg. for intraabd. abscesses x 7 last surgery 2/2017 at [facility] [place]





- Anesthesia


Hx Anesthesia: Yes


Hx Anesthesia Reactions: No


Hx Malignant Hyperthermia: No





- Suicidal Assessment


Feels Threatened In Home Enviroment: No





<Alejandra Cruz - Last Filed: 04/10/17 02:22>





Family/Social History





- Physician Review


Nursing Documentation Reviewed: Yes


Family/Social History: Unknown Family HX


Smoking Status: Former Smoker


Hx Alcohol Use: No


Hx Substance Use: No





<Alejandra Cruz - Last Filed: 04/10/17 02:22>





Allergies/Home Meds





<Liu Meléndez - Last Filed: 04/10/17 01:05>





<NancyAlejandra T - Last Filed: 04/10/17 02:22>


Allergies/Adverse Reactions: 


Allergies





FISH Allergy (Verified 04/02/17 13:24)


 RASH


shellfish derived Adverse Reaction (Verified 04/02/17 13:24)


 ANGIOEDEMA











Review of Systems





- Review of Systems


Constitutional: Fatigue.  absent: Fevers


ENT: absent: Sinus Congestion


Respiratory: absent: SOB, Cough


Cardiovascular: Syncope.  absent: Chest Pain, Palpitations


Gastrointestinal: Abdominal Pain.  absent: Diarrhea, Vomiting


Genitourinary Male: absent: Dysuria


Musculoskeletal: Arthralgias, Back Pain.  absent: Neck Pain


Skin: absent: Rash, Pruritis


Neurological: Dizziness.  absent: Headache





<Alejandra Cruz - Last Filed: 04/10/17 02:22>





Physical Exam


Vital Signs Reviewed: Yes


Temperature: Afebrile


Blood Pressure: Normal


Pulse: Regular


Respiratory Rate: Normal


Appearance: Positive for: Well-Appearing, Non-Toxic, Comfortable


Pain Distress: None


Mental Status: Positive for: Alert and Oriented X 3





- Systems Exam


Head: Present: Atraumatic


Pupils: Present: PERRL


Extroacular Muscles: Present: EOMI


Mouth: Present: Moist Mucous Membranes


Nose (External): Present: Atraumatic


Neck: Present: Normal Range of Motion.  No: MIDLINE TENDERNESS, Paraspinal 

Tenderness


Respiratory/Chest: Present: Clear to Auscultation, Good Air Exchange, Tender to 

Palpation (+ right sided lateral rib tenderness; no edema, no erythema; no step 

offs or crepitus. ).  No: Respiratory Distress, Accessory Muscle Use, Tachypneic


Cardiovascular: Present: Regular Rate and Rhythm, Normal S1, S2.  No: Murmurs


Abdomen: Present: Tenderness, Scars, Other (multiple draining wounds to abdomen 

and large draining wound to right lower back. ).  No: Rebound, Guarding


Upper Extremity: Present: Normal ROM, Tenderness.  No: NORMAL PULSES, Swelling, 

Erythema, Neurovascularly Intact, Capillary Refill < 2s, Deformity


Lower Extremity: Present: Normal ROM, Tenderness (+ ttp over lateral aspect of 

right hip; no edema, no erythema, no ecchymosis. ), Neurovascularly Intact, 

Capillary Refill < 2 s.  No: Swelling, Erythema, Deformity, Temperature 

Abnormalties


Neurological: Present: GCS=15, Normal Sensory Function


Skin: Present: Warm, Dry


Psychiatric: Present: Alert, Oriented x 3





<Alejandra Cruz - Last Filed: 04/10/17 02:22>


Vital Signs











  Temp Pulse Resp BP Pulse Ox


 


 04/10/17 00:56   77  19  147/80  100


 


 04/09/17 22:33  97.6 F  72  18  118/76  100

















Medical Decision Making





<Liu Meléndez - Last Filed: 04/10/17 01:05>





<Alejandra Cruz - Last Filed: 04/10/17 02:22>


ED Course and Treatment: 





04/10/17 00:57


21-year-old male presents today with syncopal episode. Patient with a history 

of Crohn's with multiple visits for abdominal fistulas and abscesses.





CBC: White blood cell count 7.7  Hemoglobin 7.7 change from one day ago which 

was 8.8


CMP within normal limits


trop: wnl





EKG normal sinus rhythm at 71 bpm normal axis normal intervals no ST elevations








CAT scan of the head shows no acute intracranial abnormality





X-ray of the right ribs: No fracture


X-rays of the right elbow: No fracture


X-rays of the right hip: No fracture





Type and crossmatch for 2 units as patient with symptomatic anemia with a 

hemoglobin less than 8.0


Consent for blood transfusion obtained








will given morphine for pain; 





case discussed with dr. ibarra; will admit observational status for syncope, 

symptomatic anemia





impression; syncope, symptomatic anemia


tele obs. 








 (Alejandra Cruz)








- Lab Interpretations


Lab Results: 








 04/09/17 22:58 





 04/09/17 22:58 





 Lab Results





04/10/17 00:40: Blood Type Pending, Antibody Screen Pending, BBK History 

Checked Patient has bt


04/09/17 22:58: WBC 7.7  D, RBC 3.57, Hgb 7.7 L, Hct 25.2 L, MCV 70.6 L, MCH 

21.6 L, MCHC 30.6 L, RDW 20.1 H, Plt Count 685 H, MPV 7.6, Gran % 72.6 H, Lymph 

% (Auto) 15.0 L, Mono % (Auto) 10.9 H, Eos % (Auto) 1.2 L, Baso % (Auto) 0.3, 

Gran # 5.58, Lymph # 1.2, Mono # 0.8 H, Eos # 0.1, Baso # 0.02, Sodium 143, 

Potassium 3.8, Chloride 102, Carbon Dioxide 30, Anion Gap 15, BUN 6 L, 

Creatinine 0.8, Est GFR (African Amer) > 60, Est GFR (Non-Af Amer) > 60, Random 

Glucose 85, Calcium 9.2, Total Bilirubin 0.3, AST 55, ALT 26, Alkaline 

Phosphatase 89, Lactate Dehydrogenase 343, Total Creatine Kinase 52, Troponin I 

< 0.01, Total Protein 8.3, Albumin 3.7, Globulin 4.6, Albumin/Globulin Ratio 

0.8 L, Amylase 92, Lipase 62














- RAD Interpretation


Radiology Orders: 








04/09/17 23:12


HEAD W/O CONTRAST [CT] Stat 


ELBOW RIGHT 3 VIEWS ROUTINE [RAD] Stat 


HIP MIN 2V W/ PELVIS RT [RAD] Stat 


RIBS RIGHT & PA CHEST [RAD] Stat 

















- Medication Orders


Current Medication Orders: 








Hydromorphone HCl (Dilaudid)  0.5 mg IVP Q3H PRN


   PRN Reason: Pain, moderate (4-7)


   Last Admin: 04/10/17 01:43  Dose: 0.5 MG





IVP Administration


 Document     04/10/17 01:43  MR  (Rec: 04/10/17 01:43  MR  [id number])


     Charges for Administration


      # of IVP Administrations                   1





Hydromorphone HCl (Dilaudid)  1 mg IVP Q3H PRN


   PRN Reason: Pain, severe (8-10)


Linezolid (Zyvox 600mg/300ml D5w)  300 mls @ 200 mls/hr IVPB Q12 BARBARA


   PRN Reason: Protocol


   Stop: 04/10/17 11:29


Micafungin Sodium 100 mg/ (Sodium Chloride)  100 mls @ 100 mls/hr IV DAILY BARBARA


   PRN Reason: Protocol


   Stop: 04/10/17 10:59


Sodium Chloride (Sodium Chloride 0.9%)  1,000 mls @ 100 mls/hr IV .Q10H BARBARA


   Last Admin: 04/10/17 01:43  Dose: 100 MLS/HR





eMAR Start Stop


 Document     04/10/17 01:43  MR  (Rec: 04/10/17 01:43  MR  [id number])


     Intravenous Solution


      Start Date                                 04/10/17


      Start Time                                 01:43





Piperacillin Sod/Tazobactam Sod (Zosyn 3.375 In Ns 100ml)  100 mls @ 200 mls/hr 

IVPB Q6 BARBARA


   PRN Reason: Protocol


   Stop: 04/10/17 12:29


Pantoprazole Sodium (Protonix Ec Tab)  40 mg PO 0630 BARBARA





Discontinued Medications





Sodium Chloride (Sodium Chloride 0.9%)  1,000 mls @ 999 mls/hr IV .Q1H1M STA


   Stop: 04/09/17 23:48


   Last Admin: 04/09/17 23:24  Dose: 999 MLS/HR





eMAR Start Stop


 Document     04/09/17 23:24  MR  (Rec: 04/09/17 23:25  MR  [id number])


     Intravenous Solution


      Start Date                                 04/09/17


      Start Time                                 23:25


      End Date                                   04/10/17


      End time                                   00:25


      Total Infusion Time                        60





Morphine Sulfate (Morphine)  4 mg IVP STAT STA


   Stop: 04/10/17 00:42


   Last Admin: 04/10/17 00:57  Dose: 4 MG





MAR Pain Assessment


 Document     04/10/17 00:57  MR  (Rec: 04/10/17 00:58  MR  [id number])


     Pain Reassessment


      Is this a pain reassessment?               Yes


     Sleep


      Is patient sleeping during reassessment?   No


     Presence of Pain


      Presence of Pain                           Yes


     Pain Scale Used


      Pain Scale Used                            Numeric


     Location


      Left, Right or Bilateral                   Right


      Pain Location Body Site                    Hip


     Description


      Description                                Sharp


      Intensity of Pain at present               10


      Pain Behavior                              Irritability


                                                 Restlessness


      Aggravating Factors                        ADL's


                                                 Changing Position


      Alleviating Factors/Management             Medication


       Techniques                                


      Alleviating Factors                        Medication


IVP Administration


 Document     04/10/17 00:57  MR  (Rec: 04/10/17 00:58  MR  [id number])


     Charges for Administration


      # of IVP Administrations                   1

















- PA / NP / Resident Statement


MD/ has reviewed & agrees with the documentation as recorded.


MD/ has examined the patient and agrees with the treatment plan.





<Liu Meléndez - Last Filed: 04/10/17 01:05>





Disposition/Present on Arrival





<Liu Meléndez - Last Filed: 04/10/17 01:05>





- Present on Arrival


Any Indicators Present on Arrival: No


History of DVT/PE: No


History of Uncontrolled Diabetes: No


Urinary Catheter: No


History of Decub. Ulcer: No


History Surgical Site Infection Following: None





- Disposition


Have Diagnosis and Disposition been Completed?: Yes


Disposition Time: 00:57


Patient Plan: Observation





<Alejandra Cruz - Last Filed: 04/10/17 02:22>





- Disposition


Diagnosis: 


 Syncope and collapse, Symptomatic anemia


Disposition: HOSPITALIZED


Patient Problems: 


 Current Active Problems











Problem Status Diagnosed


 


Abdominal pain Acute 


 


Crohn's disease Acute 


 


Symptomatic anemia Acute 


 


Syncope and collapse Acute 











Condition: FAIR

## 2017-04-10 NOTE — CON
DATE: 04/10/2017



INDICATIONS:  Syncopal spell.



HISTORY OF PRESENT ILLNESS:  This is a 21-year-old male with Crohn's disease 
with intra-abdominal abscesses and enterocutaneous fistula, severe anemia who 
passed out at home yesterday.  He was out during the day.  When he came home, 
he got up from a sitting position and passed out.  He briefly saw black and 
stars, and then went to the ground.  He does not really recall the details.  At 
this time, he does not want to talk about it because he has told so many other 
physicians.  I have reviewed the hospital record to get a sense for his 
description of the events.  He has had prior syncope.  He has undergone a 
workup at [facility] previously and was told that it was not a 
cardiac cause.  



There was no chest pain, shortness of breath, orthopnea, PND, palpitations, 
edema, or claudication.  There was no fever, chills, cough, sputum production, 
or hemoptysis.  There was no evidence of seizure activity.  He felt well 
shortly after the event.



PAST MEDICAL HISTORY:  Complex.  He has had Crohn's disease for several years.  
He has multiple abscesses and fistulas.  He is under the care of physicians in 
[facility].  He is on infliximab infusions.  He has chronic 
pain.  There is a history of opioid abuse, anemia, syncope, but no rheumatic 
fever, myocardial infarction, angina, congestive heart failure, arrhythmia, 
stroke, TIA, diabetes, hypertension, hyperlipidemia, or gout.



MEDICATIONS:  At the time of admission included Zyvox, Percocet, and weekly 
infliximab infusions.



ALLERGIES:  HE NOTES AN ALLERGY TO SHELLFISH.



FAMILY HISTORY:  Not notable for syncope.  There is heart attack in his family, 
cancer, and hypertension.



SOCIAL HISTORY:  He does not smoke cigarettes.  He does not drink alcohol 
significantly.  He is unemployed.  He lives at home with his mother.



REVIEW OF SYSTEMS:  A 10-point review of systems is limited, but otherwise 
unremarkable except as noted above.



PHYSICAL EXAMINATION:

GENERAL:  He is a well-developed male in no acute distress.

VITAL SIGNS:  Unremarkable.  He is afebrile.  Blood pressure 143/85, pulse 80 
sinus rhythm, respirations 16, O2 sat 100% on room air.

HEENT:  Reveals no neck-vein distention, thyromegaly, or carotid bruits.  
Mucous membranes are moist.  Conjunctivae are pale.

NECK:  Supple.

LUNGS:  Lung fields are clear.

HEART:  Revealed normal first and second heart sounds.  No murmur, gallop, rub, 
or click.

ABDOMEN:  Soft.  Bowel sounds are present.  There are enterocutaneous fistulas 
noted.

NEUROLOGIC:  He was awake, ,alert and oriented.

EXTREMITIES:  No cyanosis, clubbing, or edema.



LABORATORY AND IMAGING:  A CT scan of the head reveals no acute intracranial 
hemorrhage or suspicious mass effect.  



A portable chest x-ray is unremarkable by my reading.  



EKG demonstrates regular sinus rhythm, LVH by voltage, mild J-point elevation.



He also had x-rays of the ribs, hips, pelvis, and elbow.  Apparently, these 
were unremarkable, but they are not yet interpreted by the radiologist.  



White count normal, hemoglobin 7.7, hematocrit 25.2, platelet count 685,000.  
Electrolytes, BUN, creatinine, blood sugar, LFTs, CK, troponin are all 
unremarkable.  Amylase and lipase are normal.



IMPRESSION:  The patient is a 21-year-old man with a syncopal episode.  This is 
probably multifactorial given his severe anemia, propensity towards dehydration 
due to Crohn's disease, with abdominal abscesses and enterocutaneous fistula, 
chronic pain with Percocet use, and uncertain nutritional status.



He has undergone a previous workup at [facility], and no 
cardiac cause for syncope was found at that time.



I would check his postural vital signs.  He is having evaluations by surgery, ID
, and GI.



Plans for transfusion are underway.  We should review his old records.  He is 
getting antibiotics.  He has been cultured.  We await the radiologist's review 
of the x-rays done.  An echocardiogram is ordered.  I will review it once it is 
done.  He can be out of bed to chair.  Stool should be checked for occult 
blood.  I will follow along with you as needed and make additional 
recommendations based on his clinical course.





__________________________________________

Robles Garcia MD







cc:   



DD: 04/10/2017 09:42:28  366

TT: 04/10/2017 10:01:52

Confirmation # [id number]

Dictation # [id number]

fabricio VERMA

## 2017-04-10 NOTE — RAD
PROCEDURE:  Right Hip Radiographs.



HISTORY:

hip pain s/p syncope  



COMPARISON:

None.



FINDINGS:



BONES:

There is no acute fracture or bone destruction.  Bone alignment and 

mineralization are normal.  A focal area of sclerosis in the left 

acetabulum is statistically most compatible with a bone island. 



JOINTS:

The hip joint spaces are preserved. 



SOFT TISSUES:

Normal. 



OTHER FINDINGS:

None.



IMPRESSION:

No acute fracture or dislocation.

## 2017-04-10 NOTE — CON
DATE: 04/10/2017



The patient is in bed, was seen earlier today in the ICU, 128, bed 5.  



CHIEF COMPLAINT:  Weakness.



HISTORY OF PRESENT ILLNESS:  This is a 21-year-old male, _____ recent hospitalization with the patien
t with a past medical history significant for Crohn's disease diagnosed 2 years ago with iron deficie
ncy anemia, who recently was in the hospital.  On this admission, the patient was admitted with a syn
cope and anemia diagnosis. He has had also multiple abscesses with fistula formation.



ALLERGIES: SHELLFISH.



HOME MEDICATIONS:  The patient was on Zyvox, recently discharged by Dr. Tejada and the patient had on 
the last admission sepsis secondary to an enterocutaneous fistula and a phlegmon, microabscess and ha
d VRE, was on Zyvox and Zosyn and was getting the antibiotics at home according to the resident kunal barragan for the patient in the unit. The patient stated that he is not taking any intravenous antibiotics a
t home.



PAST MEDICAL HISTORY:  Crohn's disease and iron deficiency anemia, multiple abdominal surgeries.



PAST SURGICAL HISTORY:  Significant for multiple abdominal surgeries.



ALLERGIES:  FISH AND SHELLFISH.



MEDICATIONS AT HOME:  Included Zyvox and pain medications.



PHYSICAL EXAMINATION: 

GENERAL: He is in bed, answering questions appropriately. He had no fevers, no chills and he has had 
no abdominal pain.

VITAL SIGNS: A temperature of 98, heart rate of 92, respiratory rate of 20, blood pressure is 150/80.
 

HEENT:  Unremarkable.

NECK:  Supple.

LUNGS:  Have decreased breath sounds.

HEART:  Normal S1, S2.

ABDOMEN:  Soft, nontender.



LABORATORY EXAMINATION:  Reveals a white count of 7.7, hemoglobin of 7.  BUN of 6, creatinine of 0.8.
  Microbiology  reveals the patient's abdominal wound cultures are VRE and all the blood cultures to 
be negative.  Dr. Robles Garcia's note is reviewed and Dr. Nadege Mathews'. 



ASSESSMENT AND PLAN: 

This is a 21-year-old male with Crohn's disease, iron deficiency anemia, abdominal abscesses and fist
ulas, now admitted with syncope, workup in progress.  Would recommend the completion of the antibioti
cs as recommended by Dr. Tejada on the last admission and will follow the patient closely.  Case discu
ssed with surgical team and residents at length.





__________________________________________

Chirag Mckee MD







cc:



DD: 04/10/2017 12:53:05  350

TT: 04/10/2017 17:03:51

Confirmation # [id number]

Dictation # [id number]

ln

## 2017-04-10 NOTE — CT
EXAM:

  CT Head Without Intravenous Contrast



CLINICAL HISTORY:

  21 years old, male; Signs and symptoms; Syncope and collapse



TECHNIQUE:

  Axial computed tomography images of the head/brain without intravenous 

contrast.  This CT exam was performed using one or more of the following dose 

reduction techniques:  automated exposure control, adjustment of the mA and/or 

kV according to patient size, and/or use of iterative reconstruction technique.



COMPARISON:

  No relevant prior studies available.





FINDINGS:

  Brain: No acute intracranial hemorrhage.  No significant white matter 

disease.  No edema.

  Ventricles: No significant ventriculomegaly.

  Bones:  No acute displaced fracture.

  Sinuses:  Unremarkable as visualized.  No acute sinusitis.

  Mastoid air cells:  Unremarkable as visualized.  No mastoid effusion.



IMPRESSION:     

No acute intracranial hemorrhage, or suspicious mass effect.

## 2017-04-10 NOTE — CP.PCM.CON
History of Present Illness





- History of Present Illness


History of Present Illness: 


General Surgery consult for Dr. Brown


Consulted for: Multiple chronic enterocutaneous fistulas and intra-abdominal 

abscess d/t crohns





Pt is 21M with PMH of crohns with chronic right lower quadrant intra-abdominal 

abscesses with cutaneous fistulas, iron deficiency, anemia, and opiate abuse 

and PSH including (per patient--operations performed at [facility]) 7 abdominal 

abscess drains and 2 abdominal surgeries to "clear out the abscesses" with 

probably a bowel resection at some point. Patient presented to the ED yesterday 

with syncope and a fall on his right side. He went from a sitting to a standing 

position and when he started walking got light headed and fell to the ground. 

Was found to have a hemoglobin of 7.7 compared to his hemoglobin of 8.8 three 

days prior. Patient is currently asymptomatic except for his right sided back 

pain, RLQ abdominal pain, and drainage from 4 cutaneous fistulas, which have 

all improved over the past few weeks. Reports chills and chronic diarrhea with 

last BM yesterday, but denies hematochezia, melena, fevers, night sweats, nausea

, vomiting, chest pain, SOB, vertigo, dysuria, and hematuria. He is feeling 

much better after the administration of one unit PRBC's. Patient sees Dr. Singh 

at [facility] for infusions of infliximab but has been non-compliant with follow up 

since January. Patient has a history of pain med seeking behavior with history 

of multiple physicians prescribing percocet.





Patient has been admitted here several times in the past few months for IV 

vancomycin, linezolid, and zosyn for his abscesses and was just discharged on 4/ 6 with PO antibiotics and short dose of percocet. During patient's 

hospitalization on 3/21 patient was found to have right abdominal abscess with 

several cutaneous fistulas and small gluteal abscesses. IR, GI, and surgery 

were consulted at that time. IR determined abscess was too small to drain, GI 

said patient needs to be compliant with follow up with GI in [facility], and surgery 

recommended follow up with a colorectal surgeon at [facility]--which patient has not 

done. CT 3/25 showed no great change, and again patient was referred to [facility] 

and did not follow up.





Current afebrile, VSS


No leukocytosis, H/H: 7.7/25.2


Received one unit PRBC per ED doc orders





Review of Systems





- Review of Systems


All systems: reviewed and no additional remarkable complaints except





- Constitutional


Constitutional: As Per HPI, Fatigue.  absent: Excessive Sweating, Frequent Falls

, Headache





- EENT


Eyes: absent: Change in Vision





- Cardiovascular


Cardiovascular: As Per HPI.  absent: Chest Pain, Chest Pain at Rest, Leg Edema, 

Palpitations





- Respiratory


Respiratory: As Per HPI.  absent: Cough, Dyspnea, Hemoptysis, Dyspnea on 

Exertion





- Gastrointestinal


Gastrointestinal: As Per HPI





- Genitourinary


Genitourinary: As Per HPI





- Musculoskeletal


Musculoskeletal: As Per HPI.  absent: Numbness, Tingling





- Neurological


Neurological: As Per HPI.  absent: Disequilibrium, Numbness, Lack of 

Coordination, Tingling





- Psychiatric


Psychiatric: absent: Hopelessness





- Endocrine


Endocrine: Fatigue.  absent: Palpitations





Past Patient History





- Infectious Disease


Hx of Infectious Diseases: None





- Past Medical History & Family History


Past Medical History?: Yes


Past Family History: Reviewed and not pertinent





- Past Social History


Smoking Status: Never Smoked


Chewing Tobacco Use: No


Cigar Use: No


Alcohol: None


Drugs: Denies


Home Situation {Lives}: With Family


Domestic Violence: Negative





- CARDIAC


Hx Cardiac Disorders: No





- PULMONARY


Hx Respiratory Disorders: No





- NEUROLOGICAL


Hx Neurological Disorder: No





- HEENT


Hx HEENT Problems: No





- RENAL


Hx Chronic Kidney Disease: No





- ENDOCRINE/METABOLIC


Hx Endocrine Disorders: No





- HEMATOLOGICAL/ONCOLOGICAL


Hx Blood Disorders: No





- INTEGUMENTARY


Hx Dermatological Problems: No





- MUSCULOSKELETAL/RHEUMATOLOGICAL


Hx Falls: No





- GASTROINTESTINAL


Hx Colostomy: Yes (R back)


Hx Crohn's Disease: Yes


Other/Comment: GI surgeries





- GENITOURINARY/GYNECOLOGICAL


Hx Genitourinary Disorders: No





- PSYCHIATRIC


Hx Psychophysiologic Disorder: No


Hx Substance Use: No





- SURGICAL HISTORY


Other/Comment: Abd surg. for intraabd. abscesses x 7 last surgery 2/2017 at [facility]





- ANESTHESIA


Hx Anesthesia: Yes


Hx Anesthesia Reactions: No


Hx Malignant Hyperthermia: No





Meds


Allergies/Adverse Reactions: 


 Allergies











Allergy/AdvReac Type Severity Reaction Status Date / Time


 


FISH Allergy  RASH Verified 04/02/17 13:24


 


shellfish derived AdvReac  ANGIOEDEMA Verified 04/02/17 13:24














- Medications


Medications: 


 Current Medications





Hydromorphone HCl (Dilaudid)  0.5 mg IVP Q3H PRN


   PRN Reason: Pain, moderate (4-7)


   Last Admin: 04/10/17 01:43 Dose:  0.5 mg


Hydromorphone HCl (Dilaudid)  1 mg IVP Q3H PRN


   PRN Reason: Pain, severe (8-10)


   Last Admin: 04/10/17 05:05 Dose:  1 mg


Linezolid (Zyvox 600mg/300ml D5w)  300 mls @ 200 mls/hr IVPB Q12 BARBARA


   PRN Reason: Protocol


   Stop: 04/10/17 11:29


Micafungin Sodium 100 mg/ (Sodium Chloride)  100 mls @ 100 mls/hr IV DAILY BARBARA


   PRN Reason: Protocol


   Stop: 04/10/17 10:59


Sodium Chloride (Sodium Chloride 0.9%)  1,000 mls @ 100 mls/hr IV .Q10H BARBARA


   Last Admin: 04/10/17 01:43 Dose:  100 mls/hr


Piperacillin Sod/Tazobactam Sod (Zosyn 3.375 In Ns 100ml)  100 mls @ 200 mls/hr 

IVPB Q6 BARBARA


   PRN Reason: Protocol


   Stop: 04/10/17 12:29


Pantoprazole Sodium (Protonix Ec Tab)  40 mg PO 0630 [facility]











Physical Exam





- Constitutional


Appears: Well, Non-toxic, No Acute Distress





- Head Exam


Head Exam: ATRAUMATIC, NORMOCEPHALIC





- Eye Exam


Eye Exam: Normal appearance.  absent: Conjunctival injection, Scleral icterus





- ENT Exam


ENT Exam: Mucous Membranes Moist, Normal Oropharynx





- Neck Exam


Neck exam: Positive for: Normal Inspection





- Respiratory Exam


Respiratory Exam: NORMAL BREATHING PATTERN.  absent: Accessory Muscle Use, 

Respiratory Distress





- Cardiovascular Exam


Cardiovascular Exam: RRR





- GI/Abdominal Exam


GI & Abdominal Exam: Soft, Tenderness (RLQ to palpation).  absent: Distended


Additional comments: 


1 skin lesion just infraumbilical approximately 0.5cm with moderate muco-

purulent drainage. 1 skin lesion approximately 0.5 cm in the RLQ with mild 

mucopurulen drainage. 1 skin lesion approximately 0.5cm in the RUQ with scant 

mucopurulent drainage expressible. No erythema, swelling, or fluctuance in the 

tissues surrounding the lesions





- Extremities Exam


Extremities exam: Positive for: pedal pulses present.  Negative for: calf 

tenderness, pedal edema, tenderness





- Back Exam


Additional comments: 


Skin lesion in the right flank approximately 1.5cm in diameter with colostomy 

bag over top with approximately 15cc of muco-purulent fluid drainage. No 

surrounding erythema or swelling or fluctuance but surrounding tissues tender 

to palpation





- Neurological Exam


Neurological exam: Alert, Oriented x3





- Psychiatric Exam


Psychiatric exam: Normal Affect, Normal Mood





- Skin


Skin Exam: Dry, Normal Color, Warm





Results





- Vital Signs


Recent Vital Signs: 


 Last Vital Signs











Temp  98.8 F   04/10/17 04:59


 


Pulse  92 H  04/10/17 04:59


 


Resp  20   04/10/17 04:59


 


BP  151/84 H  04/10/17 04:59


 


Pulse Ox  100   04/10/17 00:56














- Labs


Result Diagrams: 


 04/09/17 22:58





 04/09/17 22:58





Assessment & Plan





- Assessment and Plan (Free Text)


Assessment: 


21M with crohns, chronic intra-abdominal abscess with multiple cutaneous 

fistulas, with anemia and syncope


afebrile, VSS, no leukocytosis





Plan


- no indication for surgery at this time


- f/u AM labs


- IV antibiotics


- reasonable pain management


- GI ppx


- regular diet


- dressing changes with gauze as needed for the abdominal fistulas


- f/u with appointment with his GI doctor on Wednesday for infliximab infusion


- further recs per Dr. Brown





Thank you for this consult


Nadege Mathews, PGY1


[phone]

## 2017-04-10 NOTE — RAD
PROCEDURE:  Radiographs of the Chest and Right Ribs.



HISTORY:

syncope/ pain



COMPARISON:

None available. 



TECHNIQUE:

Frontal radiograph of the chest and multiple oblique radiographs of 

the right ribs were obtained.



FINDINGS:



RIGHT RIBS:

No fracture or focal lesion visualized.



LUNGS:

Clear.



PLEURA:

No pneumothorax or pleural fluid.



CARDIOVASCULAR:

Normal sized heart. No pulmonary vascular congestion.



OTHER FINDINGS:

None.



IMPRESSION:

Unremarkable radiographs of the chest and right ribs. No right rib 

fracture.

## 2017-04-11 VITALS
HEART RATE: 93 BPM | RESPIRATION RATE: 20 BRPM | DIASTOLIC BLOOD PRESSURE: 71 MMHG | OXYGEN SATURATION: 98 % | TEMPERATURE: 98.4 F | SYSTOLIC BLOOD PRESSURE: 123 MMHG

## 2017-04-11 LAB
ADD MANUAL DIFF?: NO
ALBUMIN/GLOB SERPL: 0.8 {RATIO} (ref 1.1–1.8)
ALP SERPL-CCNC: 72 U/L (ref 38–133)
ALT SERPL-CCNC: 18 U/L (ref 7–56)
APTT BLD: 35.7 SECONDS (ref 23.7–30.8)
AST SERPL-CCNC: 30 U/L (ref 15–59)
BASOPHILS # BLD AUTO: 0.02 K/MM3 (ref 0–2)
BASOPHILS NFR BLD: 0.2 % (ref 0–3)
BILIRUB SERPL-MCNC: 0.2 MG/DL (ref 0.2–1.3)
BUN SERPL-MCNC: 4 MG/DL (ref 7–21)
CALCIUM SERPL-MCNC: 8.3 MG/DL (ref 8.4–10.5)
CHLORIDE SERPL-SCNC: 100 MMOL/L (ref 95–110)
CO2 SERPL-SCNC: 28 MMOL/L (ref 21–33)
EOSINOPHIL # BLD: 0.1 10*3/UL (ref 0–0.7)
EOSINOPHIL NFR BLD: 0.9 % (ref 1.5–5)
ERYTHROCYTE [DISTWIDTH] IN BLOOD BY AUTOMATED COUNT: 20.9 % (ref 11.5–14.5)
GLOBULIN SER-MCNC: 3.9 GM/DL
GLUCOSE SERPL-MCNC: 92 MG/DL (ref 70–110)
GRANULOCYTES # BLD: 6.14 10*3/UL (ref 1.4–6.5)
GRANULOCYTES NFR BLD: 76.2 % (ref 50–68)
HCT VFR BLD CALC: 26.2 % (ref 42–52)
INR PPP: 1.15 (ref 0.93–1.08)
LYMPHOCYTES # BLD: 0.8 10*3/UL (ref 1.2–3.4)
LYMPHOCYTES NFR BLD AUTO: 10.3 % (ref 22–35)
MCH RBC QN AUTO: 22.6 PG (ref 25–35)
MCHC RBC AUTO-ENTMCNC: 31.7 G/DL (ref 31–37)
MCV RBC AUTO: 71.2 FL (ref 80–105)
MONOCYTES # BLD AUTO: 1 10*3/UL (ref 0.1–0.6)
MONOCYTES NFR BLD: 12.4 % (ref 1–6)
PLATELET # BLD: 579 10^3/UL (ref 120–450)
PMV BLD AUTO: 7.9 FL (ref 7–11)
POTASSIUM SERPL-SCNC: 3.2 MMOL/L (ref 3.6–5)
PROT SERPL-MCNC: 6.9 G/DL (ref 5.8–8.3)
SODIUM SERPL-SCNC: 138 MMOL/L (ref 132–148)
WBC # BLD AUTO: 8.1 10^3/UL (ref 4.5–11)

## 2017-04-11 RX ADMIN — PANTOPRAZOLE SODIUM SCH MG: 40 TABLET, DELAYED RELEASE ORAL at 06:12

## 2017-04-11 RX ADMIN — HYDROMORPHONE HYDROCHLORIDE PRN MG: 1 INJECTION, SOLUTION INTRAMUSCULAR; INTRAVENOUS; SUBCUTANEOUS at 08:02

## 2017-04-11 RX ADMIN — HYDROMORPHONE HYDROCHLORIDE PRN MG: 1 INJECTION, SOLUTION INTRAMUSCULAR; INTRAVENOUS; SUBCUTANEOUS at 05:16

## 2017-04-11 RX ADMIN — HYDROMORPHONE HYDROCHLORIDE PRN MG: 1 INJECTION, SOLUTION INTRAMUSCULAR; INTRAVENOUS; SUBCUTANEOUS at 11:04

## 2017-04-11 RX ADMIN — PIPERACILLIN AND TAZOBACTAM SCH MLS/HR: 3; .375 INJECTION, POWDER, LYOPHILIZED, FOR SOLUTION INTRAVENOUS; PARENTERAL at 06:12

## 2017-04-11 RX ADMIN — HYDROMORPHONE HYDROCHLORIDE PRN MG: 1 INJECTION, SOLUTION INTRAMUSCULAR; INTRAVENOUS; SUBCUTANEOUS at 02:13

## 2017-04-11 RX ADMIN — PIPERACILLIN AND TAZOBACTAM SCH MLS/HR: 3; .375 INJECTION, POWDER, LYOPHILIZED, FOR SOLUTION INTRAVENOUS; PARENTERAL at 12:17

## 2017-04-11 RX ADMIN — HYDROMORPHONE HYDROCHLORIDE PRN MG: 1 INJECTION, SOLUTION INTRAMUSCULAR; INTRAVENOUS; SUBCUTANEOUS at 13:54

## 2017-04-11 NOTE — PN
DATE: 04/11/2017



The patient is in bed, in no acute distress, nontoxic.  No fevers and no chills.



PHYSICAL EXAMINATION:

VITAL SIGNS:  Temperature is 98, blood pressure is 120/70, respiratory rate of 16.

HEENT:  Unremarkable.

NECK:  Supple.

LUNGS:  Have decreased breath sounds.

HEART:  Normal S1, S2.

ABDOMEN:  Soft, nontender.



LABORATORY DATA:  Reveals a white count of 8.1, hemoglobin of 8, platelets of 579.  Chemistries are n
oted.  Microbiology reveals the blood cultures are no growth.  



The patient on Zosyn and Zyvox at this time.   ____ progress note is appreciated.



ASSESSMENT AND PLAN:  This is a 21-year-old with Crohn's disease, chronic intraabdominal abscesses, h
ere for syncope secondary to anemia in this patient who was admitted with syncope.  Workup in [facility]
s.  Complete the recommended antibiotics as recommended and Zyvox for the vancomycin-resistant Entero
cocci.





__________________________________________

Chirag Mckee MD







cc:



DD: 04/11/2017 13:52:44  350

TT: 04/11/2017 14:07:32

Confirmation # [id number]

Dictation # [id number]

mn

## 2017-04-11 NOTE — CARD
--------------- APPROVED REPORT --------------





EXAM: Two-dimensional and M-mode echocardiogram with Doppler and 

color Doppler.



Other Information 

Quality : AverageRhythm : 



INDICATION

Syncope 



2D DIMENSIONS 

Left Atrium (2D)3.9   (1.6-4.0cm)IVSd0.9   (0.7-1.1cm)

LVDd4.3   (3.9-5.9cm)PWd1.0   (0.7-1.1cm)

LVDs3.1   (2.5-4.0cm)FS (%) 29.3   %

LVEF (%)56.0   (>50%)



M-Mode DIMENSIONS 

Aortic Root2.90   (2.2-3.7cm)Aortic Cusp Exc.2.20   (1.5-2.0cm)



Aortic Valve

AoV Peak Mndqgkix376.0cm/sLVOT Peak Qwwfzqpd017.0cm/sLVOT 

VTI26.60cm



Mitral Valve

MV E Mhtdvxhe94.7cm/sMV A Qcmzmtuq64.2cm/sE/A ratio1.7



TDI

Lateral E' Peak V18.70cm/sMedial E' Peak V11.50cm/sE/Lateral 

E'5.2

E/Medial E'8.4



Pulmonary Valve

PV Peak Ejnwvzfv21.4cm/sPV Peak Grad.3mmHg



Tricuspid Valve

TR Peak Cudqbxtn545qj/sRAP [id number] Peak Gr.28mmHg

[id number]



 LEFT VENTRICLE 

The left ventricle is normal size. There is normal left ventricular 

wall thickness. The left ventricular function is normal. The left 

ventricular ejection fraction is within the normal range. There is 

normal LV segmental wall motion.



 RIGHT VENTRICLE 

The right ventricle is normal size.



 ATRIA 

The left atrium size is normal. The right atrium size is normal. The 

interatrial septum is intact with no evidence for an atrial septal 

defect.



 AORTIC VALVE 

The aortic valve is normal in structure.



 MITRAL VALVE 

The mitral valve is normal in structure. Mitral regurgitation is 

trace.



 TRICUSPID VALVE 

The tricuspid valve is normal in structure. There is trace tricuspid 

regurgitation.



 PULMONIC VALVE 

The pulmonic valve is not well visualized.



 GREAT VESSELS 

The aortic root is normal in size.



 PERICARDIAL EFFUSION 

There is no pericardial effusion.



<Conclusion>

The left ventricle is normal size.

There is normal left ventricular wall thickness.

The left ventricular function is normal.

## 2017-04-11 NOTE — CP.PCM.DIS
<Mable Chavez - Last Filed: 04/11/17 15:51>





Provider





- Provider


Date of Admission: 


04/10/17 00:55





Attending physician: 


Belia Graham MD





Primary care physician: 


Gary Colby MD





Consults: 


ID- Dr. Tejada


Surgery- Dr. Brown 


Time Spent in preparation of Discharge (in minutes): 45





Diagnosis





- Discharge Diagnosis


(1) Dehydration


Status: Acute





(2) Crohn's disease


Status: Acute





(3) Intra-abdominal abscess


Status: Acute








Hospital Course





- Lab Results


Lab Results: 


 Micro Results





04/10/17 03:30   Blood-Venous   Blood Culture - Preliminary


                            NO GROWTH AFTER 24 HOURS


04/10/17 02:01   Blood-Venous   Blood Culture - Preliminary


                            NO GROWTH AFTER 24 HOURS





 Most Recent Lab Values











WBC  8.1 10^3/ul (4.5-11.0)   04/11/17  07:00    


 


RBC  3.68 10^6/uL (3.5-6.1)   04/11/17  07:00    


 


Hgb  8.3 gm/dL (14.0-18.0)  L  04/11/17  07:00    


 


Hct  26.2 % (42.0-52.0)  L  04/11/17  07:00    


 


MCV  71.2 fL (80.0-105.0)  L  04/11/17  07:00    


 


MCH  22.6 pg (25.0-35.0)  L  04/11/17  07:00    


 


MCHC  31.7 g/dl (31.0-37.0)   04/11/17  07:00    


 


RDW  20.9 % (11.5-14.5)  H  04/11/17  07:00    


 


Plt Count  579 10^3/uL (120.0-450.0)  H  04/11/17  07:00    


 


MPV  7.9 fl (7.0-11.0)   04/11/17  07:00    


 


Gran %  76.2 % (50.0-68.0)  H  04/11/17  07:00    


 


Lymph % (Auto)  10.3 % (22.0-35.0)  L  04/11/17  07:00    


 


Mono % (Auto)  12.4 % (1.0-6.0)  H  04/11/17  07:00    


 


Eos % (Auto)  0.9 % (1.5-5.0)  L  04/11/17  07:00    


 


Baso % (Auto)  0.2 % (0.0-3.0)   04/11/17  07:00    


 


Gran #  6.14  (1.4-6.5)   04/11/17  07:00    


 


Lymph #  0.8  (1.2-3.4)  L  04/11/17  07:00    


 


Mono #  1.0  (0.1-0.6)  H  04/11/17  07:00    


 


Eos #  0.1  (0.0-0.7)   04/11/17  07:00    


 


Baso #  0.02 K/mm3 (0.0-2.0)   04/11/17  07:00    


 


PT  12.4 Seconds (9.9-11.8)  H  04/11/17  07:00    


 


INR  1.15  (0.93-1.08)  H  04/11/17  07:00    


 


APTT  35.7 Seconds (23.7-30.8)  H  04/11/17  07:00    


 


Sodium  138 mmol/L (132-148)   04/11/17  07:00    


 


Potassium  3.2 mmol/L (3.6-5.0)  L  04/11/17  07:00    


 


Chloride  100 mmol/L ()   04/11/17  07:00    


 


Carbon Dioxide  28 mmol/L (21-33)   04/11/17  07:00    


 


Anion Gap  13  (10-20)   04/11/17  07:00    


 


BUN  4 mg/dL (7-21)  L  04/11/17  07:00    


 


Creatinine  0.8 mg/dL (0.5-1.4)   04/11/17  07:00    


 


Est GFR ( Amer)  > 60   04/11/17  07:00    


 


Est GFR (Non-Af Amer)  > 60   04/11/17  07:00    


 


Random Glucose  92 mg/dL ()   04/11/17  07:00    


 


Calcium  8.3 mg/dL (8.4-10.5)  L  04/11/17  07:00    


 


Total Bilirubin  0.2 mg/dL (0.2-1.3)   04/11/17  07:00    


 


AST  30 U/L (15-59)   04/11/17  07:00    


 


ALT  18 U/L (7-56)   04/11/17  07:00    


 


Alkaline Phosphatase  72 U/L ()   04/11/17  07:00    


 


Lactate Dehydrogenase  269 U/L (333-699)  L  04/10/17  10:00    


 


Total Creatine Kinase  39 U/L ()   04/10/17  10:00    


 


Troponin I  < 0.01 ng/mL  04/10/17  10:00    


 


C-React Prot High Sens  > 15.00 mg/L (1.00-3.00)  H  04/09/17  22:58    


 


Total Protein  6.9 g/dL (5.8-8.3)   04/11/17  07:00    


 


Albumin  3.0 g/dL (3.0-4.8)   04/11/17  07:00    


 


Globulin  3.9 gm/dL  04/11/17  07:00    


 


Albumin/Globulin Ratio  0.8  (1.1-1.8)  L  04/11/17  07:00    


 


Amylase  92 U/L ()   04/09/17  22:58    


 


Lipase  62 U/L ()   04/09/17  22:58    


 


Vitamin B12  544 pg/mL (239-931)   04/09/17  22:58    


 


25-OH Vitamin D Total  13.8 NG/ML (30.0-100.0)  L  04/09/17  22:58    


 


Blood Type  A POSITIVE   04/10/17  00:40    


 


Antibody Screen  Negative   04/10/17  00:40    


 


Crossmatch  See Detail   04/10/17  00:40    


 


BBK History Checked  Patient has bt   04/10/17  00:40    














- Hospital Course


Hospital Course: 


This is a 20 yo male with past medical hx Crohn's dx (diagnosed 2 years ago), 

iron deficiency anemia presenting with syncopal episode. Pt was at home when 

episode occurred. It was witnessed by his mother. It took place when the pt was 

standing upright, he had recently stood up. He had recently returned from 

walking to the store. He says it has happened before- 2 x before. One in summer 

2016, one around Christmas 2016, he went to [facility] where he was told it was a 

result of dehydration. He was evaluated by a cardiologist at that time who said 

it was not cardiac related. This time was different because he had visual sx 

and saw stars before the episode. He reports he lost postural tone and he felt 

dizzy prior. He dropped to the ground. His mother said he was out for 1 minute. 

He still felt dizzy and lightheaded for 15 mins afterwards. He was transported 

to hospital by ambulance. He reports eating 3 full meals throughout the day. 

Denies seizure activity, tongue biting, bladder, bowel incontinence. Denies 

chest pain and sob. Denies cough and vomiting. Reports 3 episodes of non bloody 

diarrhea today. Last colonoscopy approximately 8 months ago. 





Since patient complained of falling on his right side, patient had imaging of 

ribs, R hip and R elbow which were negative. Patient was also found to be 

anemic at 7.7 and symptomatic.  He was transfused 1 unit.  Patient had cardiac 

workup during his stay in order to rule out any cardiac structural disease or 

arrhythmias.  Echocardiogram done was normal and EKG on admission was also NSR.

  Cardiology was consulted. ID was also consulted for his chronic mucocutaneous 

fistulas.  Patient was found to be positive for  VRE from his fistulas on his 

hospital visit on 4/2/17.  Therefore, he was started on the same antibiotics as 

he was given on previous stay.  He was also discharged with Zyvox, which is 

what he was discharged with on last stay that he never completed.  Surgery was 

also consulted for the fistulas but recommended no surgical intervention at 

this time. 





Patient is asked to follow up with PMD of his choice upon discharge 


Patient is to keep appoint with GI specialist for tomorrow


Patient is discharged with the following medications: Zyvox 600 mg BID, Colace 

100 mg QD, iron supplements QD. [place] [facility] 





For complete details, please see MAR





- Date & Time of H&P


Date of H&P: 04/11/17


Time of H&P: 15:54





Discharge Exam





- Head Exam


Head Exam: ATRAUMATIC, NORMAL INSPECTION, NORMOCEPHALIC





- Eye Exam


Eye Exam: EOMI





- ENT Exam


ENT Exam: Mucous Membranes Moist





- Respiratory Exam


Respiratory Exam: Clear to PA & Lateral, NORMAL BREATHING PATTERN.  absent: 

Accessory Muscle Use, Respiratory Distress





- Cardiovascular Exam


Cardiovascular Exam: REGULAR RHYTHM, +S1, +S2.  absent: Diastolic murmur, Gallop

, Rubs, Systolic Murmur





- GI/Abdominal Exam


GI & Abdominal Exam: Normal Bowel Sounds, Soft, Tenderness (at sight of 

fistulas ).  absent: Distended, Firm, Guarding





- Extremities Exam


Additional comments: 


no pedal edema or tenderness 





- Neurological Exam


Neurological exam: Alert, Oriented x3





- Psychiatric Exam


Psychiatric exam: Normal Affect, Normal Mood





- Skin


Skin Exam: Dry, Normal Color, Warm





Discharge Plan





- Discharge Medications


Prescriptions: 


Docusate [Colace] 100 mg PO DAILY #30 cap


Ferrous Sulfate 325 mg PO DAILY #30 tablet


Linezolid [Zyvox] 600 mg PO BID #12 tab





- Follow Up Plan


Condition: FAIR


Disposition: HOME/ ROUTINE


Instructions:  Syncope (DC), Syncope (GEN)


Additional Instructions: 


Patient is asked to follow up with PMD of his choice upon discharge 


Patient is to keep appoint with GI specialist for tomorrow


- Patient is discharged with the following medications: Zyvox, Colace, iron 

supplements. [place] [facility] 


Referrals: 


Gary Colby MD [Primary Care Provider] - 





<Belia Graham - Last Filed: 04/11/17 17:24>





Provider





- Provider


Date of Admission: 


04/10/17 00:55





Attending physician: 


Belia Graham MD





Primary care physician: 


Gary Colby MD








Hospital Course





- Lab Results


Lab Results: 


 Micro Results





04/10/17 03:30   Blood-Venous   Blood Culture - Preliminary


                            NO GROWTH AFTER 24 HOURS


04/10/17 02:01   Blood-Venous   Blood Culture - Preliminary


                            NO GROWTH AFTER 24 HOURS





 Most Recent Lab Values











WBC  8.1 10^3/ul (4.5-11.0)   04/11/17  07:00    


 


RBC  3.68 10^6/uL (3.5-6.1)   04/11/17  07:00    


 


Hgb  8.3 gm/dL (14.0-18.0)  L  04/11/17  07:00    


 


Hct  26.2 % (42.0-52.0)  L  04/11/17  07:00    


 


MCV  71.2 fL (80.0-105.0)  L  04/11/17  07:00    


 


MCH  22.6 pg (25.0-35.0)  L  04/11/17  07:00    


 


MCHC  31.7 g/dl (31.0-37.0)   04/11/17  07:00    


 


RDW  20.9 % (11.5-14.5)  H  04/11/17  07:00    


 


Plt Count  579 10^3/uL (120.0-450.0)  H  04/11/17  07:00    


 


MPV  7.9 fl (7.0-11.0)   04/11/17  07:00    


 


Gran %  76.2 % (50.0-68.0)  H  04/11/17  07:00    


 


Lymph % (Auto)  10.3 % (22.0-35.0)  L  04/11/17  07:00    


 


Mono % (Auto)  12.4 % (1.0-6.0)  H  04/11/17  07:00    


 


Eos % (Auto)  0.9 % (1.5-5.0)  L  04/11/17  07:00    


 


Baso % (Auto)  0.2 % (0.0-3.0)   04/11/17  07:00    


 


Gran #  6.14  (1.4-6.5)   04/11/17  07:00    


 


Lymph #  0.8  (1.2-3.4)  L  04/11/17  07:00    


 


Mono #  1.0  (0.1-0.6)  H  04/11/17  07:00    


 


Eos #  0.1  (0.0-0.7)   04/11/17  07:00    


 


Baso #  0.02 K/mm3 (0.0-2.0)   04/11/17  07:00    


 


PT  12.4 Seconds (9.9-11.8)  H  04/11/17  07:00    


 


INR  1.15  (0.93-1.08)  H  04/11/17  07:00    


 


APTT  35.7 Seconds (23.7-30.8)  H  04/11/17  07:00    


 


Sodium  138 mmol/L (132-148)   04/11/17  07:00    


 


Potassium  3.2 mmol/L (3.6-5.0)  L  04/11/17  07:00    


 


Chloride  100 mmol/L ()   04/11/17  07:00    


 


Carbon Dioxide  28 mmol/L (21-33)   04/11/17  07:00    


 


Anion Gap  13  (10-20)   04/11/17  07:00    


 


BUN  4 mg/dL (7-21)  L  04/11/17  07:00    


 


Creatinine  0.8 mg/dL (0.5-1.4)   04/11/17  07:00    


 


Est GFR ( Amer)  > 60   04/11/17  07:00    


 


Est GFR (Non-Af Amer)  > 60   04/11/17  07:00    


 


Random Glucose  92 mg/dL ()   04/11/17  07:00    


 


Calcium  8.3 mg/dL (8.4-10.5)  L  04/11/17  07:00    


 


Total Bilirubin  0.2 mg/dL (0.2-1.3)   04/11/17  07:00    


 


AST  30 U/L (15-59)   04/11/17  07:00    


 


ALT  18 U/L (7-56)   04/11/17  07:00    


 


Alkaline Phosphatase  72 U/L ()   04/11/17  07:00    


 


Lactate Dehydrogenase  269 U/L (333-699)  L  04/10/17  10:00    


 


Total Creatine Kinase  39 U/L ()   04/10/17  10:00    


 


Troponin I  < 0.01 ng/mL  04/10/17  10:00    


 


C-React Prot High Sens  > 15.00 mg/L (1.00-3.00)  H  04/09/17  22:58    


 


Total Protein  6.9 g/dL (5.8-8.3)   04/11/17  07:00    


 


Albumin  3.0 g/dL (3.0-4.8)   04/11/17  07:00    


 


Globulin  3.9 gm/dL  04/11/17  07:00    


 


Albumin/Globulin Ratio  0.8  (1.1-1.8)  L  04/11/17  07:00    


 


Amylase  92 U/L ()   04/09/17  22:58    


 


Lipase  62 U/L ()   04/09/17  22:58    


 


Vitamin B12  544 pg/mL (239-931)   04/09/17  22:58    


 


25-OH Vitamin D Total  13.8 NG/ML (30.0-100.0)  L  04/09/17  22:58    


 


Blood Type  A POSITIVE   04/10/17  00:40    


 


Antibody Screen  Negative   04/10/17  00:40    


 


Crossmatch  See Detail   04/10/17  00:40    


 


BBK History Checked  Patient has bt   04/10/17  00:40    














Attending/Attestation





- Attestation


I have personally seen and examined this patient.: Yes


I have fully participated in the care of the patient.: Yes


I have reviewed all pertinent clinical information, including history, physical 

exam and plan: Yes


Notes (Text): 


I have seen and examined patient with the resident. Agree with the above note 

with the following additions/ exceptions: Briefly this is 21 year old  

American male with a history of Crohn's disease, multiple surgeries, recurrent 

abscess drainage, Fistula  and iron deficiency anemia who got admitted for near 

syncope which was most likely related to dehydration, anemia and poor po 

intake. He was given 1 unit of prbc and IV iron was given as well. He was 

explained that he should take po iron. CT head, Echo, ekg and troponin was 

normal.He has recurrent nonhealing Crohn's/fistula infection. On his last 

admission, cultures grew VRE which was treated with IV Zyvox, Zosyn and 

Micafungin.and he was discharged home on Po zyvox. During this admission, all 

meds were continued and he was discharged on zyvox as per ID recommendation. 

Upon discharge the patient will follow-up with PMD , Dr Morales Bishop, 

GI and colorectal surgery as outpatient. Patient has GI appointment tomorrow.


Dr Belia Graham

## 2017-04-11 NOTE — CP.PCM.PN
Subjective





- Date & Time of Evaluation


Date of Evaluation: 04/11/17


Time of Evaluation: 06:30





- Subjective


Subjective: 


Surgery Progress note. Dr. Brown





Pt seen and evaluated at bedside. No acute events overnight. Still with 

purulent drainage from fistulas. Abd pain improved compared to previous. No N/V/

D. No F/C. Denies any more episodes of dizziness.





Objective





- Vital Signs/Intake and Output


Vital Signs (last 24 hours): 


 











Temp Pulse Resp BP Pulse Ox


 


 98.4 F   93 H  20   123/71   98 


 


 04/11/17 06:00  04/11/17 06:00  04/11/17 06:00  04/11/17 06:00  04/11/17 06:00








Intake and Output: 


 











 04/11/17 04/11/17





 06:59 18:59


 


Intake Total 360 


 


Output Total 1000 


 


Balance -640 














- Medications


Medications: 


 Current Medications





Hydromorphone HCl (Dilaudid)  0.5 mg IVP Q3H PRN


   PRN Reason: Pain, moderate (4-7)


   Last Admin: 04/10/17 01:43 Dose:  0.5 mg


Hydromorphone HCl (Dilaudid)  1 mg IVP Q3H PRN


   PRN Reason: Pain, severe (8-10)


   Last Admin: 04/11/17 11:04 Dose:  1 mg


Sodium Chloride (Sodium Chloride 0.9%)  1,000 mls @ 100 mls/hr IV .Q10H BARBARA


   Last Admin: 04/11/17 08:06 Dose:  100 mls/hr


Piperacillin Sod/Tazobactam Sod (Zosyn 3.375 In Ns 100ml)  100 mls @ 200 mls/hr 

IVPB Q6 BARBARA


   PRN Reason: Protocol


   Stop: 04/17/17 18:01


   Last Admin: 04/11/17 12:17 Dose:  200 mls/hr


Linezolid (Zyvox)  600 mg PO BID BARBARA


   PRN Reason: Protocol


   Stop: 04/17/17 18:01


   Last Admin: 04/11/17 09:31 Dose:  600 mg


Pantoprazole Sodium (Protonix Ec Tab)  40 mg PO 0630 BARBARA


   Last Admin: 04/11/17 06:12 Dose:  40 mg











- Labs


Labs: 


 





 04/11/17 07:00 





 04/11/17 07:00 





 











PT  12.4 Seconds (9.9-11.8)  H  04/11/17  07:00    


 


INR  1.15  (0.93-1.08)  H  04/11/17  07:00    


 


APTT  35.7 Seconds (23.7-30.8)  H  04/11/17  07:00    














- Constitutional


Appears: Well, No Acute Distress





- Head Exam


Head Exam: ATRAUMATIC, NORMAL INSPECTION, NORMOCEPHALIC





- Eye Exam


Eye Exam: EOMI, Normal appearance, PERRL.  absent: Scleral icterus


Pupil Exam: PERRL





- ENT Exam


ENT Exam: Mucous Membranes Moist





- Neck Exam


Neck Exam: Full ROM





- Respiratory Exam


Respiratory Exam: NORMAL BREATHING PATTERN.  absent: Wheezes





- Cardiovascular Exam


Cardiovascular Exam: RRR.  absent: JVD





- GI/Abdominal Exam


GI & Abdominal Exam: Soft


Additional comments: 


Multiple cutaneous fistulas. Draining large amount of purulent drainage. 





- Extremities Exam


Extremities Exam: Normal Inspection





- Neurological Exam


Neurological Exam: Alert, Awake, Oriented x3





- Psychiatric Exam


Psychiatric exam: Normal Affect, Normal Mood





- Skin


Additional comments: 


Multiple abdominal cutaneous fistulas. Large amount of purulent drainage from 

Right hip cutaneous fistula.  





Assessment and Plan





- Assessment and Plan (Free Text)


Assessment: 


22yo M with PMHx of Chron's, chronic intra-abdominal abscesses here for syncope 

secondary to anemia. Surgery consulted for cutaneous fistulas. 


- Afebrile


- No Leukocytosis





- no plan for surgery at this time. 


- Continue antibiotics


- pain management


- regular diet


- dressing changes with gauze as needed for the abdominal fistulas


- f/u with his out-patient GI doctor





Discussed case with Dr. Kevin Callahan PGY1


[phone]

## 2017-04-12 ENCOUNTER — HOSPITAL ENCOUNTER (EMERGENCY)
Dept: HOSPITAL 42 - ED | Age: 22
Discharge: HOME | End: 2017-04-12
Payer: MEDICAID

## 2017-04-12 VITALS — OXYGEN SATURATION: 98 %

## 2017-04-12 VITALS — BODY MASS INDEX: 22.9 KG/M2

## 2017-04-12 VITALS
DIASTOLIC BLOOD PRESSURE: 72 MMHG | SYSTOLIC BLOOD PRESSURE: 118 MMHG | RESPIRATION RATE: 18 BRPM | HEART RATE: 70 BPM | TEMPERATURE: 98 F

## 2017-04-12 DIAGNOSIS — L02.818: Primary | ICD-10-CM

## 2017-04-12 NOTE — ED PDOC
Arrival/HPI





- General


Chief Complaint: Wound Check


Time Seen by Provider: 04/12/17 06:15





- History of Present Illness


Narrative History of Present Illness (Text): 


04/12/17 07:49


20yo male presents to the ER complaining of pain from his chronic abscessed on 

his abdomen. pt states he ran out of percocet prescriptions. States he has no 

fevers or chills, states he has chronic drainage. denies any other complaints. 

states he would like a percocet or dilaudid in the ER and then he wants to be dc

'd. pt states he has an appt with his GI specialist Dr. Meraz in [facility] at 13:30 today. Again, when asked about the abscesses, pt states he 

has a hx of Crohn's and that his abscesses and drainage are chronic. Previous 

records reviewed, pt was dc'd yesterday after evaluation of syncope. 





Past Medical History





- Provider Review


Nursing Documentation Reviewed: Yes





- Infectious Disease


Hx of Infectious Diseases: None





- Cardiac


Hx Cardiac Disorders: No





- Pulmonary


Hx Respiratory Disorders: No





- Neurological


Hx Neurological Disorder: No





- HEENT


Hx HEENT Disorder: No





- Renal


Hx Renal Disorder: No





- Endocrine/Metabolic


Hx Endocrine Disorders: No





- Hematological/Oncological


Hx Blood Disorders: No





- Integumentary


Hx Dermatological Disorder: No





- Musculoskeletal/Rheumatological


Hx Falls: No





- Gastrointestinal


Hx Colostomy: Yes (R back)


Hx Crohn's Disease: Yes


Other/Comment: GI surgeries





- Genitourinary/Gynecological


Hx Genitourinary Disorders: No





- Psychiatric


Hx Psychophysiologic Disorder: No


Hx Substance Use: No





- Surgical History


Other/Comment: Abd surg. for intraabd. abscesses x 7 [place] [facility]





- Anesthesia


Hx Anesthesia: Yes


Hx Anesthesia Reactions: No


Hx Malignant Hyperthermia: No





- Suicidal Assessment


Feels Threatened In Home Enviroment: No





Family/Social History


Family/Social History: Unknown Family HX


Smoking Status: Never Smoked


Hx Alcohol Use: No


Hx Substance Use: No





Allergies/Home Meds


Allergies/Adverse Reactions: 


Allergies





FISH Allergy (Verified 04/02/17 13:24)


 RASH


shellfish derived Adverse Reaction (Verified 04/02/17 13:24)


 ANGIOEDEMA











Physical Exam





- Physical Exam


Narrative Physical Exam (Text): 





04/12/17 07:53


- Review of Systems





Constitutional: Normal.  absent: Fatigue, Weight Change, Fevers


Eyes: Normal


ENT: denies sore throat, denies tristhmus


Respiratory: Normal.  absent: SOB, Cough, Sputum


Cardiovascular: absent: Chest Pain, Palpitations, Syncope 


Gastrointestinal: Normal.  absent: Abdominal Pain, Diarrhea, Nausea, Vomiting


Genitourinary: Normal.  absent: Dysuria, Frequency, Hematuria


Musculoskeletal: Normal.  absent: Arthralgias, Back Pain, Neck Pain


Skin: no rashes, no erythema


Neurological: absent: Focal Weakness


Endocrine: Normal


Hemo/Lymphatic: Normal


Psychiatric: No suicidal or homicidal ideations





Physical exam





Patient appears age appropriate in no distress, speaking full sentences without 

difficulty





- Systems Exam


Head: Present: Atraumatic, Normocephalic


Pupils: Present: PERRL


Extroacular Muscles: Present: EOMI


Conjunctiva: Present: Normal


Mouth: Present: Moist Mucous Membranes


Neck: Present: Normal Range of Motion.  No: MIDLINE TENDERNESS, Paraspinal 

Tenderness


Respiratory/Chest: Present: Clear to Auscultation, Good Air Exchange.  No: 

Respiratory Distress, Accessory Muscle Use, Tachypneic


Cardiovascular: Present: Regular Rate and Rhythm, Normal S1, S2, Peripheal 

Pulses Present.  No: Murmurs


Abdomen: Present: Normal Bowel Sounds.  Multiple nontender abscesses located on 

patient's stomach and side. No surrounding cellulitis, no erythema, no 

fluctuance No: Tenderness, Distention, Peritoneal Signs, Rebound, Guarding


Back: Present: Normal Inspection.  No: Midline Tenderness, Paraspinal Tenderness


Upper Extremity: Present: Normal Inspection.  No: Cyanosis, Edema


Lower Extremity: Present: Normal Inspection.  No: Edema


Neurological: Present: GCS=15, Speech Normal, cranial nerves II through XII 

fully intact with no cerebellar abnormality, neurosensory fully intact. No 

focal neurological deficits.


Skin: Present: Warm, Dry, Normal Color.  No: Rashes


Lymphatic: Present: OX3, NI, NC


Psychiatric: Present: Alert, Oriented x 3, Normal Insight, Normal Concentration


Vital Signs Reviewed: Yes


Vital Signs











  Temp Pulse Resp BP Pulse Ox


 


 04/12/17 06:28  98.2 F  81  16   98


 


 04/12/17 06:16  98 F  81  18  128/80  98











Temperature: Afebrile


Blood Pressure: Normal


Pulse: Regular


Respiratory Rate: Normal


Appearance: Positive for: Well-Appearing


Pain Distress: None


Mental Status: Positive for: Alert and Oriented X 3





Medical Decision Making


ED Course and Treatment: 


Pt states he understands to return to the ER right away for new or worsening 

symptoms or for inability to f/u with PMD or specialist as instructed. 





Patient states that he fully agrees with and understands discharge 

instructions. States that he agrees with the plan and disposition. Verbalized 

and repeated discharge instructions and plan. I have given the patient 

opportunity to ask any additional questions.





- Medication Orders


Current Medication Orders: 











Discontinued Medications





Oxycodone/Acetaminophen (Percocet 10/325 Mg Tab)  1 tab PO STAT STA


   Stop: 04/12/17 07:39


   Last Admin: 04/12/17 07:42  Dose: 1 TAB











- Scribe Statement


The provider has reviewed the documentation as recorded by the Amy Gallardo





Provider Scribe Attestation:


All medical record entries made by the Scribe were at my direction and 

personally dictated by me. I have reviewed the chart and agree that the record 

accurately reflects my personal performance of the history, physical exam, 

medical decision making, and the department course for this patient. I have 

also personally directed, reviewed, and agree with the discharge instructions 

and disposition.








Disposition/Present on Arrival





- Present on Arrival


Any Indicators Present on Arrival: No


History of DVT/PE: No


History of Uncontrolled Diabetes: No


Urinary Catheter: No


History of Decub. Ulcer: No


History Surgical Site Infection Following: None





- Disposition


Have Diagnosis and Disposition been Completed?: Yes


Diagnosis: 


 Intra-abdominal abscess


Disposition: HOME/ ROUTINE


Disposition Time: 07:33


Patient Plan: Discharge


Patient Problems: 


 Current Active Problems











Problem Status Diagnosed


 


Abdominal pain Acute 


 


Crohn's disease Acute 











Condition: GOOD


Discharge Instructions (ExitCare):  Crohn Disease (ED)


Additional Instructions: 


PLEASE RETURN TO THE EMERGENCY DEPARTMENT FOR NEW OR WORSENING SYMPTOMS. RETURN 

RIGHT AWAY IF YOU CANNOT FOLLOW UP WITH YOUR PRIMARY CARE DOCTOR, CLINIC, OR 

SPECIALIST IN 1-2 DAYS. 


Referrals: 


Gary Colby MD [Primary Care Provider] - Follow up with primary

## 2017-04-13 ENCOUNTER — HOSPITAL ENCOUNTER (EMERGENCY)
Dept: HOSPITAL 42 - ED | Age: 22
Discharge: LEFT BEFORE DISCHARGE | End: 2017-04-13
Payer: MEDICAID

## 2017-04-13 VITALS — BODY MASS INDEX: 17.8 KG/M2

## 2017-04-13 VITALS
HEART RATE: 83 BPM | RESPIRATION RATE: 15 BRPM | OXYGEN SATURATION: 100 % | TEMPERATURE: 98.7 F | SYSTOLIC BLOOD PRESSURE: 149 MMHG | DIASTOLIC BLOOD PRESSURE: 82 MMHG

## 2017-04-13 DIAGNOSIS — R10.9: Primary | ICD-10-CM

## 2017-04-13 NOTE — ED PDOC
Arrival/HPI





- General


Chief Complaint: Wound Check


Time Seen by Provider: 04/13/17 20:34


Historian: Patient





- History of Present Illness


Narrative History of Present Illness (Text): 


04/13/17 20:50


21 year old male with a past medical history that includes Chron's disease 

presents to the emergency department with chronic pain from abdominal abscess. 

Patient was seen here yesterday for the same complaint, requesting pain 

medication. He reports he has follow up with GI. Denies fevers or chills. 

Denies any other complaints. 





GI: Dr. Meraz








Time/Duration: > week


Symptom Onset: Gradual


Symptom Course: Unchanged


Modifying Factors (Text): 


None 


Associated Symptoms (Text): 


None 





Past Medical History





- Provider Review


Nursing Documentation Reviewed: Yes





- Infectious Disease


Hx of Infectious Diseases: None





- Cardiac


Hx Cardiac Disorders: No





- Pulmonary


Hx Respiratory Disorders: No





- Neurological


Hx Neurological Disorder: No





- HEENT


Hx HEENT Disorder: No





- Renal


Hx Renal Disorder: No





- Endocrine/Metabolic


Hx Endocrine Disorders: No





- Hematological/Oncological


Hx Blood Disorders: No





- Integumentary


Hx Dermatological Disorder: No





- Musculoskeletal/Rheumatological


Hx Falls: No





- Gastrointestinal


Hx Colostomy: Yes (R back)


Hx Crohn's Disease: Yes


Other/Comment: GI surgeries





- Genitourinary/Gynecological


Hx Genitourinary Disorders: No





- Psychiatric


Hx Psychophysiologic Disorder: No


Hx Substance Use: No





- Surgical History


Other/Comment: Abd surg. for intraabd. abscesses x 7 last surgery 2/2017 at [facility] [place]





- Anesthesia


Hx Anesthesia: Yes


Hx Anesthesia Reactions: No


Hx Malignant Hyperthermia: No





- Suicidal Assessment


Feels Threatened In Home Enviroment: No





Family/Social History





- Physician Review


Nursing Documentation Reviewed: Yes


Family/Social History: Unknown Family HX


Smoking Status: Never Smoked


Hx Alcohol Use: No


Hx Substance Use: No





Allergies/Home Meds


Allergies/Adverse Reactions: 


Allergies





FISH Allergy (Verified 04/13/17 20:35)


 RASH


shellfish derived Adverse Reaction (Verified 04/13/17 20:35)


 ANGIOEDEMA











Review of Systems





- Physician Review


All systems were reviewed & negative as marked: Yes





- Review of Systems


Constitutional: absent: Fevers


Respiratory: absent: SOB


Cardiovascular: absent: Chest Pain


Gastrointestinal: Abdominal Pain





Physical Exam


Vital Signs Reviewed: Yes


Vital Signs











  Temp Pulse Resp BP Pulse Ox


 


 04/13/17 20:36  98.7 F  83  15  149/82  100


 


 04/13/17 20:34  98.7 F  83  15  149/82  100











Temperature: Afebrile


Blood Pressure: Normal


Pulse: Regular


Respiratory Rate: Normal


Appearance: Positive for: Well-Appearing, Non-Toxic, Uncomfortable


Pain Distress: Mild


Mental Status: Positive for: Alert and Oriented X 3





- Systems Exam


Head: Present: Atraumatic, Normocephalic


Pupils: Present: PERRL


Extroacular Muscles: Present: EOMI


Conjunctiva: Present: Normal


Mouth: Present: Moist Mucous Membranes


Neck: Present: Normal Range of Motion


Respiratory/Chest: Present: Clear to Auscultation, Good Air Exchange.  No: 

Respiratory Distress, Accessory Muscle Use


Cardiovascular: Present: Regular Rate and Rhythm, Normal S1, S2.  No: Murmurs


Abdomen: Present: Normal Bowel Sounds, Other (Multiple abscesses, nontender; No 

surrounding erythema or fluctuance).  No: Tenderness, Distention, Peritoneal 

Signs


Back: Present: Normal Inspection


Upper Extremity: Present: Normal Inspection.  No: Cyanosis, Edema


Lower Extremity: Present: Normal Inspection.  No: Edema


Neurological: Present: GCS=15, CN II-XII Intact, Speech Normal


Skin: Present: Warm, Dry, Normal Color.  No: Rashes


Psychiatric: Present: Alert, Oriented x 3, Normal Insight, Normal Concentration





Medical Decision Making


ED Course and Treatment: 


Impression:  21 year old male with a past medical history that includes Chron's 

disease presents to the emergency department with chronic pain from abdominal 

abscess.





Differential Diagnosis include but are not limited to: 





Plan:


-- Labs


-- Reassess and disposition





Prior Visits:


Notes and results from previous visits were reviewed. Patient last seen 

yesterday 04/12/17 for abdominal pain and discharged home. 





Progress Notes: 





04/13/17 21:25


Patient with noted multiple visits for similar complaints, requesting IV 

Dilaudid. Noted that patient has several narcotic prescriptions outpatient. 

This is a chronic pain issue. Patient requesting pain meds. advised we will try 

non narcotic pain meds first. patient requesting to go home immediately, wishes 

to sign out AMA.








04/13/17 21:54








- Scribe Statement


The provider has reviewed the documentation as recorded by the Amy Macedo





Provider Scribe Attestation:


All medical record entries made by the Johnathonibnorma were at my direction and 

personally dictated by me. I have reviewed the chart and agree that the record 

accurately reflects my personal performance of the history, physical exam, 

medical decision making, and the department course for this patient. I have 

also personally directed, reviewed, and agree with the discharge instructions 

and disposition. 





Disposition/Present on Arrival





- Present on Arrival


Any Indicators Present on Arrival: No


History of DVT/PE: No


History of Uncontrolled Diabetes: No


Urinary Catheter: No


History of Decub. Ulcer: No


History Surgical Site Infection Following: None





- Disposition


Have Diagnosis and Disposition been Completed?: Yes


Diagnosis: 


 Abdominal pain


Disposition: AGAINST MEDICAL ADVICE


Disposition Time: 21:17


Patient Problems: 


 Current Active Problems











Problem Status Diagnosed


 


Crohn's disease Acute 











Condition: UNKNOWN


Discharge Instructions (ExitCare):  Acute Abdominal Pain (ED)


Additional Instructions: 


please see yoour specialist or gi. return to er with worsening symptoms o 

rconcerns. 


Referrals: 


Gary Colby MD [Primary Care Provider] - Follow up with primary


[facility] [place] [Outside] - Follow up with primary


[facility] [Outside] - Follow up with primary


Roverto Hill MD [Staff Provider] - Follow up with primary

## 2017-04-14 ENCOUNTER — HOSPITAL ENCOUNTER (INPATIENT)
Dept: HOSPITAL 14 - H.ER | Age: 22
LOS: 3 days | Discharge: HOME | DRG: 179 | End: 2017-04-17
Attending: INTERNAL MEDICINE | Admitting: INTERNAL MEDICINE
Payer: MEDICAID

## 2017-04-14 VITALS — BODY MASS INDEX: 17.8 KG/M2

## 2017-04-14 DIAGNOSIS — D50.9: ICD-10-CM

## 2017-04-14 DIAGNOSIS — K50.913: Primary | ICD-10-CM

## 2017-04-14 DIAGNOSIS — Z91.013: ICD-10-CM

## 2017-04-14 DIAGNOSIS — D17.1: ICD-10-CM

## 2017-04-14 LAB
ALBUMIN/GLOB SERPL: 0.8 {RATIO} (ref 1–2.1)
ALP SERPL-CCNC: 104 U/L (ref 38–126)
ALT SERPL-CCNC: 67 U/L (ref 21–72)
AST SERPL-CCNC: 67 U/L (ref 17–59)
BASE EXCESS BLDV CALC-SCNC: 4.4 MMOL/L (ref 0–2)
BASOPHILS # BLD AUTO: 0 K/UL (ref 0–0.2)
BASOPHILS NFR BLD: 0.8 % (ref 0–2)
BILIRUB SERPL-MCNC: 0.3 MG/DL (ref 0.2–1.3)
BUN SERPL-MCNC: 8 MG/DL (ref 9–20)
CALCIUM SERPL-MCNC: 8.4 MG/DL (ref 8.4–10.2)
CHLORIDE SERPL-SCNC: 101 MMOL/L (ref 98–107)
CO2 SERPL-SCNC: 25 MMOL/L (ref 22–30)
EOSINOPHIL # BLD AUTO: 0 K/UL (ref 0–0.7)
EOSINOPHIL NFR BLD: 0.3 % (ref 0–4)
ERYTHROCYTE [DISTWIDTH] IN BLOOD BY AUTOMATED COUNT: 23.8 % (ref 11.5–14.5)
GLOBULIN SER-MCNC: 4.4 GM/DL (ref 2.2–3.9)
GLUCOSE SERPL-MCNC: 97 MG/DL (ref 75–110)
HCT VFR BLD CALC: 29 % (ref 35–51)
LYMPHOCYTES # BLD AUTO: 0.5 K/UL (ref 1–4.3)
LYMPHOCYTES NFR BLD AUTO: 14.9 % (ref 20–40)
MCH RBC QN AUTO: 22.4 PG (ref 27–31)
MCHC RBC AUTO-ENTMCNC: 31.8 G/DL (ref 33–37)
MCV RBC AUTO: 70.6 FL (ref 80–94)
MONOCYTES # BLD: 0.5 K/UL (ref 0–0.8)
MONOCYTES NFR BLD: 15.6 % (ref 0–10)
NEUTROPHILS # BLD: 2.4 K/UL (ref 1.8–7)
NEUTROPHILS NFR BLD AUTO: 68.4 % (ref 50–75)
NRBC BLD AUTO-RTO: 0 % (ref 0–0)
PCO2 BLDV: 39 MMHG (ref 40–60)
PH BLDV: 7.47 [PH] (ref 7.32–7.43)
PLATELET # BLD: 582 K/UL (ref 130–400)
PMV BLD AUTO: 6.3 FL (ref 7.2–11.7)
POTASSIUM SERPL-SCNC: 3.7 MMOL/L (ref 3.6–5)
PROT SERPL-MCNC: 7.9 G/DL (ref 6.3–8.2)
SODIUM SERPL-SCNC: 141 MMOL/L (ref 132–148)
WBC # BLD AUTO: 3.5 K/UL (ref 4.8–10.8)

## 2017-04-14 RX ADMIN — CIPROFLOXACIN SCH MLS/HR: 2 INJECTION, SOLUTION INTRAVENOUS at 21:41

## 2017-04-14 RX ADMIN — METRONIDAZOLE SCH MLS/HR: 500 INJECTION, SOLUTION INTRAVENOUS at 17:51

## 2017-04-14 NOTE — ED PDOC
HPI: General Adult


Time Seen by Provider: 04/14/17 09:14


Chief Complaint (Nursing): Weakness/Neurological Deficit


Chief Complaint (Provider): Abdominal Pain


History Per: Patient


Onset/Duration Of Symptoms: Days (x2 days)


Current Symptoms Are (Timing): Still Present


Additional Complaint(s): 


20 y/o male with a past medial history of crohn's and anemia who presents to 

the emergency department with a complaint of generalized abdominal pain x2days. 

Associated with subjective fevers, chills, nausea, vomiting, and diarrhea. 

Patient states he has Crohn's disease and is currently taking remicade. Reports 

he underwent multiple surgeries for crohn's and noted drainage from surgical 

wounds.





Past Medical History


Reviewed: Historical Data, Nursing Documentation, Vital Signs


Vital Signs: 


 Last Vital Signs











Temp  98.3 F   04/14/17 09:18


 


Pulse  99 H  04/14/17 09:18


 


Resp  20   04/14/17 09:18


 


BP  171/105 H  04/14/17 09:18


 


Pulse Ox  98   04/14/17 11:23














- Medical History


PMH: Anemia (Fe Def Anemia), Crohn's Disease


   Denies: Chronic Kidney Disease, TIA





- Surgical History


Other surgeries: Crohn's Disease





- Family History


Family History: States: Unknown Family Hx





- Social History


Current smoker - smoking cessation education provided: No


Alcohol: None


Drugs: Denies





- Immunization History


Hx Tetanus Toxoid Vaccination: No


Hx Influenza Vaccination: Yes


Hx Pneumococcal Vaccination: No





- Home Medications


Home Medications: 


 Ambulatory Orders











 Medication  Instructions  Recorded


 


oxyCODONE/Acetaminophen [Percocet 1 ea PO Q6H PRN #10 tab 04/06/17





5/325 mg Tab]  


 


Docusate [Colace] 100 mg PO DAILY #30 cap 04/11/17


 


Ferrous Sulfate 325 mg PO DAILY #30 tablet 04/11/17


 


Linezolid [Zyvox] 600 mg PO BID #12 tab 04/11/17














- Allergies


Allergies/Adverse Reactions: 


 Allergies











Allergy/AdvReac Type Severity Reaction Status Date / Time


 


FISH Allergy  RASH Verified 04/14/17 09:40


 


shellfish derived AdvReac  ANGIOEDEMA Verified 04/14/17 09:40














Review of Systems


ROS Statement: Except As Marked, All Systems Reviewed And Found Negative


Constitutional: Positive for: Fever, Chills


Gastrointestinal: Positive for: Nausea, Vomiting, Abdominal Pain (generalized), 

Diarrhea





Physical Exam





- Reviewed


Nursing Documentation Reviewed: Yes


Vital Signs Reviewed: Yes





- Physical Exam


Appears: Positive for: Non-toxic, No Acute Distress


Head Exam: Positive for: ATRAUMATIC, NORMOCEPHALIC


ENT: Positive for: Normal ENT Inspection.  Negative for: Pharyngeal Erythema


Neck: Positive for: Normal, Supple


Cardiovascular/Chest: Positive for: Regular Rate, Rhythm.  Negative for: Murmur


Respiratory: Positive for: Normal Breath Sounds.  Negative for: Accessory 

Muscle Use, Respiratory Distress


Gastrointestinal/Abdominal: Positive for: Soft, Tenderness (Abdomen is tender 

in all four quadrants with purulent drainage from surgical wounds).  Negative 

for: Normal Exam


Extremity: Positive for: Normal ROM.  Negative for: Pedal Edema, Swelling


Neurologic/Psych: Positive for: Alert, Oriented





- Laboratory Results


Result Diagrams: 


 04/14/17 10:28





 04/14/17 10:28





- ECG


O2 Sat by Pulse Oximetry: 98 (RA)


Pulse Ox Interpretation: Normal





Medical Decision Making


Medical Decision Making: 


Time: 9:14


Initial impression: Abdominal pain


Initial plan:


--VBG Shock Panel


--ABD & Pelvis PO Contrast CT


--COMP Metabolic Panel


--CBC w/ differential


--Bentyl 10 mg PO


--Morphine 2 mg IVP


--Sodium Chloride 1,000 ml  mls/hr


--Barium Sulfate 300 ml PO


--Barium Sulfate 450 mg PO


--Zofran Inj 4 mg IVP


--Blood Culture Stat


--Revaluation





Time: 11:14


--Morphie 2 mg IVP


--ABD & Pelvis W/O PO or IV Contrast


--Physician Consult Routine


--Call Physician Consult PRN


--Admit to hospital routine











Scribe Attestation:


Documented by Anel Navarro, acting as a scribe for Angel Luis Pham MD.





Provider Scribe Attestation:


All medical record entries made by the Scribe were at my direction and 

personally dictated by me. I have reviewed the chart and agree that the record 

accurately reflects my personal performance of the history, physical exam, 

medical decision making, and the department course for this patient. I have 

also personally directed, reviewed, and agree with the discharge instructions 

and disposition.





Disposition





- Clinical Impression


Clinical Impression: 


 Crohn's disease, Abdominal pain, Fistula





- Patient ED Disposition


Is Patient to be Admitted: Yes





- Disposition


Disposition Time: 11:25


Condition: FAIR





- Pt Status Changed To:


Hospital Disposition Of: Inpatient





- Admit Certification


Admit to Inpatient:: After my assessment, the patient will require 

hospitalization for at least two midnights.  This is because of the severity of 

symptoms shown, intensity of services needed, and/or the medical risk in this 

patient being treated as an outpatient.





- POA


Present On Arrival: None

## 2017-04-14 NOTE — CT
PROCEDURE:  CT Abdomen and Pelvis without intravenous contrast



HISTORY:

r/o kidney stone



COMPARISON:

None.



TECHNIQUE:

CT scan of the abdomen and pelvis was performed without 

administration of intravenous contrast.  Oral contrast was not 

administered.  Coronal and sagittal reformatted images were obtained. 



Radiation dose:



Total exam DLP = 318.26 mGy-cm.



This CT exam was performed using one or more of the following dose 

reduction techniques: Automated exposure control, adjustment of the 

mA and/or kV according to patient size, and/or use of iterative 

reconstruction technique.



FINDINGS:



LOWER THORAX:

There is a 4 mm nodule in the right lung base. The left lung base is 

clear. 



LIVER:

The liver is normal in size. No gross lesion or ductal dilatation.  



GALLBLADDER AND BILE DUCTS:

There are no calcified gallstones. 



PANCREAS:

The pancreas is normal in size. No gross lesion or ductal dilatation.



SPLEEN:

The spleen is normal in size. 



ADRENALS:

Both adrenal glands are normal in size without discrete nodule. 



KIDNEYS AND URETERS:

Both kidneys are normal in size. There are punctate nonobstructing 

stones in the left kidney.  No right nephrolithiasis. No 

hydronephrosis. No solid mass. 



VASCULATURE:

No aortic aneurysm. 



BOWEL:

Evaluation of the bowel is limited in the absence of oral contrast. 

The proximal small bowel loops are normal in caliber.  There are 

fluid-filled mildly dilated distal small bowel loops with 

fecalization of small bowel contents. There are postsurgical changes 

in the right lower quadrant. There are multiple air-fluid levels in 

the colon with mild distention of the rectum which is fluid-filled. 



APPENDIX:

Not distinctly identified. No inflammatory changes in the right lower 

quadrant. 



PERITONEUM:

No free fluid. No free air. 



LYMPH NODES:

No enlarged lymph nodes. 



BLADDER:

Partially decompressed. 



REPRODUCTIVE:

Unremarkable. 



BONES:

Within normal limits for the patient's age.  There is mild 

dextrocurvature in the lumbar spine, likely positional. 



OTHER FINDINGS:

None.



IMPRESSION:

1. Punctate nonobstructing stones in the left kidney. No evidence of 

right nephrolithiasis, hydronephrosis or obstructive uropathy. 



2. Evaluation of the bowel is limited in the absence of oral 

contrast.  Allowing for this air-fluid levels in the colon could 

represent nonspecific colitis.  



3. 4 mm noncalcified nodule in the right lung base. Correlation with 

risk factors is recommended and a follow-up CT scan in 12 month 

interval is recommended in a high-risk patient.

## 2017-04-14 NOTE — CP.PCM.CON
<Brad Ricci - Last Filed: 04/14/17 12:42>





History of Present Illness





- History of Present Illness


History of Present Illness: 


General Surgery Consult


Re: Crohn's with fistula





HPI: 21M presented to ED complaining of generalized abdominal pain since 

Wednesday. States pain is similar to his chronic abd pain 2/2 his Crohn's but 

he also has weakness and myalgias since his Rem[employer] infusion Wednesday. + non 

bloody diarrhea (also chronic), subjective fevers, 3 abd fistulas (slowly 

closing per pt). Denied N/V. Pt was seen in [place] 3 times in the last 4 days 

for syncope and abd pain (requesting pain meds). Pt can be non compliant with 

medical management at times but is currently trying to get his Crohns under 

control. Last colonoscopy was 8 months ago, he sees Dr. Avery at [facility] [place] for GI. 





At [place] on 3/21 CT showing R sided psoas abscess/collection. Pt underwent 

workup by Surgery, IR, and GI. Per IR collection was not drainable.  From 

surgical standpoint, it was recommended that pt follow up w. colorectal 

specialist at [facility] (which he has been unable to do 2/2 transport issues). CT 

on 3/25 visit at [place] was unchanged.





PMH: Crohn's, Anemia


PSH: 7 drainages of intra-abdominal abscesses, bowel resection


SH: No ETOH/tobacco, + drug seeking behavior


Meds: See MAR


All: shell fish








Review of Systems





- Review of Systems


All systems: reviewed and no additional remarkable complaints except (as per HPI

)





Past Patient History





- Infectious Disease


Hx of Infectious Diseases: None





- Past Medical History & Family History


Past Medical History?: Yes





- Past Social History


Alcohol: None


Drugs: Denies





- CARDIAC


Hx Cardiac Disorders: No





- PULMONARY


Hx Respiratory Disorders: No





- NEUROLOGICAL


Hx Transient Ischemic Attacks (TIA): No





- HEENT


Hx HEENT Problems: No





- RENAL


Hx Chronic Kidney Disease: No





- ENDOCRINE/METABOLIC


Hx Endocrine Disorders: No





- HEMATOLOGICAL/ONCOLOGICAL


Hx Anemia: Yes (Fe Def Anemia)





- INTEGUMENTARY


Hx Dermatological Problems: No





- MUSCULOSKELETAL/RHEUMATOLOGICAL


Hx Falls: No





- GASTROINTESTINAL


Hx Crohn's Disease: Yes





- GENITOURINARY/GYNECOLOGICAL


Hx Genitourinary Disorders: No





- PSYCHIATRIC


Hx Psychophysiologic Disorder: No


Hx Substance Use: No





- SURGICAL HISTORY


Other/Comment: Abd surg. for intraabd. abscesses x 7 last surgery 2/2017 at [facility] [place]





- ANESTHESIA


Hx Anesthesia: Yes


Hx Anesthesia Reactions: No


Hx Malignant Hyperthermia: No





Meds


Allergies/Adverse Reactions: 


 Allergies











Allergy/AdvReac Type Severity Reaction Status Date / Time


 


FISH Allergy  RASH Verified 04/14/17 09:40


 


shellfish derived AdvReac  ANGIOEDEMA Verified 04/14/17 09:40














- Medications


Medications: 


 Current Medications





Sodium Chloride (Sodium Chloride 0.9%)  1,000 mls @ 100 mls/hr IV .Q10H STA


   Stop: 04/14/17 20:05


   Last Admin: 04/14/17 10:42 Dose:  100 mls/hr


Metronidazole (Flagyl 500mg/100ml Ns)  100 mls @ 100 mls/hr IVPB STAT STA


   Stop: 04/14/17 13:03











Physical Exam





- Constitutional


Appears: Non-toxic, No Acute Distress





- Head Exam


Head Exam: ATRAUMATIC, NORMOCEPHALIC





- Eye Exam


Eye Exam: EOMI.  absent: Scleral icterus





- ENT Exam


ENT Exam: Mucous Membranes Moist


Additional comments: 


trachea midline





- Respiratory Exam


Respiratory Exam: NORMAL BREATHING PATTERN.  absent: Respiratory Distress





- Cardiovascular Exam


Cardiovascular Exam: RRR, +S1, +S2





- GI/Abdominal Exam


GI & Abdominal Exam: Guarding (mild), Soft, Tenderness (diffuse, mild).  absent

: Distended, Firm, Rebound, Rigid


Additional comments: 


old surgical scars


3 fistulas with scant output.





- Rectal Exam


Rectal Exam: Deferred





- Extremities Exam


Extremities exam: Positive for: pedal pulses present.  Negative for: calf 

tenderness, pedal edema





- Neurological Exam


Neurological exam: Alert, Oriented x3





- Psychiatric Exam


Psychiatric exam: Normal Affect, Normal Mood





- Skin


Skin Exam: Dry, Warm





Results





- Vital Signs


Recent Vital Signs: 


 Last Vital Signs











Temp  98.3 F   04/14/17 09:18


 


Pulse  99 H  04/14/17 09:18


 


Resp  20   04/14/17 09:18


 


BP  171/105 H  04/14/17 09:18


 


Pulse Ox  98   04/14/17 11:25














- Labs


Result Diagrams: 


 04/14/17 10:28





 04/14/17 10:28





- Imaging and Cardiology


  ** CT scan - abdomen


Status: Image reviewed by me, Report reviewed by me





Assessment & Plan





- Assessment and Plan (Free Text)


Assessment: 


21M with abdominal pain 2/2 Crohns with fistulas





Plan: 


Abx


CLD


AM labs


Serial Abd exams


D/W Dr. Turner on phone





 PGY3








<Ap Turner - Last Filed: 04/14/17 15:07>





History of Present Illness





- History of Present Illness


History of Present Illness: 


Patient was seen and examined at the bedside.  Agree with resident's note above





Meds





- Medications


Medications: 


 Current Medications





Hydromorphone HCl (Dilaudid)  1 mg IVP Q4 PRN


   PRN Reason: Pain, severe (8-10)


Sodium Chloride (Sodium Chloride 0.9%)  1,000 mls @ 100 mls/hr IV .Q10H STA


   Stop: 04/14/17 20:05


   Last Admin: 04/14/17 10:42 Dose:  100 mls/hr


Ciprofloxacin (Cipro 400mg/200ml Dsw)  200 mls @ 200 mls/hr IVPB Q12 MOJGAN


Metronidazole (Flagyl 500mg/100ml Ns)  100 mls @ 100 mls/hr IVPB Q8 MOJGAN











Results





- Vital Signs


Recent Vital Signs: 


 Last Vital Signs











Temp  98.6 F   04/14/17 13:50


 


Pulse  84   04/14/17 13:50


 


Resp  20   04/14/17 13:50


 


BP  119/71   04/14/17 13:50


 


Pulse Ox  98   04/14/17 13:50














- Labs


Result Diagrams: 


 04/14/17 10:28





 04/14/17 10:28





Assessment & Plan





- Assessment and Plan (Free Text)


Plan: 


- Clear liquid diet


- Pain control


- Continue antibiotics


- GI evaluation


- Repeat labs in am


- No general surgery intervention at present time


- Will follow

## 2017-04-15 LAB
ALBUMIN/GLOB SERPL: 0.8 {RATIO} (ref 1–2.1)
ALP SERPL-CCNC: 98 U/L (ref 38–126)
ALT SERPL-CCNC: 53 U/L (ref 21–72)
AST SERPL-CCNC: 72 U/L (ref 17–59)
BASOPHILS # BLD AUTO: 0 K/UL (ref 0–0.2)
BASOPHILS NFR BLD: 0.5 % (ref 0–2)
BILIRUB SERPL-MCNC: 0.2 MG/DL (ref 0.2–1.3)
BUN SERPL-MCNC: 6 MG/DL (ref 9–20)
CALCIUM SERPL-MCNC: 8 MG/DL (ref 8.4–10.2)
CHLORIDE SERPL-SCNC: 102 MMOL/L (ref 98–107)
CO2 SERPL-SCNC: 25 MMOL/L (ref 22–30)
EOSINOPHIL # BLD AUTO: 0 K/UL (ref 0–0.7)
EOSINOPHIL NFR BLD: 0.1 % (ref 0–4)
ERYTHROCYTE [DISTWIDTH] IN BLOOD BY AUTOMATED COUNT: 23.6 % (ref 11.5–14.5)
GLOBULIN SER-MCNC: 4 GM/DL (ref 2.2–3.9)
GLUCOSE SERPL-MCNC: 78 MG/DL (ref 75–110)
HCT VFR BLD CALC: 27.6 % (ref 35–51)
LYMPHOCYTES # BLD AUTO: 1.1 K/UL (ref 1–4.3)
LYMPHOCYTES NFR BLD AUTO: 25.4 % (ref 20–40)
MCH RBC QN AUTO: 22.9 PG (ref 27–31)
MCHC RBC AUTO-ENTMCNC: 32.6 G/DL (ref 33–37)
MCV RBC AUTO: 70.4 FL (ref 80–94)
MONOCYTES # BLD: 0.6 K/UL (ref 0–0.8)
MONOCYTES NFR BLD: 13.9 % (ref 0–10)
NEUTROPHILS # BLD: 2.7 K/UL (ref 1.8–7)
NEUTROPHILS NFR BLD AUTO: 60.1 % (ref 50–75)
NRBC BLD AUTO-RTO: 0.1 % (ref 0–0)
PLATELET # BLD: 529 K/UL (ref 130–400)
PMV BLD AUTO: 6.5 FL (ref 7.2–11.7)
POTASSIUM SERPL-SCNC: 3.6 MMOL/L (ref 3.6–5)
PROT SERPL-MCNC: 7.1 G/DL (ref 6.3–8.2)
SODIUM SERPL-SCNC: 140 MMOL/L (ref 132–148)
TSH SERPL-ACNC: 0.41 MIU/ML (ref 0.46–4.68)
WBC # BLD AUTO: 4.5 K/UL (ref 4.8–10.8)

## 2017-04-15 RX ADMIN — CIPROFLOXACIN SCH MLS/HR: 2 INJECTION, SOLUTION INTRAVENOUS at 22:07

## 2017-04-15 RX ADMIN — CIPROFLOXACIN SCH MLS/HR: 2 INJECTION, SOLUTION INTRAVENOUS at 10:00

## 2017-04-15 RX ADMIN — METRONIDAZOLE SCH MLS/HR: 500 INJECTION, SOLUTION INTRAVENOUS at 17:08

## 2017-04-15 RX ADMIN — METRONIDAZOLE SCH MLS/HR: 500 INJECTION, SOLUTION INTRAVENOUS at 01:43

## 2017-04-15 RX ADMIN — METRONIDAZOLE SCH MLS/HR: 500 INJECTION, SOLUTION INTRAVENOUS at 08:40

## 2017-04-15 NOTE — HP
CHIEF COMPLAINT:  Abdominal pain.



HISTORY OF PRESENT ILLNESS:  This is a 21-year-old male, known case of Crohn's disease with multiple 
surgeries leading to complications including fistulas in the past, who had multiple visits in the [facility] Room, who came to Emergency Room today after started having abdominal pain again which is not 
controlled by his pain medications and plus patient was found to have fistula and was admitted for fu
rther management.



REVIEW OF SYSTEMS:  Positive for abdominal pain and hunger.  Review of systems otherwise is negative 
for headache, dizziness, syncope, loss of consciousness, chest pain, nausea, vomiting, diarrhea, cons
tipation, any new joint or extremity pain.  Review of systems of all other organ systems is unremarka
ble.



PAST MEDICAL HISTORY:  Significant for Crohn's disease and anemia.



PAST SURGICAL HISTORY:  Remarkable for multiple surgeries related to Crohn's disease.



PERSONAL HISTORY:  The patient is currently a nonsmoker, nondrinker, no substance abuse, although vazquez
s seem to have pain-seeking behavior.



FAMILY HISTORY:  Noncontributory.



PHYSICAL EXAMINATION:

GENERAL:  Well-built, fairly-nourished, chronically sick-looking 21-year-old male in no acute distres
s.

VITAL SIGNS:  Temperature 98.3, pulse 69, respirations 20, blood pressure 105/68.  No orthostatic pascale
nges.

HEENT:  Pupils reacting to light.  No JVD, no thyromegaly, no lymphadenopathy, no nystagmus.  Normoce
phalic, atraumatic skull.

HEART:  S1, S2 normal, regular.  No significant murmur, gallop or rub is heard.

LUNGS:  Show good bilateral air entry.  No rales or rhonchi.

ABDOMEN:  The patient has a draining sinus on the lower abdominal wall, but abdomen itself looks soft
, nontender, no organomegaly, no fluid.  Bowel sounds are plus and normal.  No signs of acute abdomen
.  No guarding, no rigidity, no rebound.

EXTREMITIES:  No edema, no calf swelling, no tenderness, no acute ischemia.

CENTRAL NERVOUS SYSTEM:  The patient is alert, awake, oriented x 3.  There is no sign of any acute gr
oss focal motor or sensory neurological deficit.



DIAGNOSTIC DATA:  Available diagnostic data reviewed.  Telemetry monitoring does not reveal significa
nt arrhythmias.  WBC 4.5, hemoglobin 9, hematocrit 27.6, platelets 529.  Venous blood gas pH 7.47, pC
O2 of 39, pO2 of 71, saturation 99%.  Sodium 140, potassium 3.6, chloride 102, bicarb 25, BUN 6, crea
tinine 0.8.  SMA-12 is unremarkable.  CAT scan of abdomen does not reveal any acute pathology.



ADMITTING IMPRESSION:  Crohn's disease, anemia.



PLAN:  As ordered.  Case and plan discussed with patient.





__________________________________________

Bhavin Piña MD







cc:



DD: 04/15/2017 10:11:52  [id number]

TT: 04/15/2017 10:55:56

Job # [id number]

tn

## 2017-04-15 NOTE — CP.PCM.CON
History of Present Illness





- History of Present Illness


History of Present Illness: 


CC: Abdominal pain


HPI: 21 year old male with h/o Crohns disease complaing of abdominal pain. He 

says the pain is right sided and radiates down to his leg. He is on remicade. 

He got his 4th infusion wednesday. He has had two prior operations for Crohn's 

including a bowel  resection and abscess drainage. He has not followed up with 

surgery at [facility]. He has no nausea or vomiting and has been tolerating a diet. 

His primary complaint is pain. He also has had problems with fistulas. He has 

some mild chronic diarrhea. 





PMHx: Crohn's disease, Anemia


PSHx: Bowel resection, Abscess drainage


SHx: Denies ETOH/tobacco/drug use


FHx no family history of IBD


ROS A comprehensive review of systems was performed and was negative except per 

HPI








Past Patient History





- Infectious Disease


Hx of Infectious Diseases: None





- Past Medical History & Family History


Past Medical History?: Yes





- Past Social History


Smoking Status: Never Smoked





- CARDIAC


Hx Cardiac Disorders: No





- PULMONARY


Hx Respiratory Disorders: No





- NEUROLOGICAL


Hx Neurological Disorder: No


Hx Transient Ischemic Attacks (TIA): No





- HEENT


Hx HEENT Problems: No





- RENAL


Hx Chronic Kidney Disease: No





- ENDOCRINE/METABOLIC


Hx Endocrine Disorders: No





- HEMATOLOGICAL/ONCOLOGICAL


Hx Anemia: Yes (Fe Def Anemia)





- INTEGUMENTARY


Hx Dermatological Problems: No





- MUSCULOSKELETAL/RHEUMATOLOGICAL


Hx Musculoskeletal Disorders: No


Hx Falls: No





- GASTROINTESTINAL


Hx Gastrointestinal Disorders: Yes


Hx Crohn's Disease: Yes





- GENITOURINARY/GYNECOLOGICAL


Hx Genitourinary Disorders: No





- PSYCHIATRIC


Hx Psychophysiologic Disorder: No


Hx Substance Use: No





- SURGICAL HISTORY


Hx Surgeries: Yes


Other/Comment: Abd surg. for intraabd. abscesses x 7 last surgery 2/2017 at [facility] [place]





- ANESTHESIA


Hx Anesthesia: Yes


Hx Anesthesia Reactions: No


Hx Malignant Hyperthermia: No





Meds


Allergies/Adverse Reactions: 


 Allergies











Allergy/AdvReac Type Severity Reaction Status Date / Time


 


FISH Allergy  RASH Verified 04/14/17 09:40


 


shellfish derived AdvReac  ANGIOEDEMA Verified 04/14/17 09:40














- Medications


Medications: 


 Current Medications





Hydromorphone HCl (Dilaudid)  1 mg IVP Q4 PRN


   PRN Reason: Pain, severe (8-10)


   Last Admin: 04/15/17 09:56 Dose:  1 mg


Ciprofloxacin (Cipro 400mg/200ml Dsw)  200 mls @ 200 mls/hr IVPB Q12 [facility]


   Last Admin: 04/15/17 10:00 Dose:  200 mls/hr


Metronidazole (Flagyl 500mg/100ml Ns)  100 mls @ 100 mls/hr IVPB Q8 [facility]


   Last Admin: 04/15/17 08:40 Dose:  100 mls/hr











Physical Exam





- Constitutional


Appears: No Acute Distress, Chronically Ill





- Head Exam


Head Exam: ATRAUMATIC, NORMOCEPHALIC





- Eye Exam


Eye Exam: Normal appearance.  absent: Scleral icterus


Pupil Exam: PERRL





- ENT Exam


ENT Exam: Mucous Membranes Moist, Normal Oropharynx





- Neck Exam


Neck exam: Negative for: Lymphadenopathy, Thyromegaly





- Respiratory Exam


Respiratory Exam: Clear to Auscultation Bilateral, NORMAL BREATHING PATTERN.  

absent: Wheezes, Respiratory Distress





- Cardiovascular Exam


Cardiovascular Exam: REGULAR RHYTHM, +S1, +S2





- GI/Abdominal Exam


GI & Abdominal Exam: Soft.  absent: Distended, Firm, Tenderness





- Extremities Exam


Extremities exam: Negative for: pedal edema





- Neurological Exam


Neurological exam: Alert, Oriented x3





- Psychiatric Exam


Psychiatric exam: Normal Affect, Normal Mood





- Skin


Skin Exam: Dry, Warm





Results





- Vital Signs


Recent Vital Signs: 


 Last Vital Signs











Temp  98.3 F   04/15/17 08:00


 


Pulse  69   04/15/17 08:00


 


Resp  20   04/15/17 08:00


 


BP  105/68   04/15/17 08:00


 


Pulse Ox  98   04/15/17 08:00














- Labs


Result Diagrams: 


 04/15/17 05:57





 04/15/17 05:57


Labs: 


 Laboratory Results - last 24 hr











  04/15/17 04/15/17





  05:57 08:03


 


WBC  4.5 L 


 


RBC  3.92 L 


 


Hgb  9.0 L 


 


Hct  27.6 L 


 


MCV  70.4 L 


 


MCH  22.9 L 


 


MCHC  32.6 L 


 


RDW  23.6 H 


 


Plt Count  529 H 


 


MPV  6.5 L 


 


Neut % (Auto)  60.1 


 


Lymph % (Auto)  25.4 


 


Mono % (Auto)  13.9 H 


 


Eos % (Auto)  0.1 


 


Baso % (Auto)  0.5 


 


Neut #  2.7 


 


Lymph #  1.1 


 


Mono #  0.6 


 


Eos #  0.0 


 


Baso #  0.0 


 


Sodium  140 


 


Potassium  3.6 


 


Chloride  102 


 


Carbon Dioxide  25 


 


Anion Gap  17 


 


BUN  6 L 


 


Creatinine  0.8 


 


Est GFR ( Amer)  > 60 


 


Est GFR (Non-Af Amer)  > 60 


 


Random Glucose  78 


 


Calcium  8.0 L 


 


Total Bilirubin  0.2 


 


AST  72 H 


 


ALT  53 


 


Alkaline Phosphatase  98 


 


Total Protein  7.1 


 


Albumin  3.1 L 


 


Globulin  4.0 H 


 


Albumin/Globulin Ratio  0.8 L 


 


Vitamin B12   398


 


TSH 3rd Generation   0.41 L














Assessment & Plan





- Assessment and Plan (Free Text)


Assessment: 


21 year old male with h/o Crohn's disease, h/o bowel resection, abscess drainage

, fistulas admitted with abdominal pain.


1. Crohn's disease


Plan:


-patient is on remicade therapy with GI at [facility] [place]


-he just recently received infusion


-check stool studies including culture, c.diff, o&p to r/o infection


-otherwise he is already on chronic therapy for Crohn's disease and no 

additional therapy is recommended at this time


-appreciate surgical opinion


-CT was reviewed


-the patient is on antibiotics


-diet as tolerated





- Date & Time


Date: 04/15/17


Time: 11:19

## 2017-04-15 NOTE — CP.PCM.PN
<Brad Ricci - Last Filed: 04/15/17 07:32>





Subjective





- Date & Time of Evaluation


Date of Evaluation: 04/15/17


Time of Evaluation: 06:20





- Subjective


Subjective: 


General Surgery


Pt S&E, NAEO. C/O abdominal pain and says he would like 2mg of dilaudid not 1. C

/O body ache.








Objective





- Vital Signs/Intake and Output


Vital Signs (last 24 hours): 


 











Temp Pulse Resp BP Pulse Ox


 


 99.1 F   89   20   115/71   98 


 


 04/15/17 05:05  04/15/17 05:05  04/15/17 05:05  04/15/17 05:05  04/15/17 05:05











- Medications


Medications: 


 Current Medications





Hydromorphone HCl (Dilaudid)  1 mg IVP Q4 PRN


   PRN Reason: Pain, severe (8-10)


   Last Admin: 04/15/17 05:53 Dose:  1 mg


Ciprofloxacin (Cipro 400mg/200ml Dsw)  200 mls @ 200 mls/hr IVPB Q12 [facility]


   Last Admin: 04/14/17 21:41 Dose:  200 mls/hr


Metronidazole (Flagyl 500mg/100ml Ns)  100 mls @ 100 mls/hr IVPB Q8 [facility]


   Last Admin: 04/15/17 01:43 Dose:  100 mls/hr











- Labs


Labs: 


 





 04/15/17 05:57 





 04/15/17 05:57 











- Constitutional


Appears: Non-toxic, No Acute Distress





- Head Exam


Head Exam: ATRAUMATIC, NORMOCEPHALIC





- Respiratory Exam


Respiratory Exam: NORMAL BREATHING PATTERN.  absent: Respiratory Distress





- GI/Abdominal Exam


GI & Abdominal Exam: Guarding (mild), Soft, Tenderness (diffuse).  absent: 

Distended, Rigid


Additional comments: 


old surgical scars


3 fistulas with scant output, 4x4 dressing over





- Neurological Exam


Neurological Exam: Alert, Awake





- Skin


Skin Exam: Dry, Warm





Assessment and Plan





- Assessment and Plan (Free Text)


Assessment: 


21M with abdominal pain 2/2 Crohns with fistulas





Plan: 


- Pain control


- Continue antibiotics


- GI evaluation


- No general surgery intervention at this time


D/W Dr Johnny Ricci PGY3





<Ap Turner - Last Filed: 04/15/17 15:37>





Subjective





- Date & Time of Evaluation


Date of Evaluation: 04/15/17


Time of Evaluation: 15:00





- Subjective


Subjective: 


Patient was seen and examined at the bedside.  Agree with resident's note above.





Objective





- Vital Signs/Intake and Output


Vital Signs (last 24 hours): 


 











Temp Pulse Resp BP Pulse Ox


 


 98.2 F   71   20   122/76   100 


 


 04/15/17 12:00  04/15/17 12:00  04/15/17 12:00  04/15/17 12:00  04/15/17 12:00











- Medications


Medications: 


 Current Medications





Hydromorphone HCl (Dilaudid)  1 mg IVP Q4 PRN


   PRN Reason: Pain, severe (8-10)


   Last Admin: 04/15/17 13:56 Dose:  1 mg


Ciprofloxacin (Cipro 400mg/200ml Dsw)  200 mls @ 200 mls/hr IVPB Q12 [facility]


   Last Admin: 04/15/17 10:00 Dose:  200 mls/hr


Metronidazole (Flagyl 500mg/100ml Ns)  100 mls @ 100 mls/hr IVPB Q8 [facility]


   Last Admin: 04/15/17 08:40 Dose:  100 mls/hr











- Labs


Labs: 


 





 04/15/17 05:57 





 04/15/17 05:57 











Assessment and Plan





- Assessment and Plan (Free Text)


Plan: 


- Regular diet


- Continue antibiotics


- pain control


- No general surgery intervention at present time


- Will follow

## 2017-04-16 LAB
ALBUMIN/GLOB SERPL: 0.8 {RATIO} (ref 1–2.1)
ALP SERPL-CCNC: 90 U/L (ref 38–126)
ALT SERPL-CCNC: 42 U/L (ref 21–72)
AST SERPL-CCNC: 35 U/L (ref 17–59)
BILIRUB SERPL-MCNC: 0.1 MG/DL (ref 0.2–1.3)
BUN SERPL-MCNC: 6 MG/DL (ref 9–20)
CALCIUM SERPL-MCNC: 8 MG/DL (ref 8.4–10.2)
CHLORIDE SERPL-SCNC: 102 MMOL/L (ref 98–107)
CO2 SERPL-SCNC: 26 MMOL/L (ref 22–30)
ERYTHROCYTE [DISTWIDTH] IN BLOOD BY AUTOMATED COUNT: 23.2 % (ref 11.5–14.5)
GLOBULIN SER-MCNC: 3.8 GM/DL (ref 2.2–3.9)
GLUCOSE SERPL-MCNC: 81 MG/DL (ref 75–110)
HCT VFR BLD CALC: 29.8 % (ref 35–51)
MCH RBC QN AUTO: 22.2 PG (ref 27–31)
MCHC RBC AUTO-ENTMCNC: 31.1 G/DL (ref 33–37)
MCV RBC AUTO: 71.3 FL (ref 80–94)
PLATELET # BLD: 491 K/UL (ref 130–400)
POTASSIUM SERPL-SCNC: 3.9 MMOL/L (ref 3.6–5)
PROT SERPL-MCNC: 6.9 G/DL (ref 6.3–8.2)
SODIUM SERPL-SCNC: 142 MMOL/L (ref 132–148)
WBC # BLD AUTO: 4.6 K/UL (ref 4.8–10.8)

## 2017-04-16 RX ADMIN — METRONIDAZOLE SCH MLS/HR: 500 INJECTION, SOLUTION INTRAVENOUS at 17:59

## 2017-04-16 RX ADMIN — CIPROFLOXACIN SCH MLS/HR: 2 INJECTION, SOLUTION INTRAVENOUS at 09:14

## 2017-04-16 RX ADMIN — METRONIDAZOLE SCH MLS/HR: 500 INJECTION, SOLUTION INTRAVENOUS at 09:14

## 2017-04-16 RX ADMIN — CIPROFLOXACIN SCH MLS/HR: 2 INJECTION, SOLUTION INTRAVENOUS at 21:45

## 2017-04-16 RX ADMIN — METRONIDAZOLE SCH MLS/HR: 500 INJECTION, SOLUTION INTRAVENOUS at 02:01

## 2017-04-16 NOTE — CP.PCM.PN
Subjective





- Date & Time of Evaluation


Date of Evaluation: 04/16/17


Time of Evaluation: 11:52





- Subjective


Subjective: 


RFV: Crohn's 


S: Complaining of pain. Says he is not getting dilaudid frequently enough. 

Complains of diarrhea. Tolerating diet. 





Objective





- Vital Signs/Intake and Output


Vital Signs (last 24 hours): 


 











Temp Pulse Resp BP Pulse Ox


 


 97.9 F   81   20   106/70   98 


 


 04/16/17 08:00  04/16/17 09:00  04/16/17 08:00  04/16/17 08:00  04/16/17 08:00











- Medications


Medications: 


 Current Medications





Hydromorphone HCl (Dilaudid)  1 mg IVP Q6 PRN


   PRN Reason: Pain, severe (8-10)


Ciprofloxacin (Cipro 400mg/200ml Dsw)  200 mls @ 200 mls/hr IVPB Q12 [facility]


   Last Admin: 04/16/17 09:14 Dose:  200 mls/hr


Metronidazole (Flagyl 500mg/100ml Ns)  100 mls @ 100 mls/hr IVPB Q8 [facility]


   Last Admin: 04/16/17 09:14 Dose:  100 mls/hr











- Labs


Labs: 


 





 04/16/17 05:30 





 04/16/17 05:30 











- Constitutional


Appears: No Acute Distress, Chronically Ill





- Head Exam


Head Exam: ATRAUMATIC, NORMOCEPHALIC





- Eye Exam


Eye Exam: PERRL.  absent: Scleral icterus





- ENT Exam


ENT Exam: Normal Oropharynx





- Respiratory Exam


Respiratory Exam: NORMAL BREATHING PATTERN





- GI/Abdominal Exam


GI & Abdominal Exam: Soft.  absent: Tenderness





- Neurological Exam


Neurological Exam: Alert, Oriented x3





Assessment and Plan





- Assessment and Plan (Free Text)


Assessment: 


21 year old male with h/o Crohn's disease, h/o bowel resection, abscess drainage

, fistulas admitted with abdominal pain.


1. Crohn's disease


Plan:


-patient is on remicade therapy with GI at [facility] [place]


-he just recently received infusion


-recommend stool studies including culture, c.diff, o&p to r/o infection


-otherwise he is already on chronic therapy for Crohn's disease and no 

additional therapy is recommended at this time


-appreciate surgical opinion


-CT was reviewed


-the patient is on antibiotics


-diet as tolerated


-ok to discharge from GI standpoint with follow up with his GI at [facility]


-will sign off

## 2017-04-16 NOTE — CP.PCM.PN
<Brad Ricci - Last Filed: 04/16/17 09:30>





Subjective





- Date & Time of Evaluation


Date of Evaluation: 04/16/17


Time of Evaluation: 09:10





- Subjective


Subjective: 


General Surgery


Pt S&E, NAEO. C/O same abdominal pain, minimal drainage from fistulas. 








Objective





- Vital Signs/Intake and Output


Vital Signs (last 24 hours): 


 











Temp Pulse Resp BP Pulse Ox


 


 97.9 F   81   20   106/70   98 


 


 04/16/17 08:00  04/16/17 08:00  04/16/17 08:00  04/16/17 08:00  04/16/17 08:00











- Medications


Medications: 


 Current Medications





Hydromorphone HCl (Dilaudid)  1 mg IVP Q6 PRN


   PRN Reason: Pain, severe (8-10)


Ciprofloxacin (Cipro 400mg/200ml Dsw)  200 mls @ 200 mls/hr IVPB Q12 [facility]


   Last Admin: 04/16/17 09:14 Dose:  200 mls/hr


Metronidazole (Flagyl 500mg/100ml Ns)  100 mls @ 100 mls/hr IVPB Q8 [facility]


   Last Admin: 04/16/17 09:14 Dose:  100 mls/hr











- Labs


Labs: 


 





 04/16/17 05:30 





 04/16/17 05:30 











- Constitutional


Appears: Non-toxic, No Acute Distress





- Head Exam


Head Exam: ATRAUMATIC, NORMOCEPHALIC





- Eye Exam


Eye Exam: EOMI.  absent: Scleral icterus





- Respiratory Exam


Respiratory Exam: NORMAL BREATHING PATTERN.  absent: Respiratory Distress





- GI/Abdominal Exam


GI & Abdominal Exam: Guarding (mild), Soft, Tenderness (diffuse).  absent: 

Distended, Firm, Rigid


Additional comments: 


2 of 3 fistula sites with small output.





- Neurological Exam


Neurological Exam: Alert, Awake, Oriented x3





- Skin


Skin Exam: Dry, Warm





Assessment and Plan





- Assessment and Plan (Free Text)


Assessment: 


21M with abdominal pain 2/2 Crohns with fistulas





Plan: 


- Continue antibiotics


- pain control


- No general surgery intervention at present time


- Wound care to see tomorrow for dressing recs


D/W Dr. Johnny Ricci PGY3








<Ap Turner - Last Filed: 04/16/17 19:30>





Subjective





- Date & Time of Evaluation


Date of Evaluation: 04/16/17


Time of Evaluation: 19:10





- Subjective


Subjective: 


Patient was seen and examined at the bedside.  Agree with resident's note above.





Objective





- Vital Signs/Intake and Output


Vital Signs (last 24 hours): 


 











Temp Pulse Resp BP Pulse Ox


 


 98.5 F   86   20   106/69   100 


 


 04/16/17 15:44  04/16/17 15:44  04/16/17 15:44  04/16/17 15:44  04/16/17 15:44











- Medications


Medications: 


 Current Medications





Acetaminophen (Tylenol 325mg Tab)  650 mg PO Q6 PRN


   PRN Reason: Pain, Mild (1-3)


Hydromorphone HCl (Dilaudid)  1 mg IVP Q6 PRN


   PRN Reason: Pain, severe (8-10)


   Last Admin: 04/16/17 17:59 Dose:  1 mg


Ciprofloxacin (Cipro 400mg/200ml Dsw)  200 mls @ 200 mls/hr IVPB Q12 [facility]


   Last Admin: 04/16/17 09:14 Dose:  200 mls/hr


Metronidazole (Flagyl 500mg/100ml Ns)  100 mls @ 100 mls/hr IVPB Q8 [facility]


   Last Admin: 04/16/17 17:59 Dose:  100 mls/hr











- Labs


Labs: 


 





 04/16/17 05:30 





 04/16/17 05:30

## 2017-04-16 NOTE — PN
DATE: 04/16/2017



The patient seen and examined.  Interim events noted.  Consults noted, appreciated.  Surgical followu
p and gastroenterology consult and interventions noted and appreciated.  The patient remains in progr
essive care unit on telemetry monitoring, requesting Dilaudid frequently.  On physical exam, the lilibeth
ent is sleepy and very hard to arouse.  Denies any specific complaint.  No chest pain, no shortness o
f breath.  Abdominal pain is controlled with medication.



PHYSICAL EXAMINATION:

GENERAL:  The patient is in no acute distress.

VITAL SIGNS:  Stable.

HEART:  S1, S2 normal, regular.

LUNGS:  Good bilateral air entry.

ABDOMEN:  Soft, nontender.  No organomegaly, no fluid.  Bowel sounds are plus.  No sign of acute abdo
men.  No guarding, no rigidity, no rebound.  Fistula site is unchanged.

EXTREMITIES:  No edema, no calf swelling, no tenderness, no acute ischemia.

CENTRAL NERVOUS SYSTEM:  Essentially unchanged.



DIAGNOSTIC DATA:  Available reviewed.



Overall, patient's general medical condition seems to be improving, tolerating diet.



PLAN:  As ordered.





__________________________________________

Bhavin Piña MD







cc:



DD: 04/16/2017 06:48:08  [id number]

TT: 04/16/2017 09:23:22

Confirmation # [id number]

Dictation # [id number]

en

## 2017-04-17 VITALS — RESPIRATION RATE: 20 BRPM | OXYGEN SATURATION: 99 %

## 2017-04-17 VITALS — HEART RATE: 98 BPM | TEMPERATURE: 98 F | DIASTOLIC BLOOD PRESSURE: 66 MMHG | SYSTOLIC BLOOD PRESSURE: 100 MMHG

## 2017-04-17 RX ADMIN — METRONIDAZOLE SCH MLS/HR: 500 INJECTION, SOLUTION INTRAVENOUS at 02:25

## 2017-04-17 NOTE — PN
DATE: 04/17/2017



The patient seen and examined.  Interim events noted.  Consults noted, appreciated.  The patient was 
cleared for discharge by gastroenterologist and surgery.  The patient keeps complaining of pain and d
emanding Dilaudid.  No chest pain, no shortness of breath.  Now, patient complains of swelling on the
 back of unknown duration.



PHYSICAL EXAMINATION:

GENERAL:  The patient is in no acute distress, loos very comfortable.

VITAL SIGNS:  Temperature afebrile, pulse 77, respiration _____, blood pressure 130/70.

HEART:  S1, S2 normal, regular.

LUNGS:  Good bilateral air entry.

ABDOMEN:  Soft, nontender.

EXTREMITIES:  No edema, no calf swelling, no tenderness, no acute ischemia.

CENTRAL NERVOUS SYSTEM:  Essentially unchanged.



The swelling on the back looks like a lipoma of chronic duration.  The patient offered for sonogram, 
but patient refuses sonogram, just demanding Dilaudid.  The patient is medically stable.  The patient
 also understands risk of refusing sonogram as it could be something else, but patient just wants to 
go home.



PLAN:  As ordered.





__________________________________________

Bhavin Piña MD







cc:



DD: 04/17/2017 07:16:45  [id number]

TT: 04/17/2017 07:53:51

Confirmation # [id number]

Dictation # [id number]

en

## 2017-04-20 ENCOUNTER — HOSPITAL ENCOUNTER (EMERGENCY)
Dept: HOSPITAL 42 - ED | Age: 22
Discharge: HOME | End: 2017-04-20
Payer: MEDICAID

## 2017-04-20 VITALS
SYSTOLIC BLOOD PRESSURE: 137 MMHG | HEART RATE: 75 BPM | DIASTOLIC BLOOD PRESSURE: 74 MMHG | RESPIRATION RATE: 16 BRPM | OXYGEN SATURATION: 98 %

## 2017-04-20 VITALS — BODY MASS INDEX: 17.8 KG/M2

## 2017-04-20 VITALS — TEMPERATURE: 98.9 F

## 2017-04-20 DIAGNOSIS — J18.9: ICD-10-CM

## 2017-04-20 DIAGNOSIS — R10.9: Primary | ICD-10-CM

## 2017-04-20 LAB
ADD MANUAL DIFF?: NO
ALBUMIN/GLOB SERPL: 0.8 {RATIO} (ref 1.1–1.8)
ALP SERPL-CCNC: 94 U/L (ref 38–133)
ALT SERPL-CCNC: 29 U/L (ref 7–56)
APPEARANCE UR: CLEAR
AST SERPL-CCNC: 29 U/L (ref 15–59)
BACTERIA #/AREA URNS HPF: (no result) /[HPF]
BASOPHILS # BLD AUTO: 0.01 K/MM3 (ref 0–2)
BASOPHILS NFR BLD: 0.2 % (ref 0–3)
BILIRUB SERPL-MCNC: 0.4 MG/DL (ref 0.2–1.3)
BILIRUB UR-MCNC: NEGATIVE MG/DL
BUN SERPL-MCNC: 6 MG/DL (ref 7–21)
CALCIUM SERPL-MCNC: 8.5 MG/DL (ref 8.4–10.5)
CHLORIDE SERPL-SCNC: 101 MMOL/L (ref 98–107)
CO2 SERPL-SCNC: 32 MMOL/L (ref 21–33)
COLOR UR: YELLOW
EOSINOPHIL # BLD: 0.1 10*3/UL (ref 0–0.7)
EOSINOPHIL NFR BLD: 2 % (ref 1.5–5)
EPI CELLS #/AREA URNS HPF: (no result) /HPF (ref 0–5)
ERYTHROCYTE [DISTWIDTH] IN BLOOD BY AUTOMATED COUNT: 23.1 % (ref 11.5–14.5)
GLOBULIN SER-MCNC: 4.7 GM/DL
GLUCOSE SERPL-MCNC: 88 MG/DL (ref 70–110)
GLUCOSE UR STRIP-MCNC: NEGATIVE MG/DL
GRANULOCYTES # BLD: 2.62 10*3/UL (ref 1.4–6.5)
GRANULOCYTES NFR BLD: 56.8 % (ref 50–68)
HCT VFR BLD CALC: 31.7 % (ref 42–52)
KETONES UR STRIP-MCNC: NEGATIVE MG/DL
LEUKOCYTE ESTERASE UR-ACNC: NEGATIVE LEU/UL
LYMPHOCYTES # BLD: 1.4 10*3/UL (ref 1.2–3.4)
LYMPHOCYTES NFR BLD AUTO: 31 % (ref 22–35)
MCH RBC QN AUTO: 22.4 PG (ref 25–35)
MCHC RBC AUTO-ENTMCNC: 30.3 G/DL (ref 31–37)
MCV RBC AUTO: 73.9 FL (ref 80–105)
MONOCYTES # BLD AUTO: 0.5 10*3/UL (ref 0.1–0.6)
MONOCYTES NFR BLD: 10 % (ref 1–6)
PH UR STRIP: 7 [PH] (ref 4.7–8)
PLATELET # BLD: 700 10^3/UL (ref 120–450)
PMV BLD AUTO: 9.3 FL (ref 7–11)
POTASSIUM SERPL-SCNC: 3.7 MMOL/L (ref 3.6–5)
PROT SERPL-MCNC: 8.3 G/DL (ref 5.8–8.3)
PROT UR STRIP-MCNC: 30 MG/DL
RBC # UR STRIP: NEGATIVE /UL
RBC #/AREA URNS HPF: NEGATIVE /HPF (ref 0–2)
SODIUM SERPL-SCNC: 143 MMOL/L (ref 132–148)
SP GR UR STRIP: 1.02 (ref 1–1.03)
UROBILINOGEN UR STRIP-ACNC: 0.2 E.U./DL
WBC # BLD AUTO: 4.6 10^3/UL (ref 4.5–11)
WBC #/AREA URNS HPF: (no result) /HPF (ref 0–6)

## 2017-04-20 PROCEDURE — 85025 COMPLETE CBC W/AUTO DIFF WBC: CPT

## 2017-04-20 PROCEDURE — 71020: CPT

## 2017-04-20 PROCEDURE — 99285 EMERGENCY DEPT VISIT HI MDM: CPT

## 2017-04-20 PROCEDURE — 80053 COMPREHEN METABOLIC PANEL: CPT

## 2017-04-20 PROCEDURE — 96374 THER/PROPH/DIAG INJ IV PUSH: CPT

## 2017-04-20 PROCEDURE — 96375 TX/PRO/DX INJ NEW DRUG ADDON: CPT

## 2017-04-20 PROCEDURE — 87181 SC STD AGAR DILUTION PER AGT: CPT

## 2017-04-20 PROCEDURE — 87040 BLOOD CULTURE FOR BACTERIA: CPT

## 2017-04-20 PROCEDURE — 81001 URINALYSIS AUTO W/SCOPE: CPT

## 2017-04-20 PROCEDURE — 87086 URINE CULTURE/COLONY COUNT: CPT

## 2017-04-20 NOTE — ED PDOC
Arrival/HPI





- General


Chief Complaint: Abdominal Pain


Time Seen by Provider: 04/20/17 18:00


Historian: Patient





- History of Present Illness


Narrative History of Present Illness (Text): 


04/20/17 18:35


A 21 year old male, whose past medical history includes Crohn's disease and 

anemia, presents to the emergency department complaining of not feeling well. 

Patient reports nausea and non bloody bon bilious vomiting today. He states he 

has a unwitnessed syncopal episode prior to arrival. patient notes he woke up 

on his own and then decided to come to the emergency department for evaluation. 

Patient also complains of a slight cough and a subjective fever but he denies 

any head trauma, dizziness, headache, chest pain, or any other complaints at 

this time. Patient is currently demanding Dilaudid. u[sharon review of prior 

visits pt consistently demands dilaudid. pt states he has pain managemnt appt 

on monday.





PMD: None





04/20/17 22:30





Time/Duration: Prior to Arrival


Symptom Onset: Sudden


Symptom Course: Other


Quality: Other


Activities at Onset: Rest


Context: Home





Past Medical History





- Provider Review


Nursing Documentation Reviewed: Yes





- Infectious Disease


Hx of Infectious Diseases: None





- Reproductive


Currently Pregnant: No





- Cardiac


Hx Cardiac Disorders: No





- Pulmonary


Hx Respiratory Disorders: No





- Neurological


Hx Neurological Disorder: No


Hx Transient Ischemic Attacks (TIA): No





- HEENT


Hx HEENT Disorder: No





- Renal


Hx Renal Disorder: No





- Endocrine/Metabolic


Hx Endocrine Disorders: No





- Hematological/Oncological


Hx Anemia: Yes (Fe Def Anemia)





- Integumentary


Hx Dermatological Disorder: No





- Musculoskeletal/Rheumatological


Hx Musculoskeletal Disorders: No


Hx Falls: No





- Gastrointestinal


Hx Gastrointestinal Disorders: Yes


Hx Crohn's Disease: Yes





- Genitourinary/Gynecological


Hx Genitourinary Disorders: No





- Psychiatric


Hx Psychophysiologic Disorder: No


Hx Substance Use: No





- Surgical History


Other/Comment: Abd surg. for intraabd. abscesses x 7 last surgery 2/2017 at [facility]





- Anesthesia


Hx Anesthesia: Yes


Hx Anesthesia Reactions: No


Hx Malignant Hyperthermia: No





- Suicidal Assessment


Feels Threatened In Home Enviroment: No





Family/Social History





- Physician Review


Nursing Documentation Reviewed: Yes


Family/Social History: Unknown Family HX


Smoking Status: Never Smoked


Hx Alcohol Use: No


Hx Substance Use: No





Allergies/Home Meds


Allergies/Adverse Reactions: 


Allergies





FISH Allergy (Verified 04/14/17 09:40)


 RASH


shellfish derived Adverse Reaction (Verified 04/14/17 09:40)


 ANGIOEDEMA








Home Medications: 


 Home Meds











 Medication  Instructions  Recorded  Confirmed


 


oxyCODONE [oxyCODONE Immediate 15 mg PO Q8H 04/14/17 04/14/17





Release Tab]   














Review of Systems





- Physician Review


All systems were reviewed & negative as marked: Yes





- Review of Systems


Constitutional: Fevers (subjective).  absent: Other (head injury )


Respiratory: Cough


Cardiovascular: Syncope.  absent: Chest Pain


Neurological: absent: Headache, Dizziness





Physical Exam


Vital Signs Reviewed: Yes


Vital Signs











  Temp Pulse Resp BP Pulse Ox


 


 04/20/17 21:55   75  16  137/74  98


 


 04/20/17 20:05   77  17  129/92 H  99


 


 04/20/17 18:15  98.9 F  83  18  111/76  98


 


 04/20/17 18:05  98.9 F  83  17  111/76  100











Temperature: Afebrile


Blood Pressure: Normal


Pulse: Regular


Respiratory Rate: Normal


Appearance: Positive for: Well-Appearing, Non-Toxic, Comfortable


Pain Distress: None


Mental Status: Positive for: Alert and Oriented X 3


Finger Stick Blood Glucose: 68





- Systems Exam


Head: Present: Atraumatic, Normocephalic


Pupils: Present: PERRL


Extroacular Muscles: Present: EOMI


Conjunctiva: Present: Normal


Mouth: Present: Moist Mucous Membranes


Neck: Present: Normal Range of Motion


Respiratory/Chest: Present: Clear to Auscultation, Good Air Exchange.  No: 

Respiratory Distress, Accessory Muscle Use


Cardiovascular: Present: Regular Rate and Rhythm, Normal S1, S2.  No: Murmurs


Abdomen: Present: Normal Bowel Sounds, Other (chronic draining (serosanguinous, 

not purulent or acute)fistula on the side of the umbilical, healed fistula on 

the right flank that is not currently draining and no surround cellulitis).  No

: Tenderness, Distention, Peritoneal Signs


Back: Present: Normal Inspection


Upper Extremity: Present: Normal Inspection.  No: Cyanosis, Edema


Lower Extremity: Present: Normal Inspection.  No: Edema


Neurological: Present: GCS=15, CN II-XII Intact, Speech Normal


Skin: Present: Warm, Dry, Normal Color.  No: Rashes


Psychiatric: Present: Alert, Oriented x 3, Normal Insight, Normal Concentration





Medical Decision Making


ED Course and Treatment: 


04/20/17 18:35


Impression:


A 21 year old male after a unwitnessed syncopal episode. Patient demanding 

Dilaudid. pt not coughing or vomiting here and appears to be completely 

comfortable. he is texting on his phone etc. but demanding dilaudid





Differential Diagnosis include but are not limited to: cough rule out pneumonia





Plan:


-- Chest X-ray


-- Labs


-- Urinalysis 


-- Dilaudid, Zofran and IV Fluids


-- Reassess and disposition


1900





labs reviewed. pt anemic as baseline. no other major abnormalities. pt appears 

comfortabel.


Prior Visits:


Notes and results from previous visits were reviewed. The patient had 3 

fistulas that are slowly closing. Patient had a CT on 3/20 which showed a right 

sided abscess. Patient was worked up with surgery, infection and GI. Patient 

then had a repeat CT on 3/25, which showed no changes. Patient was later came 

to the emergency department on 4/13 demanding narcotics but once patient was 

told he will not get any he signed out AMA. He then came back on 4/14. He was 

admitted to the hospital till 4/17. During his stay he had a full GI and 

surgical work up. Surgery performed no intervention at this time and told 

patient to follow up with a specialist in [facility]. GI felt no additional therapy 

was needed at that time.


 


Progress Notes:


04/20/17 21:41


EXAM:


XR Chest, 2 Views





FINDINGS:


Lungs: Unremarkable. No consolidation.


Pleural space: Unremarkable. No pneumothorax.


Heart: Unremarkable. No cardiomegaly.


Mediastinum: Unremarkable.


Bones/joints: Unremarkable.





IMPRESSION:


Normal chest x-rays





2200


pt continually exhibiting drug seeking behavior. while walking by the room and 

evaluting pt at bedside pt completely comfortable, pt still demnading dilaudid. 

pt starting to become threatening when dilaudid declined. 


 all labs and imaging esults reviewed with patient and pt instructed to follow 

up as oupt. 





dx chronic abdomina pain


drug seeking behavior


04/20/17 22:31








- Lab Interpretations


Lab Results: 








 04/20/17 18:35 





 04/20/17 20:10 





 Lab Results





04/20/17 20:20: Urine Color Yellow, Urine Appearance Clear, Urine pH 7.0, Ur 

Specific Gravity 1.020, Urine Protein 30 H, Urine Glucose (UA) Negative, Urine 

Ketones Negative, Urine Blood Negative, Urine Nitrate Negative, Urine Bilirubin 

Negative, Urine Urobilinogen 0.2, Ur Leukocyte Esterase Negative, Urine RBC 

Negative, Urine WBC 0 - 2, Ur Epithelial Cells 0 - 2, Urine Bacteria Rare


04/20/17 20:10: Sodium 143, Potassium 3.7, Chloride 101, Carbon Dioxide 32, 

Anion Gap 14, BUN 6 L, Creatinine 0.7, Est GFR (African Amer) > 60, Est GFR (Non

-Af Amer) > 60, Random Glucose 88, Calcium 8.5, Total Bilirubin 0.4, AST 29, 

ALT 29, Alkaline Phosphatase 94, Total Protein 8.3, Albumin 3.7, Globulin 4.7, 

Albumin/Globulin Ratio 0.8 L


04/20/17 18:35: WBC 4.6  D, RBC 4.29, Hgb 9.6 L, Hct 31.7 L, MCV 73.9 L, MCH 

22.4 L, MCHC 30.3 L, RDW 23.1 H, Plt Count 700 H, MPV 9.3, Gran % 56.8, Lymph % 

(Auto) 31.0, Mono % (Auto) 10.0 H, Eos % (Auto) 2.0, Baso % (Auto) 0.2, Gran # 

2.62, Lymph # 1.4, Mono # 0.5, Eos # 0.1, Baso # 0.01








I have reviewed the lab results: Yes





- RAD Interpretation


Radiology Orders: 








04/20/17 18:57


CHEST TWO VIEWS (PA/LAT) [RAD] Stat 














- Medication Orders


Current Medication Orders: 











Discontinued Medications





Hydromorphone HCl (Dilaudid)  1 mg IVP STAT STA


   Stop: 04/20/17 19:06


   Last Admin: 04/20/17 20:05  Dose: 1 MG





IVP Administration


 Document     04/20/17 20:05  RD  (Rec: 04/20/17 20:05  RD  [id number])


     Charges for Administration


      # of IVP Administrations                   1





Sodium Chloride (Sodium Chloride 0.9%)  500 mls @ 1,000 mls/hr IV .Q30M STA


   Stop: 04/20/17 19:22


   Last Admin: 04/20/17 18:59  Dose: 1,000 MLS/HR





eMAR Start Stop


 Document     04/20/17 18:59  SF  (Rec: 04/20/17 19:00  SF  [id number])


     Intravenous Solution


      Start Date                                 04/20/17


      Start Time                                 18:59


      End Date                                   04/20/17


      End time                                   19:29


      Total Infusion Time                        30





Ondansetron HCl (Zofran Inj)  4 mg IVP STAT STA


   Stop: 04/20/17 18:54


   Last Admin: 04/20/17 19:05  Dose: 4 MG





IVP Administration


 Document     04/20/17 19:05  SF  (Rec: 04/20/17 19:05  SF  [id number])


     Charges for Administration


      # of IVP Administrations                   1





Oxycodone/Acetaminophen (Percocet 5/325 Mg Tab)  1 tab PO STAT STA


   Stop: 04/20/17 21:54


   Last Admin: 04/20/17 22:00  Dose: 1 TAB











- Scribe Statement


The provider has reviewed the documentation as recorded by the Scribe


Virgie Batista





Provider Scribe Attestation:


All medical record entries made by the Scribe were at my direction and 

personally dictated by me. I have reviewed the chart and agree that the record 

accurately reflects my personal performance of the history, physical exam, 

medical decision making, and the department course for this patient. I have 

also personally directed, reviewed, and agree with the discharge instructions 

and disposition.











Disposition/Present on Arrival





- Present on Arrival


Any Indicators Present on Arrival: No


History of DVT/PE: No


History of Uncontrolled Diabetes: No


Urinary Catheter: No


History of Decub. Ulcer: No


History Surgical Site Infection Following: None





- Disposition


Have Diagnosis and Disposition been Completed?: Yes


Diagnosis: 


 Chronic abdominal pain


Disposition: HOME/ ROUTINE


Disposition Time: 21:00


Patient Plan: Discharge


Condition: GOOD


Discharge Instructions (ExitCare):  Abdominal Pain (ED)


Additional Instructions: 


follow up with your primary doctor in 1-2 days. return to the ED wtih any 

worsening or concerning symptoms. 


Referrals: 


PCP,NO [Primary Care Provider] - Follow up with primary

## 2017-04-21 NOTE — RAD
HISTORY:

cough  



COMPARISON:

Chest x-ray performed 4/9/17



TECHNIQUE:

Chest PA and lateral



FINDINGS:





LUNGS:

Small retrocardiac opacity may reflect developing/resolving pneumonia.



Please note that chest x-ray has limited sensitivity for the 

detection of pulmonary masses.



PLEURA:

No significant pleural effusion identified. No definite pneumothorax .



CARDIOVASCULAR:

The cardiomediastinal silhouette appears within normal limits of 

size. 



OSSEOUS STRUCTURES:

 No acute osseous abnormality identified.



VISUALIZED UPPER ABDOMEN:

Unremarkable.



OTHER FINDINGS:

None.



IMPRESSION:

Small retrocardiac opacity may reflect developing/resolving pneumonia.



Study has been marked for PA review.

## 2017-04-23 ENCOUNTER — HOSPITAL ENCOUNTER (EMERGENCY)
Dept: HOSPITAL 14 - H.ER | Age: 22
Discharge: HOME | End: 2017-04-23
Payer: MEDICAID

## 2017-04-23 ENCOUNTER — HOSPITAL ENCOUNTER (EMERGENCY)
Dept: HOSPITAL 42 - ED | Age: 22
Discharge: HOME | End: 2017-04-23
Payer: MEDICAID

## 2017-04-23 VITALS
TEMPERATURE: 97 F | HEART RATE: 91 BPM | DIASTOLIC BLOOD PRESSURE: 79 MMHG | OXYGEN SATURATION: 100 % | SYSTOLIC BLOOD PRESSURE: 133 MMHG | RESPIRATION RATE: 18 BRPM

## 2017-04-23 VITALS
TEMPERATURE: 98 F | RESPIRATION RATE: 16 BRPM | DIASTOLIC BLOOD PRESSURE: 81 MMHG | HEART RATE: 82 BPM | OXYGEN SATURATION: 100 % | SYSTOLIC BLOOD PRESSURE: 121 MMHG

## 2017-04-23 VITALS — BODY MASS INDEX: 17.8 KG/M2

## 2017-04-23 DIAGNOSIS — J06.9: Primary | ICD-10-CM

## 2017-04-23 DIAGNOSIS — J18.9: Primary | ICD-10-CM

## 2017-04-23 NOTE — ED PDOC
Arrival/HPI





- General


Historian: Patient





- General


Chief Complaint: Cough, Cold, Congestion


Time Seen by Provider: 04/23/17 12:27





- History of Present Illness


Narrative History of Present Illness (Text): 


04/23/17 12:28


20 y/o male, pmh including crohn's disease, nkda, c/o here for the follow up 

about the chest x-ray.  Pt. was seen here about 3 days ago, chest x-ray show 

there is pneumonia which attempted to call but never call back which the 

certified letter sent out base on the chart, here today because he is still 

coughing, no chest pain or shortness of breath, no fever or chills, no recent 

traveling, no history of sickle cells, no numbness or tingling, no other 

medical or psychological complaints.  (Pantera Virgen)








Past Medical History





- Provider Review


Nursing Documentation Reviewed: Yes





- Infectious Disease


Hx of Infectious Diseases: None





- Reproductive


Currently Pregnant: No





- Cardiac


Hx Cardiac Disorders: No





- Pulmonary


Hx Respiratory Disorders: No





- Neurological


Hx Neurological Disorder: No


Hx Transient Ischemic Attacks (TIA): No





- HEENT


Hx HEENT Disorder: No





- Renal


Hx Renal Disorder: No





- Endocrine/Metabolic


Hx Endocrine Disorders: No





- Hematological/Oncological


Hx Blood Disorders: Yes


Hx Anemia: Yes (Fe Def Anemia)





- Integumentary


Hx Dermatological Disorder: No





- Musculoskeletal/Rheumatological


Hx Musculoskeletal Disorders: No


Hx Falls: No





- Gastrointestinal


Hx Gastrointestinal Disorders: Yes


Hx Crohn's Disease: Yes





- Genitourinary/Gynecological


Hx Genitourinary Disorders: No





- Psychiatric


Hx Psychophysiologic Disorder: No


Hx Substance Use: No





- Surgical History


Other/Comment: Abd surg. for intraabd. abscesses x 7 last surgery 2/2017 at [facility] [place]





- Anesthesia


Hx Anesthesia: Yes


Hx Anesthesia Reactions: No


Hx Malignant Hyperthermia: No





- Suicidal Assessment


Feels Threatened In Home Enviroment: No





Family/Social History





- Physician Review


Nursing Documentation Reviewed: Yes


Family/Social History: Unknown Family HX


Smoking Status: Never Smoked


Hx Alcohol Use: No


Hx Substance Use: No





Allergies/Home Meds


Allergies/Adverse Reactions: 


Allergies





FISH Allergy (Verified 04/23/17 12:26)


 RASH


shellfish derived Adverse Reaction (Verified 04/23/17 12:26)


 ANGIOEDEMA








Home Medications: 


 Home Meds











 Medication  Instructions  Recorded  Confirmed


 


oxyCODONE [oxyCODONE Immediate 15 mg PO Q8H 04/14/17 04/23/17





Release Tab]   














Review of Systems





- Review of Systems


Constitutional: absent: Fatigue, Fevers


Eyes: absent: Vision Changes


ENT: absent: Hearing Changes


Respiratory: Cough, Sputum.  absent: SOB, Wheezing


Cardiovascular: absent: Chest Pain


Gastrointestinal: absent: Abdominal Pain, Nausea, Vomiting


Musculoskeletal: absent: Arthralgias, Back Pain, Neck Pain, Joint Swelling, 

Myalgias


Skin: absent: Rash, Pruritis, Skin Lesions


Neurological: absent: Headache, Dizziness, Focal Weakness


Endocrine: absent: Diaphoresis


Psychiatric: absent: Anxiety, Depression, Suicidal Ideation





Physical Exam


Vital Signs Reviewed: Yes


Temperature: Afebrile


Blood Pressure: Normal


Pulse: Regular


Respiratory Rate: Normal


Appearance: Positive for: Well-Appearing, Non-Toxic, Comfortable


Pain Distress: Mild


Mental Status: Positive for: Alert and Oriented X 3





- Systems Exam


Head: Present: Atraumatic, Normocephalic


Pupils: Present: PERRL


Extroacular Muscles: Present: EOMI


Conjunctiva: Present: Normal


Ears: Present: NORMAL TM, Normal Canal.  No: Erythema


Mouth: Present: Moist Mucous Membranes


Neck: Present: Normal Range of Motion


Respiratory/Chest: Present: Clear to Auscultation, Good Air Exchange, Rhonchi (

lt. lower lobe).  No: Respiratory Distress, Accessory Muscle Use, Wheezes, 

Decreased Breath Sounds, Rales, Retracting, Tachypneic, Tender to Palpation


Cardiovascular: Present: Regular Rate and Rhythm, Normal S1, S2.  No: Murmurs


Abdomen: Present: Normal Bowel Sounds.  No: Tenderness, Distention, Peritoneal 

Signs, Guarding, McBurney's Point Tender


Back: Present: Normal Inspection


Upper Extremity: Present: Normal Inspection.  No: Cyanosis, Edema


Lower Extremity: Present: Normal Inspection.  No: Edema


Neurological: Present: GCS=15, Speech Normal, Motor Func Grossly Intact, Gait 

Normal, Memory Normal


Skin: Present: Warm, Dry, Normal Color.  No: Rashes


Psychiatric: Present: Alert, Oriented x 3, Normal Insight, Normal Concentration





Vital Signs











  Temp Pulse Resp BP Pulse Ox


 


 04/23/17 12:22  98.0 F  82  16  121/81  100

















Medical Decision Making


ED Course and Treatment: 





04/23/17 12:39


-Pt. stated that he has chronic crohn's disease which he is chronically on the 

percocet, stated that he forgot to take it today, will give percocet as the 

patient has family member to drive him home.


-I discussed about the use of levaquin with the patient including possible 

achilles tendon rupture which the patient stated that he prefer the levaquin 

for his pneumonia and understands the risk of achilles tendon rupture.


-Discharge home with levaquin, promethazine dm, continue tylenol or motrin at 

home as needed, follow up with your own pmd within 2 days, repeat xray after 

complete the antibiotic, return to the ER for any new or worsening signs or 

symptoms.  (Pantear Virgen)


I was available for consultation during PA evaluation. The chart was reviewed 

by me, and I agree with disposition. The documented history was done by the 

physician extender. The documented physical exam was done by the physician 

extender. The documented procedures were done by the physician extender.  (

Allen Dupree)








- Medication Orders


Current Medication Orders: 














Discontinued Medications





Levofloxacin (Levaquin)  750 mg PO STAT STA


   Stop: 04/23/17 12:37


   Last Admin: 04/23/17 12:51  Dose: 750 MG





Oxycodone/Acetaminophen (Percocet 5/325 Mg Tab)  1 tab PO STAT STA


   Stop: 04/23/17 12:37


   Last Admin: 04/23/17 12:51  Dose: 1 TAB














- PA / NP / Resident Statement


MD/DO has reviewed & agrees with the documentation as recorded.





Disposition/Present on Arrival





- Present on Arrival


Any Indicators Present on Arrival: Yes


History of DVT/PE: No


History of Uncontrolled Diabetes: No


Urinary Catheter: No


History of Decub. Ulcer: No


History Surgical Site Infection Following: None





- Disposition


Have Diagnosis and Disposition been Completed?: Yes


Disposition Time: 12:41


Patient Plan: Discharge





- Disposition


Diagnosis: 


 Pneumonia


Disposition: HOME/ ROUTINE


Condition: GOOD


Additional Instructions: 


Discharge home with levaquin, promethazine dm, continue tylenol or motrin at 

home as needed, follow up with your own pmd within 2 days, repeat xray after 

complete the antibiotic, return to the ER for any new or worsening signs or 

symptoms. 


Prescriptions: 


Levofloxacin [Levaquin] 750 mg PO DAILY #4 tablet


Promethazine DM [Phenergan DM Oral Syrup] 5 ml PO QID PRN #150 ml


 PRN Reason: Other


Referrals: 


[facility] [Outside] - Follow up with primary


Forms:  WORK NOTE

## 2017-04-23 NOTE — ED PDOC
HPI: CCC, URI, Sore Throat


Time Seen by Provider: 04/23/17 13:43


Chief Complaint (Nursing): GI Problem


Chief Complaint (Provider): Cough, abdominal pain


History Per: Patient


History/Exam Limitations: no limitations


Have you had recent travel within the past 21 days to any of the following 

countries: [place], [place], [place] or [place]?: No


Onset/Duration Of Symptoms: Days


Current Symptoms Are (Timing): Still Present


Associated Symptoms: Cough.  denies: Fever, Vomiting, Diarrhea


Severity: Moderate


Additional History Per: Patient


Additional Complaint(s): 


The pt is  a 22yo male, with PMHx of Crohns Disease, multiple abdominal 

surgeries, presents to the ED for evaluation of cough for the past couple days. 

Pt reports he has had multiple abdominal surgeries which resulted in fistulas 

in his abdominal wall - pt reports he experiences pain in his right lower 

quadrant while coughing. Pt denies any GI symptoms including vomiting, diarrhea 

and blody stool. Pt reports he is currently getting Remicade per month to 

manage his Crohns diease. He additionally denies fever and chills. Of note, pt 

was seen in the ED 3 days ago and had a CXR which had questionable results of 

pneumonia. Pt reports he is currently on an antibiotics course based on the CXR 

results. At present, pt offers no additional medical complaints.





Past Medical History


Reviewed: Historical Data, Nursing Documentation, Vital Signs


Vital Signs: 


 Last Vital Signs











Temp  97 F L  04/23/17 13:40


 


Pulse  91 H  04/23/17 13:40


 


Resp  18   04/23/17 13:40


 


BP  133/79   04/23/17 13:40


 


Pulse Ox  100   04/23/17 14:50














- Medical History


PMH: Anemia (Fe Def Anemia), Crohn's Disease


   Denies: Chronic Kidney Disease, TIA





- Surgical History


Other surgeries: Abdominal surgeries resulting in fistulas along abdominal wall





- Family History


Family History: States: Unknown Family Hx





- Social History


Current smoker - smoking cessation education provided: No


Alcohol: None





- Immunization History


Hx Tetanus Toxoid Vaccination: No


Hx Influenza Vaccination: Yes


Hx Pneumococcal Vaccination: No





- Home Medications


Home Medications: 


 Ambulatory Orders











 Medication  Instructions  Recorded


 


Docusate [Colace] 100 mg PO DAILY #30 cap 04/11/17


 


Ferrous Sulfate 325 mg PO DAILY #30 tablet 04/11/17


 


Linezolid [Zyvox] 600 mg PO BID #12 tab 04/11/17


 


oxyCODONE [oxyCODONE Immediate 15 mg PO Q8H 04/14/17





Release Tab]  


 


Levofloxacin [Levaquin] 750 mg PO DAILY #4 tablet 04/23/17


 


Promethazine DM [Phenergan DM Oral 5 ml PO QID PRN #150 ml 04/23/17





[place]]  


 


Promethazine/Codeine 10 ml PO TID #120 ml 04/23/17





[Codeine/Promethazine 10 MG/5  





Ml-6.25 MG/5 Ml]  














- Allergies


Allergies/Adverse Reactions: 


 Allergies











Allergy/AdvReac Type Severity Reaction Status Date / Time


 


FISH Allergy  RASH Verified 04/23/17 12:26


 


shellfish derived AdvReac  ANGIOEDEMA Verified 04/23/17 12:26














Review of Systems


ROS Statement: Except As Marked, All Systems Reviewed And Found Negative


Constitutional: Negative for: Fever, Chills


Respiratory: Positive for: Cough


Gastrointestinal: Positive for: Abdominal Pain (RLQ).  Negative for: Nausea, 

Vomiting, Diarrhea





Physical Exam





- Reviewed


Nursing Documentation Reviewed: Yes


Vital Signs Reviewed: Yes





- Physical Exam


Appears: Positive for: Well, Non-toxic, Uncomfortable


Head Exam: Positive for: ATRAUMATIC


Skin: Positive for: Normal Color, Warm, DRY


Eye Exam: Positive for: Normal appearance, EOMI, PERRL


Neck: Positive for: Normal, Supple


Cardiovascular/Chest: Positive for: Regular Rate, Rhythm


Respiratory: Positive for: Normal Breath Sounds.  Negative for: Respiratory 

Distress


Gastrointestinal/Abdominal: Positive for: Soft, Tenderness (mild diffuse 

tenderness)


Neurologic/Psych: Positive for: Alert, Oriented





- ECG


O2 Sat by Pulse Oximetry: 100 (RA)


Pulse Ox Interpretation: Normal





Medical Decision Making


Medical Decision Making: 


Time: 1350


Impression:


22yo male w/ cough


Plan:


-- CBC


-- CMP


-- IV Fluids


-- XR Obstructive series


-- Phenergan 10 ml PO





Reassess:


-- Pt is refusing Toradol IV and when offered Toradol IM, pt is requesting 

Dilaudid. When informed he cannot receive Dilaudid, pt requesting Percocets for 

pain.


[place] was reviewed by provider which indicated pt has a pattern of excessive 

opioid use. 


-- Pt stable for discharge home. Final diagnosis: Cough








Scribe Attestation:


Documented by Sandra De Leon acting as a scribe for Inna Castaneda MD


Provider Scribe Attestation:


All medical record entries made by the Scribe were at my direction and 

personally dictated by me. I


have reviewed the chart and agree that the record accurately reflects my 

personal performance of the


history, physical exam, medical decision making, and the department course for 

this patient. I have


also personally directed, reviewed, and agree with the discharge instructions 

and disposition.





Disposition





- Clinical Impression


Clinical Impression: 


 Cough





- Patient ED Disposition


Is Patient to be Admitted: No


Doctor Will See Patient In The: Office


Counseled Patient/Family Regarding: Diagnosis, Need For Followup, Rx Given





- Disposition


Referrals: 


Gabriel Flores MD [Family Provider] - 


Disposition: Routine/Home


Disposition Time: 15:00


Condition: STABLE


Prescriptions: 


Promethazine/Codeine [Codeine/Promethazine 10 MG/5 Ml-6.25 MG/5 Ml] 10 ml PO 

TID #120 ml


Instructions:  Upper Respiratory Infection (ED)





- POA


Present On Arrival: None

## 2017-04-28 ENCOUNTER — HOSPITAL ENCOUNTER (INPATIENT)
Dept: HOSPITAL 42 - ED | Age: 22
LOS: 3 days | Discharge: LEFT BEFORE DISCHARGE | DRG: 552 | End: 2017-05-01
Attending: INTERNAL MEDICINE | Admitting: HOSPITALIST
Payer: MEDICAID

## 2017-04-28 VITALS — BODY MASS INDEX: 17.8 KG/M2

## 2017-04-28 DIAGNOSIS — F41.9: ICD-10-CM

## 2017-04-28 DIAGNOSIS — K21.9: ICD-10-CM

## 2017-04-28 DIAGNOSIS — Z91.19: ICD-10-CM

## 2017-04-28 DIAGNOSIS — K68.12: ICD-10-CM

## 2017-04-28 DIAGNOSIS — K50.914: ICD-10-CM

## 2017-04-28 DIAGNOSIS — Z87.01: ICD-10-CM

## 2017-04-28 DIAGNOSIS — D63.8: ICD-10-CM

## 2017-04-28 DIAGNOSIS — F11.20: ICD-10-CM

## 2017-04-28 DIAGNOSIS — T81.4XXA: ICD-10-CM

## 2017-04-28 DIAGNOSIS — A41.9: ICD-10-CM
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
## 2017-04-28 DIAGNOSIS — K50.913: Primary | ICD-10-CM
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
## 2017-04-28 LAB
ADD MANUAL DIFF?: NO
ALBUMIN/GLOB SERPL: 0.9 {RATIO} (ref 1.1–1.8)
ALP SERPL-CCNC: 82 U/L (ref 38–133)
ALT SERPL-CCNC: 24 U/L (ref 7–56)
APTT BLD: 30.6 SECONDS (ref 23.7–30.8)
AST SERPL-CCNC: 56 U/L (ref 15–59)
BASOPHILS # BLD AUTO: 0.02 K/MM3 (ref 0–2)
BASOPHILS NFR BLD: 0.1 % (ref 0–3)
BILIRUB SERPL-MCNC: 0.6 MG/DL (ref 0.2–1.3)
BUN SERPL-MCNC: 6 MG/DL (ref 7–21)
CALCIUM SERPL-MCNC: 8.5 MG/DL (ref 8.4–10.5)
CHLORIDE SERPL-SCNC: 98 MMOL/L (ref 98–107)
CO2 SERPL-SCNC: 30 MMOL/L (ref 21–33)
EOSINOPHIL # BLD: 0.1 10*3/UL (ref 0–0.7)
EOSINOPHIL NFR BLD: 0.3 % (ref 1.5–5)
ERYTHROCYTE [DISTWIDTH] IN BLOOD BY AUTOMATED COUNT: 23.1 % (ref 11.5–14.5)
GLOBULIN SER-MCNC: 4.2 GM/DL
GLUCOSE SERPL-MCNC: 82 MG/DL (ref 70–110)
GRANULOCYTES # BLD: 14.92 10*3/UL (ref 1.4–6.5)
GRANULOCYTES NFR BLD: 80.8 % (ref 50–68)
HCT VFR BLD CALC: 28.2 % (ref 42–52)
INR PPP: 1.11 (ref 0.93–1.08)
LIPASE SERPL-CCNC: 24 U/L (ref 23–300)
LYMPHOCYTES # BLD: 1.9 10*3/UL (ref 1.2–3.4)
LYMPHOCYTES NFR BLD AUTO: 10.2 % (ref 22–35)
MCH RBC QN AUTO: 22.8 PG (ref 25–35)
MCHC RBC AUTO-ENTMCNC: 30.9 G/DL (ref 31–37)
MCV RBC AUTO: 74 FL (ref 80–105)
MONOCYTES # BLD AUTO: 1.6 10*3/UL (ref 0.1–0.6)
MONOCYTES NFR BLD: 8.6 % (ref 1–6)
PLATELET # BLD: 580 10^3/UL (ref 120–450)
PMV BLD AUTO: 8.7 FL (ref 7–11)
POTASSIUM SERPL-SCNC: 3.8 MMOL/L (ref 3.6–5)
PROT SERPL-MCNC: 7.8 G/DL (ref 5.8–8.3)
SODIUM SERPL-SCNC: 138 MMOL/L (ref 132–148)
WBC # BLD AUTO: 18.5 10^3/UL (ref 4.5–11)

## 2017-04-28 NOTE — CT
EXAM:

  CT Abdomen and Pelvis Without Intravenous Contrast



CLINICAL HISTORY:

  21 years old, male; Pain; Abdominal pain; Acute; Prior surgery; Surgery date: 

6+ months; Surgery type: Colon resection. Crohns; Patient HX: Right side abd 

pain. Abcess. Open wound right side of back. Draining pus



TECHNIQUE:

  Axial computed tomography images of the abdomen and pelvis without 

intravenous contrast.  This CT exam was performed using one or more of the 

following dose reduction techniques:  automated exposure control, adjustment of 

the mA and/or kV according to patient size, and/or use of iterative 

reconstruction technique.

  Coronal and sagittal reformatted images were created and reviewed.



EXAM DATE/TIME:

  Exam ordered 4/28/2017 10:34 PM



COMPARISON:

  CT - ABD PELVIS PO   IV CONTRAST 3/26/2017 2:14:38 AM



FINDINGS:

  Lower thorax:  The lung bases show no definite airspace disease, there is a 5 

mm nodule in the right lower lobe on the uppermost image.  Air in the esophagus 

in keeping with reflux.  Small hiatus hernia.



 ABDOMEN:

  Liver:  Unremarkable.

  Gallbladder and bile ducts:  The gallbladder is collapsed.  No calcified 

stones.  No ductal dilation.

  Pancreas: Limited evaluation.

  Spleen:  Unremarkable.  No splenomegaly.

  Adrenals:  There is moderate thickening of the medial limb of the left 

adrenal, see series 2 image 34.

  Kidneys and ureters:  There is no definite hydronephrosis.

  Stomach and bowel:  There is comparison to previous CT dated March 26, 2017, 

at that time there was note of multiple abscesses, or colonic wall thickening 

and bladder wall thickening, noted a history of Crohn's disease and abdominal 

procedures.  The present study is greatly limited by the absence of intravenous 

contrast and the paucity of abdominal fat, if the patient can tolerate 

contrast, a contrast-enhanced examination is recommended.  Alternatively 

magnetic resonance imaging may be useful.  There is also significant limitation 

related to minimal oral contrast opacification, oral contrast was apparently 

administered but has not passed beyond the duodenum.  There is again note of a 

bowel anastomosis.  Patient is reported to be status post previous colonic 

resection.  There is formed fecal content in the colon.  There is no finding of 

abdominal wall hernia containing bowel.  No obstruction.

  Appendix: Cannot evaluate the appendix, please correlate whether it may be 

surgically absent.



 PELVIS:

  Bladder:  Compared to the previous study the bladder appears less 

thickwalled.  No stones.

  Reproductive:  Prostate approximately 4.3 cm.



 ABDOMEN and PELVIS:

  Intraperitoneal space:  There is no free intraperitoneal air.  Noting the 

severe limitations of the present study, it is possible to compare some of the 

previously known other abscesses.  There is redemonstration of abnormal fluid 

and dots of air associated with the right psoas series 2 image 95, favor that 

this represents an interval increase in air in the previously described 

abscesses associated with the right psoas and right iliac muscles.  Again noted 

that free fluid in the right paracolic gutter is inseparable from the apparent 

abscess/phlegmon associated with the right psoas and right lateral abdominal 

wall.

  Bones/joints:  There is no definitive radiographic evidence of osteomyelitis, 

noting that CT is of limited sensitivity for osteomyelitis.  No acute fracture. 

 No dislocation.

  Soft tissues:  History is provided of open wound at the right aspect of the 

back.  this area is visualized series 2 image 105 at approximately the level of 

the L4-L5 disc space on the right.  There is tissue attenuation tracking from 

the right posterior paraspinal muscles through the subcutaneous fat.  In the 

absence of iv  contrast, cannot determine whether this represents a drainable 

collection or a phlegmon.  However, more caudally, series 2 image 115, it is 

favored that there is some fluid tracking both laterally and medially within 

the subcutaneous fat just superficial to the paraspinal muscles and right 

gluteal.  Please note that this was in the area described previously, at which 

time series 2 image 101 on the study of March 26 a distinct drainable 

collection in this area was not seen.  It is favored that there is some 

interval increase in associated fluid and soft tissue findings, this study does 

not provide an adequate evaluation regarding whether some of this may be 

drainable.   





  Vasculature:  Unremarkable.  No abdominal aortic aneurysm.

  Lymph nodes: Very limited evaluation 



Other findings:  



IMPRESSION:     

  Very limited examination noting absence of intravenous contrast and  minimal 

oral contrast present.



  Interval increase in air associated with the right psoas and right flank, 

dots of air   now extend as high as the retrocrural region at L1 series 2 image 

61, appearing in continuity with the findings suggesting abscess and/or 

phlegmon in the right psoas, right iliac , inseparable from fluid tracking 

along the right paracolic gutter and also inseparable from the abnormality 

which brings the patient to the present CT in the right posterior soft tissues.



   In the area of the previously described posterior right subcutaneous tissues 

where there was fluid seen tracking to the skin surface previously, there is an 

interval increase in subcutaneous fat abnormality.  Please note that this 

finding may be in continuity with the probable psoas abscess.  Cannot determine 

on this noncontrast study whether this is phlegmon this order whether there is 

a drainable collection.



If clarification of details of the soft tissues will alter management, either a 

contrast-enhanced CT or an MRI may be useful.

## 2017-04-29 VITALS — RESPIRATION RATE: 18 BRPM

## 2017-04-29 LAB
ADD MANUAL DIFF?: NO
ALBUMIN/GLOB SERPL: 0.9 {RATIO} (ref 1.1–1.8)
ALP SERPL-CCNC: 80 U/L (ref 38–133)
ALT SERPL-CCNC: 24 U/L (ref 7–56)
AST SERPL-CCNC: 36 U/L (ref 15–59)
BASOPHILS # BLD AUTO: 0.01 K/MM3 (ref 0–2)
BASOPHILS NFR BLD: 0.1 % (ref 0–3)
BILIRUB SERPL-MCNC: 0.5 MG/DL (ref 0.2–1.3)
BUN SERPL-MCNC: 6 MG/DL (ref 7–21)
CALCIUM SERPL-MCNC: 8.3 MG/DL (ref 8.4–10.5)
CHLORIDE SERPL-SCNC: 101 MMOL/L (ref 98–107)
CO2 SERPL-SCNC: 28 MMOL/L (ref 21–33)
EOSINOPHIL # BLD: 0.1 10*3/UL (ref 0–0.7)
EOSINOPHIL NFR BLD: 0.4 % (ref 1.5–5)
ERYTHROCYTE [DISTWIDTH] IN BLOOD BY AUTOMATED COUNT: 22.8 % (ref 11.5–14.5)
GLOBULIN SER-MCNC: 3.9 GM/DL
GLUCOSE SERPL-MCNC: 109 MG/DL (ref 70–110)
GRANULOCYTES # BLD: 12.38 10*3/UL (ref 1.4–6.5)
GRANULOCYTES NFR BLD: 86.8 % (ref 50–68)
HCT VFR BLD CALC: 26.9 % (ref 42–52)
LYMPHOCYTES # BLD: 0.9 10*3/UL (ref 1.2–3.4)
LYMPHOCYTES NFR BLD AUTO: 6.2 % (ref 22–35)
MAGNESIUM SERPL-MCNC: 2.1 MG/DL (ref 1.7–2.2)
MCH RBC QN AUTO: 23.1 PG (ref 25–35)
MCHC RBC AUTO-ENTMCNC: 31.2 G/DL (ref 31–37)
MCV RBC AUTO: 74.1 FL (ref 80–105)
MONOCYTES # BLD AUTO: 0.9 10*3/UL (ref 0.1–0.6)
MONOCYTES NFR BLD: 6.5 % (ref 1–6)
PHOSPHATE SERPL-MCNC: 2.7 MG/DL (ref 2.5–4.5)
PLATELET # BLD: 465 10^3/UL (ref 120–450)
PMV BLD AUTO: 8 FL (ref 7–11)
POTASSIUM SERPL-SCNC: 3.8 MMOL/L (ref 3.6–5)
PROT SERPL-MCNC: 7.3 G/DL (ref 5.8–8.3)
SODIUM SERPL-SCNC: 137 MMOL/L (ref 132–148)
WBC # BLD AUTO: 14.3 10^3/UL (ref 4.5–11)

## 2017-04-29 RX ADMIN — MEROPENEM AND SODIUM CHLORIDE SCH MLS/HR: 1 INJECTION, SOLUTION INTRAVENOUS at 21:53

## 2017-04-29 RX ADMIN — HYDROMORPHONE HYDROCHLORIDE PRN MG: 1 INJECTION, SOLUTION INTRAMUSCULAR; INTRAVENOUS; SUBCUTANEOUS at 01:16

## 2017-04-29 RX ADMIN — MORPHINE SULFATE PRN MG: 4 INJECTION, SOLUTION INTRAMUSCULAR; INTRAVENOUS at 22:38

## 2017-04-29 RX ADMIN — ENOXAPARIN SODIUM SCH MG: 40 INJECTION SUBCUTANEOUS at 09:01

## 2017-04-29 RX ADMIN — Medication SCH CAP: at 09:01

## 2017-04-29 RX ADMIN — HYDROMORPHONE HYDROCHLORIDE PRN MG: 1 INJECTION, SOLUTION INTRAMUSCULAR; INTRAVENOUS; SUBCUTANEOUS at 14:53

## 2017-04-29 RX ADMIN — MORPHINE SULFATE PRN MG: 4 INJECTION, SOLUTION INTRAMUSCULAR; INTRAVENOUS at 10:41

## 2017-04-29 RX ADMIN — METRONIDAZOLE SCH MLS/HR: 500 INJECTION, SOLUTION INTRAVENOUS at 14:52

## 2017-04-29 RX ADMIN — Medication SCH CAP: at 03:21

## 2017-04-29 RX ADMIN — Medication SCH CAP: at 18:06

## 2017-04-29 RX ADMIN — HYDROMORPHONE HYDROCHLORIDE PRN MG: 1 INJECTION, SOLUTION INTRAMUSCULAR; INTRAVENOUS; SUBCUTANEOUS at 09:01

## 2017-04-29 RX ADMIN — METRONIDAZOLE SCH MLS/HR: 500 INJECTION, SOLUTION INTRAVENOUS at 03:21

## 2017-04-29 RX ADMIN — HYDROMORPHONE HYDROCHLORIDE PRN MG: 1 INJECTION, SOLUTION INTRAMUSCULAR; INTRAVENOUS; SUBCUTANEOUS at 21:00

## 2017-04-29 RX ADMIN — MORPHINE SULFATE PRN MG: 4 INJECTION, SOLUTION INTRAMUSCULAR; INTRAVENOUS at 03:21

## 2017-04-29 RX ADMIN — MORPHINE SULFATE PRN MG: 4 INJECTION, SOLUTION INTRAMUSCULAR; INTRAVENOUS at 16:42

## 2017-04-29 NOTE — ED PDOC
Arrival/HPI





- General


Historian: Patient





<Ana Maria Degroot A - Last Filed: 04/29/17 00:46>





<Liu Meléndez - Last Filed: 04/29/17 00:48>





- General


Chief Complaint: Abdominal Pain


Time Seen by Provider: 04/28/17 20:02





- History of Present Illness


Narrative History of Present Illness (Text): 





04/29/17 00:22


20yo male with PMhx of Chron's disease, anemia, present with complaint of 

worsening right sided abdominal pain and discharge from the wound x one week. 

Patient states he was seen last week at [place] and given antibiotics for 

Pneumonia. states he continues to cough and it makes his right sided abdominal 

pain worse. Feels that the area is swollen and noticing increasing discharge 

from his abdominal surgical wounds. He denies fever, chills, vomiting, diarrhea

, constipation, chest pain. He had surgical colon resection last year July.











 (Ana Maria Degroot A)





Past Medical History





- Provider Review


Nursing Documentation Reviewed: Yes





- Infectious Disease


Hx of Infectious Diseases: None





- Reproductive


Currently Pregnant: No





- Cardiac


Hx Cardiac Disorders: No





- Pulmonary


Hx Respiratory Disorders: No





- Neurological


Hx Transient Ischemic Attacks (TIA): No





- HEENT


Hx HEENT Disorder: No





- Renal


Hx Renal Disorder: No





- Endocrine/Metabolic


Hx Endocrine Disorders: No





- Hematological/Oncological


Hx Anemia: Yes (Fe Def Anemia)





- Integumentary


Hx Dermatological Disorder: No





- Musculoskeletal/Rheumatological


Hx Musculoskeletal Disorders: No


Hx Falls: No





- Gastrointestinal


Hx Crohn's Disease: Yes (multiple surgeries fistulas per patient)





- Genitourinary/Gynecological


Hx Genitourinary Disorders: No





- Psychiatric


Hx Psychophysiologic Disorder: No


Hx Substance Use: No





- Surgical History


Other/Comment: Abd surg. for intraabd. abscesses x 7 [place] [facility] [place]





- Anesthesia


Hx Anesthesia: Yes


Hx Anesthesia Reactions: No


Hx Malignant Hyperthermia: No





- Suicidal Assessment


Feels Threatened In Home Enviroment: No





<Ana Maria Degroot A - Last Filed: 04/29/17 00:46>





Family/Social History





- Physician Review


Nursing Documentation Reviewed: Yes


Family/Social History: Unknown Family HX


Smoking Status: Never Smoked


Hx Alcohol Use: No


Hx Substance Use: No





<Ana Maria Degroot A - Last Filed: 04/29/17 00:46>





Allergies/Home Meds





<Ana Maria Degroot A - Last Filed: 04/29/17 00:46>





<Liu Meléndez - Last Filed: 04/29/17 00:48>


Allergies/Adverse Reactions: 


Allergies





diphenhydramine [From Benadryl] Allergy (Verified 04/28/17 20:20)


 RASH


FISH Allergy (Verified 04/28/17 20:19)


 RASH


ketorolac [From Toradol] Allergy (Verified 04/28/17 20:19)


 RASH


shellfish derived Adverse Reaction (Verified 04/28/17 20:19)


 ANGIOEDEMA








Home Medications: 


 Home Meds











 Medication  Instructions  Recorded  Confirmed


 


Cephalexin [Keflex] 500 mg PO BID 04/28/17 04/28/17














Review of Systems





- Physician Review


All systems were reviewed & negative as marked: Yes





- Review of Systems


Constitutional: Normal


Eyes: Normal


ENT: Normal


Respiratory: Normal


Cardiovascular: Normal


Gastrointestinal: Abdominal Pain.  absent: Constipation, Diarrhea, Nausea, 

Vomiting, Hematochezia, Hematemesis


Genitourinary Male: Normal


Musculoskeletal: Normal


Skin: Normal


Neurological: Normal


Endocrine: Normal


Hemo/Lymphatic: Normal


Psychiatric: Normal





<Ana Maria Degroot A - Last Filed: 04/29/17 00:46>





Physical Exam


Vital Signs Reviewed: Yes


Temperature: Febrile


Blood Pressure: Normal


Pulse: Tachycardic


Respiratory Rate: Normal


Appearance: Positive for: Well-Appearing, Non-Toxic, Comfortable


Pain Distress: None


Mental Status: Positive for: Alert and Oriented X 3





- Systems Exam


Head: Present: Atraumatic, Normocephalic


Pupils: Present: PERRL


Extroacular Muscles: Present: EOMI


Conjunctiva: Present: Normal


Mouth: Present: Moist Mucous Membranes


Neck: Present: Normal Range of Motion


Respiratory/Chest: Present: Clear to Auscultation, Good Air Exchange.  No: 

Respiratory Distress, Accessory Muscle Use, Wheezes, Decreased Breath Sounds, 

Rales, Retracting, Rhonchi


Cardiovascular: Present: Regular Rate and Rhythm, Normal S1, S2.  No: Murmurs


Abdomen: Present: Tenderness (Diffuse tenderness), Normal Bowel Sounds, Guarding

, Scars (Healed surgical scars noted), Other (Open wound with purulent 

discharge noted drom the abdominal surgical open wound).  No: Distention, 

Peritoneal Signs, Rebound, McBurney's Point Tender, Rovsing's Sign Present


Back: Present: Normal Inspection


Upper Extremity: Present: Normal Inspection.  No: Cyanosis, Edema


Lower Extremity: Present: Normal Inspection.  No: Edema


Neurological: Present: GCS=15, CN II-XII Intact, Speech Normal


Skin: Present: Warm, Dry, Normal Color.  No: Rashes


Psychiatric: Present: Alert, Oriented x 3, Normal Insight, Normal Concentration





<Ana Maria diego A - Last Filed: 04/29/17 00:46>





Medical Decision Making





<MikaylaAna Maria TASHI - Last Filed: 04/29/17 00:46>





<Liu Meléndez - Last Filed: 04/29/17 00:48>


ED Course and Treatment: 





04/29/17 00:40


PT in ED for stated history. He had low grade temp on presentation. His pain 

was controlled in ED with medication. On re valuation he states his pain 

started coming back. He had leukocytosis on lab review.





Abdominal/Pelvis CT was ordered








IMPRESSION:       


   Very limited examination noting absence of intravenous contrast and  minimal

   


 oral contrast present.  


 


   Interval increase in air associated with the right psoas and right flank,   


 dots of air   now extend as high as the retrocrural region at L1 series 2 

image   


 61, appearing in continuity with the findings suggesting abscess and/or   


 phlegmon in the right psoas, right iliac , inseparable from fluid tracking   


 along the right paracolic gutter and also inseparable from the abnormality   


 which brings the patient to the present CT in the right posterior soft 

tissues.  


 


    In the area of the previously described posterior right subcutaneous 

tissues   


 where there was fluid seen tracking to the skin surface previously, there is 

an   


 interval increase in subcutaneous fat abnormality.  Please note that this   


 finding may be in continuity with the probable psoas abscess.  Cannot 

determine   


 on this noncontrast study whether this is phlegmon this order whether there is

   


 a drainable collection.  


 


 If clarification of details of the soft tissues will alter management, either 

a   


 contrast-enhanced CT or an MRI may be useful.  


 


Chest Xay - ???RML infiltrate





PT will be admitted for further evaluation and treatment.


Koryn ordered








Case was ERNESTINE Spence. He accepted pt for admission





Result and plan was DW the pt and he agreed. (johnathonAna Maria A)





- Lab Interpretations


Lab Results: 











 04/28/17 21:39 





 04/28/17 21:39 





 Lab Results





04/28/17 21:39: Sodium 138, Potassium 3.8, Chloride 98, Carbon Dioxide 30, 

Anion Gap 14, BUN 6 L, Creatinine 0.9, Est GFR (African Amer) > 60, Est GFR (Non

-Af Amer) > 60, Random Glucose 82, Calcium 8.5, Total Bilirubin 0.6, AST 56, 

ALT 24, Alkaline Phosphatase 82, Total Protein 7.8, Albumin 3.7, Globulin 4.2, 

Albumin/Globulin Ratio 0.9 L, Lipase 24


04/28/17 21:39: PT 12.0 H, INR 1.11 H, APTT 30.6


04/28/17 21:39: WBC 18.5 H D, RBC 3.81, Hgb 8.7 L, Hct 28.2 L, MCV 74.0 L, MCH 

22.8 L, MCHC 30.9 L, RDW 23.1 H, Plt Count 580 H, MPV 8.7, Gran % 80.8 H, Lymph 

% (Auto) 10.2 L, Mono % (Auto) 8.6 H, Eos % (Auto) 0.3 L, Baso % (Auto) 0.1, 

Gran # 14.92 H, Lymph # 1.9, Mono # 1.6 H, Eos # 0.1, Baso # 0.02











- RAD Interpretation


Radiology Orders: 











04/28/17 22:34


ABD & PELVIS W/O PO OR IV CONT [CT] Stat 





04/28/17 22:38


CHEST TWO VIEWS (PA/LAT) [RAD] Stat 














- Medication Orders


Current Medication Orders: 











Piperacillin Sod/Tazobactam Sod (Zosyn 3.375 In Ns 100ml)  100 mls @ 200 mls/hr 

IVPB STAT STA


   PRN Reason: Protocol


   Stop: 04/29/17 01:06





Discontinued Medications





Hydromorphone HCl (Dilaudid)  1 mg IVP STAT STA


   Stop: 04/28/17 22:35


   Last Admin: 04/28/17 23:05  Dose: 1 mg





Sodium Chloride (Sodium Chloride 0.9%)  1,000 mls @ 999 mls/hr IV .Q1H1M STA


   Stop: 04/28/17 22:00


   Last Admin: 04/28/17 21:45  Dose: 999 mls/hr





Ketorolac Tromethamine (Toradol)  30 mg IVP STAT STA


   Stop: 04/28/17 21:55


   Last Admin: 04/28/17 22:25  Dose: 30 mg





Pantoprazole Sodium (Protonix Inj)  40 mg IVP STAT STA


   Stop: 04/28/17 20:57


   Last Admin: 04/28/17 23:05  Dose: 40 mg











- PA / NP / Resident Statement


MD/ has reviewed & agrees with the documentation as recorded.


MD/ has examined the patient and agrees with the treatment plan.





<Liu Meléndez - Last Filed: 04/29/17 00:48>





Disposition/Present on Arrival





- Present on Arrival


Any Indicators Present on Arrival: No


History of DVT/PE: No


History of Uncontrolled Diabetes: No


Urinary Catheter: No


History of Decub. Ulcer: No


History Surgical Site Infection Following: None





- Disposition


Have Diagnosis and Disposition been Completed?: Yes


Disposition Time: 00:20





<Ana Maria Degroot - Last Filed: 04/29/17 00:46>





<Liu Meléndez - Last Filed: 04/29/17 00:48>





- Disposition


Diagnosis: 


 Abdominal pain, Pneumonia





Disposition: HOSPITALIZED


Patient Problems: 


 Current Active Problems











Problem Status Onset


 


Abdominal pain Acute  


 


Pneumonia Acute  











Condition: FAIR

## 2017-04-29 NOTE — CP.PCM.CON
Addendum entered and electronically signed by Nadege Mathews DO  04/29/17 03:29

: 





Patient refused repeat CT abdomen and pelvis with PO contrast. Primary team 

aware.





Back exam: R flank wound with profuse purulent fluid expressible


Abdominal exam: 1 small skin wound approximately 4-5cm under the umbilicus with 

minimal muco-purulent drainage. 1 small skin wound in the RLQ with moderate muco

-purulent drainage expressed. No surrounding erythema or active bleeding





Original Note:








<Nadege Mathews - Last Filed: 04/29/17 03:10>





History of Present Illness





- History of Present Illness


History of Present Illness: 


General Surgery consult for Dr. Knapp


Consulted for: Multiple chronic enterocutaneous fistulas and intra-abdominal 

abscess d/t crohns





Pt is 21M with PMH of crohns with chronic right lower quadrant intra-abdominal 

abscesses with cutaneous fistulas, iron deficiency, anemia, and opiate abuse 

with drug seeking behavior and PSH including (per patient--operations performed 

at [facility] [place]) 7 abdominal abscess drains and 2 abdominal surgeries to "clear out the 

abscesses" with probably a bowel resection at some point per anastamosis seen 

on CT. Patient presented to the ED tonight with worsened Right sided abdominal 

pain and flank pain for 2-3weeks for which he has been to the ER and 

hospitalized at multiple institutions multiple times in the past month. Patient 

states that he has felt more swollen on his right flank and the pain is 

spreading to his proximal thigh. Patient also states that purulent drainage 

from the right flank fistula and abdominal fistulas has increased. Patient has 

been treated with multiple IV antibiotics for the abscesses and enterocutaneous 

fistulas over the past few months. Patient was also treated for pneumonia with 

levaquin recently. Patient reports chronic diarrhea and feeling febrile since 

he was diagnosed with Crohns but denies any recent changes in these symptoms. 

Patient also denies nausea, vomiting, hematochezia, melena, dysuria, hematuria, 

chest pain, SOB, cough, light headedness or any other symptoms. Patient states 

that he sees a GI doctor for monthly remicaid and will have another dose in a 

week or two. Patient states that he supposed to set up an appointment with a 

pain specialist outpatient but hasn't yet done so.





Patient had mild fever 100.6 and -111 in the ED.


CT abdomen and pelvis in the ER: Limited visualization of R flank soft tissue, 

but possible increase in air in the previous abscess on R psoas/iliac muscles 

since last CT, uncertain if fluid collections are drainable.





PMH: Crohn's, Anemia


PSH: 7 drainages of intra-abdominal abscesses, bowel resection


SH: No ETOH/tobacco, + drug seeking behavior


Meds: See MAR


All: shell fish, benadryl








Review of Systems





- Review of Systems


All systems: reviewed and no additional remarkable complaints except (as per HPI

)





- Constitutional


Constitutional: As Per HPI.  absent: Headache





- Cardiovascular


Cardiovascular: As Per HPI.  absent: Dyspnea, Leg Edema, Palpitations, Pedal 

Edema





- Respiratory


Respiratory: absent: Cough, Dyspnea, Dyspnea on Exertion, Chest Congestion





- Gastrointestinal


Gastrointestinal: As Per HPI





- Genitourinary


Genitourinary: As Per HPI.  absent: Dysuria, Hematuria, Pyuria





- Musculoskeletal


Musculoskeletal: Back Pain.  absent: Numbness, Tingling





- Integumentary


Integumentary: As Per HPI, Wounds (two abdominal wounds draining purulent fluid

, flank wound draining purulent fluid)





- Neurological


Neurological: absent: Abnormal Gait, Dizziness, Headaches, Syncope





- Endocrine


Endocrine: absent: Palpitations, Polyuria





Past Patient History





- Infectious Disease


Hx of Infectious Diseases: None





- Past Medical History & Family History


Past Medical History?: Yes





- Past Social History


Smoking Status: Never Smoked





- CARDIAC


Hx Cardiac Disorders: No





- PULMONARY


Hx Respiratory Disorders: No





- NEUROLOGICAL


Hx Transient Ischemic Attacks (TIA): No





- HEENT


Hx HEENT Problems: No





- RENAL


Hx Chronic Kidney Disease: No





- ENDOCRINE/METABOLIC


Hx Endocrine Disorders: No





- HEMATOLOGICAL/ONCOLOGICAL


Hx Anemia: Yes (Fe Def Anemia)





- INTEGUMENTARY


Hx Dermatological Problems: No





- MUSCULOSKELETAL/RHEUMATOLOGICAL


Hx Musculoskeletal Disorders: No


Hx Falls: No





- GASTROINTESTINAL


Hx Crohn's Disease: Yes (multiple surgeries fistulas per patient)





- GENITOURINARY/GYNECOLOGICAL


Hx Genitourinary Disorders: No





- PSYCHIATRIC


Hx Psychophysiologic Disorder: No


Hx Substance Use: No





- SURGICAL HISTORY


Other/Comment: Abd surg. for intraabd. abscesses x 7 last surgery 2/2017 at [facility] [place]





- ANESTHESIA


Hx Anesthesia: Yes


Hx Anesthesia Reactions: No


Hx Malignant Hyperthermia: No





Meds


Allergies/Adverse Reactions: 


 Allergies











Allergy/AdvReac Type Severity Reaction Status Date / Time


 


diphenhydramine Allergy  RASH Verified 04/28/17 20:20





[From Benadryl]     


 


FISH Allergy  RASH Verified 04/28/17 20:19


 


ketorolac [From Toradol] Allergy  RASH Verified 04/28/17 20:19


 


shellfish derived AdvReac  ANGIOEDEMA Verified 04/28/17 20:19














- Medications


Medications: 


 Current Medications





Acetaminophen (Tylenol 325mg Tab)  650 mg PO Q6 PRN


   PRN Reason: Fever >100.4 F


Enoxaparin Sodium (Lovenox)  40 mg SC DAILY [facility]


   PRN Reason: Protocol


Famotidine (Pepcid)  20 mg PO BID [facility]


Hydromorphone HCl (Dilaudid)  0.5 mg IVP Q8H PRN


   PRN Reason: Pain, severe (8-10)


   Last Admin: 04/29/17 01:16 Dose:  0.5 mg


Sodium Chloride (Sodium Chloride 0.9%)  1,000 mls @ 100 mls/hr IV .Q10H [facility]


   Last Admin: 04/29/17 01:09 Dose:  100 mls/hr


Metronidazole (Flagyl)  500 mg in 100 mls @ 100 mls/hr IVPB Q8 [facility]


   PRN Reason: Protocol


Vancomycin HCl (Vancomycin 1gm)  1 gm in 250 mls @ 167 mls/hr IVPB DAILY [facility]


   PRN Reason: Protocol


Morphine Sulfate (Morphine)  4 mg IVP Q6H PRN


   PRN Reason: Pain, moderate (4-7)


Ondansetron HCl (Zofran Inj)  4 mg IVP Q6 PRN


   PRN Reason: Nausea/Vomiting











Physical Exam





- Constitutional


Appears: Well, Non-toxic, No Acute Distress





- Head Exam


Head Exam: ATRAUMATIC, NORMOCEPHALIC





- Eye Exam


Eye Exam: Normal appearance.  absent: Conjunctival injection, Scleral icterus





- ENT Exam


ENT Exam: Mucous Membranes Moist, Normal Oropharynx





- Respiratory Exam


Respiratory Exam: NORMAL BREATHING PATTERN.  absent: Accessory Muscle Use, 

Respiratory Distress





- Cardiovascular Exam


Cardiovascular Exam: Tachycardia, REGULAR RHYTHM





- GI/Abdominal Exam


GI & Abdominal Exam: Guarding (voluntary), Soft, Tenderness (diffusely tender, 

worst on RLQ).  absent: Distended





- Extremities Exam


Extremities exam: Positive for: pedal pulses present.  Negative for: calf 

tenderness, pedal edema, tenderness





- Back Exam


Back exam: absent: CVA tenderness (L), CVA tenderness (R)





- Neurological Exam


Neurological exam: Alert, Oriented x3





- Psychiatric Exam


Psychiatric exam: Normal Affect, Normal Mood





- Skin


Skin Exam: Dry, Normal Color, Warm





Results





- Vital Signs


Recent Vital Signs: 


 Last Vital Signs











Temp  100.6 F H  04/28/17 20:07


 


Pulse  100 H  04/28/17 22:39


 


Resp  16   04/28/17 22:39


 


BP  130/42 L  04/28/17 22:39


 


Pulse Ox  100   04/28/17 22:39














- Labs


Result Diagrams: 


 04/28/17 21:39





 04/28/17 21:39


Labs: 


 Laboratory Results - last 24 hr











  04/28/17 04/28/17 04/28/17





  21:39 21:39 21:39


 


WBC  18.5 H D  


 


RBC  3.81  


 


Hgb  8.7 L  


 


Hct  28.2 L  


 


MCV  74.0 L  


 


MCH  22.8 L  


 


MCHC  30.9 L  


 


RDW  23.1 H  


 


Plt Count  580 H  


 


MPV  8.7  


 


Gran %  80.8 H  


 


Lymph % (Auto)  10.2 L  


 


Mono % (Auto)  8.6 H  


 


Eos % (Auto)  0.3 L  


 


Baso % (Auto)  0.1  


 


Gran #  14.92 H  


 


Lymph #  1.9  


 


Mono #  1.6 H  


 


Eos #  0.1  


 


Baso #  0.02  


 


PT   12.0 H 


 


INR   1.11 H 


 


APTT   30.6 


 


Sodium    138


 


Potassium    3.8


 


Chloride    98


 


Carbon Dioxide    30


 


Anion Gap    14


 


BUN    6 L


 


Creatinine    0.9


 


Est GFR ( Amer)    > 60


 


Est GFR (Non-Af Amer)    > 60


 


Random Glucose    82


 


Calcium    8.5


 


Total Bilirubin    0.6


 


AST    56


 


ALT    24


 


Alkaline Phosphatase    82


 


Total Protein    7.8


 


Albumin    3.7


 


Globulin    4.2


 


Albumin/Globulin Ratio    0.9 L


 


Lipase    24














Assessment & Plan





- Assessment and Plan (Free Text)


Assessment: 


21M with PMH including chronic anemia, crohns, opiate abuse with drug seeking 

tendencies, intra-abdominal abscess, and multiple right flank and right abdomen 

enterocutaneous fistulas complaining of worsening abdominal pain and fistula 

drainage


WBC 18.5


T: 100.6


H/H: 8.7/28.2


CT abdomen and pelvis in the ER: Limited visualization of R flank soft tissue, 

but possible increase in air in the previous abscess on R psoas/iliac muscles 

since last CT, uncertain if fluid collections are drainable.





Plan: 


Abx


CLD


AM labs


F/U GI consults


F/u wound culture


F/U C. diff stool


F/u MRI


Analgesics


Serial Abd exams


 


Further recs per Dr. Ra Mathews, PGY1


[phone]





<Reji Knapp - Last Filed: 05/03/17 20:29>





Results





- Vital Signs


Recent Vital Signs: 


 Last Vital Signs











Temp  96.9 F L  05/01/17 06:00


 


Pulse  88   05/01/17 06:00


 


Resp  18   05/01/17 06:00


 


BP  132/74   05/01/17 06:00


 


Pulse Ox  100   05/01/17 06:00














- Labs


Result Diagrams: 


 05/01/17 06:00





 05/01/17 06:00





Attending/Attestation





- Attestation


I have personally seen and examined this patient.: Yes


I have fully participated in the care of the patient.: Yes


I have reviewed all pertinent clinical information: Yes


Notes (Text): 





05/03/17 20:28


Pt was seen and examined at bedside on 04/29/17


Agree with above note and assessment


Pt with Crohns dis and intraabdominal abscess


Pt would need IR guided drainage of abscess


C.w IV antibiotics


Plan d.w pt and primary team in detail

## 2017-04-29 NOTE — CP.PCM.HP
Addendum entered and electronically signed by Madison Kahn DO  04/29/17 01:37: 





Pt currently refusing CT abdomen with PO contrast- will order MRI 





Original Note:








<Madison Kahn - Last Filed: 04/29/17 01:35>





History of Present Illness





- History of Present Illness


History of Present Illness: 





Internal medicine H & P for Hospitalist service- Madison Kahn, PGY-1





Pt S & E at bedside. 





21  M w/PMH sig for Crohn's and anemia admitted for abdominal 

pain x 1 wk.  Pain is sharp/stabbing, right sided with radiation to back, 

moderate to severe intensity, worsened over past week.  Alleviated by certain 

positions, Dilaudid, aggravated by certain foods/positions.  Admits to diarrhea 

daily, subjective fevers, cough, headache.  Denies N/V, chills, CP, palpitations

, sore throat, SOB, rhinorrhea, hearing changes, vision changes, constipation, 

wt loss. 





Note: Pt has been seen in ED in [facility] system multiple times in past for 

similar- Surgeon is at [facility] [place], just switched PMDs- attempting to set up 

outpatient pain mgmt





PMH: Crohns on Remicaide Q4-6 wks, anemia


PSH: multiple abdominal surgerys, IR  & D for abscesses


All: Benadryl, Shellfish, FISH, ketoralac


SH: Denies ETOH use, current tobacco use, illicit drug use


PMD: Antoinette


Outpt GI: [facility]





Present on Admission





- Present on Admission


Any Indicators Present on Admission: No


History of DVT/PE: No


History of Uncontrolled Diabetes: No


Urinary Catheter: No


Decubitus Ulcer Present: No





Review of Systems





- Review of Systems


All systems: reviewed and no additional remarkable complaints except





- Constitutional


Constitutional: Chills, Fever, Headache.  absent: Weight Loss, Weakness





- EENT


Eyes: absent: Blurred Vision, Change in Vision, Diplopia


Ears: absent: Decreased Hearing


Nose/Mouth/Throat: absent: Nasal Congestion, Sore Throat





- Cardiovascular


Cardiovascular: absent: Chest Pain, Palpitations, Pedal Edema





- Respiratory


Respiratory: Cough





- Gastrointestinal


Gastrointestinal: Abdominal Pain, Diarrhea.  absent: Constipation, Nausea, 

Vomiting





- Genitourinary


Genitourinary: absent: Dysuria





- Musculoskeletal


Musculoskeletal: absent: Numbness, Tingling





- Integumentary


Integumentary: Non-Healing Lesions, Wounds





- Neurological


Neurological: absent: Weakness





Past Patient History





- Infectious Disease


Hx of Infectious Diseases: None





- Past Medical History & Family History


Past Medical History?: Yes





- Past Social History


Smoking Status: Never Smoked





- CARDIAC


Hx Cardiac Disorders: No





- PULMONARY


Hx Respiratory Disorders: No





- NEUROLOGICAL


Hx Transient Ischemic Attacks (TIA): No





- HEENT


Hx HEENT Problems: No





- RENAL


Hx Chronic Kidney Disease: No





- ENDOCRINE/METABOLIC


Hx Endocrine Disorders: No





- HEMATOLOGICAL/ONCOLOGICAL


Hx Anemia: Yes (Fe Def Anemia)





- INTEGUMENTARY


Hx Dermatological Problems: No





- MUSCULOSKELETAL/RHEUMATOLOGICAL


Hx Musculoskeletal Disorders: No


Hx Falls: No





- GASTROINTESTINAL


Hx Crohn's Disease: Yes (multiple surgeries fistulas per patient)





- GENITOURINARY/GYNECOLOGICAL


Hx Genitourinary Disorders: No





- PSYCHIATRIC


Hx Psychophysiologic Disorder: No


Hx Substance Use: No





- SURGICAL HISTORY


Other/Comment: Abd surg. for intraabd. abscesses x 7 last surgery 2/2017 at [facility] [place]





- ANESTHESIA


Hx Anesthesia: Yes


Hx Anesthesia Reactions: No


Hx Malignant Hyperthermia: No





Meds


Allergies/Adverse Reactions: 


 Allergies











Allergy/AdvReac Type Severity Reaction Status Date / Time


 


diphenhydramine Allergy  RASH Verified 04/28/17 20:20





[From Benadryl]     


 


FISH Allergy  RASH Verified 04/28/17 20:19


 


ketorolac [From Toradol] Allergy  RASH Verified 04/28/17 20:19


 


shellfish derived AdvReac  ANGIOEDEMA Verified 04/28/17 20:19














Physical Exam





- Constitutional


Appears: Non-toxic, No Acute Distress





- Head Exam


Head Exam: ATRAUMATIC, NORMAL INSPECTION, NORMOCEPHALIC





- Eye Exam


Eye Exam: EOMI, Normal appearance





- Neck Exam


Neck exam: Positive for: Full Rom, Normal Inspection





- Respiratory Exam


Respiratory Exam: Clear to Auscultation Bilateral, NORMAL BREATHING PATTERN.  

absent: Rales, Rhonchi, Wheezes, Respiratory Distress





- Cardiovascular Exam


Cardiovascular Exam: Tachycardia, +S1, +S2





- GI/Abdominal Exam


GI & Abdominal Exam: Hyperactive Bowel Sounds, Soft, Tenderness (over wounds in 

periumbilical region at 7 o'clock, and wound in suprapubic to right, both with 

purulent drainage, some yellow and pink tissue visible).  absent: Distended, 

Firm





- Extremities Exam


Extremities exam: Positive for: full ROM.  Negative for: pedal edema





- Neurological Exam


Neurological exam: Alert, CN II-XII Intact, Oriented x3





- Psychiatric Exam


Psychiatric exam: Normal Affect, Normal Mood





- Skin


Skin Exam: Warm


Additional comments: 





see extremity exam for skin findings of abdomen





Results





- Vital Signs


Recent Vital Signs: 





 Last Vital Signs











Temp  100.6 F H  04/28/17 20:07


 


Pulse  100 H  04/28/17 22:39


 


Resp  16   04/28/17 22:39


 


BP  130/42 L  04/28/17 22:39


 


Pulse Ox  100   04/28/17 22:39














- Labs


Result Diagrams: 


 04/28/17 21:39





 04/28/17 21:39


Labs: 





 Laboratory Results - last 24 hr











  04/28/17 04/28/17 04/28/17





  21:39 21:39 21:39


 


WBC  18.5 H D  


 


RBC  3.81  


 


Hgb  8.7 L  


 


Hct  28.2 L  


 


MCV  74.0 L  


 


MCH  22.8 L  


 


MCHC  30.9 L  


 


RDW  23.1 H  


 


Plt Count  580 H  


 


MPV  8.7  


 


Gran %  80.8 H  


 


Lymph % (Auto)  10.2 L  


 


Mono % (Auto)  8.6 H  


 


Eos % (Auto)  0.3 L  


 


Baso % (Auto)  0.1  


 


Gran #  14.92 H  


 


Lymph #  1.9  


 


Mono #  1.6 H  


 


Eos #  0.1  


 


Baso #  0.02  


 


PT   12.0 H 


 


INR   1.11 H 


 


APTT   30.6 


 


Sodium    138


 


Potassium    3.8


 


Chloride    98


 


Carbon Dioxide    30


 


Anion Gap    14


 


BUN    6 L


 


Creatinine    0.9


 


Est GFR ( Amer)    > 60


 


Est GFR (Non-Af Amer)    > 60


 


Random Glucose    82


 


Calcium    8.5


 


Total Bilirubin    0.6


 


AST    56


 


ALT    24


 


Alkaline Phosphatase    82


 


Total Protein    7.8


 


Albumin    3.7


 


Globulin    4.2


 


Albumin/Globulin Ratio    0.9 L


 


Lipase    24














Assessment & Plan





- Assessment and Plan (Free Text)


Assessment: 





22 yo  M w/PMH sig for Crohns and anemia admitted for abdominal 

pain, leukocytosis, non-healing abdominal wounds.  Pt currently stable. 


Plan: 





Abdominal pain w/hx of Crohns


Febrile Tmax 100.6


Leukocytosis of 18.5


Tylenol PRN Fever


Morphine 4mg Q8H PRN mod pain


Dilaudid 0.5mg Q8H PRN severe pain


Zofran 4mg Q6H PRN


Started Vancomycin 1gm Q24H


Started Flagyl 500mg Q8H


FU wound cx & gram stain


FU blood cx


FU U/A


Wound care referral- pt had toilet tissue covering wounds w/purulent strike 

through- wound was cleansed with betadine and covered with gauze w/o tape 

securing dressing at pt's request


CT ab/pelvis w/o contrast: 


Very limited examination noting absence of intravenous contrast and  minimal 

oral contrast present. Interval increase in air associated with the right psoas 

and right flank, dots of air   now extend as high as the retrocrural region at 

L1 series 2 image  61, appearing in continuity with the findings suggesting 

abscess and/or phlegmon in the right psoas, right iliac , inseparable from 

fluid tracking   


 along the right paracolic gutter and also inseparable from the abnormality, 

which brings the patient to the present CT in the right posterior soft tissues.

  In the area of the previously described posterior right subcutaneous tissues 

  


 where there was fluid seen tracking to the skin surface previously, there is 

an interval increase in subcutaneous fat abnormality.  Please note that this 

finding may be in continuity with the probable psoas abscess.  Cannot determine 

on this noncontrast study whether this is phlegmon this order whether there is 

a drainable collection.  If clarification of details of the soft tissues will 

alter management, either a contrast-enhanced CT or an MRI may be useful. 


Will repeat CT ab/pelvis w/PO contrast- no IV contrast due to allergy to 

shellfish


Surgery consulted- Ra





Diarrhea


NS @100


CLD


GI consulted- Juliana





Hx anemia


Hgb 8.70- basline range 7.7 to 9.3


Hct 28.2, baseline range 24.9 - 31.7


Monitor





GI/DVT ppx


Pepcid


SCDs


Lovenox





Dispo


Admit to Med-surg


VS Q6H


CLD


Activity as tolerated








DW attending





- Date & Time


Date: 04/29/17


Time: 23:45





Decision To Admit





- Pt Status Changed To:


Hospital Disposition Of: Observation





- .


Bed Request Type: Med/Surg


Admitting Physician: Alva Spence





<Alva Spence - Last Filed: 04/29/17 02:54>





Results





- Vital Signs


Recent Vital Signs: 





 Last Vital Signs











Temp  98.4 F   04/29/17 02:43


 


Pulse  84   04/29/17 02:43


 


Resp  18   04/29/17 02:43


 


BP  115/59 L  04/29/17 02:43


 


Pulse Ox  100   04/29/17 02:43














- Labs


Result Diagrams: 


 04/28/17 21:39





 04/28/17 21:39





Attending/Attestation





- Attestation


I have personally seen and examined this patient.: Yes


I have fully participated in the care of the patient.: Yes


I have reviewed all pertinent clinical information: Yes


Notes (Text): 





04/29/17 02:53


Patient was seen when she was in bed # 20 in the ER.


Agree with history , physical examination, assessment and plan.

## 2017-04-29 NOTE — RAD
HISTORY:

cough  



COMPARISON:

04/20/2017.



TECHNIQUE:

Chest PA and lateral



FINDINGS:



LUNGS:

No active pulmonary disease.



PLEURA:

No significant pleural effusion identified. No pneumothorax apparent.



CARDIOVASCULAR:

Normal.



OSSEOUS STRUCTURES:

No significant abnormalities.



VISUALIZED UPPER ABDOMEN:

Normal.



OTHER FINDINGS:

None.



IMPRESSION:

No active disease.

## 2017-04-29 NOTE — CON
DATE: 04/29/2017



This patient was seen and evaluated earlier.  This is a 21-year-old patient 
with a past medical history of Crohn disease.  He has been followed by Dr. Woodard at [facility] on Remicade infusion, admitted with 
worsening of the abdominal pain and swelling in the right flank area and 
increased discharge.  The patient has multiple enterocutaneous fistulas and the 
patient, ever since he has been started on the Remicade, these fistulas started 
closing up.  He mentioned to me the fistulas have closed and the only thing 
which is more draining was in the right flank area and discharge also and now 
he noticed more swelling and discomfort and that is reason mainly he came to 
the hospital this time.  He did complain of having some fever, low grade 100.6.
  He has been on Remicade infusions with the last Remicade dose is supposed to 
be in another 1-2 weeks.  The patient has been diagnosed with Crohn's disease 
nearly about 2 years now.  His primary doctor is Dr. Curry and the patient's 
gastroenterologist doctor is Vance in [facility].



PAST SURGICAL HISTORY:  Includes abdominal surgeries and drainage of the 
abscesses.



ALLERGIES:  HE IS ALLERGIC TO BENADRYL, FISH AND TORADOL.  



SOCIAL HISTORY:  He denies alcohol.  Positive for smoking.



REVIEW OF SYSTEMS:  Positive as above.  Other systems reviewed.



PHYSICAL EXAMINATION:

GENERAL:  The patient is lying on the bed, not in acute distress.

VITAL SIGNS:  Temperature is 98.4, blood pressure is 115/59, respirations 18.

HEENT:  Atraumatic, anicteric.

NECK:  Supple.

HEART:  S1, S2 heard.

LUNGS:  Bilateral air entry present.

ABDOMEN:  Soft.  There are multiple incisions and drainage of the fistulous 
areas noticed.  They all look like healing.  There is swelling on the right 
side of the abdomen with a small opening, tender.

EXTREMITIES:  No edema.  No cyanosis.

NEUROLOGIC:  Alert, oriented.  Moves all the extremities.



LABORATORY DATA:  WBC count initially when he came into the ER was 18.5, it has 
come down to 14.3, hemoglobin 8.4, hematocrit 26.9, platelets 465.  Chemistry 
is essentially unremarkable.



IMPRESSION:  This 21-year-old patient with Crohn disease on Remicade was 
admitted with increasing right flank pain and has a fistulous opening noticed 
close by, history of multiple abdominal surgeries done in the past with 
enterocutaneous fistulas, mostly clinically appears to be improving with 
Remicade regimen.  The real concern now is another abscess with a draining 
fistula opening.  The CT scan was reviewed and also requested for MRI and will 
follow up on that.  The patient has been also followed by surgery.  I did 
discuss with the nursing staff.  ID consult on board and the patient has been 
on Zyvox and Flagyl along with the meropenem.  I will continue the antibiotics 
and will hold off in the setting of an active infection.  Continuing with 
Remicade is relatively contraindicated, would hold off until the active 
infective episode or abscess is controlled.  The patient needs to be followed 
with Dr. Woodard his longtime gastroenterologist who manages the Crohn disease 
once the acute episode is resolved.  I had a long discussion with the patient 
who was fully understands.



Thank you very much for allowing us to participate in the care of the patient.





__________________________________________

Rowdy Andrews MD







cc:   



DD: 04/29/2017 20:03:55  [id number]

TT: 04/29/2017 22:47:16

Confirmation # [id number]

Dictation # [id number]

berhane VERMA

## 2017-04-30 LAB
ADD MANUAL DIFF?: NO
ALBUMIN/GLOB SERPL: 0.8 {RATIO} (ref 1.1–1.8)
ALP SERPL-CCNC: 89 U/L (ref 38–133)
ALT SERPL-CCNC: 29 U/L (ref 7–56)
AST SERPL-CCNC: 23 U/L (ref 15–59)
BASOPHILS # BLD AUTO: 0.02 K/MM3 (ref 0–2)
BASOPHILS NFR BLD: 0.1 % (ref 0–3)
BILIRUB SERPL-MCNC: 0.4 MG/DL (ref 0.2–1.3)
BUN SERPL-MCNC: 6 MG/DL (ref 7–21)
CALCIUM SERPL-MCNC: 8.5 MG/DL (ref 8.4–10.5)
CHLORIDE SERPL-SCNC: 104 MMOL/L (ref 98–107)
CO2 SERPL-SCNC: 27 MMOL/L (ref 21–33)
EOSINOPHIL # BLD: 0.1 10*3/UL (ref 0–0.7)
EOSINOPHIL NFR BLD: 0.7 % (ref 1.5–5)
ERYTHROCYTE [DISTWIDTH] IN BLOOD BY AUTOMATED COUNT: 22.7 % (ref 11.5–14.5)
GLOBULIN SER-MCNC: 3.9 GM/DL
GLUCOSE SERPL-MCNC: 77 MG/DL (ref 70–110)
GRANULOCYTES # BLD: 12.53 10*3/UL (ref 1.4–6.5)
GRANULOCYTES NFR BLD: 80.4 % (ref 50–68)
HCT VFR BLD CALC: 26.7 % (ref 42–52)
LYMPHOCYTES # BLD: 1 10*3/UL (ref 1.2–3.4)
LYMPHOCYTES NFR BLD AUTO: 6.2 % (ref 22–35)
MCH RBC QN AUTO: 22.5 PG (ref 25–35)
MCHC RBC AUTO-ENTMCNC: 30.3 G/DL (ref 31–37)
MCV RBC AUTO: 74.2 FL (ref 80–105)
MONOCYTES # BLD AUTO: 2 10*3/UL (ref 0.1–0.6)
MONOCYTES NFR BLD: 12.6 % (ref 1–6)
PLATELET # BLD: 466 10^3/UL (ref 120–450)
PMV BLD AUTO: 8.5 FL (ref 7–11)
POTASSIUM SERPL-SCNC: 3.8 MMOL/L (ref 3.6–5)
PROT SERPL-MCNC: 7 G/DL (ref 5.8–8.3)
SODIUM SERPL-SCNC: 139 MMOL/L (ref 132–148)
WBC # BLD AUTO: 15.6 10^3/UL (ref 4.5–11)

## 2017-04-30 RX ADMIN — OXYCODONE HYDROCHLORIDE AND ACETAMINOPHEN PRN TAB: 5; 325 TABLET ORAL at 18:17

## 2017-04-30 RX ADMIN — MEROPENEM AND SODIUM CHLORIDE SCH MLS/HR: 1 INJECTION, SOLUTION INTRAVENOUS at 05:56

## 2017-04-30 RX ADMIN — HYDROMORPHONE HYDROCHLORIDE PRN MG: 1 INJECTION, SOLUTION INTRAMUSCULAR; INTRAVENOUS; SUBCUTANEOUS at 09:24

## 2017-04-30 RX ADMIN — MEROPENEM AND SODIUM CHLORIDE SCH MLS/HR: 1 INJECTION, SOLUTION INTRAVENOUS at 13:15

## 2017-04-30 RX ADMIN — HYDROMORPHONE HYDROCHLORIDE PRN MG: 1 INJECTION, SOLUTION INTRAMUSCULAR; INTRAVENOUS; SUBCUTANEOUS at 21:24

## 2017-04-30 RX ADMIN — MORPHINE SULFATE PRN MG: 4 INJECTION, SOLUTION INTRAMUSCULAR; INTRAVENOUS at 11:43

## 2017-04-30 RX ADMIN — Medication SCH CAP: at 17:45

## 2017-04-30 RX ADMIN — MORPHINE SULFATE PRN MG: 4 INJECTION, SOLUTION INTRAMUSCULAR; INTRAVENOUS at 17:45

## 2017-04-30 RX ADMIN — HYDROMORPHONE HYDROCHLORIDE PRN MG: 1 INJECTION, SOLUTION INTRAMUSCULAR; INTRAVENOUS; SUBCUTANEOUS at 03:14

## 2017-04-30 RX ADMIN — MEROPENEM AND SODIUM CHLORIDE SCH MLS/HR: 1 INJECTION, SOLUTION INTRAVENOUS at 21:25

## 2017-04-30 RX ADMIN — MORPHINE SULFATE PRN MG: 4 INJECTION, SOLUTION INTRAMUSCULAR; INTRAVENOUS at 05:46

## 2017-04-30 RX ADMIN — Medication SCH CAP: at 09:24

## 2017-04-30 RX ADMIN — HYDROMORPHONE HYDROCHLORIDE PRN MG: 1 INJECTION, SOLUTION INTRAMUSCULAR; INTRAVENOUS; SUBCUTANEOUS at 15:17

## 2017-04-30 RX ADMIN — ENOXAPARIN SODIUM SCH MG: 40 INJECTION SUBCUTANEOUS at 09:24

## 2017-04-30 NOTE — CP.PCM.PN
<Chadwick Goldman - Last Filed: 04/30/17 11:43>





Subjective





- Date & Time of Evaluation


Date of Evaluation: 04/30/17


Time of Evaluation: 07:55





- Subjective


Subjective: 





Hospitalist Progress Note: 





Pt seen and examined at bedside. No acute events overnight. Low grade fever 

overnight and blood cx ordered.  Pt states that his pain is well controlled. He 

still c/o purulent drainage from abdominal wound. He denies any headaches, 

dizziness, f/c, sob, cp, palpitations, urinary or bm changes. 





Objective





- Vital Signs/Intake and Output


Vital Signs (last 24 hours): 


 











Temp Pulse Resp BP Pulse Ox


 


 98.3 F   92 H  18   110/60   99 


 


 04/30/17 06:00  04/30/17 06:00  04/30/17 06:00  04/30/17 06:00  04/30/17 06:00








Intake and Output: 


 











 04/30/17 04/30/17





 06:59 18:59


 


Intake Total 360 


 


Output Total 3450 


 


Balance -3090 














- Medications


Medications: 


 Current Medications





Acetaminophen (Tylenol 325mg Tab)  650 mg PO Q6 PRN


   PRN Reason: Fever >100.4 F


   Last Admin: 04/29/17 19:46 Dose:  650 mg


Enoxaparin Sodium (Lovenox)  40 mg SC DAILY [facility]


   PRN Reason: Protocol


   Last Admin: 04/30/17 09:24 Dose:  40 mg


Famotidine (Pepcid)  20 mg PO BID [facility]


   Last Admin: 04/30/17 09:24 Dose:  20 mg


Fluconazole (Diflucan)  200 mg PO DAILY [facility]


   PRN Reason: Protocol


   Stop: 05/14/17 10:01


   Last Admin: 04/30/17 09:24 Dose:  200 mg


Hydromorphone HCl (Dilaudid)  0.5 mg IVP Q6H PRN


   PRN Reason: Pain, severe (8-10)


   Last Admin: 04/30/17 09:24 Dose:  0.5 mg


Sodium Chloride (Sodium Chloride 0.9%)  1,000 mls @ 100 mls/hr IV .Q10H [facility]


   Last Admin: 04/29/17 14:52 Dose:  100 mls/hr


Meropenem 1g/NS 100mL IVPB (Meropenem 1g/Ns 100ml Ivpb)  1 gm in 100 mls @ 100 

mls/hr IVPB Q8 [facility]


   PRN Reason: Protocol


   Stop: 05/13/17 22:01


   Last Admin: 04/30/17 05:56 Dose:  100 mls/hr


Ibuprofen (Motrin Tab)  600 mg PO Q6H PRN


   PRN Reason: Pain, moderate (4-7)


   Last Admin: 04/29/17 23:48 Dose:  600 mg


Lactobacillus Acidophilus (Bacid Acidophilus)  1 cap PO BID [facility]


   Last Admin: 04/30/17 09:24 Dose:  1 cap


Linezolid (Zyvox)  600 mg PO BID [facility]


   PRN Reason: Protocol


   Stop: 05/14/17 10:01


   Last Admin: 04/30/17 09:24 Dose:  600 mg


Metronidazole (Flagyl)  500 mg PO Q8 [facility]


   PRN Reason: Protocol


   Stop: 05/13/17 22:01


   Last Admin: 04/30/17 05:46 Dose:  500 mg


Morphine Sulfate (Morphine)  4 mg IVP Q6H PRN


   PRN Reason: Pain, moderate (4-7)


   Last Admin: 04/30/17 05:46 Dose:  4 mg


Ondansetron HCl (Zofran Inj)  4 mg IVP Q6 PRN


   PRN Reason: Nausea/Vomiting











- Labs


Labs: 


 





 04/30/17 08:10 





 04/30/17 08:10 





 











PT  12.0 Seconds (9.9-11.8)  H  04/28/17  21:39    


 


INR  1.11  (0.93-1.08)  H  04/28/17  21:39    


 


APTT  30.6 Seconds (23.7-30.8)   04/28/17  21:39    














- Constitutional


Appears: No Acute Distress





- Head Exam


Head Exam: ATRAUMATIC, NORMAL INSPECTION, NORMOCEPHALIC





- Eye Exam


Eye Exam: EOMI, Normal appearance, PERRL





- ENT Exam


ENT Exam: Mucous Membranes Moist, Normal Exam





- Neck Exam


Neck Exam: Full ROM, Normal Inspection.  absent: Lymphadenopathy





- Respiratory Exam


Respiratory Exam: Clear to Ausculation Bilateral, NORMAL BREATHING PATTERN.  

absent: Wheezes





- Cardiovascular Exam


Cardiovascular Exam: REGULAR RHYTHM, RRR, +S1, +S2.  absent: Murmur





- GI/Abdominal Exam


GI & Abdominal Exam: Soft, Tenderness.  absent: Distended, Guarding


Additional comments: 





Dressing changed in place by surgery 





- Extremities Exam


Extremities Exam: absent: Calf Tenderness





- Back Exam


Back Exam: NORMAL INSPECTION





- Neurological Exam


Neurological Exam: Alert, Awake, CN II-XII Intact, Normal Gait, Oriented x3





- Psychiatric Exam


Psychiatric exam: Normal Affect, Normal Mood





- Skin


Skin Exam: Dry, Intact, Normal Color, Warm





Assessment and Plan





- Assessment and Plan (Free Text)


Assessment: 





22 yo  M w/PMH sig for Crohns and anemia admitted for abdominal 

pain, leukocytosis, non-healing abdominal wounds/ fistula. 





1. Abdominal pain: 


- Pain control 


- F/u Surgery Dr Knapp recs 


- F/U GI Dr Andrews recs 


- F/u ID recs - Cont Zyvox, Shayla, Flagyl, Fluconazole 


- Cont IVF 100ml/h r


- CT abdomen and pelvis in the ER: Limited visualization of R flank soft tissue

, but possible increase in air in the previous abscess on R psoas/iliac muscles 

since last CT


- Pending MRI of Abdomen 


- F/u Wound cx 





2. Diarrhea 


- F/u C. diff tox/ antigen 





3. GI/DVT ppx 


- Lovenox SC and Pepcid 


- Liquid diet 





Case and plan was seen, reviewed, and discussed in detail with Dr Buitrago. 





<Trever Buitrago - Last Filed: 04/30/17 13:41>





Objective





- Vital Signs/Intake and Output


Vital Signs (last 24 hours): 


 











Temp Pulse Resp BP Pulse Ox


 


 98.3 F   92 H  18   110/60   99 


 


 04/30/17 06:00  04/30/17 06:00  04/30/17 06:00  04/30/17 06:00  04/30/17 06:00








Intake and Output: 


 











 04/30/17 04/30/17





 06:59 18:59


 


Intake Total 360 


 


Output Total 3450 


 


Balance -3090 














- Medications


Medications: 


 Current Medications





Acetaminophen (Tylenol 325mg Tab)  650 mg PO Q6 PRN


   PRN Reason: Fever >100.4 F


   Last Admin: 04/29/17 19:46 Dose:  650 mg


Enoxaparin Sodium (Lovenox)  40 mg SC DAILY [facility]


   PRN Reason: Protocol


   Last Admin: 04/30/17 09:24 Dose:  40 mg


Famotidine (Pepcid)  20 mg PO BID [facility]


   Last Admin: 04/30/17 09:24 Dose:  20 mg


Fluconazole (Diflucan)  200 mg PO DAILY [facility]


   PRN Reason: Protocol


   Stop: 05/14/17 10:01


   Last Admin: 04/30/17 09:24 Dose:  200 mg


Hydromorphone HCl (Dilaudid)  0.5 mg IVP Q6H PRN


   PRN Reason: Pain, severe (8-10)


   Last Admin: 04/30/17 09:24 Dose:  0.5 mg


Sodium Chloride (Sodium Chloride 0.9%)  1,000 mls @ 100 mls/hr IV .Q10H [facility]


   Last Admin: 04/29/17 14:52 Dose:  100 mls/hr


Meropenem 1g/NS 100mL IVPB (Meropenem 1g/Ns 100ml Ivpb)  1 gm in 100 mls @ 100 

mls/hr IVPB Q8 [facility]


   PRN Reason: Protocol


   Stop: 05/13/17 22:01


   Last Admin: 04/30/17 13:15 Dose:  100 mls/hr


Ibuprofen (Motrin Tab)  600 mg PO Q6H PRN


   PRN Reason: Pain, moderate (4-7)


   Last Admin: 04/29/17 23:48 Dose:  600 mg


Lactobacillus Acidophilus (Bacid Acidophilus)  1 cap PO BID [facility]


   Last Admin: 04/30/17 09:24 Dose:  1 cap


Linezolid (Zyvox)  600 mg PO BID [facility]


   PRN Reason: Protocol


   Stop: 05/14/17 10:01


   Last Admin: 04/30/17 09:24 Dose:  600 mg


Metronidazole (Flagyl)  500 mg PO Q8 [facility]


   PRN Reason: Protocol


   Stop: 05/13/17 22:01


   Last Admin: 04/30/17 13:15 Dose:  500 mg


Morphine Sulfate (Morphine)  4 mg IVP Q6H PRN


   PRN Reason: Pain, moderate (4-7)


   Last Admin: 04/30/17 11:43 Dose:  4 mg


Ondansetron HCl (Zofran Inj)  4 mg IVP Q6 PRN


   PRN Reason: Nausea/Vomiting











- Labs


Labs: 


 





 04/30/17 08:10 





 04/30/17 08:10 





 











PT  12.0 Seconds (9.9-11.8)  H  04/28/17  21:39    


 


INR  1.11  (0.93-1.08)  H  04/28/17  21:39    


 


APTT  30.6 Seconds (23.7-30.8)   04/28/17  21:39    














Attending/Attestation





- Attestation


I have personally seen and examined this patient.: Yes


I have fully participated in the care of the patient.: Yes


I have reviewed all pertinent clinical information, including history, physical 

exam and plan: Yes


Notes (Text): 





04/30/17 13:39








attending note;





 patient seen and examined with resident.





Patient is a 21-year-old male with a past medical history of Crohn's disease, 

multiple abdominal surgeries, multiple fistula formation is admitted with 

fevers and chills and elevated white count.


CT abdomen and pelvis showed possible psoas abscess. MRI ordered.


We will reviewed with radiologist tomorrow.


continue IV Zyvox, meropenem and Flagyl. Case discussed th ID Dr. Henson in 

detail.


Culture is pending.





Patient had previous wound culture positive for VRE, enterococcus infections.





Patient currently follows up with GI  for outpatient Remicade 

injections.





patient also follows up with surgery at [facility].





Chronic opiate dependency.





chronic anemia secondary to anemia of chronic inflammatory disease.





Upon discharge the patient will follow-up with PMD Dr. Elias Peter.





Needs close follow-up with GI/Surgery at [facility].





The diagnosis, treatment plan discussed with patient and patient's father in 

detail.








04/30/17 13:41

## 2017-04-30 NOTE — CON
DATE: 04/29/2017



CHIEF COMPLAINT:  Weakness and abdominal wound discharge times several days.



HISTORY OF PRESENT ILLNESS:  This is a 21-year-old male with recent 
hospitalization with a history of Crohn's disease and microabscesses and 
fistulas.  He had grown VRE and Candida from fistula tract, now is admitted 
with drainage from the abdominal wound and questionable abdominal pain, 
diffuse.  No fevers, no chills, no chest pain, shortness of breath or cough.



PAST MEDICAL HISTORY:  Significant for Crohn's disease and GERD, VRE and 
Candida infection from the fistula, anxiety, peptic ulcer disease.



PAST SURGICAL HISTORY:  Significant for abdominal surgery done at [facility].



ALLERGIES:  THE PATIENT IS ALLERGIC TO FISH, SHELLFISH, KETOROLAC, 
DIPHENHYDRAMINE.



MEDICATIONS:  At home are noted and reviewed.



PHYSICAL EXAMINATION:

GENERAL:  The patient is in bed, no acute distress with answering questions 
appropriately.

VITAL SIGNS:  Temperature of 98.4, T-max is 100.6 and heart rate of 100, 
respiratory rate of 18, blood pressure is 130/60.

HEENT:  Unremarkable.

NECK:  Supple.

LUNGS:  Have decreased breath sounds.

HEART:  Normal S1, S2.

ABDOMEN:  There is purulent discharge from one of the wounds.  No rebound or 
guarding.



LABORATORY EXAMINATION:  Reveals a white count of 18,500; hemoglobin of 8; 
platelets of _____ 580,000.  The patient is 80% granulocytosis.  Coagulation is 
noted.  Chemistries reveals a BUN of 6, creatinine of 0.8.  Urinalysis is noted 
to be negative from the 20th and the patient had an HIV test done last month, 
which was negative.  Microbiology reveals the patient did have Klebsiella 
pneumoniae from 04/14/2017, which was relatively sensitive Klebsiella.  The 
patient had a CAT scan of the abdomen and pelvis yesterday, which was read by 
Dr. Purvis _____, a very limited exam, noting the absence of intravenous 
contrast and minimal oral contrast, possible right psoas, right flank dots of 
air noted, now extended.  The patient also had a chest x-ray, which was read by 
Dr. Dinh Resendiz, no active lung disease.



ASSESSMENT AND PLAN:  This is a 21-year-old with Crohn's disease, recent 
hospitalization, microabscesses and fistula, presenting with sepsis with 
abdominal wall wound infection and questionable psoas abscess.  We will treat 
the patient with meropenem and zyvox, diflucan, and p.o. Flagyl for the fistula 
pending culture results, review of the CAT scan and initial workup results.  We 
will follow closely with you.





__________________________________________

Chirag Mckee MD







cc:   



DD: 04/29/2017 19:24:55  350

TT: 04/30/2017 00:33:20

Confirmation # [id number]

Dictation # [id number]

mn

LCUI

## 2017-04-30 NOTE — PN
DATE: 04/30/2017



The patient is in bed in no acute distress, nontoxic.  Was seen earlier today.  He is awake and alert
.



PHYSICAL EXAMINATION:

VITAL SIGNS:  Temperature is 98, blood pressure is 110/60, respiratory rate of 18.

HEENT:  Unremarkable.

NECK:  Supple.

LUNGS:  Have decreased breath sounds.

HEART:  Normal S1, S2.

ABDOMEN:  Soft, nontender.



LABORATORY DATA:  Reveals a white count of 15,600, hemoglobin of 8 and platelets of 466.  Chemistries
 reveal the BUN of 6, creatinine of 0.7.  Lipase is 24.  Microbiology reveals the wound culture is pe
nding.  The stool for C. diff antigen and toxin are negative for both.  



Review of the orders reveals the patient to be on fluconazole p.o., p.o. Flagyl, meropenem and p.o. Z
yvox.



ASSESSMENT AND PLAN:  This is a 21-year-old male with extensive Crohn disease and multiple hospitaliz
ations, micro-abscesses and fistula formation, abdominal surgery, presenting with sepsis with abdomin
al wound infection and questionable psoas abscess on CAT scan of the abdomen.  Will ask Dr. Jorge Luis acuña to review the CAT scan of the abdomen.  Currently on Diflucan, Flagyl, Zyvox and meropenem.  Await
ing for wound culture results pending.  Will follow closely with you.





__________________________________________

Chirag Mckee MD







cc:



DD: 04/30/2017 16:40:43  350

TT: 04/30/2017 17:01:58

Confirmation # [id number]

Dictation # [id number]

dn

## 2017-04-30 NOTE — CP.PCM.PN
Subjective





- Date & Time of Evaluation


Date of Evaluation: 04/30/17


Time of Evaluation: 07:19





- Subjective


Subjective: 


General Surgery Progress Note for Dr. Brown





This 21M was seen and examined this AM at bedside. He reports not acute events 

overnight. Patient reports pain in his back. He denies fevers, chills, chest 

pain, nausea, vomiting diarrhea. 








Objective





- Vital Signs/Intake and Output


Vital Signs (last 24 hours): 


 











Temp Pulse Resp BP Pulse Ox


 


 98.3 F   92 H  18   110/60   99 


 


 04/30/17 06:00  04/30/17 06:00  04/30/17 06:00  04/30/17 06:00  04/30/17 06:00








Intake and Output: 


 











 04/30/17 04/30/17





 06:59 18:59


 


Intake Total 360 


 


Output Total 3450 


 


Balance -3090 














- Medications


Medications: 


 Current Medications





Acetaminophen (Tylenol 325mg Tab)  650 mg PO Q6 PRN


   PRN Reason: Fever >100.4 F


   Last Admin: 04/29/17 19:46 Dose:  650 mg


Enoxaparin Sodium (Lovenox)  40 mg SC DAILY [facility]


   PRN Reason: Protocol


   Last Admin: 04/30/17 09:24 Dose:  40 mg


Famotidine (Pepcid)  20 mg PO BID [facility]


   Last Admin: 04/30/17 09:24 Dose:  20 mg


Fluconazole (Diflucan)  200 mg PO DAILY [facility]


   PRN Reason: Protocol


   Stop: 05/14/17 10:01


   Last Admin: 04/30/17 09:24 Dose:  200 mg


Hydromorphone HCl (Dilaudid)  0.5 mg IVP Q6H PRN


   PRN Reason: Pain, severe (8-10)


   Last Admin: 04/30/17 09:24 Dose:  0.5 mg


Sodium Chloride (Sodium Chloride 0.9%)  1,000 mls @ 100 mls/hr IV .Q10H [facility]


   Last Admin: 04/29/17 14:52 Dose:  100 mls/hr


Meropenem 1g/NS 100mL IVPB (Meropenem 1g/Ns 100ml Ivpb)  1 gm in 100 mls @ 100 

mls/hr IVPB Q8 [facility]


   PRN Reason: Protocol


   Stop: 05/13/17 22:01


   Last Admin: 04/30/17 05:56 Dose:  100 mls/hr


Ibuprofen (Motrin Tab)  600 mg PO Q6H PRN


   PRN Reason: Pain, moderate (4-7)


   Last Admin: 04/29/17 23:48 Dose:  600 mg


Lactobacillus Acidophilus (Bacid Acidophilus)  1 cap PO BID [facility]


   Last Admin: 04/30/17 09:24 Dose:  1 cap


Linezolid (Zyvox)  600 mg PO BID [facility]


   PRN Reason: Protocol


   Stop: 05/14/17 10:01


   Last Admin: 04/30/17 09:24 Dose:  600 mg


Metronidazole (Flagyl)  500 mg PO Q8 [facility]


   PRN Reason: Protocol


   Stop: 05/13/17 22:01


   Last Admin: 04/30/17 05:46 Dose:  500 mg


Morphine Sulfate (Morphine)  4 mg IVP Q6H PRN


   PRN Reason: Pain, moderate (4-7)


   Last Admin: 04/30/17 11:43 Dose:  4 mg


Ondansetron HCl (Zofran Inj)  4 mg IVP Q6 PRN


   PRN Reason: Nausea/Vomiting











- Labs


Labs: 


 





 04/30/17 08:10 





 04/30/17 08:10 





 











PT  12.0 Seconds (9.9-11.8)  H  04/28/17  21:39    


 


INR  1.11  (0.93-1.08)  H  04/28/17  21:39    


 


APTT  30.6 Seconds (23.7-30.8)   04/28/17  21:39    














- Constitutional


Appears: Non-toxic, No Acute Distress





- Head Exam


Head Exam: ATRAUMATIC, NORMOCEPHALIC





- Eye Exam


Eye Exam: EOMI, Normal appearance





- ENT Exam


ENT Exam: Mucous Membranes Moist, Normal Exam





- Respiratory Exam


Respiratory Exam: NORMAL BREATHING PATTERN





- Cardiovascular Exam


Cardiovascular Exam: +S1, +S2





- GI/Abdominal Exam


GI & Abdominal Exam: Soft, Tenderness.  absent: Firm, Guarding, Rigid


Additional comments: 





Two lesions draining purulent fluid on the anterior abdomen. Dressings changed 

this AM. 





- Back Exam


Additional comments: 





Back lesion on right back draining purulent fluid with feculent flecks. Ostomy 

bag placed over lesion.





- Neurological Exam


Neurological Exam: Alert, Awake





Assessment and Plan





- Assessment and Plan (Free Text)


Assessment: 


This is a 21M with chrones disease, with multiple fistulas who presented with 

back pain and fever, CT Limited visualization of R flank soft tissue, but 

possible increase in air in the previous abscess on R psoas/iliac muscles since 

last CT, uncertain if fluid collections are drainable.





F/U MRI Reading


Monitor wound output


F/U IR Recommendations for drainage


Continue medical management per primary team


Followup cultures. 


D/W Dr. Kevin Palmer PGY-1

## 2017-05-01 VITALS
TEMPERATURE: 96.9 F | DIASTOLIC BLOOD PRESSURE: 74 MMHG | OXYGEN SATURATION: 100 % | HEART RATE: 88 BPM | SYSTOLIC BLOOD PRESSURE: 132 MMHG

## 2017-05-01 LAB
ADD MANUAL DIFF?: NO
ALBUMIN/GLOB SERPL: 0.8 {RATIO} (ref 1.1–1.8)
ALP SERPL-CCNC: 87 U/L (ref 38–133)
ALT SERPL-CCNC: 30 U/L (ref 7–56)
AST SERPL-CCNC: 23 U/L (ref 15–59)
BASOPHILS # BLD AUTO: 0.01 K/MM3 (ref 0–2)
BASOPHILS NFR BLD: 0.1 % (ref 0–3)
BILIRUB SERPL-MCNC: 0.2 MG/DL (ref 0.2–1.3)
BUN SERPL-MCNC: 5 MG/DL (ref 7–21)
CALCIUM SERPL-MCNC: 8.1 MG/DL (ref 8.4–10.5)
CHLORIDE SERPL-SCNC: 104 MMOL/L (ref 95–110)
CO2 SERPL-SCNC: 26 MMOL/L (ref 21–33)
EOSINOPHIL # BLD: 0.2 10*3/UL (ref 0–0.7)
EOSINOPHIL NFR BLD: 2.3 % (ref 1.5–5)
ERYTHROCYTE [DISTWIDTH] IN BLOOD BY AUTOMATED COUNT: 22.6 % (ref 11.5–14.5)
GLOBULIN SER-MCNC: 3.6 GM/DL
GLUCOSE SERPL-MCNC: 77 MG/DL (ref 70–110)
GRANULOCYTES # BLD: 5.5 10*3/UL (ref 1.4–6.5)
GRANULOCYTES NFR BLD: 67.7 % (ref 50–68)
HCT VFR BLD CALC: 27.4 % (ref 42–52)
LYMPHOCYTES # BLD: 1.2 10*3/UL (ref 1.2–3.4)
LYMPHOCYTES NFR BLD AUTO: 14.5 % (ref 22–35)
MCH RBC QN AUTO: 22.5 PG (ref 25–35)
MCHC RBC AUTO-ENTMCNC: 30.7 G/DL (ref 31–37)
MCV RBC AUTO: 73.3 FL (ref 80–105)
MONOCYTES # BLD AUTO: 1.3 10*3/UL (ref 0.1–0.6)
MONOCYTES NFR BLD: 15.4 % (ref 1–6)
PLATELET # BLD: 508 10^3/UL (ref 120–450)
PMV BLD AUTO: 8.3 FL (ref 7–11)
POTASSIUM SERPL-SCNC: 3.9 MMOL/L (ref 3.6–5)
PROT SERPL-MCNC: 6.5 G/DL (ref 5.8–8.3)
SODIUM SERPL-SCNC: 139 MMOL/L (ref 132–148)
WBC # BLD AUTO: 8.1 10^3/UL (ref 4.5–11)

## 2017-05-01 RX ADMIN — MEROPENEM AND SODIUM CHLORIDE SCH MLS/HR: 1 INJECTION, SOLUTION INTRAVENOUS at 05:30

## 2017-05-01 RX ADMIN — Medication SCH CAP: at 09:22

## 2017-05-01 RX ADMIN — HYDROMORPHONE HYDROCHLORIDE PRN MG: 1 INJECTION, SOLUTION INTRAMUSCULAR; INTRAVENOUS; SUBCUTANEOUS at 09:22

## 2017-05-01 RX ADMIN — ENOXAPARIN SODIUM SCH MG: 40 INJECTION SUBCUTANEOUS at 09:23

## 2017-05-01 RX ADMIN — OXYCODONE HYDROCHLORIDE AND ACETAMINOPHEN PRN TAB: 5; 325 TABLET ORAL at 12:17

## 2017-05-01 RX ADMIN — OXYCODONE HYDROCHLORIDE AND ACETAMINOPHEN PRN TAB: 5; 325 TABLET ORAL at 00:31

## 2017-05-01 RX ADMIN — HYDROMORPHONE HYDROCHLORIDE PRN MG: 1 INJECTION, SOLUTION INTRAMUSCULAR; INTRAVENOUS; SUBCUTANEOUS at 03:43

## 2017-05-01 RX ADMIN — OXYCODONE HYDROCHLORIDE AND ACETAMINOPHEN PRN TAB: 5; 325 TABLET ORAL at 06:18

## 2017-05-01 NOTE — PN
DATE: 04/30/2017



This patient was seen and evaluated ____.  The patient has now opened up the discharge from ____ one 
on the side and one also in the midline area.  There is ____ on the right flank area ____ more discom
fort.  He feels a little more comfortable now.



PHYSICAL EXAMINATION:

VITAL SIGNS:  Temperature is 98.3, pulse 92, blood pressure is 110/60.

HEENT:  Atraumatic, anicteric.

NECK:  Supple.

HEART:  S1, S2 regular.

LUNGS:  Bilateral air entry present.

____ multiple ____ fistulas ____ healed.  There are 2 areas which opened up more, draining ____ right
 flank area ____ discharge is more copious ____ is also decreased.  There is also ____ covered with d
ressing.



LABORATORY REVIEW:  Hemoglobin 8.1, hematocrit 26.7, WBCs 15.6, platelets 566.  BUN 6, creatinine is 
0.7.



The wound showed ____.



IMPRESSION:  This is a 21-year-old patient with Crohn's disease ____ enterocutaneous fistula, present
ed with ____ drainage.  ____ the patient had an MRI of the abdomen done.  May benefit from ____ pelvi
s also to correlate.  Appears to be fistulous tract leading ____.  ____ is still pending.



Would recommend continue the antibiotics.  Will review with the radiologist regarding the MRI.  May r
equest MRI of the pelvis also after the review with the radiologist.  The patient is being followed b
smitha Cordon. ____ at the [facility].  The patient needs ____ gastroenterologist which he pre
fers.  The plan is to treat the acute abscess and the infective process presently.



Thank you very much for allowing us to participate in the care of the patient.





__________________________________________

Rowdy Andrews MD







cc:



DD: 04/30/2017 22:50:35  416

TT: 05/01/2017 09:06:41

Confirmation # [id number]

Dictation # [id number]

en

## 2017-05-01 NOTE — CP.PCM.PN
Subjective





- Date & Time of Evaluation


Date of Evaluation: 05/01/17


Time of Evaluation: 07:42





- Subjective


Subjective: 


Dr. Chirag Rojas PGY1 Surgery Note for Dr. Brown





Pt seen and examined at bedside this AM; denies any overnight events or acute 

complaints. Denies fevers/chills, HA, CP, SOB, abdominal pain, N/V/D, dysuria/

freq/urg or lower extremity pain/swelling. 





Objective





- Vital Signs/Intake and Output


Vital Signs (last 24 hours): 


 











Temp Pulse Resp BP Pulse Ox


 


 98.3 F   92 H  18   110/60   99 


 


 04/30/17 06:00  04/30/17 06:00  04/30/17 06:00  04/30/17 06:00  04/30/17 06:00








Intake and Output: 


 











 05/01/17 05/01/17





 06:59 18:59


 


Intake Total 1200 


 


Balance 1200 














- Medications


Medications: 


 Current Medications





Acetaminophen (Tylenol 325mg Tab)  650 mg PO Q6 PRN


   PRN Reason: Fever >100.4 F


   Last Admin: 04/29/17 19:46 Dose:  650 mg


Enoxaparin Sodium (Lovenox)  40 mg SC DAILY [facility]


   PRN Reason: Protocol


   Last Admin: 04/30/17 09:24 Dose:  40 mg


Famotidine (Pepcid)  20 mg PO BID [facility]


   Last Admin: 04/30/17 17:45 Dose:  20 mg


Fluconazole (Diflucan)  200 mg PO DAILY [facility]


   PRN Reason: Protocol


   Stop: 05/14/17 10:01


   Last Admin: 04/30/17 09:24 Dose:  200 mg


Hydromorphone HCl (Dilaudid)  0.5 mg IVP Q6H PRN


   PRN Reason: Pain, severe (8-10)


   Last Admin: 05/01/17 03:43 Dose:  0.5 mg


Sodium Chloride (Sodium Chloride 0.9%)  1,000 mls @ 100 mls/hr IV .Q10H [facility]


   Last Admin: 04/29/17 14:52 Dose:  100 mls/hr


Meropenem 1g/NS 100mL IVPB (Meropenem 1g/Ns 100ml Ivpb)  1 gm in 100 mls @ 100 

mls/hr IVPB Q8 [facility]


   PRN Reason: Protocol


   Stop: 05/13/17 22:01


   Last Admin: 05/01/17 05:30 Dose:  100 mls/hr


Ibuprofen (Motrin Tab)  600 mg PO Q6H PRN


   PRN Reason: Pain, moderate (4-7)


   Last Admin: 04/29/17 23:48 Dose:  600 mg


Lactobacillus Acidophilus (Bacid Acidophilus)  1 cap PO BID [facility]


   Last Admin: 04/30/17 17:45 Dose:  1 cap


Linezolid (Zyvox)  600 mg PO BID [facility]


   PRN Reason: Protocol


   Stop: 05/14/17 10:01


   Last Admin: 04/30/17 17:45 Dose:  600 mg


Metronidazole (Flagyl)  500 mg PO Q8 [facility]


   PRN Reason: Protocol


   Stop: 05/13/17 22:01


   Last Admin: 05/01/17 05:32 Dose:  500 mg


Ondansetron HCl (Zofran Inj)  4 mg IVP Q6 PRN


   PRN Reason: Nausea/Vomiting


Oxycodone/Acetaminophen (Percocet 5/325 Mg Tab)  1 tab PO Q6H PRN


   PRN Reason: Pain, moderate (4-7)


   Stop: 05/03/17 17:57


   Last Admin: 05/01/17 06:18 Dose:  1 tab











- Labs


Labs: 


 





 05/01/17 06:00 





 05/01/17 06:00 





 











PT  12.0 Seconds (9.9-11.8)  H  04/28/17  21:39    


 


INR  1.11  (0.93-1.08)  H  04/28/17  21:39    


 


APTT  30.6 Seconds (23.7-30.8)   04/28/17  21:39    














- Constitutional


Appears: Well





- Head Exam


Additional comments: 


Head Exam: ATRAUMATIC, NORMOCEPHALIC





- Eye Exam


Eye Exam: EOMI, Normal appearance





- ENT Exam


ENT Exam: Mucous Membranes Moist, Normal Exam





- Respiratory Exam


Respiratory Exam: NORMAL BREATHING PATTERN





- Cardiovascular Exam


Cardiovascular Exam: +S1, +S2





- GI/Abdominal Exam


GI & Abdominal Exam: Soft, Tenderness.  absent: Firm, Guarding, Rigid


Additional comments: 


Two lesions draining purulent fluid on the anterior abdomen.  





- Back Exam


Additional comments: 


right back draining purulent fluid with feculent flecks. Ostomy bag in place 

with 50cc of purulent drainage





- Psychiatric Exam


Psychiatric exam: Normal Affect





- Skin


Skin Exam: Warm





Assessment and Plan





- Assessment and Plan (Free Text)


Assessment: 


This is a 21M with Crohns, with multiple fistulas who presented with back pain 

and fever, CT Limited visualization of R flank soft tissue, but possible 

increase in air in the previous abscess on R psoas/iliac muscles since last CT





F/U MRI Reading


Monitor wound output


Continue medical management per primary team


Followup cultures. 


D/W Dr. Brown





As per surgical interventions, there does not seem to be any surgical 

intervention needed at this time.


Thank you for this interesting consult, please feel free to consult PRN





Dr. Chirag Rojas PGY1 Surgery Service

## 2017-05-01 NOTE — MRI
PROCEDURE:  MRI Abdomen without contrast



HISTORY:

Abdominal pain 



COMPARISON:

CT scan 04/28/2017 



TECHNIQUE:

Multisequence, multiplanar MR images of the abdomen without 

gadolinium contrast enhancement.



FINDINGS:



LIVER:

 Unremarkable.



GALLBLADDER:

Unremarkable.



SPLEEN:

Unremarkable.



ADRENALS:

Unremarkable.



KIDNEYS:

Unremarkable.



PANCREAS:

 Unremarkable.



AORTA:

No aneurysm.



ASCITES:

 None.



PERITONEUM:

Unremarkable.



LYMPH NODES:

 Unremarkable.



OTHER FINDINGS:

There is edema and swelling in the right psoas and iliacus muscle. 

This is similar in appearance to the recent CT.  Findings are 

consistent with inflammation or infection. The gas collections seen 

on CT are more difficult to appreciate on MRI.



The report concurs with the preliminary [facility] report



IMPRESSION:

Edema and swelling of the right psoas and iliacus muscles consistent 

with inflammation or infection. The remainder the study is 

unremarkable

## 2017-05-01 NOTE — CP.PCM.DIS
<Mable Chavez - Last Filed: 05/01/17 12:35>





Provider





- Provider


Date of Admission: 


04/29/17 01:50





Attending physician: 


Trever Buitrago MD





Consults: 





GI: Dr. Andrews 


Surgery: Dr. Knapp


ID; Dr. Mcpherson





Time Spent in preparation of Discharge (in minutes): 45





Diagnosis





- Discharge Diagnosis


(1) Vesicocutaneous fistula


Status: Chronic   





(2) Crohn's disease


Status: Chronic   





(3) Intra-abdominal abscess


Status: Chronic   





Hospital Course





- Lab Results


Lab Results: 


 Micro Results





04/30/17 08:10   Blood-Venous   Blood Culture - Preliminary


                            NO GROWTH AFTER 24 HOURS


04/30/17 07:50   Blood-Venous   Blood Culture - Preliminary


                            NO GROWTH AFTER 24 HOURS


04/29/17 15:00   Stool   C. difficile Antigen & Toxin A,B (M - Final





 Most Recent Lab Values











WBC  8.1 10^3/ul (4.5-11.0)  D 05/01/17  06:00    


 


RBC  3.74 10^6/uL (3.5-6.1)   05/01/17  06:00    


 


Hgb  8.4 gm/dL (14.0-18.0)  L  05/01/17  06:00    


 


Hct  27.4 % (42.0-52.0)  L  05/01/17  06:00    


 


MCV  73.3 fL (80.0-105.0)  L  05/01/17  06:00    


 


MCH  22.5 pg (25.0-35.0)  L  05/01/17  06:00    


 


MCHC  30.7 g/dl (31.0-37.0)  L  05/01/17  06:00    


 


RDW  22.6 % (11.5-14.5)  H  05/01/17  06:00    


 


Plt Count  508 10^3/uL (120.0-450.0)  H  05/01/17  06:00    


 


MPV  8.3 fl (7.0-11.0)   05/01/17  06:00    


 


Gran %  67.7 % (50.0-68.0)   05/01/17  06:00    


 


Lymph % (Auto)  14.5 % (22.0-35.0)  L  05/01/17  06:00    


 


Mono % (Auto)  15.4 % (1.0-6.0)  H  05/01/17  06:00    


 


Eos % (Auto)  2.3 % (1.5-5.0)   05/01/17  06:00    


 


Baso % (Auto)  0.1 % (0.0-3.0)   05/01/17  06:00    


 


Gran #  5.50  (1.4-6.5)   05/01/17  06:00    


 


Lymph #  1.2  (1.2-3.4)   05/01/17  06:00    


 


Mono #  1.3  (0.1-0.6)  H  05/01/17  06:00    


 


Eos #  0.2  (0.0-0.7)   05/01/17  06:00    


 


Baso #  0.01 K/mm3 (0.0-2.0)   05/01/17  06:00    


 


PT  12.0 Seconds (9.9-11.8)  H  04/28/17  21:39    


 


INR  1.11  (0.93-1.08)  H  04/28/17  21:39    


 


APTT  30.6 Seconds (23.7-30.8)   04/28/17  21:39    


 


Sodium  139 mmol/L (132-148)   05/01/17  06:00    


 


Potassium  3.9 mmol/L (3.6-5.0)   05/01/17  06:00    


 


Chloride  104 mmol/L ()   05/01/17  06:00    


 


Carbon Dioxide  26 mmol/L (21-33)   05/01/17  06:00    


 


Anion Gap  13  (10-20)   05/01/17  06:00    


 


BUN  5 mg/dL (7-21)  L  05/01/17  06:00    


 


Creatinine  0.7 mg/dL (0.5-1.4)   05/01/17  06:00    


 


Est GFR ( Amer)  > 60   05/01/17  06:00    


 


Est GFR (Non-Af Amer)  > 60   05/01/17  06:00    


 


Random Glucose  77 mg/dL ()   05/01/17  06:00    


 


Calcium  8.1 mg/dL (8.4-10.5)  L  05/01/17  06:00    


 


Phosphorus  2.7 mg/dL (2.5-4.5)   04/29/17  09:02    


 


Magnesium  2.1 mg/dL (1.7-2.2)   04/29/17  09:02    


 


Total Bilirubin  0.2 mg/dL (0.2-1.3)   05/01/17  06:00    


 


AST  23 U/L (15-59)   05/01/17  06:00    


 


ALT  30 U/L (7-56)   05/01/17  06:00    


 


Alkaline Phosphatase  87 U/L ()   05/01/17  06:00    


 


Total Protein  6.5 g/dL (5.8-8.3)   05/01/17  06:00    


 


Albumin  2.9 g/dL (3.0-4.8)  L  05/01/17  06:00    


 


Globulin  3.6 gm/dL  05/01/17  06:00    


 


Albumin/Globulin Ratio  0.8  (1.1-1.8)  L  05/01/17  06:00    


 


Lipase  24 U/L ()   04/28/17  21:39    


 


Urine Opiates Screen  Positive  (NEGATIVE)  H  04/29/17  16:40    


 


Urine Methadone Screen  Negative  (NEGATIVE)   04/29/17  16:40    


 


Ur Barbiturates Screen  Negative  (NEGATIVE)   04/29/17  16:40    


 


Ur Phencyclidine Scrn  Negative  (NEGATIVE)   04/29/17  16:40    


 


Ur Amphetamines Screen  Negative  (NEGATIVE)   04/29/17  16:40    


 


U Benzodiazepines Scrn  Negative  (NEGATIVE)   04/29/17  16:40    


 


U Oth Cocaine Metabols  Negative  (NEGATIVE)   04/29/17  16:40    


 


U Cannabinoids Screen  Negative  (NEGATIVE)   04/29/17  16:40    














- Hospital Course


Hospital Course: 





21  M w/PMH sig for Crohn's and anemia admitted for abdominal 

pain x 1 wk.  Pain is sharp/stabbing, right sided with radiation to back, 

moderate to severe intensity, worsened over past week.  Alleviated by certain 

positions, Dilaudid, aggravated by certain foods/positions.  Admits to diarrhea 

daily, subjective fevers, cough, headache.  Denies N/V, chills, CP, palpitations

, sore throat, SOB, rhinorrhea, hearing changes, vision changes, constipation, 

wt loss. 





Note: Pt has been seen in ED in [facility] system multiple times in past for 

similar- Surgeon is at [facility], just switched PMDs- attempting to set up 

outpatient pain mgmt





Upon admission, CT of abd/pelvis showed interval increase in air with right 

psoas and right flank abscess vs. phlegmon. ID was consulted and patient was 

started on antibiotics zyvox, meropenem, flagyl and diflucan.  Wound cultures 

were sent prior to starting antibiotics and grew klebsiella, citrobacter 

freundii and yeast species.  GI was also consulted and recommended to pause the 

remicade treatment once patient's acute Crohn attack has resolved.  Surgery was 

consulted and recommended no surgical intervention at this time.  MRI of abdomen

/pelvis was ordered for better visualization of abdomen and showed edema and 

swelling of right psoas and iliacus muscles consistent with inflammation. 








Pt is to follow up with PMD, Dr. Curry upon discharge.


Pt is to follow up with gastroenterologist, Dr. Woodard upon discharge.


Follow up with Colorectal surgery per DR. Nielsen or [facility].


FOLLOW UP WITH PAIN MANAGEMENT FOR CHRONIC OPIATE USE.


continue flagyl.





Patient left AMA but was given prescription for Flagyl and given discharge 

instructions.  


Please see MAR for full details.  





- Date & Time of H&P


Date of H&P: 05/01/17


Time of H&P: 12:37





Discharge Exam





- Head Exam


Head Exam: ATRAUMATIC, NORMOCEPHALIC





- Eye Exam


Eye Exam: EOMI


Pupil Exam: PERRL





- ENT Exam


ENT Exam: Mucous Membranes Moist





- Respiratory Exam


Respiratory Exam: Clear to PA & Lateral, NORMAL BREATHING PATTERN.  absent: 

Rales, Rhonchi, Wheezes





- Cardiovascular Exam


Cardiovascular Exam: REGULAR RHYTHM, +S1, +S2.  absent: Diastolic murmur, Gallop

, Rubs, Systolic Murmur





- GI/Abdominal Exam


GI & Abdominal Exam: Normal Bowel Sounds, Soft, Tenderness.  absent: 

Unremarkable


Additional comments: 





draining fistulas on right quadrants and low right back 





- Extremities Exam


Additional comments: 





no edema or tenderness 





- Neurological Exam


Neurological exam: Alert, Oriented x3





- Psychiatric Exam


Psychiatric exam: Normal Affect, Normal Mood





- Skin


Skin Exam: Dry, Intact, Normal Color, Warm





Discharge Plan





- Discharge Medications


Prescriptions: 


Metronidazole [Flagyl] 500 mg PO Q8 #30 tablet





- Follow Up Plan


Condition: FAIR


Disposition: AGAINST MEDICAL ADVICE


Instructions:  Crohn Disease (DC), Colostomy Creation (DC)


Additional Instructions: 


Pt is to follow up with PMDDr. Curry upon discharge.


Pt is to follow up with gastroenterologist, Dr. Woodard upon discharge. 


Follow up with Colorectal surgery per DR. Nielsen or [facility].


FOLLOW UP WITH PAIN MANAGEMENT FOR CHRONIC OPIATE USE.


continue flagyl.








<Trever Buitrago - Last Filed: 05/01/17 14:21>





Provider





- Provider


Date of Admission: 


04/29/17 01:50





Attending physician: 


Trever Buitrago MD








Hospital Course





- Lab Results


Lab Results: 


 Micro Results





04/29/17 15:01   Stool   Stool Culture - Final


                            NO SALMONELLA, SHIGELLA OR CAMPYLOBACTER ISOLATED.


04/30/17 08:10   Blood-Venous   Blood Culture - Preliminary


                            NO GROWTH AFTER 24 HOURS


04/30/17 07:50   Blood-Venous   Blood Culture - Preliminary


                            NO GROWTH AFTER 24 HOURS


04/29/17 15:00   Stool   C. difficile Antigen & Toxin A,B (M - Final





 Most Recent Lab Values











WBC  8.1 10^3/ul (4.5-11.0)  D 05/01/17  06:00    


 


RBC  3.74 10^6/uL (3.5-6.1)   05/01/17  06:00    


 


Hgb  8.4 gm/dL (14.0-18.0)  L  05/01/17  06:00    


 


Hct  27.4 % (42.0-52.0)  L  05/01/17  06:00    


 


MCV  73.3 fL (80.0-105.0)  L  05/01/17  06:00    


 


MCH  22.5 pg (25.0-35.0)  L  05/01/17  06:00    


 


MCHC  30.7 g/dl (31.0-37.0)  L  05/01/17  06:00    


 


RDW  22.6 % (11.5-14.5)  H  05/01/17  06:00    


 


Plt Count  508 10^3/uL (120.0-450.0)  H  05/01/17  06:00    


 


MPV  8.3 fl (7.0-11.0)   05/01/17  06:00    


 


Gran %  67.7 % (50.0-68.0)   05/01/17  06:00    


 


Lymph % (Auto)  14.5 % (22.0-35.0)  L  05/01/17  06:00    


 


Mono % (Auto)  15.4 % (1.0-6.0)  H  05/01/17  06:00    


 


Eos % (Auto)  2.3 % (1.5-5.0)   05/01/17  06:00    


 


Baso % (Auto)  0.1 % (0.0-3.0)   05/01/17  06:00    


 


Gran #  5.50  (1.4-6.5)   05/01/17  06:00    


 


Lymph #  1.2  (1.2-3.4)   05/01/17  06:00    


 


Mono #  1.3  (0.1-0.6)  H  05/01/17  06:00    


 


Eos #  0.2  (0.0-0.7)   05/01/17  06:00    


 


Baso #  0.01 K/mm3 (0.0-2.0)   05/01/17  06:00    


 


PT  12.0 Seconds (9.9-11.8)  H  04/28/17  21:39    


 


INR  1.11  (0.93-1.08)  H  04/28/17  21:39    


 


APTT  30.6 Seconds (23.7-30.8)   04/28/17  21:39    


 


Sodium  139 mmol/L (132-148)   05/01/17  06:00    


 


Potassium  3.9 mmol/L (3.6-5.0)   05/01/17  06:00    


 


Chloride  104 mmol/L ()   05/01/17  06:00    


 


Carbon Dioxide  26 mmol/L (21-33)   05/01/17  06:00    


 


Anion Gap  13  (10-20)   05/01/17  06:00    


 


BUN  5 mg/dL (7-21)  L  05/01/17  06:00    


 


Creatinine  0.7 mg/dL (0.5-1.4)   05/01/17  06:00    


 


Est GFR ( Amer)  > 60   05/01/17  06:00    


 


Est GFR (Non-Af Amer)  > 60   05/01/17  06:00    


 


Random Glucose  77 mg/dL ()   05/01/17  06:00    


 


Calcium  8.1 mg/dL (8.4-10.5)  L  05/01/17  06:00    


 


Phosphorus  2.7 mg/dL (2.5-4.5)   04/29/17  09:02    


 


Magnesium  2.1 mg/dL (1.7-2.2)   04/29/17  09:02    


 


Total Bilirubin  0.2 mg/dL (0.2-1.3)   05/01/17  06:00    


 


AST  23 U/L (15-59)   05/01/17  06:00    


 


ALT  30 U/L (7-56)   05/01/17  06:00    


 


Alkaline Phosphatase  87 U/L ()   05/01/17  06:00    


 


Total Protein  6.5 g/dL (5.8-8.3)   05/01/17  06:00    


 


Albumin  2.9 g/dL (3.0-4.8)  L  05/01/17  06:00    


 


Globulin  3.6 gm/dL  05/01/17  06:00    


 


Albumin/Globulin Ratio  0.8  (1.1-1.8)  L  05/01/17  06:00    


 


Lipase  24 U/L ()   04/28/17  21:39    


 


Urine Opiates Screen  Positive  (NEGATIVE)  H  04/29/17  16:40    


 


Urine Methadone Screen  Negative  (NEGATIVE)   04/29/17  16:40    


 


Ur Barbiturates Screen  Negative  (NEGATIVE)   04/29/17  16:40    


 


Ur Phencyclidine Scrn  Negative  (NEGATIVE)   04/29/17  16:40    


 


Ur Amphetamines Screen  Negative  (NEGATIVE)   04/29/17  16:40    


 


U Benzodiazepines Scrn  Negative  (NEGATIVE)   04/29/17  16:40    


 


U Oth Cocaine Metabols  Negative  (NEGATIVE)   04/29/17  16:40    


 


U Cannabinoids Screen  Negative  (NEGATIVE)   04/29/17  16:40    














Attending/Attestation





- Attestation


I have personally seen and examined this patient.: Yes


I have fully participated in the care of the patient.: Yes


I have reviewed all pertinent clinical information, including history, physical 

exam and plan: Yes


Notes (Text): 





05/01/17 14:12





attending note;





 patient seen and examined with resident.





Patient is a 21-year-old male with a past medical history of Crohn's disease, 

multiple abdominal surgeries, multiple fistula formation is admitted with 

fevers and chills and elevated white count.


CT abdomen and pelvis showed possible psoas abscess. MRI showed edema and 

swelling of the psoas and iliac is muscle.





CT reviewed with intervention radiologist Dr. Jorge Luis Gregory today. Small amount 

of psoas abscess suspected.


Patient refused drainage by IR. the patient wanted to follow-up with  

surgeon at [facility].


Case discussed with Tracee Mooney at DR. Nielsen's office.


Ct scan results faxed.





Treated with IV Zyvox, meropenem and Flagyl. Case discussed th ID Dr. Henson 

in detail.


Culture grew Klebsiella and citrobacter.


Will be discharged home with  po Flagyl.





Patient currently follows up with GI  for outpatient Remicade 

injections.





Chronic opiate dependency. Patient gets opiate prescription from multiple 

physicians/ pharmacies.


Strongly advised to follow-up with pain management.


Advised to taper and discontinue opiates.





chronic anemia secondary to anemia of chronic inflammatory disease.





Upon discharge the patient will follow-up with PMD Dr. Elias Peter.





Needs close follow-up with GI/Surgery at [facility].


Patient is strongly advised to follow-up with colorectal surgery at [facility].





The diagnosis, treatment plan discussed with patient  in detail.





While preparing for discharge instructions,making CD copy of CAT scan and MRI 

results the patient signed AGAINST MEDICAL ADVICE.





Diagnosis;


Crohn's disease


Chronic fistula


Anemia


Opiate dependency


 small psoas abscess versus inflammation


Noncompliance with follow-up


05/01/17 14:20

## 2017-05-19 ENCOUNTER — HOSPITAL ENCOUNTER (EMERGENCY)
Dept: HOSPITAL 42 - ED | Age: 22
Discharge: LEFT BEFORE DISCHARGE | End: 2017-05-19
Payer: MEDICAID

## 2017-05-19 VITALS
TEMPERATURE: 99.1 F | DIASTOLIC BLOOD PRESSURE: 77 MMHG | HEART RATE: 100 BPM | RESPIRATION RATE: 18 BRPM | SYSTOLIC BLOOD PRESSURE: 122 MMHG | OXYGEN SATURATION: 100 %

## 2017-05-19 VITALS — BODY MASS INDEX: 17.4 KG/M2

## 2017-05-19 DIAGNOSIS — K50.90: Primary | ICD-10-CM

## 2017-05-19 NOTE — ED PDOC
Arrival/HPI





- General


Chief Complaint: Abdominal Pain


Time Seen by Provider: 05/19/17 10:02


Historian: Patient





- History of Present Illness


Narrative History of Present Illness (Text): 





05/19/17 10:15





Lawrence Wen is a 21 year old male, whose past medical history includes 

Crohn's Disease and fistulas, who presents to the emergency department 

complaining of constant right-sided abdominal pain for a a few days. Patient 

states that he has been taking home antibiotics through a pic line but it has 

brought no relief and indicates that there is still drainage from his wounds. 

Patient says he feels subjectively feverish but at present, is not febrile. 

Patient endorses that he did not take any pyretics within the past day and 

denies taking any pain medications at home. Patient notes that he had a recent 

previous hospitalization for pneumonia. Patient denies any other complaint at 

this time. 





PMD: Dr. Colby





Time/Duration: < week


Symptom Onset: Gradual


Symptom Course: Unchanged


Severity Level: Mild


Activities at Onset: Rest


Context: Home





Past Medical History





- Provider Review


Nursing Documentation Reviewed: Yes





- Infectious Disease


Hx of Infectious Diseases: None





- Reproductive


Currently Pregnant: No





- Cardiac


Hx Cardiac Disorders: No





- Pulmonary


Hx Respiratory Disorders: Yes


Hx Pneumonia: Yes





- Neurological


Hx Neurological Disorder: No





- HEENT


Hx HEENT Disorder: No





- Renal


Hx Renal Disorder: No





- Endocrine/Metabolic


Hx Endocrine Disorders: No





- Hematological/Oncological


Hx Blood Disorders: Yes


Hx Anemia: Yes





- Integumentary


Hx Dermatological Disorder: Yes





- Musculoskeletal/Rheumatological


Hx Musculoskeletal Disorders: No


Hx Falls: No





- Gastrointestinal


Hx Gastrointestinal Disorders: Yes


Hx Crohn's Disease: Yes





- Genitourinary/Gynecological


Hx Genitourinary Disorders: Yes


Hx Urinary Tract Infection: Yes





- Psychiatric


Hx Psychophysiologic Disorder: Yes


Hx Anxiety: Yes


Hx Substance Use: No





- Surgical History


Other/Comment: Colon resection





- Anesthesia


Hx Anesthesia: Yes


Hx Anesthesia Reactions: No


Hx Malignant Hyperthermia: No





- Suicidal Assessment


Feels Threatened In Home Enviroment: No





Family/Social History





- Physician Review


Nursing Documentation Reviewed: Yes


Family/Social History: No Known Family HX


Smoking Status: Current Some Days Smoker


Hx Alcohol Use: No


Hx Substance Use: No





Allergies/Home Meds


Allergies/Adverse Reactions: 


Allergies





diphenhydramine [From Benadryl] Allergy (Verified 04/28/17 20:20)


 RASH


FISH Allergy (Verified 04/28/17 20:19)


 RASH


ketorolac [From Toradol] Allergy (Verified 04/28/17 20:19)


 RASH


shellfish derived Adverse Reaction (Verified 04/28/17 20:19)


 ANGIOEDEMA








Home Medications: 


 Home Meds











 Medication  Instructions  Recorded  Confirmed


 


DAPTOmycin [Cubicin] 500 mg IV DAILY 05/19/17 05/19/17


 


Ertapenem 1gm in NS 50ml [Invanz] 1 gm IV DAILY 05/19/17 05/19/17


 


Heparin Sodium,Porcine/Pf [Heparin 10 units IV DAILY 05/19/17 05/19/17





5 Unit/5 ml (1/ml) Syr]   














Review of Systems





- Physician Review


All systems were reviewed & negative as marked: Yes





- Review of Systems


Constitutional: Fevers.  absent: Night Sweats


Eyes: absent: Vision Changes


ENT: absent: Hearing Changes


Respiratory: absent: SOB


Cardiovascular: absent: Chest Pain


Gastrointestinal: Abdominal Pain (Right-sided abdominal pain)


Genitourinary Male: absent: Urinary Output Changes


Musculoskeletal: absent: Back Pain, Neck Pain


Skin: absent: Rash, Pruritis


Neurological: absent: Headache, Dizziness


Endocrine: absent: Polyuria


Hemo/Lymphatic: absent: Easy Bleeding


Psychiatric: absent: Depression





Physical Exam





- Physical Exam


Narrative Physical Exam (Text): 





Constitutional: No acute distress.


Head: Normocephalic.  Atraumatic.  


Eyes:  PERRL.


ENT:  Moist mucous membranes.


Respiratory:  Clear to auscultation bilaterally.


GI: Multiple open draining wounds to right sided abdomen. Right-sided diffuse 

abdominal tenderness with voluntary guarding. No left sided tenderness


Musculoskeletal:  No tenderness or swelling of extremities.


Skin:  No rash. 


Neurologic:  Alert, no focal deficit.





Vital Signs Reviewed: Yes


Vital Signs











  Temp Pulse Resp BP Pulse Ox


 


 05/19/17 09:59  99.1 F  100 H  18  122/77  100











Temperature: Afebrile


Blood Pressure: Normal


Pulse: Tachycardic


Respiratory Rate: Normal


Appearance: Positive for: Well-Appearing, Non-Toxic, Comfortable


Pain Distress: None


Mental Status: Positive for: Alert and Oriented X 3





Medical Decision Making


ED Course and Treatment: 


05/19/17 10:20


Drug registry checked, patient filled 10 mg Oxycodone 30 tablets 3 days ago, 

and 30 tablets of Percocets 10 days prior to that. 72 listed prescriptions from 

28 different prescribers.





05/19/17 11:09


I discussed drug prescription history with patient. He then told me that he is 

taking those medications at home but that nothing is working and that he 

requires IV narcotics. I informed him that his prescription history is 

concerning and that while I could definitely treat him with antibiotics, repeat 

imaging, assess his lab work for worsening infection, that he would not be 

receiving any narcotic medication. He perseverated in saying that antibiotics 

alone will not work and that he must have IV narcotics with them to help him 

with his pain and "whipjaws" and that he just wants to go home and relax and 

just try to make it to his first appointment with pain management in 3 days. He 

then asked if he could at least have cough medication to help him with his 

pneumonia. I informed him that I could provide him cough medication but not a 

controlled substance, to which he was very upset stating that only the cough 

medication with narcotics work for him. He asked me what I have against him and 

stated this was personal, to which I replied that it was not and that any 

patient with this prescription history is at high risk of narcotic abuse, and 

that I would not be able to provide narcotic medication to him. I continued to 

offer him any other pain medication, any cough medication, antibiotics, imaging

, and labs, but he refused all these if he was not also getting narcotic 

medication. The patient then walked out of the ER without his papers. 





- Scribe Statement


The provider has reviewed the documentation as recorded by the Amy Mojica





Provider Scribe Attestation:


All medical record entries made by the Amy were at my direction and 

personally dictated by me. I have reviewed the chart and agree that the record 

accurately reflects my personal performance of the history, physical exam, 

medical decision making, and the department course for this patient. I have 

also personally directed, reviewed, and agree with the discharge instructions 

and disposition.





Disposition/Present on Arrival





- Present on Arrival


Any Indicators Present on Arrival: No


History of DVT/PE: No


History of Uncontrolled Diabetes: No


Urinary Catheter: No


History of Decub. Ulcer: No


History Surgical Site Infection Following: None





- Disposition


Have Diagnosis and Disposition been Completed?: Yes


Diagnosis: 


 Crohn's disease





Disposition: ELOPEMENT - ER ONLY


Disposition Time: 10:57


Condition: STABLE


Discharge Instructions (ExitCare):  Against Medical Advice (ED), Crohn Disease (

ED)


Referrals: 


Gary Colby MD [Primary Care Provider] - Follow up with primary

## 2017-06-15 ENCOUNTER — HOSPITAL ENCOUNTER (INPATIENT)
Dept: HOSPITAL 42 - ED | Age: 22
LOS: 6 days | Discharge: LEFT BEFORE DISCHARGE | DRG: 277 | End: 2017-06-21
Attending: INTERNAL MEDICINE | Admitting: INTERNAL MEDICINE
Payer: MEDICAID

## 2017-06-15 VITALS — BODY MASS INDEX: 17.4 KG/M2

## 2017-06-15 DIAGNOSIS — G89.29: ICD-10-CM

## 2017-06-15 DIAGNOSIS — L02.211: Primary | ICD-10-CM

## 2017-06-15 DIAGNOSIS — N32.2: ICD-10-CM

## 2017-06-15 DIAGNOSIS — D50.9: ICD-10-CM

## 2017-06-15 DIAGNOSIS — E87.6: ICD-10-CM

## 2017-06-15 DIAGNOSIS — Z91.19: ICD-10-CM

## 2017-06-15 DIAGNOSIS — K50.113: ICD-10-CM

## 2017-06-15 DIAGNOSIS — K50.114: ICD-10-CM

## 2017-06-15 DIAGNOSIS — F11.20: ICD-10-CM

## 2017-06-15 DIAGNOSIS — Z76.5: ICD-10-CM

## 2017-06-15 DIAGNOSIS — B96.1: ICD-10-CM

## 2017-06-15 LAB
ADD MANUAL DIFF?: NO
ALBUMIN/GLOB SERPL: 0.9 {RATIO} (ref 1.1–1.8)
ALP SERPL-CCNC: 94 U/L (ref 38–133)
ALT SERPL-CCNC: 40 U/L (ref 7–56)
APTT BLD: 24.7 SECONDS (ref 23.7–30.8)
AST SERPL-CCNC: 52 U/L (ref 15–59)
BASOPHILS # BLD AUTO: 0.02 K/MM3 (ref 0–2)
BASOPHILS NFR BLD: 0.2 % (ref 0–3)
BILIRUB SERPL-MCNC: 0.1 MG/DL (ref 0.2–1.3)
BUN SERPL-MCNC: 6 MG/DL (ref 7–21)
CALCIUM SERPL-MCNC: 8.7 MG/DL (ref 8.4–10.5)
CHLORIDE SERPL-SCNC: 105 MMOL/L (ref 98–107)
CO2 SERPL-SCNC: 27 MMOL/L (ref 21–33)
EOSINOPHIL # BLD: 0.1 10*3/UL (ref 0–0.7)
EOSINOPHIL NFR BLD: 1.1 % (ref 1.5–5)
ERYTHROCYTE [DISTWIDTH] IN BLOOD BY AUTOMATED COUNT: 21.4 % (ref 11.5–14.5)
GLOBULIN SER-MCNC: 3.7 GM/DL
GLUCOSE SERPL-MCNC: 99 MG/DL (ref 70–110)
GRANULOCYTES # BLD: 8.01 10*3/UL (ref 1.4–6.5)
GRANULOCYTES NFR BLD: 85.2 % (ref 50–68)
HCT VFR BLD CALC: 26.4 % (ref 42–52)
INR PPP: 1.11 (ref 0.93–1.08)
LIPASE SERPL-CCNC: 86 U/L (ref 23–300)
LYMPHOCYTES # BLD: 0.8 10*3/UL (ref 1.2–3.4)
LYMPHOCYTES NFR BLD AUTO: 8.8 % (ref 22–35)
MCH RBC QN AUTO: 23 PG (ref 25–35)
MCHC RBC AUTO-ENTMCNC: 31.1 G/DL (ref 31–37)
MCV RBC AUTO: 74.2 FL (ref 80–105)
MONOCYTES # BLD AUTO: 0.4 10*3/UL (ref 0.1–0.6)
MONOCYTES NFR BLD: 4.7 % (ref 1–6)
PLATELET # BLD: 579 10^3/UL (ref 120–450)
PMV BLD AUTO: 7.6 FL (ref 7–11)
POTASSIUM SERPL-SCNC: 3.5 MMOL/L (ref 3.6–5)
PROT SERPL-MCNC: 7.1 G/DL (ref 5.8–8.3)
SODIUM SERPL-SCNC: 141 MMOL/L (ref 132–148)
WBC # BLD AUTO: 9.4 10^3/UL (ref 4.5–11)

## 2017-06-15 RX ADMIN — PIPERACILLIN AND TAZOBACTAM SCH MLS/HR: 3; .375 INJECTION, POWDER, LYOPHILIZED, FOR SOLUTION INTRAVENOUS; PARENTERAL at 23:48

## 2017-06-15 RX ADMIN — MORPHINE SULFATE PRN MG: 2 INJECTION, SOLUTION INTRAMUSCULAR; INTRAVENOUS at 23:23

## 2017-06-15 RX ADMIN — OXYCODONE HYDROCHLORIDE AND ACETAMINOPHEN PRN TAB: 5; 325 TABLET ORAL at 17:04

## 2017-06-15 RX ADMIN — MORPHINE SULFATE PRN MG: 2 INJECTION, SOLUTION INTRAMUSCULAR; INTRAVENOUS at 19:20

## 2017-06-15 RX ADMIN — PIPERACILLIN AND TAZOBACTAM SCH: 3; .375 INJECTION, POWDER, LYOPHILIZED, FOR SOLUTION INTRAVENOUS; PARENTERAL at 17:43

## 2017-06-15 RX ADMIN — OXYCODONE HYDROCHLORIDE AND ACETAMINOPHEN PRN TAB: 5; 325 TABLET ORAL at 21:07

## 2017-06-15 NOTE — CP.PCM.HP
<Issac Romero - Last Filed: 06/15/17 15:02>





History of Present Illness





- History of Present Illness


History of Present Illness: 








This is a 21 year old male with past medical history of Crohn's disease, 

vesicular cutaneous fistula and chronic abdominal abscess, presents to the 

emergency department complaining of right lower abominal pain.  Patient reports 

his pain has worsened over the past 3 days. He notes chills, cough and chronic 

non-bloody diarrhea. Patient denies any fever, nausea, vomiting, chest pain, 

shortness of breath or any other complaints. He has had multiple admissions for 

the same problem. He was recently discharged from [facility] in [place] 

where he got picc line and iv abx. He is constantly requesting pain medications.











PMH: Crohns on Remicaide Q4-6 wks, anemia





PSH: multiple abdominal surgerys, IR  & D for abscesses





All: Benadryl, Shellfish, FISH, ketoralac





SH: Denies ETOH use, current tobacco use, illicit drug use








PMD: Antoinette


Outpt GI: Spirr





Present on Admission





- Present on Admission


Any Indicators Present on Admission: No


History of DVT/PE: No


History of Uncontrolled Diabetes: No


Urinary Catheter: No


Decubitus Ulcer Present: No





Review of Systems





- Review of Systems


All systems: reviewed and no additional remarkable complaints except


Review of Systems: 





Negative except per HPI. 





Past Patient History





- Infectious Disease


Hx of Infectious Diseases: None





- Tetanus Immunizations


Tetanus Immunization: Unknown





- Past Medical History & Family History


Past Medical History?: Yes





- Past Social History


Smoking Status: Smoker Currrent Status Unknown


Chewing Tobacco Use: No


Cigar Use: No


Alcohol: None


Drugs: Denies


Home Situation {Lives}: With Family


Domestic Violence: Negative





- CARDIAC


Hx Cardiac Disorders: No





- PULMONARY


Hx Respiratory Disorders: Yes


Hx Pneumonia: Yes





- NEUROLOGICAL


Hx Neurological Disorder: No





- HEENT


Hx HEENT Problems: No





- RENAL


Hx Chronic Kidney Disease: No





- ENDOCRINE/METABOLIC


Hx Endocrine Disorders: No





- HEMATOLOGICAL/ONCOLOGICAL


Hx Blood Disorders: Yes


Hx Anemia: Yes





- INTEGUMENTARY


Hx Dermatological Problems: Yes





- MUSCULOSKELETAL/RHEUMATOLOGICAL


Hx Musculoskeletal Disorders: No


Hx Falls: No





- GASTROINTESTINAL


Hx Gastrointestinal Disorders: Yes


Hx Crohn's Disease: Yes





- GENITOURINARY/GYNECOLOGICAL


Hx Genitourinary Disorders: Yes


Hx Urinary Tract Infection: Yes





- PSYCHIATRIC


Hx Psychophysiologic Disorder: Yes


Hx Anxiety: Yes


Hx Substance Use: No





- SURGICAL HISTORY


Other/Comment: Colon resection





- ANESTHESIA


Hx Anesthesia: Yes


Hx Anesthesia Reactions: No


Hx Malignant Hyperthermia: No





Meds


Allergies/Adverse Reactions: 


 Allergies











Allergy/AdvReac Type Severity Reaction Status Date / Time


 


diphenhydramine Allergy  RASH Verified 06/15/17 16:48





[From Benadryl]     


 


FISH Allergy  RASH Verified 06/15/17 16:48


 


ketorolac [From Toradol] Allergy  RASH Verified 06/15/17 16:48


 


shellfish derived AdvReac  ANGIOEDEMA Verified 06/15/17 16:48














Physical Exam





- Constitutional


Appears: Non-toxic, No Acute Distress





- Head Exam


Head Exam: ATRAUMATIC, NORMAL INSPECTION, NORMOCEPHALIC





- Eye Exam


Eye Exam: EOMI





- ENT Exam


ENT Exam: Mucous Membranes Moist





- Neck Exam


Neck exam: Positive for: Full Rom, Normal Inspection





- Respiratory Exam


Respiratory Exam: Clear to Auscultation Bilateral, NORMAL BREATHING PATTERN





- Cardiovascular Exam


Cardiovascular Exam: +S1, +S2





- GI/Abdominal Exam


GI & Abdominal Exam: Tenderness.  absent: Distended


Additional comments: 





Abdominal wounds with trace yellow pus 





- Extremities Exam


Extremities exam: Positive for: full ROM, normal inspection





- Back Exam


Back exam: NORMAL INSPECTION





- Neurological Exam


Neurological exam: Alert, CN II-XII Intact, Oriented x3





- Psychiatric Exam


Psychiatric exam: Normal Affect, Normal Mood





- Skin


Skin Exam: Dry, Intact, Normal Color, Warm





Results





- Vital Signs


Recent Vital Signs: 





 Last Vital Signs











Temp  98.1 F   06/15/17 08:33


 


Pulse  81   06/15/17 14:00


 


Resp  17   06/15/17 14:00


 


BP  108/66   06/15/17 14:00


 


Pulse Ox  99   06/15/17 14:00














- Labs


Result Diagrams: 


 06/15/17 08:55





 06/15/17 08:55


Labs: 





 Laboratory Results - last 24 hr











  06/15/17 06/15/17 06/15/17





  08:55 08:55 08:55


 


WBC  9.4  


 


RBC  3.56  


 


Hgb  8.2 L  


 


Hct  26.4 L  


 


MCV  74.2 L  


 


MCH  23.0 L  


 


MCHC  31.1  


 


RDW  21.4 H  


 


Plt Count  579 H  


 


MPV  7.6  


 


Gran %  85.2 H  


 


Lymph % (Auto)  8.8 L  


 


Mono % (Auto)  4.7  


 


Eos % (Auto)  1.1 L  


 


Baso % (Auto)  0.2  


 


Gran #  8.01 H  


 


Lymph #  0.8 L  


 


Mono #  0.4  


 


Eos #  0.1  


 


Baso #  0.02  


 


PT   12.0 H 


 


INR   1.11 H 


 


APTT   24.7 


 


Sodium    141


 


Potassium    3.5 L


 


Chloride    105


 


Carbon Dioxide    27


 


Anion Gap    13


 


BUN    6 L


 


Creatinine    0.8


 


Est GFR ( Amer)    > 60


 


Est GFR (Non-Af Amer)    > 60


 


Random Glucose    99


 


Calcium    8.7


 


Total Bilirubin    0.1 L


 


AST    52


 


ALT    40


 


Alkaline Phosphatase    94


 


Total Protein    7.1


 


Albumin    3.4


 


Globulin    3.7


 


Albumin/Globulin Ratio    0.9 L


 


Lipase    86














Assessment & Plan





- Assessment and Plan (Free Text)


Assessment: 

















This is a 20 yo male with past medical hx of Crohn's and drug seeking behavior 

presenting with lower abdominal pain











1. Fistula/Crohn's disease








-surgery consult. recs appreciated


-ID consult. Dr. Mckee. recs appreciated.


-will get input from IR regarding drainage.


-CT scan abdomen/pelvis shows 2.4 x 3.4 cm abscess in right abdominal wall


-IV vanco and IV zosyn given in  ER


-will start PO flagyl tid


-cultures drawn


-f/u am labs











2. Chronic pain





-will add percocet for pain control








3. hypokalemia





-repleted with K


-will f/u and continue to monitor 











4. GI/DVT ppx








-protonix


-SCDs








discussed with Dr. Buitrago





<Trever Buitrago - Last Filed: 06/15/17 17:52>





Results





- Vital Signs


Recent Vital Signs: 





 Last Vital Signs











Temp  98.1 F   06/15/17 08:33


 


Pulse  81   06/15/17 14:00


 


Resp  17   06/15/17 14:00


 


BP  108/66   06/15/17 14:00


 


Pulse Ox  99   06/15/17 14:00














- Labs


Result Diagrams: 


 06/15/17 08:55





 06/15/17 08:55





Attending/Attestation





- Attestation


I have personally seen and examined this patient.: Yes


I have fully participated in the care of the patient.: Yes


I have reviewed all pertinent clinical information: Yes


Notes (Text): 





06/15/17 17:44





attending note;





Patient seen and examined with resident in ER.





This is a 21 year old male with past medical history of Crohn's disease, 

vesicular cutaneous fistula and chronic abdominal abscess, Opiate dependency 

presents to the emergency department complaining of right lower abominal pain 

and increasing discharge.





the patient was recently discharged from [facility] after abscess drainage.


Patient has PICC  in the right upper arm. As per patient he just completed once 

daily dosage of antibiotics but does not remember the name of the antibiotics.


He does not remember the name of the doctors who is treating him at [facility].





CT showed a right psoas myositis. Small abscess noted.


CT reviewed with interventional radiologist  Dr. Jorge Luis Gregory. no drainable 

collection.





Surgery evaluation requested.





Chronic opiate dependency; continue Per pain control.





Started on IV vancomycin, Zosyn and by mouth Flagyl. ID evaluation requested.





Medical records requested from [facility].





Patient is very noncompliance with follow up.





Patient will be referred back to colorectal surgery at [facility].














06/15/17 17:51





06/15/17 17:52

## 2017-06-15 NOTE — ED PDOC
Arrival/HPI





- General


Time Seen by Provider: 06/15/17 08:20


Historian: Patient





- History of Present Illness


Narrative History of Present Illness (Text): 


06/15/17 08:26


A 21 year old male, whose past medical history includes Crohn's disease, 

vesicular cutaneous fistula and chronic abdominal abscess, presents to the 

emergency department complaining of chronic right upper abdominal pain. Patient 

reports his pain has worsened over the past 3 days. He notes chills, cough and 

chronic non-bloody diarrhea. Patient denies any fever, nausea, vomiting, chest 

pain, shortness of breath or any other complaints. 





PMD: Dr. Curry


Gastroenterologist: Dr. Doyle





Time/Duration: Other (3 days)


Symptom Course: Worsening


Quality: Other


Context: Home





Past Medical History





- Provider Review


Nursing Documentation Reviewed: Yes





- Infectious Disease


Hx of Infectious Diseases: None





- Reproductive


Currently Pregnant: No





- Cardiac


Hx Cardiac Disorders: No





- Pulmonary


Hx Respiratory Disorders: Yes


Hx Pneumonia: Yes





- Neurological


Hx Neurological Disorder: No





- HEENT


Hx HEENT Disorder: No





- Renal


Hx Renal Disorder: No





- Endocrine/Metabolic


Hx Endocrine Disorders: No





- Hematological/Oncological


Hx Blood Disorders: Yes


Hx Anemia: Yes





- Integumentary


Hx Dermatological Disorder: Yes





- Musculoskeletal/Rheumatological


Hx Musculoskeletal Disorders: No


Hx Falls: No





- Gastrointestinal


Hx Gastrointestinal Disorders: Yes


Hx Crohn's Disease: Yes





- Genitourinary/Gynecological


Hx Genitourinary Disorders: Yes


Hx Urinary Tract Infection: Yes





- Psychiatric


Hx Psychophysiologic Disorder: Yes


Hx Anxiety: Yes


Hx Substance Use: No





- Surgical History


Other/Comment: Colon resection





- Anesthesia


Hx Anesthesia: Yes


Hx Anesthesia Reactions: No


Hx Malignant Hyperthermia: No





- Suicidal Assessment


Feels Threatened In Home Enviroment: No





Family/Social History





- Physician Review


Nursing Documentation Reviewed: Yes


Family/Social History: No Known Family HX


Smoking Status: Current Some Days Smoker


Hx Alcohol Use: No


Hx Substance Use: No





Allergies/Home Meds


Allergies/Adverse Reactions: 


Allergies





diphenhydramine [From Benadryl] Allergy (Verified 06/15/17 08:28)


 RASH


FISH Allergy (Verified 06/15/17 08:28)


 RASH


ketorolac [From Toradol] Allergy (Verified 06/15/17 08:28)


 RASH


shellfish derived Adverse Reaction (Verified 06/15/17 08:28)


 ANGIOEDEMA








Home Medications: 


 Home Meds











 Medication  Instructions  Recorded  Confirmed


 


DAPTOmycin [Cubicin] 500 mg IV DAILY 05/19/17 05/19/17


 


Ertapenem 1gm in NS 50ml [Invanz] 1 gm IV DAILY 05/19/17 05/19/17


 


Heparin Sodium,Porcine/Pf [Heparin 10 units IV DAILY 05/19/17 05/19/17





5 Unit/5 ml (1/ml) Syr]   














Review of Systems





- Physician Review


All systems were reviewed & negative as marked: Yes





- Review of Systems


Constitutional: Night Sweats.  absent: Fevers


Respiratory: Cough.  absent: SOB


Cardiovascular: absent: Chest Pain


Gastrointestinal: Abdominal Pain, Diarrhea.  absent: Nausea, Vomiting





Physical Exam


Vital Signs Reviewed: Yes


Vital Signs











  Temp Pulse Resp BP Pulse Ox


 


 06/15/17 14:00   81  17  108/66  99


 


 06/15/17 12:09   73  16  100/45 L  100


 


 06/15/17 10:00   78  17  110/60  100


 


 06/15/17 08:33  98.1 F  74  17  133/65  100











Temperature: Afebrile


Blood Pressure: Normal


Pulse: Regular


Respiratory Rate: Normal


Appearance: Positive for: Well-Appearing, Non-Toxic, Comfortable


Pain Distress: None


Mental Status: Positive for: Alert and Oriented X 3





- Systems Exam


Head: Present: Atraumatic, Normocephalic


Pupils: Present: PERRL


Extroacular Muscles: Present: EOMI


Conjunctiva: Present: Normal


Mouth: Present: Moist Mucous Membranes


Neck: Present: Normal Range of Motion


Respiratory/Chest: Present: Clear to Auscultation, Good Air Exchange.  No: 

Respiratory Distress, Accessory Muscle Use


Cardiovascular: Present: Regular Rate and Rhythm, Normal S1, S2.  No: Murmurs


Abdomen: Present: Tenderness (Right sided abdominal tenderness to palpation ), 

Normal Bowel Sounds, Other (Abdominal wounds with trace yellow pus, no foul odor

, erythema or warmth).  No: Distention, Peritoneal Signs


Back: Present: Normal Inspection


Upper Extremity: Present: Normal Inspection.  No: Cyanosis, Edema


Lower Extremity: Present: Normal Inspection.  No: Edema


Neurological: Present: GCS=15, CN II-XII Intact, Speech Normal


Skin: Present: Warm, Dry, Normal Color.  No: Rashes


Psychiatric: Present: Alert, Oriented x 3, Normal Insight, Normal Concentration





Medical Decision Making


ED Course and Treatment: 


06/15/17 08:26


Impression:


A 21 year old male with chronic right upper abdominal pain, worse than usual. 

Patient notes chills, cough and chronic non-bloody diarrhea.





Differential Diagnosis included but are not limited to: Abdominal pain with 

Crohn's rule out abscess 





Plan:


-- Abdominal CT


-- Labs


-- Pepcid, Morphine, Zofran and IV fluids


-- Reassess and disposition





Progress Notes:


06/15/17 09:20


Labs reviewed, hemoglobin and platelets at baseline.





Report Date : 06/15/2017 13:21:42


PROCEDURE:  CT Abdomen and Pelvis with contrast


Dictator : Jyoti Spence MD


IMPRESSION:


1. Findings are concerning for 2.4 x 3.4 cm abscess in the right lateral 

abdominal wall with associated edema and myositis.


2. Chest also noted is edema and fluid collection in the right iliacus and 

psoas muscles with ill-defined enhancement which may represent phlegmon or 

abscess. 


3. Additional comments as described in findings.





ADDENDUM:  This addendum is in regards to additional findings: 





1. Findings are concerning for 2.4 x 3.4 cm abscess in the right lateral 

abdominal wall with associated edema and myositis.





2. Interval worsening of edema and fluid collection in the right iliacus and 

psoas muscles with ill-defined enhancement which may represent phlegmon or 

abscess. 





3. Additional comments as described above. 





Important findings were discussed with Dr. Vera in the ER on 06/15/2017 at 21

:30 p.m.


[ Addendum Report Added by Jyoti Spence MD at 06/15/2017 13:35:30 ]








06/15/17 14:44


Vanco and Zosyn IV ordered. Case discussed with radiologist DR. Spence who 

states there are worsening findings of fluid collections compared to previous 

CT. Case discussed with Dr. Buitrago, hospitalist who will place patient under 

her service. 








- Lab Interpretations


Lab Results: 








 06/15/17 08:55 





 06/15/17 08:55 





 Lab Results





06/15/17 08:55: Sodium 141, Potassium 3.5 L, Chloride 105, Carbon Dioxide 27, 

Anion Gap 13, BUN 6 L, Creatinine 0.8, Est GFR (African Amer) > 60, Est GFR (Non

-Af Amer) > 60, Random Glucose 99, Calcium 8.7, Total Bilirubin 0.1 L, AST 52, 

ALT 40, Alkaline Phosphatase 94, Total Protein 7.1, Albumin 3.4, Globulin 3.7, 

Albumin/Globulin Ratio 0.9 L, Lipase 86


06/15/17 08:55: PT 12.0 H, INR 1.11 H, APTT 24.7


06/15/17 08:55: WBC 9.4, RBC 3.56, Hgb 8.2 L, Hct 26.4 L, MCV 74.2 L, MCH 23.0 L

, MCHC 31.1, RDW 21.4 H, Plt Count 579 H, MPV 7.6, Gran % 85.2 H, Lymph % (Auto

) 8.8 L, Mono % (Auto) 4.7, Eos % (Auto) 1.1 L, Baso % (Auto) 0.2, Gran # 8.01 H

, Lymph # 0.8 L, Mono # 0.4, Eos # 0.1, Baso # 0.02








I have reviewed the lab results: Yes





- RAD Interpretation


Radiology Orders: 








06/15/17 08:27


ABD PELVIS PO & IV CONTRAST [CT] Stat 














- Medication Orders


Current Medication Orders: 








Sodium Chloride (Sodium Chloride 0.9%)  1,000 mls @ 100 mls/hr IV .Q10H STA


   Stop: 06/15/17 18:26


   Last Admin: 06/15/17 09:01  Dose: 100 mls/hr





Vancomycin HCl (Vancomycin 1gm)  1 gm in 250 mls @ 167 mls/hr IVPB STAT STA


   PRN Reason: Protocol


   Stop: 06/15/17 15:16


   Last Admin: 06/15/17 14:40  Dose: 167 mls/hr





Discontinued Medications





Famotidine (Pepcid)  20 mg IVP STAT STA


   Stop: 06/15/17 08:28


   Last Admin: 06/15/17 09:02  Dose: 20 mg





Piperacillin Sod/Tazobactam Sod (Zosyn 4.5 Gm In Ns 100ml)  4.5 gm in 100 mls @ 

200 mls/hr IVPB STAT STA


   PRN Reason: Protocol


   Stop: 06/15/17 14:16


   Last Admin: 06/15/17 13:58  Dose: 200 mls/hr





Iohexol (Omnipaque 240 (50 Ml)) Confirm Administered Dose 50 ml .ROUTE .[facility]-MED 

ONE


   Stop: 06/15/17 08:38


Iohexol (Omnipaque 350 100 Ml) Confirm Administered Dose 350 mg .ROUTE .[facility]-MED 

ONE


   Stop: 06/15/17 09:39


Iohexol (Omnipaque 350 100 Ml) Confirm Administered Dose 350 mg .ROUTE .[facility]-MED 

ONE


   Stop: 06/15/17 11:45


Morphine Sulfate (Morphine)  4 mg IVP STAT STA


   Stop: 06/15/17 08:28


   Last Admin: 06/15/17 09:01  Dose: 4 mg





Re-Assess: MAR Pain Assessment


 Document     06/15/17 10:01  ALA  (Rec: 06/15/17 10:19  ALA  [id number]


     Pain Reassessment


      Is this a pain reassessment?               Yes


     Sleep


      Is patient sleeping during reassessment?   No


     Presence of Pain


      Presence of Pain                           Yes


     Pain Scale Used


      Pain Scale Used                            Numeric





Morphine Sulfate (Morphine)  4 mg IVP STAT STA


   Stop: 06/15/17 10:18


   Last Admin: 06/15/17 10:59  Dose: 4 mg





Re-Assess: MAR Pain Assessment


 Document     06/15/17 11:59  ALA  (Rec: 06/15/17 13:08  ALA  [id number]


     Pain Reassessment


      Is this a pain reassessment?               Yes


     Sleep


      Is patient sleeping during reassessment?   No


     Presence of Pain


      Presence of Pain                           Yes


     Pain Scale Used


      Pain Scale Used                            Numeric





Morphine Sulfate (Morphine)  4 mg IVP STAT STA


   Stop: 06/15/17 13:43


   Last Admin: 06/15/17 13:57  Dose: 4 mg





Ondansetron HCl (Zofran Inj)  4 mg IVP STAT STA


   Stop: 06/15/17 08:28


   Last Admin: 06/15/17 09:02  Dose: 4 mg





Potassium Chloride (K-Dur 20 Meq Er Tab)  40 meq PO STAT STA


   Stop: 06/15/17 09:33


   Last Admin: 06/15/17 09:47  Dose: 40 meq











- Scribe Statement


The provider has reviewed the documentation as recorded by the Johnathonibnorma Gallardo





Provider Scribe Attestation:


All medical record entries made by the Scribe were at my direction and 

personally dictated by me. I have reviewed the chart and agree that the record 

accurately reflects my personal performance of the history, physical exam, 

medical decision making, and the department course for this patient. I have 

also personally directed, reviewed, and agree with the discharge instructions 

and disposition.








Disposition/Present on Arrival





- Present on Arrival


Any Indicators Present on Arrival: No


History of DVT/PE: No


History of Uncontrolled Diabetes: No


Urinary Catheter: No


History Surgical Site Infection Following: None





- Disposition


Have Diagnosis and Disposition been Completed?: Yes


Diagnosis: 


 Intra-abdominal abscess, Abdominal pain





Disposition: HOSPITALIZED


Disposition Time: 14:47


Patient Plan: Admission


Condition: FAIR


Referrals: 


Bárbara Ortiz, [Primary Care Provider] - Follow up with primary

## 2017-06-15 NOTE — CP.PCM.CON
History of Present Illness





- History of Present Illness


History of Present Illness: 





General Surgery Progress Note for Dr. Charles





21 year old  male with past medical history of Crohn's disease, 

vesicular cutaneous fistula, pain medication seeking behavior and iron 

deficiency anemia presents with right lower abominal pain. Patient reports his 

abdominal pain started 3 days ago, similar his past Crohn's attacks. Patient 

has 3 vesicular cutaneous fistula in this abdomen which started draining light 

green color fluids 3 days ago as well. Patient was recently seen by a 

specialist at [facility] in [place] whom drained his abscess on 5/30/17 

in the lower back. Patient was instructed to administer IV antibiotics x 2weeks 

via PICC upon discharge. Patient reports to have subject fever, chills and 

diarrhea at home. He had multiple [place] [facility] [place] 

complaints. In the ED, patient's abdominal CT showed 2.4x3.4cm abscess in the 

right lateral abdominal wall. Surgical consultation was requested to evaluate 

patient's abdominal abscess. Patient denies any headache, weakness, shortness 

of breath, chest pain, nausea, vomiting, or urinary complaints. 





PMH: Crohn's, fistula, anemia, pain medication seeking behavior


PSH: drainages of intra-abdominal abscesses x 9, bowel resection


Social Hx: denies tobacco, alcohol or other drug use


Meds: Finished unknown IV abx and percocet 3 days ago


Allergy: Benadryl (rash), toradol (rash), fish (rash), shell fish (angioedema)





Review of Systems





- Constitutional


Constitutional: As Per HPI, Chills, Fever.  absent: Headache, Weakness





- EENT


Eyes: As Per HPI.  absent: Blurred Vision, Change in Vision


Ears: As Per HPI.  absent: Dizziness


Nose/Mouth/Throat: As Per HPI.  absent: Nasal Congestion





- Cardiovascular


Cardiovascular: As Per HPI.  absent: Chest Pain, Diaphoresis, Syncope





- Respiratory


Respiratory: As Per HPI, Cough.  absent: Dyspnea





- Gastrointestinal


Gastrointestinal: As Per HPI, Abdominal Pain, Diarrhea.  absent: Nausea, 

Vomiting





- Genitourinary


Genitourinary: As Per HPI.  absent: Pyuria, Urinary Frequency, Urinary Hesitance

, Urinary Urgency





- Musculoskeletal


Musculoskeletal: As Per HPI.  absent: Back Pain





- Integumentary


Integumentary: As Per HPI


Additional comments: 





draining abdominal fistula





- Neurological


Neurological: As Per HPI.  absent: Dizziness, Syncope, Weakness





- Psychiatric


Psychiatric: As Per HPI.  absent: Confusion, Depression





- Endocrine


Endocrine: As Per HPI





- Hematologic/Lymphatic


Hematologic: As Per HPI





Past Patient History





- Infectious Disease


Hx of Infectious Diseases: None





- Tetanus Immunizations


Tetanus Immunization: Unknown





- Past Medical History & Family History


Past Medical History?: Yes





- Past Social History


Smoking Status: Smoker Currrent Status Unknown


Chewing Tobacco Use: No


Cigar Use: No


Alcohol: None


Drugs: Denies


Home Situation {Lives}: With Family


Domestic Violence: Negative





- CARDIAC


Hx Cardiac Disorders: No





- PULMONARY


Hx Respiratory Disorders: Yes


Hx Pneumonia: Yes





- NEUROLOGICAL


Hx Neurological Disorder: No





- HEENT


Hx HEENT Problems: No





- RENAL


Hx Chronic Kidney Disease: No





- ENDOCRINE/METABOLIC


Hx Endocrine Disorders: No





- HEMATOLOGICAL/ONCOLOGICAL


Hx Blood Disorders: Yes


Hx Anemia: Yes





- INTEGUMENTARY


Hx Dermatological Problems: Yes





- MUSCULOSKELETAL/RHEUMATOLOGICAL


Hx Musculoskeletal Disorders: No


Hx Falls: No





- GASTROINTESTINAL


Hx Gastrointestinal Disorders: Yes


Hx Crohn's Disease: Yes





- GENITOURINARY/GYNECOLOGICAL


Hx Genitourinary Disorders: Yes


Hx Urinary Tract Infection: Yes





- PSYCHIATRIC


Hx Psychophysiologic Disorder: Yes


Hx Anxiety: Yes


Hx Substance Use: No





- SURGICAL HISTORY


Other/Comment: Colon resection





- ANESTHESIA


Hx Anesthesia: Yes


Hx Anesthesia Reactions: No


Hx Malignant Hyperthermia: No





Meds


Allergies/Adverse Reactions: 


 Allergies











Allergy/AdvReac Type Severity Reaction Status Date / Time


 


diphenhydramine Allergy  RASH Verified 06/15/17 08:28





[From Benadryl]     


 


FISH Allergy  RASH Verified 06/15/17 08:28


 


ketorolac [From Toradol] Allergy  RASH Verified 06/15/17 08:28


 


shellfish derived AdvReac  ANGIOEDEMA Verified 06/15/17 08:28














- Medications


Medications: 


 Current Medications





Sodium Chloride (Sodium Chloride 0.9%)  1,000 mls @ 100 mls/hr IV .Q10H STA


   Stop: 06/15/17 18:26


   Last Admin: 06/15/17 09:01 Dose:  100 mls/hr


Vancomycin HCl (Vancomycin 1gm)  1 gm in 250 mls @ 167 mls/hr IVPB STAT STA


   PRN Reason: Protocol


   Stop: 06/15/17 15:16


   Last Admin: 06/15/17 14:40 Dose:  167 mls/hr


Metronidazole (Flagyl)  500 mg PO TID BARBARA


   PRN Reason: Protocol


Pantoprazole Sodium (Protonix Ec Tab)  40 mg PO DAILY BARBARA











Physical Exam





- Constitutional


Appears: Non-toxic, No Acute Distress





- Head Exam


Head Exam: ATRAUMATIC, NORMAL INSPECTION





- Eye Exam


Eye Exam: EOMI, Normal appearance





- ENT Exam


ENT Exam: Mucous Membranes Moist





- Neck Exam


Neck exam: Positive for: Normal Inspection





- Respiratory Exam


Respiratory Exam: NORMAL BREATHING PATTERN.  absent: Respiratory Distress





- Cardiovascular Exam


Cardiovascular Exam: +S1, +S2





- GI/Abdominal Exam


GI & Abdominal Exam: Soft, Tenderness.  absent: Hernia, Mass


Additional comments: 





Three abdominal fistula located at RLQ draining light green color fluid. 





- Extremities Exam


Extremities exam: Positive for: normal capillary refill, normal inspection, 

pedal pulses present





- Back Exam


Back exam: absent: NORMAL INSPECTION (slight edema in the left lower back (site 

drained 2 weeks ago))





- Neurological Exam


Neurological exam: Alert, Oriented x3





- Psychiatric Exam


Psychiatric exam: Normal Affect, Normal Mood





- Skin


Skin Exam: Normal Color, Warm





Results





- Vital Signs


Recent Vital Signs: 


 Last Vital Signs











Temp  98.1 F   06/15/17 08:33


 


Pulse  81   06/15/17 14:00


 


Resp  17   06/15/17 14:00


 


BP  108/66   06/15/17 14:00


 


Pulse Ox  99   06/15/17 14:00














- Labs


Result Diagrams: 


 06/15/17 08:55





 06/15/17 08:55


Labs: 


 Laboratory Results - last 24 hr











  06/15/17 06/15/17 06/15/17





  08:55 08:55 08:55


 


WBC  9.4  


 


RBC  3.56  


 


Hgb  8.2 L  


 


Hct  26.4 L  


 


MCV  74.2 L  


 


MCH  23.0 L  


 


MCHC  31.1  


 


RDW  21.4 H  


 


Plt Count  579 H  


 


MPV  7.6  


 


Gran %  85.2 H  


 


Lymph % (Auto)  8.8 L  


 


Mono % (Auto)  4.7  


 


Eos % (Auto)  1.1 L  


 


Baso % (Auto)  0.2  


 


Gran #  8.01 H  


 


Lymph #  0.8 L  


 


Mono #  0.4  


 


Eos #  0.1  


 


Baso #  0.02  


 


PT   12.0 H 


 


INR   1.11 H 


 


APTT   24.7 


 


Sodium    141


 


Potassium    3.5 L


 


Chloride    105


 


Carbon Dioxide    27


 


Anion Gap    13


 


BUN    6 L


 


Creatinine    0.8


 


Est GFR ( Amer)    > 60


 


Est GFR (Non-Af Amer)    > 60


 


Random Glucose    99


 


Calcium    8.7


 


Total Bilirubin    0.1 L


 


AST    52


 


ALT    40


 


Alkaline Phosphatase    94


 


Total Protein    7.1


 


Albumin    3.4


 


Globulin    3.7


 


Albumin/Globulin Ratio    0.9 L


 


Lipase    86














Assessment & Plan





- Assessment and Plan (Free Text)


Assessment: 





21 year old  male with past medical history of Crohn's disease, 

vesicular cutaneous fistula, pain medication seeking behavior and iron 

deficiency anemia presents with right lower abominal pain. Surgical consult was 

request to evaluate patient's right lower abdominal abscess found on CT scan.


Plan: 





Right lower abdominal abscess


-Draining bowel content via chronic fistula vs intraabdominal abscess


-No surgical intervention indicated at this time


-Topical vitamin A&D


-Recommend GI workup


-Antibiotic and pain management per primary team


-Discuss with attending

## 2017-06-15 NOTE — CT
PROCEDURE:  CT Abdomen and Pelvis with contrast



HISTORY:

Crohn's h/o abscess r/o worse abscess



COMPARISON:

04/28/2017



TECHNIQUE:

CT scan of the abdomen and pelvis was performed after intravenous 

administration of contrast. Oral contrast was not administered.  

Coronal and sagittal reformatted images were obtained. 



Contrast dose: 100 mL Omnipaque 350



Radiation dose:



Total exam DLP = 207.24 mGy-cm.



This CT exam was performed using one or more of the following dose 

reduction techniques: Automated exposure control, adjustment of the 

mA and/or kV according to patient size, and/or use of iterative 

reconstruction technique.



FINDINGS:



LOWER THORAX:

There are two 2-3 mm nodules in the right lung base. The left lung 

base is clear 



LIVER:

The liver is normal in size and there is homogeneous enhancement 

without ductal dilatation or focal mass. 



GALLBLADDER AND BILE DUCTS:

There are no calcified gallstones. 



PANCREAS:

The pancreas is normal in size and there is homogeneous enhancement 

without focal mass or ductal dilatation.



SPLEEN:

The spleen is normal in size and there is homogeneous enhancement. 

There is a 6 mm low-attenuation lesion in the anterior spleen too 

small to characterize by CT criteria. 



ADRENALS:

Both adrenal glands are normal in size without discrete nodule. 



KIDNEYS AND URETERS:

Both kidneys are normal in size and there is homogeneous enhancement 

without hydronephrosis, solid or cystic mass. 



VASCULATURE:

No aortic aneurysm. 



BOWEL:

Evaluation of the bowel is limited in the absence of oral contrast. 

The proximal small bowel loops are normal in caliber. There are 

postsurgical changes in the small bowel in the right mid abdomen. 

There is fluid in the ascending colon. The remaining colon is 

unremarkable.  No evidence of bowel dilatation or obstruction.



APPENDIX:

Not visualized. 



PERITONEUM:

There is small amount of free fluid in the right lower quadrant and 

pelvis. 



LYMPH NODES:

No enlarged lymph nodes. 



BLADDER:

There is severe circumferential mural thickening of the urinary 

bladder wall. 



REPRODUCTIVE:

Unremarkable. 



BONES:

No acute fracture. 



OTHER FINDINGS:

There is asymmetric enlargement and low attenuation in the right 

iliacus and psoas muscles with ill-defined enhancement.  There are 

also tiny foci of air in the iliacus muscle. There is also linear 

abnormal enhancing tissue tracking from the right iliac crest to the 

midline anterior abdominal wall. There is also abnormal enhancement 

in the open wound in the back on the right side (series 2, image 96). 

Also noted is a rim enhancing 2.4 x 3.4 cm lesion in the right 

anterior lateral abdominal wall.  There is edema and asymmetric 

enlargement of the right lateral wall muscles. 



IMPRESSION:

1. Findings are concerning for 2.4 x 3.4 cm abscess in the right 

lateral abdominal wall with associated edema and myositis.



2. Chest also noted is edema and fluid collection in the right 

iliacus and psoas muscles with ill-defined enhancement which may 

represent phlegmon or abscess. 



3. Additional comments as described above.

## 2017-06-16 LAB
ADD MANUAL DIFF?: NO
ALBUMIN/GLOB SERPL: 0.8 {RATIO} (ref 1.1–1.8)
ALP SERPL-CCNC: 89 U/L (ref 38–133)
ALT SERPL-CCNC: 34 U/L (ref 7–56)
AST SERPL-CCNC: 32 U/L (ref 15–59)
BASOPHILS # BLD AUTO: 0.03 K/MM3 (ref 0–2)
BASOPHILS NFR BLD: 0.3 % (ref 0–3)
BILIRUB SERPL-MCNC: 0.2 MG/DL (ref 0.2–1.3)
BUN SERPL-MCNC: 6 MG/DL (ref 7–21)
CALCIUM SERPL-MCNC: 7.7 MG/DL (ref 8.4–10.5)
CHLORIDE SERPL-SCNC: 105 MMOL/L (ref 98–107)
CO2 SERPL-SCNC: 25 MMOL/L (ref 21–33)
EOSINOPHIL # BLD: 0.3 10*3/UL (ref 0–0.7)
EOSINOPHIL NFR BLD: 2.5 % (ref 1.5–5)
ERYTHROCYTE [DISTWIDTH] IN BLOOD BY AUTOMATED COUNT: 21.8 % (ref 11.5–14.5)
GLOBULIN SER-MCNC: 3.6 GM/DL
GLUCOSE SERPL-MCNC: 86 MG/DL (ref 70–110)
GRANULOCYTES # BLD: 8.73 10*3/UL (ref 1.4–6.5)
GRANULOCYTES NFR BLD: 80.7 % (ref 50–68)
HCT VFR BLD CALC: 26.6 % (ref 42–52)
LYMPHOCYTES # BLD: 1 10*3/UL (ref 1.2–3.4)
LYMPHOCYTES NFR BLD AUTO: 9.2 % (ref 22–35)
MAGNESIUM SERPL-MCNC: 2.1 MG/DL (ref 1.7–2.2)
MCH RBC QN AUTO: 23.2 PG (ref 25–35)
MCHC RBC AUTO-ENTMCNC: 31.2 G/DL (ref 31–37)
MCV RBC AUTO: 74.5 FL (ref 80–105)
MONOCYTES # BLD AUTO: 0.8 10*3/UL (ref 0.1–0.6)
MONOCYTES NFR BLD: 7.3 % (ref 1–6)
PHOSPHATE SERPL-MCNC: 3.9 MG/DL (ref 2.5–4.5)
PLATELET # BLD: 543 10^3/UL (ref 120–450)
PMV BLD AUTO: 7.7 FL (ref 7–11)
POTASSIUM SERPL-SCNC: 4.5 MMOL/L (ref 3.6–5)
PROT SERPL-MCNC: 6.6 G/DL (ref 5.8–8.3)
SODIUM SERPL-SCNC: 138 MMOL/L (ref 132–148)
WBC # BLD AUTO: 10.8 10^3/UL (ref 4.5–11)

## 2017-06-16 RX ADMIN — MORPHINE SULFATE PRN MG: 2 INJECTION, SOLUTION INTRAMUSCULAR; INTRAVENOUS at 08:10

## 2017-06-16 RX ADMIN — PIPERACILLIN AND TAZOBACTAM SCH MLS/HR: 3; .375 INJECTION, POWDER, LYOPHILIZED, FOR SOLUTION INTRAVENOUS; PARENTERAL at 14:30

## 2017-06-16 RX ADMIN — PIPERACILLIN AND TAZOBACTAM SCH MLS/HR: 3; .375 INJECTION, POWDER, LYOPHILIZED, FOR SOLUTION INTRAVENOUS; PARENTERAL at 05:54

## 2017-06-16 RX ADMIN — OXYCODONE HYDROCHLORIDE AND ACETAMINOPHEN PRN TAB: 5; 325 TABLET ORAL at 10:41

## 2017-06-16 RX ADMIN — MORPHINE SULFATE PRN MG: 2 INJECTION, SOLUTION INTRAMUSCULAR; INTRAVENOUS at 21:31

## 2017-06-16 RX ADMIN — OXYCODONE HYDROCHLORIDE AND ACETAMINOPHEN PRN TAB: 5; 325 TABLET ORAL at 00:57

## 2017-06-16 RX ADMIN — MORPHINE SULFATE PRN MG: 2 INJECTION, SOLUTION INTRAMUSCULAR; INTRAVENOUS at 12:42

## 2017-06-16 RX ADMIN — PANTOPRAZOLE SODIUM SCH MG: 40 TABLET, DELAYED RELEASE ORAL at 09:37

## 2017-06-16 RX ADMIN — PIPERACILLIN AND TAZOBACTAM SCH MLS/HR: 3; .375 INJECTION, POWDER, LYOPHILIZED, FOR SOLUTION INTRAVENOUS; PARENTERAL at 16:53

## 2017-06-16 RX ADMIN — MORPHINE SULFATE PRN MG: 2 INJECTION, SOLUTION INTRAMUSCULAR; INTRAVENOUS at 15:56

## 2017-06-16 RX ADMIN — OXYCODONE HYDROCHLORIDE AND ACETAMINOPHEN PRN TAB: 5; 325 TABLET ORAL at 16:52

## 2017-06-16 RX ADMIN — MORPHINE SULFATE PRN MG: 2 INJECTION, SOLUTION INTRAMUSCULAR; INTRAVENOUS at 04:23

## 2017-06-16 RX ADMIN — OXYCODONE HYDROCHLORIDE AND ACETAMINOPHEN PRN TAB: 5; 325 TABLET ORAL at 05:53

## 2017-06-16 NOTE — CP.PCM.PN
<Issac Romero - Last Filed: 06/16/17 15:22>





Subjective





- Date & Time of Evaluation


Date of Evaluation: 06/16/17


Time of Evaluation: 15:15





- Subjective


Subjective: 














Medicine progress note.








Attending: Dr. Buitrago











Pt seen and examined at bedside. No acute distress. Pt requesting pain meds 

overnight and pt continuously requesting more and stronger pain meds this 

morning. Has hx of dependence on pain meds. No fevers, chills, vomiting. Does 

report some diarrhea. 





Objective





- Vital Signs/Intake and Output


Vital Signs (last 24 hours): 


 











Temp Pulse Resp BP Pulse Ox


 


 99.3 F   75   18   107/48 L  100 


 


 06/16/17 06:00  06/16/17 06:00  06/16/17 06:00  06/16/17 06:00  06/16/17 06:00











- Medications


Medications: 


 Current Medications





Piperacillin Sod/Tazobactam Sod (Zosyn 3.375 In Ns 100ml)  100 mls @ 200 mls/hr 

IVPB Q6 BARBARA


   PRN Reason: Protocol


   Stop: 06/24/17 18:01


   Last Admin: 06/16/17 05:54 Dose:  200 mls/hr


Linezolid (Zyvox)  600 mg PO BID BARBARA


   PRN Reason: Protocol


   Stop: 06/23/17 10:01


   Last Admin: 06/16/17 09:38 Dose:  600 mg


Metronidazole (Flagyl)  500 mg PO TID BARBARA


   PRN Reason: Protocol


   Last Admin: 06/16/17 09:36 Dose:  500 mg


Morphine Sulfate (Morphine)  1 mg IVP Q4H PRN


   PRN Reason: pain, sever


   Last Admin: 06/16/17 12:42 Dose:  1 mg


Ondansetron HCl (Zofran Tab)  4 mg PO Q6 PRN


   PRN Reason: Nausea/Vomiting


Oxycodone/Acetaminophen (Percocet 5/325 Mg Tab)  1 tab PO Q4H PRN


   PRN Reason: Pain, moderate (4-7)


   Stop: 06/18/17 16:10


   Last Admin: 06/16/17 10:41 Dose:  1 tab


Pantoprazole Sodium (Protonix Ec Tab)  40 mg PO DAILY Pending sale to [facility]


   Last Admin: 06/16/17 09:37 Dose:  40 mg


Vitamin A (Vitamin A & D Oint Ud Foilpak)  1 ea TOP Q4 PRN


   PRN Reason: Other











- Labs


Labs: 


 





 06/16/17 07:20 





 06/16/17 07:20 





 











PT  12.0 Seconds (9.9-11.8)  H  06/15/17  08:55    


 


INR  1.11  (0.93-1.08)  H  06/15/17  08:55    


 


APTT  24.7 Seconds (23.7-30.8)   06/15/17  08:55    














- Constitutional


Appears: Non-toxic, No Acute Distress





- Head Exam


Head Exam: ATRAUMATIC, NORMAL INSPECTION, NORMOCEPHALIC





- Eye Exam


Eye Exam: EOMI





- ENT Exam


ENT Exam: Mucous Membranes Moist





- Neck Exam


Neck Exam: Full ROM, Normal Inspection





- Respiratory Exam


Respiratory Exam: NORMAL BREATHING PATTERN.  absent: Respiratory Distress





- Cardiovascular Exam


Cardiovascular Exam: +S1, +S2





- GI/Abdominal Exam


Additional comments: 





Abdominal wounds with minimal drainage, mild diffuse tenderness to palpation





- Extremities Exam


Extremities Exam: Full ROM, Normal Inspection





- Neurological Exam


Neurological Exam: Alert, Awake, CN II-XII Intact, Oriented x3





- Psychiatric Exam


Psychiatric exam: Normal Affect, Normal Mood





- Skin


Skin Exam: Dry, Intact, Normal Color, Warm





Assessment and Plan





- Assessment and Plan (Free Text)


Assessment: 





This is a 20 yo male with past medical hx of Crohn's and drug seeking behavior 

presenting with lower abdominal pain











1. Crohn's disease with cutaneous fistula and abscess on CT








-surgery consult. recs appreciated


-no surgical intervention at this time


-ID consult. Dr. Mckee. recs appreciated.


-will get input from IR regarding drainage>> no drainage possible as of now 


-CT scan abdomen/pelvis shows 2.4 x 3.4 cm abscess in right abdominal wall


-IV vanco and IV zosyn given in  ER


-continue PO flagyl tid


-pt very noncompliant with follow up, cannot remember details of recent 

hospital stay in [place] including names of doctors 


-pt continuously requesting pain meds


-cultures negative so far


-pt has picc in place


-continue IV linezolid and IV zosyn


-pending insurance approval for abx


-f/u am labs











2. Chronic pain





-will add percocet for pain control


-pt also getting morphine for pain








3. hypokalemia





-repleted with K


-will f/u and continue to monitor 











4. GI/DVT ppx








-protonix


-SCDs








discussed with Dr. Buitrago





<Trever Buitrago - Last Filed: 06/21/17 18:05>





Objective





- Vital Signs/Intake and Output


Vital Signs (last 24 hours): 


 











Temp Pulse Resp BP Pulse Ox


 


 99.7 F H  100 H  20   103/67   98 


 


 06/21/17 06:00  06/21/17 06:00  06/21/17 06:00  06/21/17 06:00  06/21/17 06:00








Intake and Output: 


 











 06/21/17 06/21/17





 06:59 18:59


 


Intake Total 660 


 


Output Total 1600 


 


Balance -940 














- Labs


Labs: 


 





 06/21/17 09:00 





 06/21/17 09:00 





 











PT  12.0 Seconds (9.9-11.8)  H  06/15/17  08:55    


 


INR  1.11  (0.93-1.08)  H  06/15/17  08:55    


 


APTT  24.7 Seconds (23.7-30.8)   06/15/17  08:55    














Attending/Attestation





- Attestation


I have personally seen and examined this patient.: Yes


I have fully participated in the care of the patient.: Yes


I have reviewed all pertinent clinical information, including history, physical 

exam and plan: Yes


Notes (Text): 





06/21/17 18:00





attending note;





Patient seen and examined with resident.





This is a 21 year old male with past medical history of Crohn's disease, 

vesicular cutaneous fistula and chronic abdominal abscess, Opiate dependency 

presents to the emergency department complaining of right lower abdominal pain 

and increasing discharge.





the patient was recently discharged from [facility] after abscess drainage.


Patient has PICC  in the right upper arm. As per patient he just completed once 

daily dosage of antibiotics but does not remember the name of the antibiotics.


 Case discussed with  in detail.


The patient was not found by infusion company after completion of IV 

antibiotics.


currently they won't follow up with him at home. 





CT showed a right psoas myositis. Small abscess noted.


CT reviewed with interventional radiologist  Dr. Jorge Luis Gregory. no drainable 

collection.





Surgery evaluation appreciated.





Chronic opiate dependency; [place]   aware reviewed. patient gets opiate 

prescriptions from multiple hospitals/doctors.





Started on IV vancomycin, Zosyn and by mouth Flagyl. ID evaluation appreciated.


Suggested to continue daptomycin, invanz and fluconazole to complete 2 more 

weeks.





patient is more interested in pain medication than antibiotics.





Medical records requested from [facility].





Patient is very noncompliance with follow up.





Patient will be referred back to colorectal surgery at [facility].

## 2017-06-16 NOTE — CP.PCM.PN
Subjective





- Date & Time of Evaluation


Date of Evaluation: 06/16/17


Time of Evaluation: 07:10





- Subjective


Subjective: 





General Surgery Progress Note for Dr. Charles





Patient seen and examined at bedside. Patient continued to ask for pain 

medications overnight and complained they are not enough for his pain. He 

expressed interest in signing out AMA. Otherwise he has no other complaints. 

Patient denies headache, fever, chills, shortness of breath, chest pain, nausea

, vomiting, or urinary symptoms.





Objective





- Vital Signs/Intake and Output


Vital Signs (last 24 hours): 


 











Temp Pulse Resp BP Pulse Ox


 


 99.3 F   75   18   107/48 L  100 


 


 06/16/17 06:00  06/16/17 06:00  06/16/17 06:00  06/16/17 06:00  06/16/17 06:00











- Medications


Medications: 


 Current Medications





Piperacillin Sod/Tazobactam Sod (Zosyn 3.375 In Ns 100ml)  100 mls @ 200 mls/hr 

IVPB Q6 BARBARA


   PRN Reason: Protocol


   Stop: 06/24/17 18:01


   Last Admin: 06/16/17 05:54 Dose:  200 mls/hr


Linezolid (Zyvox)  600 mg PO BID BARBARA


   PRN Reason: Protocol


   Stop: 06/23/17 10:01


   Last Admin: 06/16/17 09:38 Dose:  600 mg


Metronidazole (Flagyl)  500 mg PO TID BARBARA


   PRN Reason: Protocol


   Last Admin: 06/16/17 09:36 Dose:  500 mg


Morphine Sulfate (Morphine)  1 mg IVP Q4H PRN


   PRN Reason: pain, sever


   Last Admin: 06/16/17 08:10 Dose:  1 mg


Ondansetron HCl (Zofran Tab)  4 mg PO Q6 PRN


   PRN Reason: Nausea/Vomiting


Oxycodone/Acetaminophen (Percocet 5/325 Mg Tab)  1 tab PO Q4H PRN


   PRN Reason: Pain, moderate (4-7)


   Stop: 06/18/17 16:10


   Last Admin: 06/16/17 05:53 Dose:  1 tab


Pantoprazole Sodium (Protonix Ec Tab)  40 mg PO DAILY [facility]


   Last Admin: 06/16/17 09:37 Dose:  40 mg


Vitamin A (Vitamin A & D Oint Ud Foilpak)  1 ea TOP Q4 PRN


   PRN Reason: Other











- Labs


Labs: 


 





 06/16/17 07:20 





 06/16/17 07:20 





 











PT  12.0 Seconds (9.9-11.8)  H  06/15/17  08:55    


 


INR  1.11  (0.93-1.08)  H  06/15/17  08:55    


 


APTT  24.7 Seconds (23.7-30.8)   06/15/17  08:55    














- Constitutional


Appears: Non-toxic, No Acute Distress





- Head Exam


Head Exam: ATRAUMATIC, NORMAL INSPECTION





- Eye Exam


Eye Exam: EOMI, Normal appearance





- ENT Exam


ENT Exam: Mucous Membranes Moist





- Respiratory Exam


Respiratory Exam: absent: Respiratory Distress





- Cardiovascular Exam


Cardiovascular Exam: +S1, +S2





- GI/Abdominal Exam


GI & Abdominal Exam: Soft, Tenderness


Additional comments: 





Three abdominal fistula located at RLQ draining light green and yellow color 

fluid. Dressing changed with sterile 4x4 gauze. Diffused tenderness to the 

touch.








- Extremities Exam


Extremities Exam: Normal Capillary Refill, Normal Inspection





- Back Exam


Additional comments: 





slight edema in the left lower back





- Neurological Exam


Neurological Exam: Alert, Awake, Oriented x3





- Psychiatric Exam


Psychiatric exam: Agitated





- Skin


Skin Exam: Normal Color, Warm





Assessment and Plan





- Assessment and Plan (Free Text)


Assessment: 





21 year old  male with past medical history of Crohn's disease, 

vesicular cutaneous fistula, pain medication seeking behavior and iron 

deficiency anemia presents with right lower abominal pain. Surgical consult was 

request to evaluate patient's right lower abdominal abscess found on CT scan.


Plan: 





Right lower abdominal abscess


-No surgical intervention indicated at this time


-Topical vitamin A&D as skin protectant


-Recommend GI workup


-IV Antibiotic per ID


-Discuss with attending

## 2017-06-16 NOTE — CP.PCM.CON
History of Present Illness





- History of Present Illness


History of Present Illness: 


21 year old male with PMH of poorly-controlled Crohn's disease with history of 

colocutaneous fistulas, abdominal abscesses was recently admitted in [facility] for abdominal pain and was again found to have intra-abdominal abscess 

and was discharged with PICC line on Daptomycin, Invanz and Diflucan. The 

patient was supposed to complete these for 2 weeks but the patient was non-

compliant. He is now in [facility] complaining of increasing 

abdominal pain. He denies nausea or vomiting, no diarrhea, no fever or chills, 

no headache or dizziness, no chest pain, no SOB, no cough or colds, no sore 

throat, no penile discharge. In the ED, CT abdomen and pelvis showed intra-

abdominal abscess and Infectious diseases consult is requested to further 

evaluate and manage.





Review of Systems





- Review of Systems


All systems: reviewed and no additional remarkable complaints except (as per HPI

)





Past Patient History





- Infectious Disease


Hx of Infectious Diseases: None





- Tetanus Immunizations


Tetanus Immunization: Unknown





- Past Medical History & Family History


Past Medical History?: Yes





- Past Social History


Smoking Status: Current Some Days Smoker





- CARDIAC


Hx Cardiac Disorders: No





- PULMONARY


Hx Respiratory Disorders: Yes


Hx Pneumonia: Yes





- NEUROLOGICAL


Hx Neurological Disorder: Yes


Hx Dizziness: Yes (SYNCOPE)





- HEENT


Hx HEENT Problems: No





- RENAL


Hx Chronic Kidney Disease: No





- ENDOCRINE/METABOLIC


Hx Endocrine Disorders: No





- HEMATOLOGICAL/ONCOLOGICAL


Hx Blood Disorders: Yes


Hx Anemia: Yes





- INTEGUMENTARY


Hx Dermatological Problems: Yes (RIGHT LOWER BACK FISTULA.DID I&D MAY 30 17 

[facility],)


Other/Comment: 6-15-17 3 RIGHT ABDOMINAL WALL ABSCESS. WITH LIGHT GREEN 

DRAINAGE.MEDIUM AMT.





- MUSCULOSKELETAL/RHEUMATOLOGICAL


Hx Musculoskeletal Disorders: No


Hx Falls: No





- GASTROINTESTINAL


Hx Gastrointestinal Disorders: Yes


Hx Crohn's Disease: Yes





- GENITOURINARY/GYNECOLOGICAL


Hx Genitourinary Disorders: Yes


Hx Urinary Tract Infection: Yes





- PSYCHIATRIC


Hx Psychophysiologic Disorder: Yes


Hx Anxiety: Yes


Hx Substance Use: Yes (H/O)





- SURGICAL HISTORY


Hx Surgeries: Yes


Other/Comment: Colon resection





- ANESTHESIA


Hx Anesthesia: Yes


Hx Anesthesia Reactions: No


Hx Malignant Hyperthermia: No





Meds


Home Medications: 


 Home Medication List











 Medication  Instructions  Recorded  Confirmed  Type


 


guaiFENesin/Codeine [Robitussin 5 ml PO Q4H #10 [facility] 06/16/17  Rx





w/Codeine]    











Allergies/Adverse Reactions: 


 Allergies











Allergy/AdvReac Type Severity Reaction Status Date / Time


 


diphenhydramine Allergy  RASH Verified 06/15/17 16:48





[From Benadryl]     


 


FISH Allergy  RASH Verified 06/15/17 16:48


 


ketorolac [From Toradol] Allergy  RASH Verified 06/15/17 16:48


 


shellfish derived AdvReac  ANGIOEDEMA Verified 06/15/17 16:48














- Medications


Medications: 


 Current Medications





Piperacillin Sod/Tazobactam Sod (Zosyn 3.375 In Ns 100ml)  100 mls @ 200 mls/hr 

IVPB Q6 [facility]


   PRN Reason: Protocol


   Stop: 06/24/17 18:01


   Last Admin: 06/16/17 05:54 Dose:  200 mls/hr


Vancomycin HCl (Vancomycin 1gm)  1 gm in 250 mls @ 167 mls/hr IVPB Q12 [facility]


   PRN Reason: Protocol


   Last Admin: 06/15/17 21:08 Dose:  167 mls/hr


Metronidazole (Flagyl)  500 mg PO TID [facility]


   PRN Reason: Protocol


   Last Admin: 06/15/17 17:21 Dose:  500 mg


Morphine Sulfate (Morphine)  1 mg IVP Q4H PRN


   PRN Reason: pain, sever


   Last Admin: 06/16/17 04:23 Dose:  1 mg


Ondansetron HCl (Zofran Tab)  4 mg PO Q6 PRN


   PRN Reason: Nausea/Vomiting


Oxycodone/Acetaminophen (Percocet 5/325 Mg Tab)  1 tab PO Q4H PRN


   PRN Reason: Pain, moderate (4-7)


   Stop: 06/18/17 16:10


   Last Admin: 06/16/17 05:53 Dose:  1 tab


Pantoprazole Sodium (Protonix Ec Tab)  40 mg PO DAILY [facility]


Vitamin A (Vitamin A & D Oint Ud Foilpak)  1 ea TOP Q4 PRN


   PRN Reason: Other











Physical Exam





- Constitutional


Appears: Non-toxic, No Acute Distress





- Head Exam


Head Exam: NORMAL INSPECTION





- ENT Exam


ENT Exam: Mucous Membranes Moist





- Neck Exam


Neck exam: Negative for: Lymphadenopathy, Meningismus





- Respiratory Exam


Respiratory Exam: Decreased Breath Sounds





- Cardiovascular Exam


Cardiovascular Exam: +S1, +S2





- GI/Abdominal Exam


GI & Abdominal Exam: Soft, Tenderness (mild, lower quadrants).  absent: 

Distended, Guarding, Rebound, Rigid





- Extremities Exam


Additional comments: 





left upper arm PICC line in place





Results





- Vital Signs


Recent Vital Signs: 


 Last Vital Signs











Temp  98.7 F   06/15/17 20:16


 


Pulse  81   06/15/17 20:16


 


Resp  17   06/15/17 20:16


 


BP  106/68   06/15/17 20:16


 


Pulse Ox  100   06/15/17 16:00














- Labs


Result Diagrams: 


 06/16/17 07:20





 06/16/17 07:20





Assessment & Plan





- Assessment and Plan (Free Text)


Plan: 


Assessment


Intra-abdominal abscess


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 

microabscesses, previously grew Vancomycin-resistant Enterococcus faecium from 3

/23, growing gram positive cocci and yeast from 3/26, in a patient with poorly-

controlled Crohn's disease, now growing VRE again





Plan


continue Zyvox, Zosyn and Diflucan - discussed with Dr. Buitrago


Will continue to monitor clinically while the patient is in the hospital

## 2017-06-16 NOTE — CP.PCM.CON
Past Patient History





- Infectious Disease


Hx of Infectious Diseases: None





- Tetanus Immunizations


Tetanus Immunization: Unknown





- Past Medical History & Family History


Past Medical History?: Yes





- Past Social History


Smoking Status: Current Some Days Smoker





- CARDIAC


Hx Cardiac Disorders: No





- PULMONARY


Hx Respiratory Disorders: Yes


Hx Pneumonia: Yes





- NEUROLOGICAL


Hx Neurological Disorder: Yes


Hx Dizziness: Yes (SYNCOPE)





- HEENT


Hx HEENT Problems: No





- RENAL


Hx Chronic Kidney Disease: No





- ENDOCRINE/METABOLIC


Hx Endocrine Disorders: No





- HEMATOLOGICAL/ONCOLOGICAL


Hx Blood Disorders: Yes


Hx Anemia: Yes





- INTEGUMENTARY


Hx Dermatological Problems: Yes (RIGHT LOWER BACK FISTULA.DID I&D MAY 30 17 

[facility],)


Other/Comment: 6-15-17 3 RIGHT ABDOMINAL WALL ABSCESS. WITH LIGHT GREEN 

DRAINAGE.MEDIUM AMT.





- MUSCULOSKELETAL/RHEUMATOLOGICAL


Hx Musculoskeletal Disorders: No


Hx Falls: No





- GASTROINTESTINAL


Hx Gastrointestinal Disorders: Yes


Hx Crohn's Disease: Yes





- GENITOURINARY/GYNECOLOGICAL


Hx Genitourinary Disorders: Yes


Hx Urinary Tract Infection: Yes





- PSYCHIATRIC


Hx Psychophysiologic Disorder: Yes


Hx Anxiety: Yes


Hx Substance Use: Yes (H/O)





- SURGICAL HISTORY


Hx Surgeries: Yes


Other/Comment: Colon resection





- ANESTHESIA


Hx Anesthesia: Yes


Hx Anesthesia Reactions: No


Hx Malignant Hyperthermia: No





Meds


Allergies/Adverse Reactions: 


 Allergies











Allergy/AdvReac Type Severity Reaction Status Date / Time


 


diphenhydramine Allergy  RASH Verified 06/15/17 16:48





[From Benadryl]     


 


FISH Allergy  RASH Verified 06/15/17 16:48


 


ketorolac [From Toradol] Allergy  RASH Verified 06/15/17 16:48


 


shellfish derived AdvReac  ANGIOEDEMA Verified 06/15/17 16:48














- Medications


Medications: 


 Current Medications





Piperacillin Sod/Tazobactam Sod (Zosyn 3.375 In Ns 100ml)  100 mls @ 200 mls/hr 

IVPB Q6 [facility]


   PRN Reason: Protocol


   Stop: 06/24/17 18:01


   Last Admin: 06/16/17 05:54 Dose:  200 mls/hr


Linezolid (Zyvox)  600 mg PO BID [facility]


   PRN Reason: Protocol


   Stop: 06/23/17 10:01


   Last Admin: 06/16/17 09:38 Dose:  600 mg


Metronidazole (Flagyl)  500 mg PO TID [facility]


   PRN Reason: Protocol


   Last Admin: 06/16/17 09:36 Dose:  500 mg


Morphine Sulfate (Morphine)  1 mg IVP Q4H PRN


   PRN Reason: pain, sever


   Last Admin: 06/16/17 08:10 Dose:  1 mg


Ondansetron HCl (Zofran Tab)  4 mg PO Q6 PRN


   PRN Reason: Nausea/Vomiting


Oxycodone/Acetaminophen (Percocet 5/325 Mg Tab)  1 tab PO Q4H PRN


   PRN Reason: Pain, moderate (4-7)


   Stop: 06/18/17 16:10


   Last Admin: 06/16/17 05:53 Dose:  1 tab


Pantoprazole Sodium (Protonix Ec Tab)  40 mg PO DAILY [facility]


   Last Admin: 06/16/17 09:37 Dose:  40 mg


Vitamin A (Vitamin A & D Oint Ud Foilpak)  1 ea TOP Q4 PRN


   PRN Reason: Other











Results





- Vital Signs


Recent Vital Signs: 


 Last Vital Signs











Temp  99.3 F   06/16/17 06:00


 


Pulse  75   06/16/17 06:00


 


Resp  18   06/16/17 06:00


 


BP  107/48 L  06/16/17 06:00


 


Pulse Ox  100   06/16/17 06:00














- Labs


Result Diagrams: 


 06/16/17 07:20





 06/16/17 07:20


Labs: 


 Laboratory Results - last 24 hr











  06/16/17 06/16/17





  07:20 07:20


 


WBC  10.8 


 


RBC  3.57 


 


Hgb  8.3 L 


 


Hct  26.6 L 


 


MCV  74.5 L 


 


MCH  23.2 L 


 


MCHC  31.2 


 


RDW  21.8 H 


 


Plt Count  543 H 


 


MPV  7.7 


 


Gran %  80.7 H 


 


Lymph % (Auto)  9.2 L 


 


Mono % (Auto)  7.3 H 


 


Eos % (Auto)  2.5 


 


Baso % (Auto)  0.3 


 


Gran #  8.73 H 


 


Lymph #  1.0 L 


 


Mono #  0.8 H 


 


Eos #  0.3 


 


Baso #  0.03 


 


Sodium   138


 


Potassium   4.5


 


Chloride   105


 


Carbon Dioxide   25


 


Anion Gap   13


 


BUN   6 L


 


Creatinine   0.9


 


Est GFR ( Amer)   > 60


 


Est GFR (Non-Af Amer)   > 60


 


Random Glucose   86


 


Calcium   7.7 L


 


Phosphorus   3.9


 


Magnesium   2.1


 


Total Bilirubin   0.2


 


AST   32


 


ALT   34


 


Alkaline Phosphatase   89


 


Total Protein   6.6


 


Albumin   3.0


 


Globulin   3.6


 


Albumin/Globulin Ratio   0.8 L

## 2017-06-17 LAB
ADD MANUAL DIFF?: NO
ALBUMIN/GLOB SERPL: 0.9 {RATIO} (ref 1.1–1.8)
ALP SERPL-CCNC: 90 U/L (ref 38–133)
ALT SERPL-CCNC: 34 U/L (ref 7–56)
AST SERPL-CCNC: 50 U/L (ref 15–59)
BASOPHILS # BLD AUTO: 0.01 K/MM3 (ref 0–2)
BASOPHILS NFR BLD: 0.1 % (ref 0–3)
BILIRUB SERPL-MCNC: 0.2 MG/DL (ref 0.2–1.3)
BUN SERPL-MCNC: 7 MG/DL (ref 7–21)
CALCIUM SERPL-MCNC: 8.6 MG/DL (ref 8.4–10.5)
CHLORIDE SERPL-SCNC: 103 MMOL/L (ref 95–110)
CO2 SERPL-SCNC: 28 MMOL/L (ref 21–33)
EOSINOPHIL # BLD: 0.2 10*3/UL (ref 0–0.7)
EOSINOPHIL NFR BLD: 1.7 % (ref 1.5–5)
ERYTHROCYTE [DISTWIDTH] IN BLOOD BY AUTOMATED COUNT: 21.6 % (ref 11.5–14.5)
GLOBULIN SER-MCNC: 3.6 GM/DL
GLUCOSE SERPL-MCNC: 81 MG/DL (ref 70–110)
GRANULOCYTES # BLD: 8.73 10*3/UL (ref 1.4–6.5)
GRANULOCYTES NFR BLD: 81.7 % (ref 50–68)
HCT VFR BLD CALC: 27.7 % (ref 42–52)
LYMPHOCYTES # BLD: 1 10*3/UL (ref 1.2–3.4)
LYMPHOCYTES NFR BLD AUTO: 9.2 % (ref 22–35)
MAGNESIUM SERPL-MCNC: 2 MG/DL (ref 1.7–2.2)
MCH RBC QN AUTO: 23.1 PG (ref 25–35)
MCHC RBC AUTO-ENTMCNC: 31 G/DL (ref 31–37)
MCV RBC AUTO: 74.5 FL (ref 80–105)
MONOCYTES # BLD AUTO: 0.8 10*3/UL (ref 0.1–0.6)
MONOCYTES NFR BLD: 7.3 % (ref 1–6)
PHOSPHATE SERPL-MCNC: 3.3 MG/DL (ref 2.5–4.5)
PLATELET # BLD: 594 10^3/UL (ref 120–450)
PMV BLD AUTO: 8 FL (ref 7–11)
POTASSIUM SERPL-SCNC: 3.8 MMOL/L (ref 3.6–5)
PROT SERPL-MCNC: 6.9 G/DL (ref 5.8–8.3)
SODIUM SERPL-SCNC: 139 MMOL/L (ref 132–148)
WBC # BLD AUTO: 10.7 10^3/UL (ref 4.5–11)

## 2017-06-17 RX ADMIN — PIPERACILLIN AND TAZOBACTAM SCH MLS/HR: 3; .375 INJECTION, POWDER, LYOPHILIZED, FOR SOLUTION INTRAVENOUS; PARENTERAL at 00:51

## 2017-06-17 RX ADMIN — PIPERACILLIN AND TAZOBACTAM SCH MLS/HR: 3; .375 INJECTION, POWDER, LYOPHILIZED, FOR SOLUTION INTRAVENOUS; PARENTERAL at 05:08

## 2017-06-17 RX ADMIN — PIPERACILLIN AND TAZOBACTAM SCH MLS/HR: 3; .375 INJECTION, POWDER, LYOPHILIZED, FOR SOLUTION INTRAVENOUS; PARENTERAL at 17:07

## 2017-06-17 RX ADMIN — OXYCODONE HYDROCHLORIDE AND ACETAMINOPHEN PRN TAB: 5; 325 TABLET ORAL at 17:07

## 2017-06-17 RX ADMIN — PIPERACILLIN AND TAZOBACTAM SCH: 3; .375 INJECTION, POWDER, LYOPHILIZED, FOR SOLUTION INTRAVENOUS; PARENTERAL at 16:58

## 2017-06-17 RX ADMIN — MORPHINE SULFATE PRN MG: 2 INJECTION, SOLUTION INTRAMUSCULAR; INTRAVENOUS at 02:06

## 2017-06-17 RX ADMIN — MORPHINE SULFATE PRN MG: 2 INJECTION, SOLUTION INTRAMUSCULAR; INTRAVENOUS at 20:35

## 2017-06-17 RX ADMIN — MORPHINE SULFATE PRN MG: 2 INJECTION, SOLUTION INTRAMUSCULAR; INTRAVENOUS at 14:43

## 2017-06-17 RX ADMIN — OXYCODONE HYDROCHLORIDE AND ACETAMINOPHEN PRN TAB: 5; 325 TABLET ORAL at 09:00

## 2017-06-17 RX ADMIN — OXYCODONE HYDROCHLORIDE AND ACETAMINOPHEN PRN TAB: 5; 325 TABLET ORAL at 05:09

## 2017-06-17 RX ADMIN — PANTOPRAZOLE SODIUM SCH MG: 40 TABLET, DELAYED RELEASE ORAL at 08:59

## 2017-06-17 RX ADMIN — FLUCONAZOLE SCH MLS/HR: 200 INJECTION, SOLUTION INTRAVENOUS at 09:00

## 2017-06-17 RX ADMIN — OXYCODONE HYDROCHLORIDE AND ACETAMINOPHEN PRN TAB: 5; 325 TABLET ORAL at 00:52

## 2017-06-17 RX ADMIN — MORPHINE SULFATE PRN MG: 2 INJECTION, SOLUTION INTRAMUSCULAR; INTRAVENOUS at 06:46

## 2017-06-17 RX ADMIN — MORPHINE SULFATE PRN MG: 2 INJECTION, SOLUTION INTRAMUSCULAR; INTRAVENOUS at 10:46

## 2017-06-17 NOTE — CON
DATE: 06/16/2017



HISTORY OF PRESENT ILLNESS:  This 21-year-old patient with a history of Crohn's 
disease with colocutaneous fistula, history of chronic abdominal abscesses 
presented to the Emergency Room with worsening of the right lower quadrant 
abdominal pain.  This patient has been followed by Dr. Doyle at [facility] (formerly [facility]  The patient has been on Remicade infusions, last infusion was 
on 05/06.  Subsequently, the patient was hospitalized in the [facility] with an abscess and he was admitted and he was given intravenous 
antibiotics through PICC line.  The next scheduled dose of fusion on 06/07 was 
canceled by Dr. Doyle because of the recent infection and has been receiving 
antibiotics.  The patient has an appointment to be followed at the [facility] GI clinic on 07/13.  The patient came in because of the worsening of 
the pain.  The patient was on Diflucan, daptomycin and ____.



PAST MEDICAL HISTORY:  Significant as above, history of multiple abdominal 
surgeries, incision and drainage of abscess.  The patient was on Remicade for 
Crohn's.



ALLERGIES:  HE IS ALLERGIC TO MULTIPLE SHELLFISH, KETOROLAC, TORADOL AND 
BENADRYL.



SOCIAL HISTORY:  Denies alcohol.  Occasional smoking. 



At this time the patient had a CAT scan done in the ER and was found to have an 
abscess on the right side abdominal area and admitted.  Other past medical 
history significant as above.



PHYSICAL EXAMINATION:   

GENERAL:  The patient is lying on the bed, not in acute distress.

VITAL SIGNS:  Temperature is 98.7, pulse 91, blood pressure is 100/65, O2 
saturation 100%.

HEENT:  Atraumatic, anicteric.

NECK:  Supple.

HEART:  S1, S2 heard.

LUNGS:  Bilateral air entry present.

ABDOMEN:  Soft.  Dressing present on abdominal wound with yellowish discharge.  
The scar also seen.  The patient was tender in the lower abdomen, mainly in the 
right lower quadrant area.  There is no rebound.

EXTREMITIES:  No edema.  No cyanosis negative.

NEUROLOGIC:  Alert, oriented.  Moves all the extremities.



LABORATORY DATA:  Hemoglobin 8.3, hematocrit of 26.6, WBC is 10.8, platelets 
543. Coagulations:  PT, PTT normal.  Review of the blood work showed LFTs are 
essentially unremarkable.  Chemistry is essentially unremarkable.  The CT scan 
of the abdomen and pelvis done was reviewed. 



ASSESSMENT:  This 21-year-old patient with a long history of Crohn's disease on 
Remicade had abdominal abscess.  Recently in the [facility], he was 
put on antibiotics.  The patient came to the Emergency Room for worsening of 
the pain.  The patient still has a PICC line.  The patient is on Remicade 
infusion.  The last Remicade infusion scheduled on 06/07 was canceled because 
of the infection.



RECOMMEND:

1.  Continue the antibiotics as per ID.

2.  Close followup of the hemoglobin and hematocrit.

3.  This patient may need a coordinated care.  The patient's care now appears 
to be more fragmented and also compliance is an issue.  The patient may need to 
have surgical intervention if there is no significant improvement with 
antibiotics alone in view of this collection.  Will discuss with the surgical 
team and also the primary doctor.  At the present time, the plan is patient 
prefers to have  followup  at the [facility], which we agree.  We will 
continue to closely follow up his care and suggest further management based on 
the clinical course.





__________________________________________

Rowdy Andrews MD







cc:   



DD: 06/16/2017 22:18:49  [id number]

TT: 06/17/2017 08:40:01

Confirmation # [id number]

Dictation # [id number]

jn

MTDD

## 2017-06-17 NOTE — CP.PCM.PN
Subjective





- Date & Time of Evaluation


Date of Evaluation: 06/17/17


Time of Evaluation: 10:42





- Subjective


Subjective: 





Surgery for Leo Melvin





PT S&E. C/O abd pain. Abscesses continue to drain. Denies F/C/N/V/D/CP/SOB





Objective





- Vital Signs/Intake and Output


Vital Signs (last 24 hours): 


 











Temp Pulse Resp BP Pulse Ox


 


 98.5 F   86   20   106/68   99 


 


 06/17/17 07:50  06/17/17 07:50  06/17/17 07:50  06/17/17 07:50  06/17/17 07:50








Intake and Output: 


 











 06/17/17 06/17/17





 06:59 18:59


 


Intake Total 1380 120


 


Output Total 1300 1400


 


Balance 80 -1280














- Medications


Medications: 


 Current Medications





Piperacillin Sod/Tazobactam Sod (Zosyn 3.375 In Ns 100ml)  100 mls @ 200 mls/hr 

IVPB Q6 [facility]


   PRN Reason: Protocol


   Stop: 06/24/17 18:01


   Last Admin: 06/17/17 05:08 Dose:  200 mls/hr


Fluconazole (Diflucan Iv 200 Mg/100 Ml Ns)  100 mls @ 100 mls/hr IVPB DAILY [facility]


   PRN Reason: Protocol


   Last Admin: 06/17/17 09:00 Dose:  100 mls/hr


Linezolid (Zyvox)  600 mg PO BID [facility]


   PRN Reason: Protocol


   Stop: 06/23/17 10:01


   Last Admin: 06/17/17 09:00 Dose:  600 mg


Metronidazole (Flagyl)  500 mg PO TID [facility]


   PRN Reason: Protocol


   Last Admin: 06/17/17 08:59 Dose:  500 mg


Morphine Sulfate (Morphine)  2 mg IVP Q4H PRN


   PRN Reason: pain, sever


Ondansetron HCl (Zofran Tab)  4 mg PO Q6 PRN


   PRN Reason: Nausea/Vomiting


Oxycodone/Acetaminophen (Percocet 5/325 Mg Tab)  1 tab PO Q8H PRN


   PRN Reason: Pain, moderate (4-7)


   Stop: 06/18/17 16:10


Pantoprazole Sodium (Protonix Ec Tab)  40 mg PO DAILY [facility]


   Last Admin: 06/17/17 08:59 Dose:  40 mg


Vitamin A (Vitamin A & D Oint Ud Foilpak)  1 ea TOP Q4 PRN


   PRN Reason: Other











- Labs


Labs: 


 





 06/17/17 07:00 





 06/17/17 07:00 





 











PT  12.0 Seconds (9.9-11.8)  H  06/15/17  08:55    


 


INR  1.11  (0.93-1.08)  H  06/15/17  08:55    


 


APTT  24.7 Seconds (23.7-30.8)   06/15/17  08:55    














- Constitutional


Appears: No Acute Distress





- Head Exam


Head Exam: ATRAUMATIC, NORMAL INSPECTION, NORMOCEPHALIC





- Eye Exam


Eye Exam: EOMI, Normal appearance, PERRL


Pupil Exam: NORMAL ACCOMODATION, PERRL





- ENT Exam


ENT Exam: Mucous Membranes Moist, Normal Exam





- Neck Exam


Neck Exam: Full ROM, Normal Inspection.  absent: Lymphadenopathy





- Respiratory Exam


Respiratory Exam: Clear to Ausculation Bilateral, NORMAL BREATHING PATTERN





- Cardiovascular Exam


Cardiovascular Exam: REGULAR RHYTHM, +S1, +S2.  absent: Murmur





- GI/Abdominal Exam


GI & Abdominal Exam: Soft, Tenderness, Normal Bowel Sounds.  absent: Distended, 

Firm, Guarding, Rigid


Additional comments: 





multiple abd wall abscess: purulent drainage. 





- Extremities Exam


Extremities Exam: Full ROM, Normal Capillary Refill





- Back Exam


Back Exam: NORMAL INSPECTION





- Neurological Exam


Neurological Exam: Alert, Awake, CN II-XII Intact, Normal Gait, Oriented x3





- Skin


Skin Exam: Warm





Assessment and Plan





- Assessment and Plan (Free Text)


Assessment: 








21 year old  male with past medical history of Crohn's disease, 

vesicular cutaneous fistula, pain medication seeking behavior and iron 

deficiency anemia presents with right lower abominal pain. Surgical consult was 

request to evaluate patient's right lower abdominal abscess found on CT scan.


Plan: 





Right lower abdominal abscess


-No surgical intervention indicated at this time


-Topical vitamin A&D as skin protectant


-GI recommends surgical intervention if not better with ABX


-IV Antibiotic per ID


-DW attending

## 2017-06-17 NOTE — CP.PCM.PN
Subjective





- Date & Time of Evaluation


Date of Evaluation: 06/17/17


Time of Evaluation: 11:55





- Subjective


Subjective: 





Patient is still complaining of right sided abdominal pain, no fevers 

overnight. 





Objective





- Vital Signs/Intake and Output


Vital Signs (last 24 hours): 


 











Temp Pulse Resp BP Pulse Ox


 


 98.5 F   86   20   106/68   99 


 


 06/17/17 07:50  06/17/17 07:50  06/17/17 07:50  06/17/17 07:50  06/17/17 07:50








Intake and Output: 


 











 06/17/17 06/17/17





 06:59 18:59


 


Intake Total 1380 120


 


Output Total 1300 1400


 


Balance 80 -1280














- Medications


Medications: 


 Current Medications





Piperacillin Sod/Tazobactam Sod (Zosyn 3.375 In Ns 100ml)  100 mls @ 200 mls/hr 

IVPB Q6 [facility]


   PRN Reason: Protocol


   Stop: 06/24/17 18:01


   Last Admin: 06/17/17 05:08 Dose:  200 mls/hr


Fluconazole (Diflucan Iv 200 Mg/100 Ml Ns)  100 mls @ 100 mls/hr IVPB DAILY [facility]


   PRN Reason: Protocol


   Last Admin: 06/17/17 09:00 Dose:  100 mls/hr


Linezolid (Zyvox)  600 mg PO BID [facility]


   PRN Reason: Protocol


   Stop: 06/23/17 10:01


   Last Admin: 06/17/17 09:00 Dose:  600 mg


Metronidazole (Flagyl)  500 mg PO TID [facility]


   PRN Reason: Protocol


   Last Admin: 06/17/17 08:59 Dose:  500 mg


Morphine Sulfate (Morphine)  2 mg IVP Q4H PRN


   PRN Reason: pain, sever


Ondansetron HCl (Zofran Tab)  4 mg PO Q6 PRN


   PRN Reason: Nausea/Vomiting


Oxycodone/Acetaminophen (Percocet 5/325 Mg Tab)  1 tab PO Q8H PRN


   PRN Reason: Pain, moderate (4-7)


   Stop: 06/18/17 16:10


Pantoprazole Sodium (Protonix Ec Tab)  40 mg PO DAILY [facility]


   Last Admin: 06/17/17 08:59 Dose:  40 mg


Vitamin A (Vitamin A & D Oint Ud Foilpak)  1 ea TOP Q4 PRN


   PRN Reason: Other











- Labs


Labs: 


 





 06/17/17 07:00 





 06/17/17 07:00 





 











PT  12.0 Seconds (9.9-11.8)  H  06/15/17  08:55    


 


INR  1.11  (0.93-1.08)  H  06/15/17  08:55    


 


APTT  24.7 Seconds (23.7-30.8)   06/15/17  08:55    














- Constitutional


Appears: Non-toxic, No Acute Distress





- Head Exam


Head Exam: NORMAL INSPECTION





- ENT Exam


ENT Exam: Mucous Membranes Moist





- Neck Exam


Neck Exam: absent: Lymphadenopathy, Meningismus





- Respiratory Exam


Respiratory Exam: Decreased Breath Sounds





- Cardiovascular Exam


Cardiovascular Exam: +S1, +S2





- GI/Abdominal Exam


GI & Abdominal Exam: Soft.  absent: Tenderness





Assessment and Plan





- Assessment and Plan (Free Text)


Plan: 





Assessment


Intra-abdominal abscess


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 

microabscesses, previously grew Vancomycin-resistant Enterococcus faecium from 3

/23, growing gram positive cocci and yeast from 3/26, in a patient with poorly-

controlled Crohn's disease, now growing VRE again





Plan


continue Zyvox, Zosyn and Diflucan - discussed with Dr. Buitrago


Will continue to follow clinically while the patient is in the hospital

## 2017-06-17 NOTE — CP.PCM.PN
<Chadwick Goldman - Last Filed: 06/17/17 11:53>





Subjective





- Date & Time of Evaluation


Date of Evaluation: 06/17/17


Time of Evaluation: 09:00





- Subjective


Subjective: 





Medicine progress note.





Pt seen and examined at bedside. No acute events overnight. Pt c/o R sided abd 

pain. Requesting more pain medication. Denies any ha, dizziness, f/c, sob, cp, n

/v/d. 





Objective





- Vital Signs/Intake and Output


Vital Signs (last 24 hours): 


 











Temp Pulse Resp BP Pulse Ox


 


 98.5 F   86   20   106/68   99 


 


 06/17/17 07:50  06/17/17 07:50  06/17/17 07:50  06/17/17 07:50  06/17/17 07:50








Intake and Output: 


 











 06/17/17 06/17/17





 06:59 18:59


 


Intake Total 1380 120


 


Output Total 1300 1400


 


Balance 80 -1280














- Medications


Medications: 


 Current Medications





Piperacillin Sod/Tazobactam Sod (Zosyn 3.375 In Ns 100ml)  100 mls @ 200 mls/hr 

IVPB Q6 [facility]


   PRN Reason: Protocol


   Stop: 06/24/17 18:01


   Last Admin: 06/17/17 05:08 Dose:  200 mls/hr


Fluconazole (Diflucan Iv 200 Mg/100 Ml Ns)  100 mls @ 100 mls/hr IVPB DAILY [facility]


   PRN Reason: Protocol


   Last Admin: 06/17/17 09:00 Dose:  100 mls/hr


Linezolid (Zyvox)  600 mg PO BID [facility]


   PRN Reason: Protocol


   Stop: 06/23/17 10:01


   Last Admin: 06/17/17 09:00 Dose:  600 mg


Metronidazole (Flagyl)  500 mg PO TID [facility]


   PRN Reason: Protocol


   Last Admin: 06/17/17 08:59 Dose:  500 mg


Morphine Sulfate (Morphine)  2 mg IVP Q4H PRN


   PRN Reason: pain, sever


   Last Admin: 06/17/17 10:46 Dose:  2 mg


Ondansetron HCl (Zofran Tab)  4 mg PO Q6 PRN


   PRN Reason: Nausea/Vomiting


Oxycodone/Acetaminophen (Percocet 5/325 Mg Tab)  1 tab PO Q8H PRN


   PRN Reason: Pain, moderate (4-7)


   Stop: 06/18/17 16:10


Pantoprazole Sodium (Protonix Ec Tab)  40 mg PO DAILY [facility]


   Last Admin: 06/17/17 08:59 Dose:  40 mg


Vitamin A (Vitamin A & D Oint Ud Foilpak)  1 ea TOP Q4 PRN


   PRN Reason: Other











- Labs


Labs: 


 





 06/17/17 07:00 





 06/17/17 07:00 





 











PT  12.0 Seconds (9.9-11.8)  H  06/15/17  08:55    


 


INR  1.11  (0.93-1.08)  H  06/15/17  08:55    


 


APTT  24.7 Seconds (23.7-30.8)   06/15/17  08:55    














- Constitutional


Appears: No Acute Distress





- Head Exam


Head Exam: ATRAUMATIC, NORMAL INSPECTION, NORMOCEPHALIC





- Eye Exam


Eye Exam: EOMI, Normal appearance, PERRL


Pupil Exam: NORMAL ACCOMODATION, PERRL





- ENT Exam


ENT Exam: Mucous Membranes Moist, Normal Exam





- Neck Exam


Neck Exam: Full ROM, Normal Inspection.  absent: Lymphadenopathy





- Respiratory Exam


Respiratory Exam: Clear to Ausculation Bilateral, NORMAL BREATHING PATTERN.  

absent: Rales, Wheezes





- Cardiovascular Exam


Cardiovascular Exam: REGULAR RHYTHM, +S1, +S2.  absent: Murmur





- GI/Abdominal Exam


GI & Abdominal Exam: Soft, Tenderness, Normal Bowel Sounds.  absent: Distended


Additional comments: 





minimal drainage, diffuse tenderness to palpation





- Extremities Exam


Extremities Exam: Full ROM, Normal Capillary Refill, Normal Inspection.  absent

: Joint Swelling, Pedal Edema





- Back Exam


Back Exam: NORMAL INSPECTION





- Neurological Exam


Neurological Exam: Alert, Awake, CN II-XII Intact, Oriented x3





- Psychiatric Exam


Psychiatric exam: Normal Affect, Normal Mood





- Skin


Skin Exam: Dry, Intact, Normal Color, Warm





Assessment and Plan





- Assessment and Plan (Free Text)


Assessment: 





This is a 22 yo male with past medical hx of Crohn's and drug seeking behavior 

presents with lower abdominal pain, fistula and abscess.  





1. Crohn's disease with cutaneous fistula and abscess on CT


- afebrile, no leukocytosis 


-ID consult. Dr. Mckee. - cont zyvox, diflucan, flagyl, zosyn 


-Curgery consult Cont IV abx no surgical intervention at this time 


-f/u IR regarding drainage -no drainage possible at this time 


-CT scan abdomen/pelvis shows 2.4 x 3.4 cm abscess in right abdominal wall


-cultures negative so far


-pt has picc in place


-pending insurance approval for abx


-f/u am labs





2. Chronic pain


-pt continuously requesting pain meds


- Increased morphine 1mg-->2mg 


- Dec percocet Q4h to Q8H 


-pt also getting morphine for pain





3. hypokalemia


-repleted with K


-will f/u and continue to monitor 





4. GI/DVT ppx


-protonix


-SCDs








Case and plan was seen, reviewed and discussed in detail with Dr Oneill. 





<Marisol Oneill - Last Filed: 06/17/17 12:46>





Objective





- Vital Signs/Intake and Output


Vital Signs (last 24 hours): 


 











Temp Pulse Resp BP Pulse Ox


 


 98.5 F   86   20   106/68   99 


 


 06/17/17 07:50  06/17/17 07:50  06/17/17 07:50  06/17/17 07:50  06/17/17 07:50








Intake and Output: 


 











 06/17/17 06/17/17





 06:59 18:59


 


Intake Total 1380 120


 


Output Total 1300 1400


 


Balance 80 -1280














- Medications


Medications: 


 Current Medications





Piperacillin Sod/Tazobactam Sod (Zosyn 3.375 In Ns 100ml)  100 mls @ 200 mls/hr 

IVPB Q6 [facility]


   PRN Reason: Protocol


   Stop: 06/24/17 18:01


   Last Admin: 06/17/17 05:08 Dose:  200 mls/hr


Fluconazole (Diflucan Iv 200 Mg/100 Ml Ns)  100 mls @ 100 mls/hr IVPB DAILY [facility]


   PRN Reason: Protocol


   Last Admin: 06/17/17 09:00 Dose:  100 mls/hr


Linezolid (Zyvox)  600 mg PO BID [facility]BARA


   PRN Reason: Protocol


   Stop: 06/23/17 10:01


   Last Admin: 06/17/17 09:00 Dose:  600 mg


Metronidazole (Flagyl)  500 mg PO TID [facility]BARA


   PRN Reason: Protocol


   Last Admin: 06/17/17 08:59 Dose:  500 mg


Morphine Sulfate (Morphine)  2 mg IVP Q4H PRN


   PRN Reason: pain, sever


   Last Admin: 06/17/17 10:46 Dose:  2 mg


Ondansetron HCl (Zofran Tab)  4 mg PO Q6 PRN


   PRN Reason: Nausea/Vomiting


Oxycodone/Acetaminophen (Percocet 5/325 Mg Tab)  1 tab PO Q8H PRN


   PRN Reason: Pain, moderate (4-7)


   Stop: 06/18/17 16:10


Pantoprazole Sodium (Protonix Ec Tab)  40 mg PO DAILY [facility]BARA


   Last Admin: 06/17/17 08:59 Dose:  40 mg


Vitamin A (Vitamin A & D Oint Ud Foilpak)  1 ea TOP Q4 PRN


   PRN Reason: Other











- Labs


Labs: 


 





 06/17/17 07:00 





 06/17/17 07:00 





 











PT  12.0 Seconds (9.9-11.8)  H  06/15/17  08:55    


 


INR  1.11  (0.93-1.08)  H  06/15/17  08:55    


 


APTT  24.7 Seconds (23.7-30.8)   06/15/17  08:55    














Attending/Attestation





- Attestation


I have personally seen and examined this patient.: Yes


I have fully participated in the care of the patient.: Yes


I have reviewed all pertinent clinical information, including history, physical 

exam and plan: Yes


Notes (Text): 





06/17/17 12:40


21 year old male with past medical history of Crohn's disease, chronic 

abdominal abscess and vesicular cutaneous fistula, opiate dependency on chronic 

opiates who presented with complaint of abdominal pain and increased discharge.

  He was recently discharged from [facility] [place] after abscess drainage and also reports 

he recently finished antibiotics.  CT scan was reviewed as above; no drainable 

collection as per IR.  Surgery and ID are following.  Continue with antibiotics 

as above.   notes were reviewed regarding home infusion therapy.  

Will follow up with  on Monday for discharge plan.





He is on morphine and percocet prn for pain.  He complains of chronic pain but 

appears comfortable in bed.  Percocet was tapered.  He was counselled on risks 

of narcotic abuse.





Upon discharge patient will be referred back to colorectal surgery at [facility] [place].





Marisol Oneill MD


Hospitalist.

## 2017-06-18 RX ADMIN — OXYCODONE HYDROCHLORIDE AND ACETAMINOPHEN PRN TAB: 5; 325 TABLET ORAL at 11:03

## 2017-06-18 RX ADMIN — MORPHINE SULFATE PRN MG: 2 INJECTION, SOLUTION INTRAMUSCULAR; INTRAVENOUS at 02:18

## 2017-06-18 RX ADMIN — OXYCODONE HYDROCHLORIDE AND ACETAMINOPHEN PRN TAB: 5; 325 TABLET ORAL at 23:01

## 2017-06-18 RX ADMIN — PIPERACILLIN AND TAZOBACTAM SCH MLS/HR: 3; .375 INJECTION, POWDER, LYOPHILIZED, FOR SOLUTION INTRAVENOUS; PARENTERAL at 17:06

## 2017-06-18 RX ADMIN — FLUCONAZOLE SCH MLS/HR: 200 INJECTION, SOLUTION INTRAVENOUS at 13:39

## 2017-06-18 RX ADMIN — PIPERACILLIN AND TAZOBACTAM SCH MLS/HR: 3; .375 INJECTION, POWDER, LYOPHILIZED, FOR SOLUTION INTRAVENOUS; PARENTERAL at 01:20

## 2017-06-18 RX ADMIN — PANTOPRAZOLE SODIUM SCH MG: 40 TABLET, DELAYED RELEASE ORAL at 09:00

## 2017-06-18 RX ADMIN — OXYCODONE HYDROCHLORIDE AND ACETAMINOPHEN PRN TAB: 5; 325 TABLET ORAL at 01:21

## 2017-06-18 RX ADMIN — MORPHINE SULFATE PRN MG: 2 INJECTION, SOLUTION INTRAMUSCULAR; INTRAVENOUS at 06:31

## 2017-06-18 RX ADMIN — PIPERACILLIN AND TAZOBACTAM SCH MLS/HR: 3; .375 INJECTION, POWDER, LYOPHILIZED, FOR SOLUTION INTRAVENOUS; PARENTERAL at 23:00

## 2017-06-18 RX ADMIN — OXYCODONE HYDROCHLORIDE AND ACETAMINOPHEN PRN TAB: 5; 325 TABLET ORAL at 08:57

## 2017-06-18 RX ADMIN — PIPERACILLIN AND TAZOBACTAM SCH MLS/HR: 3; .375 INJECTION, POWDER, LYOPHILIZED, FOR SOLUTION INTRAVENOUS; PARENTERAL at 13:34

## 2017-06-18 RX ADMIN — PIPERACILLIN AND TAZOBACTAM SCH MLS/HR: 3; .375 INJECTION, POWDER, LYOPHILIZED, FOR SOLUTION INTRAVENOUS; PARENTERAL at 05:28

## 2017-06-18 RX ADMIN — OXYCODONE HYDROCHLORIDE AND ACETAMINOPHEN PRN TAB: 5; 325 TABLET ORAL at 17:05

## 2017-06-18 RX ADMIN — PIPERACILLIN AND TAZOBACTAM SCH MLS/HR: 3; .375 INJECTION, POWDER, LYOPHILIZED, FOR SOLUTION INTRAVENOUS; PARENTERAL at 23:01

## 2017-06-18 NOTE — PN
DATE: 06/17/2017



SUBJECTIVE:  This patient was seen and evaluated earlier.  The patient is still complaining of abdomi
nal pain mainly on the right side.



PHYSICAL EXAMINATION:

VITAL SIGNS:  Temperature is 99, blood pressure 116/96, pulse is 95, respirations 20, O2 saturation 1
00%.

HEENT:  Atraumatic, anicteric.

NECK:  Supple.

HEART:  S1, S2 heard.

LUNGS:  Bilateral air entry present.

ABDOMEN:  Soft.  There are multiple small fistulous opening seen.  There is a dressing covering the r
ight side noticed.  The patient has significant tenderness on the right side.

EXTREMITIES:  No edema, no cyanosis.



LABORATORY DATA:  Hemoglobin 8.6, hematocrit 27.7, WBC 10.7, platelets _____.  Chemistry is essential
ly unremarkable.



IMPRESSION:  This is a 21-year-old patient with Crohn's disease who was on Remicade, which has been o
n hold because of an abscess, intraabdominal infection.  The patient is on IV antibiotics.  The last 
dose he had was more than a month ago of the Remicade.  The repeat CT scan shows collection in the ri
ght side of the abdomen.  I have requested IR evaluation for possible drainage.  The real concern is 
persistence of increasing pain despite of being on antibiotics and with _____ collection.  Would bene
fit from IR, CT-guided drainage.  I agree with the decision.  The patient has been followed up by 
 _____ before and now the patient wants to follow up at the [facility].  I agree with the decision to
 _____ the antibiotics until the active infected process is resolved.  The patient is also anemic.  F
ollowup of the hemoglobin and hematocrit.  The patient may need a workup for that.  We need to review
 the previous records from  _____.  Presently, the patient has an appointment to be followed up at
 the [facility] where he wants to continue the long-term management, which I agree.  At the moment, h
is care has been fragmented in this patient with a complex inflammatory bowel disease.



Thank you very much for allowing us to participate in the care of the patient.





__________________________________________

Rowdy Andrews MD







cc:



DD: 06/17/2017 23:02:16  [id number]

TT: 06/18/2017 00:41:57

Confirmation # [id number]

Dictation # [id number]

mn

## 2017-06-18 NOTE — CP.PCM.PN
<Issac Romero - Last Filed: 06/18/17 11:23>





Subjective





- Date & Time of Evaluation


Date of Evaluation: 06/18/17


Time of Evaluation: 11:20





- Subjective


Subjective: 











Medicine progress note.





Attending: Dr. Oneill











Pt seen/examined at bedside. No acute distress. No events overnight. Pt is not 

satisfied with pain medication regimen. No intervention from surgery. No fevers

, chills, vomiting. 





Objective





- Vital Signs/Intake and Output


Vital Signs (last 24 hours): 


 











Temp Pulse Resp BP Pulse Ox


 


 98.6 F   76   19   104/67   100 


 


 06/18/17 08:14  06/18/17 08:14  06/18/17 08:14  06/18/17 08:14  06/18/17 08:14








Intake and Output: 


 











 06/18/17 06/18/17





 06:59 18:59


 


Intake Total 600 


 


Balance 600 














- Medications


Medications: 


 Current Medications





Piperacillin Sod/Tazobactam Sod (Zosyn 3.375 In Ns 100ml)  100 mls @ 200 mls/hr 

IVPB Q6 [facility]


   PRN Reason: Protocol


   Stop: 06/24/17 18:01


   Last Admin: 06/18/17 05:28 Dose:  200 mls/hr


Fluconazole (Diflucan Iv 200 Mg/100 Ml Ns)  100 mls @ 100 mls/hr IVPB DAILY [facility]


   PRN Reason: Protocol


   Last Admin: 06/17/17 09:00 Dose:  100 mls/hr


Linezolid (Zyvox)  600 mg PO BID [facility]BARA


   PRN Reason: Protocol


   Stop: 06/23/17 10:01


   Last Admin: 06/18/17 09:00 Dose:  600 mg


Metronidazole (Flagyl)  500 mg PO TID [facility]BARA


   PRN Reason: Protocol


   Last Admin: 06/18/17 09:00 Dose:  500 mg


Morphine Sulfate (Morphine)  2 mg IVP Q4H PRN


   PRN Reason: pain, sever


   Last Admin: 06/18/17 06:31 Dose:  2 mg


Ondansetron HCl (Zofran Tab)  4 mg PO Q6 PRN


   PRN Reason: Nausea/Vomiting


Oxycodone/Acetaminophen (Percocet 5/325 Mg Tab)  2 tab PO Q6 PRN


   PRN Reason: Pain, moderate (4-7)


   Stop: 06/21/17 12:01


   Last Admin: 06/18/17 11:03 Dose:  2 tab


Pantoprazole Sodium (Protonix Ec Tab)  40 mg PO DAILY [facility]BARA


   Last Admin: 06/18/17 09:00 Dose:  40 mg


Vitamin A (Vitamin A & D Oint Ud Foilpak)  1 ea TOP Q4 PRN


   PRN Reason: Other











- Labs


Labs: 


 





 06/17/17 07:00 





 06/17/17 07:00 





 











PT  12.0 Seconds (9.9-11.8)  H  06/15/17  08:55    


 


INR  1.11  (0.93-1.08)  H  06/15/17  08:55    


 


APTT  24.7 Seconds (23.7-30.8)   06/15/17  08:55    














- Constitutional


Appears: Non-toxic, No Acute Distress





- Head Exam


Head Exam: ATRAUMATIC, NORMAL INSPECTION, NORMOCEPHALIC





- Eye Exam


Eye Exam: EOMI





- ENT Exam


ENT Exam: Mucous Membranes Moist





- Neck Exam


Neck Exam: Full ROM, Normal Inspection





- Respiratory Exam


Respiratory Exam: NORMAL BREATHING PATTERN.  absent: Respiratory Distress





- Cardiovascular Exam


Cardiovascular Exam: +S1, +S2





- GI/Abdominal Exam


GI & Abdominal Exam: Soft, Normal Bowel Sounds.  absent: Tenderness


Additional comments: 





Multiple abscesses/fistula minimal purulent drainage. 





- Extremities Exam


Extremities Exam: Full ROM, Normal Inspection





- Neurological Exam


Neurological Exam: Alert, Awake, Oriented x3





- Psychiatric Exam


Psychiatric exam: Normal Affect, Normal Mood





- Skin


Skin Exam: Dry, Intact, Normal Color, Warm





Assessment and Plan





- Assessment and Plan (Free Text)


Assessment: 








This is a 22 yo male with past medical hx of Crohn's and drug seeking behavior 

presents with lower abdominal pain, fistula and abscess.  





1. Crohn's disease with cutaneous fistula and abscess on CT


- afebrile, no leukocytosis 


-ID consult. Dr. Mckee. recs appreciated. 


-continue zyvox, diflucan, flagyl, zosyn 


-surgery consult Cont IV abx no surgical intervention at this time 


-f/u IR regarding drainage -no drainage possible at this time 


-CT scan abdomen/pelvis shows 2.4 x 3.4 cm abscess in right abdominal wall


-cultures negative so far


-pt has picc in place


-pending insurance approval for abx


-f/u am labs





2. Chronic pain


-pt continuously requesting pain meds


- Increased morphine 1mg-->2mg >> morphine placed on hold 


-percocet increased to 2 q 6





3. hypokalemia


-repleted with K


-will f/u and continue to monitor 





4. GI/DVT ppx


-protonix


-SCDs








Case and plan was seen, reviewed and discussed in detail with Dr Oneill. 





<Marisol Oneill - Last Filed: 06/19/17 08:56>





Objective





- Vital Signs/Intake and Output


Vital Signs (last 24 hours): 


 











Temp Pulse Resp BP Pulse Ox


 


 98.6 F   88   20   105/70   100 


 


 06/19/17 08:21  06/19/17 08:21  06/19/17 08:21  06/19/17 08:21  06/19/17 08:21








Intake and Output: 


 











 06/19/17 06/19/17





 06:59 18:59


 


Intake Total 1440 


 


Output Total 1500 


 


Balance -60 














- Medications


Medications: 


 Current Medications





Piperacillin Sod/Tazobactam Sod (Zosyn 3.375 In Ns 100ml)  100 mls @ 200 mls/hr 

IVPB Q6 [facility]BARA


   PRN Reason: Protocol


   Stop: 06/24/17 18:01


   Last Admin: 06/19/17 05:12 Dose:  200 mls/hr


Fluconazole (Diflucan Iv 200 Mg/100 Ml Ns)  100 mls @ 100 mls/hr IVPB DAILY [facility]


   PRN Reason: Protocol


   Last Admin: 06/18/17 13:39 Dose:  100 mls/hr


Linezolid (Zyvox)  600 mg PO BID BARBARA


   PRN Reason: Protocol


   Stop: 06/23/17 10:01


   Last Admin: 06/18/17 17:05 Dose:  600 mg


Metronidazole (Flagyl)  500 mg PO TID BARBARA


   PRN Reason: Protocol


   Last Admin: 06/18/17 17:06 Dose:  500 mg


Morphine Sulfate (Morphine)  2 mg IVP Q4H PRN


   PRN Reason: pain, sever


   Last Admin: 06/18/17 06:31 Dose:  2 mg


Ondansetron HCl (Zofran Tab)  4 mg PO Q6 PRN


   PRN Reason: Nausea/Vomiting


Oxycodone/Acetaminophen (Percocet 5/325 Mg Tab)  2 tab PO Q6 PRN


   PRN Reason: Pain, moderate (4-7)


   Stop: 06/21/17 12:01


   Last Admin: 06/19/17 05:11 Dose:  2 tab


Pantoprazole Sodium (Protonix Ec Tab)  40 mg PO DAILY [facility]BARA


   Last Admin: 06/18/17 09:00 Dose:  40 mg


Vitamin A (Vitamin A & D Oint Ud Foilpak)  1 ea TOP Q4 PRN


   PRN Reason: Other











- Labs


Labs: 


 





 06/19/17 06:00 





 06/19/17 06:00 





 











PT  12.0 Seconds (9.9-11.8)  H  06/15/17  08:55    


 


INR  1.11  (0.93-1.08)  H  06/15/17  08:55    


 


APTT  24.7 Seconds (23.7-30.8)   06/15/17  08:55    














Attending/Attestation





- Attestation


I have personally seen and examined this patient.: Yes


I have fully participated in the care of the patient.: Yes


I have reviewed all pertinent clinical information, including history, physical 

exam and plan: Yes


Notes (Text): 





06/18/17 


21 year old male with past medical history of Crohn's disease, chronic 

abdominal abscess and vesicular cutaneous fistula, opiate dependency on chronic 

opiates who presented with complaint of abdominal pain and increased discharge.

  He was recently discharged from [facility] after abscess drainage and also 

reported recently finishing antibiotics.  CT scan was reviewed as above; no 

drainable collection as per IR.  Surgery and ID are following.  Continue with 

antibiotics as above.   notes were reviewed regarding home infusion 

therapy.  Will follow up with  tomorrow for discharge plan.





He has history of opioid dependency on chronic opioids.  He complains of 

chronic pain but appears comfortable in bed.  He was counselled on risks of 

narcotic abuse.  His pain regimen has been changed to percocet 2 tabs q6h prn.





Upon discharge patient will be referred back to colorectal surgery at [facility].





Marisol Oneill MD


Hospitalist.

## 2017-06-18 NOTE — PN
DATE: 06/18/2017



ADDENDUM



This is an addendum to the GI progress report.



This patient was seen and evaluated earlier today.  The patient is feeling slightly better now.



PHYSICAL EXAMINATION:

VITAL SIGNS:  Temperature is 98.6, pulse 76, blood pressure 104/67

HEENT:  Atraumatic, anicteric.

NECK:  Supple.

HEART:  S1, S2 heard.

LUNGS:  Bilateral air entry present.

ABDOMEN:  Soft.  There is a dressing present on the right side covering the fistulous opening.  There
 is tenderness present in the right side of the abdomen.  There is no rebound or guarding.



IMPRESSION:  This 21-year-old patient complaining of Crohn's disease. He is admitted with an intra-ab
dominal abscess.  Worsening of the pain in the right side.  The patient was on Remicade in the past a
nd the last dose was more than a month.  The present plan is to get an IR to evaluate whether is a dr
ainable collection.  If it is not drainable, the patient will need to continue the antibiotics.  The 
patient has an appointment to be followed up with the [facility].  The patient is not a cand
idate for any biological therapy in the presence of this active infection. The priority now is to con
trol the infective process. I also discussed with Dr. Tejada earlier today, the infectious disease spe
cialist.  Thank you very much for allowing us to participate in the care of the patient.





__________________________________________

Rowdy Andrews MD







cc:



DD: 06/18/2017 16:22:57  [id number]

TT: 06/18/2017 17:00:58

Confirmation # [id number]

Dictation # [id number]

mn

## 2017-06-18 NOTE — CP.PCM.PN
Subjective





- Date & Time of Evaluation


Date of Evaluation: 06/18/17


Time of Evaluation: 08:24





- Subjective


Subjective: 





Surgery for Dr. Charles





Pt s&jane NOEL. C/o abd pain. No other complaints. Denies F/C/V. Low appetite 

but tolerating diet. + amb, + void





Objective





- Vital Signs/Intake and Output


Vital Signs (last 24 hours): 


 











Temp Pulse Resp BP Pulse Ox


 


 98.6 F   76   19   104/67   100 


 


 06/18/17 08:14  06/18/17 08:14  06/18/17 08:14  06/18/17 08:14  06/18/17 08:14








Intake and Output: 


 











 06/18/17 06/18/17





 06:59 18:59


 


Intake Total 600 


 


Balance 600 














- Medications


Medications: 


 Current Medications





Piperacillin Sod/Tazobactam Sod (Zosyn 3.375 In Ns 100ml)  100 mls @ 200 mls/hr 

IVPB Q6 BARBARA


   PRN Reason: Protocol


   Stop: 06/24/17 18:01


   Last Admin: 06/18/17 05:28 Dose:  200 mls/hr


Fluconazole (Diflucan Iv 200 Mg/100 Ml Ns)  100 mls @ 100 mls/hr IVPB DAILY [facility]


   PRN Reason: Protocol


   Last Admin: 06/17/17 09:00 Dose:  100 mls/hr


Linezolid (Zyvox)  600 mg PO BID [facility]


   PRN Reason: Protocol


   Stop: 06/23/17 10:01


   Last Admin: 06/17/17 17:07 Dose:  600 mg


Metronidazole (Flagyl)  500 mg PO TID [facility]


   PRN Reason: Protocol


   Last Admin: 06/17/17 17:07 Dose:  500 mg


Morphine Sulfate (Morphine)  2 mg IVP Q4H PRN


   PRN Reason: pain, sever


   Last Admin: 06/18/17 06:31 Dose:  2 mg


Ondansetron HCl (Zofran Tab)  4 mg PO Q6 PRN


   PRN Reason: Nausea/Vomiting


Oxycodone/Acetaminophen (Percocet 5/325 Mg Tab)  1 tab PO Q8H PRN


   PRN Reason: Pain, moderate (4-7)


   Stop: 06/18/17 16:10


   Last Admin: 06/18/17 01:21 Dose:  1 tab


Pantoprazole Sodium (Protonix Ec Tab)  40 mg PO DAILY [facility]


   Last Admin: 06/17/17 08:59 Dose:  40 mg


Vitamin A (Vitamin A & D Oint Ud Foilpak)  1 ea TOP Q4 PRN


   PRN Reason: Other











- Labs


Labs: 


 





 06/17/17 07:00 





 06/17/17 07:00 





 











PT  12.0 Seconds (9.9-11.8)  H  06/15/17  08:55    


 


INR  1.11  (0.93-1.08)  H  06/15/17  08:55    


 


APTT  24.7 Seconds (23.7-30.8)   06/15/17  08:55    














- Constitutional


Appears: Non-toxic





- Head Exam


Head Exam: ATRAUMATIC, NORMAL INSPECTION, NORMOCEPHALIC





- Eye Exam


Eye Exam: EOMI, Normal appearance, PERRL


Pupil Exam: NORMAL ACCOMODATION, PERRL





- ENT Exam


ENT Exam: Mucous Membranes Moist, Normal Exam





- Neck Exam


Neck Exam: Full ROM, Normal Inspection.  absent: Lymphadenopathy





- Cardiovascular Exam


Cardiovascular Exam: REGULAR RHYTHM, +S1, +S2.  absent: Murmur





- GI/Abdominal Exam


GI & Abdominal Exam: Soft, Tenderness.  absent: Distended, Firm, Guarding, 

Diminished Bowel Sounds


Additional comments: 





Ostomy in place, no signs of infection. multiple abscesses/fistula minimal 

purulent fluids. 





-  Exam


 Exam: NORMAL INSPECTION





- Extremities Exam


Extremities Exam: Full ROM, Normal Capillary Refill, Normal Inspection.  absent

: Joint Swelling, Pedal Edema





- Back Exam


Back Exam: NORMAL INSPECTION





- Neurological Exam


Neurological Exam: Alert, Awake, CN II-XII Intact, Normal Gait, Oriented x3





- Skin


Skin Exam: Dry, Normal Color, Warm.  absent: Erythema





Assessment and Plan





- Assessment and Plan (Free Text)


Assessment: 





21 year old  male with past medical history of Crohn's disease, 

vesicular cutaneous fistula, pain medication seeking behavior and iron 

deficiency anemia presents with right lower abdominal pain. Surgical consult 

was request to evaluate patient's right lower abdominal abscess found on CT 

scan.


Plan: 





Right lower abdominal abscess


-No surgical intervention indicated at this time


-Topical vitamin A&D as skin protectant


-GI recommends surgical intervention if not better with ABX


-IV Antibiotic per ID


-Will DW attending

## 2017-06-19 LAB
ADD MANUAL DIFF?: NO
ALBUMIN/GLOB SERPL: 0.9 {RATIO} (ref 1.1–1.8)
ALP SERPL-CCNC: 79 U/L (ref 38–133)
ALT SERPL-CCNC: 26 U/L (ref 7–56)
AST SERPL-CCNC: 29 U/L (ref 15–59)
BASOPHILS # BLD AUTO: 0.01 K/MM3 (ref 0–2)
BASOPHILS NFR BLD: 0.1 % (ref 0–3)
BILIRUB SERPL-MCNC: 0.2 MG/DL (ref 0.2–1.3)
BUN SERPL-MCNC: 5 MG/DL (ref 7–21)
CALCIUM SERPL-MCNC: 8.7 MG/DL (ref 8.4–10.5)
CHLORIDE SERPL-SCNC: 100 MMOL/L (ref 95–110)
CO2 SERPL-SCNC: 28 MMOL/L (ref 21–33)
EOSINOPHIL # BLD: 0.2 10*3/UL (ref 0–0.7)
EOSINOPHIL NFR BLD: 2.5 % (ref 1.5–5)
ERYTHROCYTE [DISTWIDTH] IN BLOOD BY AUTOMATED COUNT: 21.5 % (ref 11.5–14.5)
GLOBULIN SER-MCNC: 3.8 GM/DL
GLUCOSE SERPL-MCNC: 98 MG/DL (ref 70–110)
GRANULOCYTES # BLD: 7.33 10*3/UL (ref 1.4–6.5)
GRANULOCYTES NFR BLD: 79.1 % (ref 50–68)
HCT VFR BLD CALC: 27.4 % (ref 42–52)
LYMPHOCYTES # BLD: 0.8 10*3/UL (ref 1.2–3.4)
LYMPHOCYTES NFR BLD AUTO: 9.1 % (ref 22–35)
MAGNESIUM SERPL-MCNC: 2 MG/DL (ref 1.7–2.2)
MCH RBC QN AUTO: 22.8 PG (ref 25–35)
MCHC RBC AUTO-ENTMCNC: 30.7 G/DL (ref 31–37)
MCV RBC AUTO: 74.5 FL (ref 80–105)
MONOCYTES # BLD AUTO: 0.9 10*3/UL (ref 0.1–0.6)
MONOCYTES NFR BLD: 9.2 % (ref 1–6)
PHOSPHATE SERPL-MCNC: 4.3 MG/DL (ref 2.5–4.5)
PLATELET # BLD: 537 10^3/UL (ref 120–450)
PMV BLD AUTO: 8 FL (ref 7–11)
POTASSIUM SERPL-SCNC: 3.9 MMOL/L (ref 3.6–5)
PROT SERPL-MCNC: 7.2 G/DL (ref 5.8–8.3)
SODIUM SERPL-SCNC: 136 MMOL/L (ref 132–148)
WBC # BLD AUTO: 9.3 10^3/UL (ref 4.5–11)

## 2017-06-19 RX ADMIN — PIPERACILLIN AND TAZOBACTAM SCH MLS/HR: 3; .375 INJECTION, POWDER, LYOPHILIZED, FOR SOLUTION INTRAVENOUS; PARENTERAL at 23:00

## 2017-06-19 RX ADMIN — PIPERACILLIN AND TAZOBACTAM SCH MLS/HR: 3; .375 INJECTION, POWDER, LYOPHILIZED, FOR SOLUTION INTRAVENOUS; PARENTERAL at 13:17

## 2017-06-19 RX ADMIN — PIPERACILLIN AND TAZOBACTAM SCH MLS/HR: 3; .375 INJECTION, POWDER, LYOPHILIZED, FOR SOLUTION INTRAVENOUS; PARENTERAL at 17:34

## 2017-06-19 RX ADMIN — OXYCODONE HYDROCHLORIDE AND ACETAMINOPHEN PRN TAB: 5; 325 TABLET ORAL at 16:52

## 2017-06-19 RX ADMIN — OXYCODONE HYDROCHLORIDE AND ACETAMINOPHEN PRN TAB: 5; 325 TABLET ORAL at 22:58

## 2017-06-19 RX ADMIN — PIPERACILLIN AND TAZOBACTAM SCH MLS/HR: 3; .375 INJECTION, POWDER, LYOPHILIZED, FOR SOLUTION INTRAVENOUS; PARENTERAL at 05:12

## 2017-06-19 RX ADMIN — PANTOPRAZOLE SODIUM SCH MG: 40 TABLET, DELAYED RELEASE ORAL at 09:18

## 2017-06-19 RX ADMIN — FLUCONAZOLE SCH MLS/HR: 200 INJECTION, SOLUTION INTRAVENOUS at 09:18

## 2017-06-19 RX ADMIN — OXYCODONE HYDROCHLORIDE AND ACETAMINOPHEN PRN TAB: 5; 325 TABLET ORAL at 05:11

## 2017-06-19 RX ADMIN — OXYCODONE HYDROCHLORIDE AND ACETAMINOPHEN PRN TAB: 5; 325 TABLET ORAL at 11:01

## 2017-06-19 NOTE — CP.PCM.PN
Subjective





- Date & Time of Evaluation


Date of Evaluation: 06/19/17


Time of Evaluation: 07:00





- Subjective


Subjective: 





General Surgery Progress Note for Dr. Charles





Patient seen and examined at bedside. No acute events overnight. Patient still 

complains of pain, requesting more pain medication. Patient is ambulating and 

tolerating diet. Denies headache, fever, chills, shortness of breath, chest pain

, nausea, or vomiting.





Objective





- Vital Signs/Intake and Output


Vital Signs (last 24 hours): 


 











Temp Pulse Resp BP Pulse Ox


 


 98.6 F   88   20   105/70   100 


 


 06/19/17 08:21  06/19/17 08:21  06/19/17 08:21  06/19/17 08:21  06/19/17 08:21








Intake and Output: 


 











 06/19/17 06/19/17





 06:59 18:59


 


Intake Total 1440 


 


Output Total 1500 


 


Balance -60 














- Medications


Medications: 


 Current Medications





Piperacillin Sod/Tazobactam Sod (Zosyn 3.375 In Ns 100ml)  100 mls @ 200 mls/hr 

IVPB Q6 [facility]


   PRN Reason: Protocol


   Stop: 06/24/17 18:01


   Last Admin: 06/19/17 05:12 Dose:  200 mls/hr


Fluconazole (Diflucan Iv 200 Mg/100 Ml Ns)  100 mls @ 100 mls/hr IVPB DAILY [facility]


   PRN Reason: Protocol


   Last Admin: 06/18/17 13:39 Dose:  100 mls/hr


Linezolid (Zyvox)  600 mg PO BID [facility]


   PRN Reason: Protocol


   Stop: 06/23/17 10:01


   Last Admin: 06/18/17 17:05 Dose:  600 mg


Metronidazole (Flagyl)  500 mg PO TID [facility]


   PRN Reason: Protocol


   Last Admin: 06/18/17 17:06 Dose:  500 mg


Morphine Sulfate (Morphine)  2 mg IVP Q4H PRN


   PRN Reason: pain, sever


   Last Admin: 06/18/17 06:31 Dose:  2 mg


Ondansetron HCl (Zofran Tab)  4 mg PO Q6 PRN


   PRN Reason: Nausea/Vomiting


Oxycodone/Acetaminophen (Percocet 5/325 Mg Tab)  2 tab PO Q6 PRN


   PRN Reason: Pain, moderate (4-7)


   Stop: 06/21/17 12:01


   Last Admin: 06/19/17 05:11 Dose:  2 tab


Pantoprazole Sodium (Protonix Ec Tab)  40 mg PO DAILY [facility]


   Last Admin: 06/18/17 09:00 Dose:  40 mg


Vitamin A (Vitamin A & D Oint Ud Foilpak)  1 ea TOP Q4 PRN


   PRN Reason: Other











- Labs


Labs: 


 





 06/19/17 06:00 





 06/19/17 06:00 





 











PT  12.0 Seconds (9.9-11.8)  H  06/15/17  08:55    


 


INR  1.11  (0.93-1.08)  H  06/15/17  08:55    


 


APTT  24.7 Seconds (23.7-30.8)   06/15/17  08:55    














- Constitutional


Appears: Non-toxic, No Acute Distress





- Head Exam


Head Exam: ATRAUMATIC, NORMAL INSPECTION





- Eye Exam


Eye Exam: EOMI, Normal appearance





- ENT Exam


ENT Exam: Mucous Membranes Moist





- Neck Exam


Neck Exam: Normal Inspection





- Respiratory Exam


Respiratory Exam: absent: Respiratory Distress





- Cardiovascular Exam


Cardiovascular Exam: +S1, +S2





- GI/Abdominal Exam


GI & Abdominal Exam: Soft, Tenderness


Additional comments: 





Three abdominal fistula located at RLQ draining yellow color fluid. Dressing 

changed with sterile gauze x3. 


Diffused tenderness to the touch.





- Extremities Exam


Extremities Exam: Full ROM, Normal Capillary Refill





- Neurological Exam


Neurological Exam: Alert, Awake, Oriented x3





- Psychiatric Exam


Psychiatric exam: Normal Affect, Normal Mood





- Skin


Skin Exam: Warm





Assessment and Plan





- Assessment and Plan (Free Text)


Assessment: 





21 year old  male with past medical history of Crohn's disease, 

vesicular cutaneous fistula, pain medication seeking behavior and iron 

deficiency anemia presents with right lower abdominal pain. Surgical consult 

was request to evaluate patient's right lower abdominal abscess found on CT 

scan.


Plan: 





Right lower abdominal abscess


-No surgical intervention indicated at this time


-Topical vitamin A&D as skin protectant


-GI recommends IR evaluation


-Continue IV Antibiotic per ID


-D/w attending Dr. Charles

## 2017-06-19 NOTE — CP.PCM.PN
<Issac Romero - Last Filed: 06/19/17 12:06>





Subjective





- Date & Time of Evaluation


Date of Evaluation: 06/19/17


Time of Evaluation: 12:00





- Subjective


Subjective: 











Medicine progress note.








Attending: Dr. Oneill








Pt seen and examined at bedside. No acute distress. Lying in bed, does not 

appear to be in pain. No fevers, chills, sob, syncope. Pending discharge 

planning. 





Objective





- Vital Signs/Intake and Output


Vital Signs (last 24 hours): 


 











Temp Pulse Resp BP Pulse Ox


 


 98.6 F   88   20   105/70   100 


 


 06/19/17 08:21  06/19/17 08:21  06/19/17 08:21  06/19/17 08:21  06/19/17 08:21








Intake and Output: 


 











 06/19/17 06/19/17





 06:59 18:59


 


Intake Total 1440 


 


Output Total 1500 


 


Balance -60 














- Medications


Medications: 


 Current Medications





Piperacillin Sod/Tazobactam Sod (Zosyn 3.375 In Ns 100ml)  100 mls @ 200 mls/hr 

IVPB Q6 [facility]


   PRN Reason: Protocol


   Stop: 06/24/17 18:01


   Last Admin: 06/19/17 05:12 Dose:  200 mls/hr


Fluconazole (Diflucan Iv 200 Mg/100 Ml Ns)  100 mls @ 100 mls/hr IVPB DAILY [facility]


   PRN Reason: Protocol


   Last Admin: 06/19/17 09:18 Dose:  100 mls/hr


Linezolid (Zyvox)  600 mg PO BID [facility]


   PRN Reason: Protocol


   Stop: 06/23/17 10:01


   Last Admin: 06/19/17 09:18 Dose:  600 mg


Metronidazole (Flagyl)  500 mg PO TID [facility]


   PRN Reason: Protocol


   Last Admin: 06/19/17 09:18 Dose:  500 mg


Morphine Sulfate (Morphine)  2 mg IVP Q4H PRN


   PRN Reason: pain, sever


   Last Admin: 06/18/17 06:31 Dose:  2 mg


Ondansetron HCl (Zofran Tab)  4 mg PO Q6 PRN


   PRN Reason: Nausea/Vomiting


Oxycodone/Acetaminophen (Percocet 5/325 Mg Tab)  2 tab PO Q6 PRN


   PRN Reason: Pain, moderate (4-7)


   Stop: 06/21/17 12:01


   Last Admin: 06/19/17 11:01 Dose:  2 tab


Pantoprazole Sodium (Protonix Ec Tab)  40 mg PO DAILY [facility]


   Last Admin: 06/19/17 09:18 Dose:  40 mg


Vitamin A (Vitamin A & D Oint Ud Foilpak)  1 ea TOP Q4 PRN


   PRN Reason: Other











- Labs


Labs: 


 





 06/19/17 06:00 





 06/19/17 06:00 





 











PT  12.0 Seconds (9.9-11.8)  H  06/15/17  08:55    


 


INR  1.11  (0.93-1.08)  H  06/15/17  08:55    


 


APTT  24.7 Seconds (23.7-30.8)   06/15/17  08:55    














- Constitutional


Appears: Non-toxic, No Acute Distress





- Head Exam


Head Exam: ATRAUMATIC, NORMAL INSPECTION, NORMOCEPHALIC





- Eye Exam


Eye Exam: EOMI





- ENT Exam


ENT Exam: Mucous Membranes Moist





- Neck Exam


Neck Exam: Full ROM, Normal Inspection





- Respiratory Exam


Respiratory Exam: NORMAL BREATHING PATTERN.  absent: Respiratory Distress





- Cardiovascular Exam


Cardiovascular Exam: +S1, +S2





- GI/Abdominal Exam


GI & Abdominal Exam: Soft, Tenderness, Normal Bowel Sounds


Additional comments: 





Cutaneous fistula, minimal drainage, erythema 





- Extremities Exam


Extremities Exam: Full ROM, Normal Inspection





- Back Exam


Back Exam: NORMAL INSPECTION





- Neurological Exam


Neurological Exam: Alert, Awake, Oriented x3





- Psychiatric Exam


Psychiatric exam: Normal Affect, Normal Mood





- Skin


Skin Exam: Dry, Intact, Normal Color, Warm





Assessment and Plan





- Assessment and Plan (Free Text)


Assessment: 











This is a 22 yo male with past medical hx of Crohn's and drug seeking behavior 

presents with lower abdominal pain, fistula and abscess.  





1. Crohn's disease with cutaneous fistula and abscess on CT


- afebrile, no leukocytosis 


-ID consult. Dr. Mckee. recs appreciated. 


-continue zyvox, diflucan, flagyl, zosyn 


-surgery consult Cont IV abx no surgical intervention at this time 


-f/u IR regarding drainage -no drainage possible at this time 


-CT scan abdomen/pelvis shows 2.4 x 3.4 cm abscess in right abdominal wall


-cultures negative so far


-pt has picc in place


-pending discharge planning; previous infusion center is not accepting him 


-f/u am labs





2. Chronic pain


-pt continuously requesting pain meds


- Increased morphine 1mg-->2mg >> morphine placed on hold 


-percocet increased to 2 q 6





3. hypokalemia


-repleted with K


-will f/u and continue to monitor 





4. GI/DVT ppx


-protonix


-SCDs





Dispo: will f/u with  regarding infusion of abx.








Case and plan was seen, reviewed and discussed in detail with Dr Oneill. 








<Marisol Oneill - Last Filed: 06/19/17 17:11>





Objective





- Vital Signs/Intake and Output


Vital Signs (last 24 hours): 


 











Temp Pulse Resp BP Pulse Ox


 


 99.3 F   94 H  20   97/60 L  100 


 


 06/19/17 16:00  06/19/17 16:00  06/19/17 16:00  06/19/17 16:00  06/19/17 16:00








Intake and Output: 


 











 06/19/17 06/19/17





 06:59 18:59


 


Intake Total 1440 1250


 


Output Total 1500 


 


Balance -60 1250














- Medications


Medications: 


 Current Medications





Piperacillin Sod/Tazobactam Sod (Zosyn 3.375 In Ns 100ml)  100 mls @ 200 mls/hr 

IVPB Q6 [facility]


   PRN Reason: Protocol


   Stop: 06/24/17 18:01


   Last Admin: 06/19/17 13:17 Dose:  200 mls/hr


Fluconazole (Diflucan Iv 200 Mg/100 Ml Ns)  100 mls @ 100 mls/hr IVPB DAILY [facility]


   PRN Reason: Protocol


   Last Admin: 06/19/17 09:18 Dose:  100 mls/hr


Linezolid (Zyvox)  600 mg PO BID [facility]


   PRN Reason: Protocol


   Stop: 06/23/17 10:01


   Last Admin: 06/19/17 09:18 Dose:  600 mg


Metronidazole (Flagyl)  500 mg PO TID [facility]


   PRN Reason: Protocol


   Last Admin: 06/19/17 13:17 Dose:  500 mg


Morphine Sulfate (Morphine)  2 mg IVP Q4H PRN


   PRN Reason: pain, sever


   Last Admin: 06/18/17 06:31 Dose:  2 mg


Ondansetron HCl (Zofran Tab)  4 mg PO Q6 PRN


   PRN Reason: Nausea/Vomiting


Oxycodone/Acetaminophen (Percocet 5/325 Mg Tab)  2 tab PO Q6 PRN


   PRN Reason: Pain, moderate (4-7)


   Stop: 06/21/17 12:01


   Last Admin: 06/19/17 16:52 Dose:  2 tab


Pantoprazole Sodium (Protonix Ec Tab)  40 mg PO DAILY [facility]


   Last Admin: 06/19/17 09:18 Dose:  40 mg


Vitamin A (Vitamin A & D Oint Ud Foilpak)  1 ea TOP Q4 PRN


   PRN Reason: Other











- Labs


Labs: 


 





 06/19/17 06:00 





 06/19/17 06:00 





 











PT  12.0 Seconds (9.9-11.8)  H  06/15/17  08:55    


 


INR  1.11  (0.93-1.08)  H  06/15/17  08:55    


 


APTT  24.7 Seconds (23.7-30.8)   06/15/17  08:55    














Attending/Attestation





- Attestation


I have personally seen and examined this patient.: Yes


I have fully participated in the care of the patient.: Yes


I have reviewed all pertinent clinical information, including history, physical 

exam and plan: Yes


Notes (Text): 





06/19/17 17:09


21 year old male with past medical history of Crohn's disease, chronic 

abdominal abscess and vesicular cutaneous fistula, opiate dependency on chronic 

opiates who presented with complaint of abdominal pain and increased discharge.

  He was recently discharged from [facility] after abscess drainage and also 

reported recently finishing antibiotics.  CT scan was reviewed as above; no 

drainable collection as per IR.  Surgery and ID are following.  Continue with 

iv antibiotics.  Pending d/c planning; however his prior infusion company is 

not accepting him.  Will discuss with .





He has history of opioid dependency on chronic opioids.  He was counselled on 

risks of narcotic abuse.  He is on percocet prn.  He will need to follow up 

with pain management as outpatient.





Upon discharge patient will be referred back to colorectal surgery at [facility].





Marisol Oneill MD


Hospitalist.

## 2017-06-19 NOTE — CP.PCM.PN
Subjective





- Date & Time of Evaluation


Date of Evaluation: 06/19/17


Time of Evaluation: 11:50





- Subjective


Subjective: 





Still with abdominal pain and still with drainage on the right lower abdominal 

area. No fevers overnight.





Objective





- Vital Signs/Intake and Output


Vital Signs (last 24 hours): 


 











Temp Pulse Resp BP Pulse Ox


 


 99.3 F   94 H  20   97/60 L  100 


 


 06/19/17 16:00  06/19/17 16:00  06/19/17 16:00  06/19/17 16:00  06/19/17 16:00








Intake and Output: 


 











 06/19/17 06/20/17





 18:59 06:59


 


Intake Total 1250 


 


Balance 1250 














- Medications


Medications: 


 Current Medications





Piperacillin Sod/Tazobactam Sod (Zosyn 3.375 In Ns 100ml)  100 mls @ 200 mls/hr 

IVPB Q6 [facility]


   PRN Reason: Protocol


   Stop: 06/24/17 18:01


   Last Admin: 06/19/17 17:34 Dose:  200 mls/hr


Fluconazole (Diflucan Iv 200 Mg/100 Ml Ns)  100 mls @ 100 mls/hr IVPB DAILY [facility]


   PRN Reason: Protocol


   Last Admin: 06/19/17 09:18 Dose:  100 mls/hr


Linezolid (Zyvox)  600 mg PO BID [facility]BAR[facility]


   PRN Reason: Protocol


   Stop: 06/23/17 10:01


   Last Admin: 06/19/17 17:34 Dose:  600 mg


Metronidazole (Flagyl)  500 mg PO TID [facility]BARA


   PRN Reason: Protocol


   Last Admin: 06/19/17 17:34 Dose:  500 mg


Morphine Sulfate (Morphine)  2 mg IVP Q4H PRN


   PRN Reason: pain, sever


   Last Admin: 06/18/17 06:31 Dose:  2 mg


Ondansetron HCl (Zofran Tab)  4 mg PO Q6 PRN


   PRN Reason: Nausea/Vomiting


Oxycodone/Acetaminophen (Percocet 5/325 Mg Tab)  2 tab PO Q6 PRN


   PRN Reason: Pain, moderate (4-7)


   Stop: 06/21/17 12:01


   Last Admin: 06/19/17 16:52 Dose:  2 tab


Pantoprazole Sodium (Protonix Ec Tab)  40 mg PO DAILY [facility]


   Last Admin: 06/19/17 09:18 Dose:  40 mg


Vitamin A (Vitamin A & D Oint Ud Foilpak)  1 ea TOP Q4 PRN


   PRN Reason: Other











- Labs


Labs: 


 





 06/19/17 06:00 





 06/19/17 06:00 





 











PT  12.0 Seconds (9.9-11.8)  H  06/15/17  08:55    


 


INR  1.11  (0.93-1.08)  H  06/15/17  08:55    


 


APTT  24.7 Seconds (23.7-30.8)   06/15/17  08:55    














- Constitutional


Appears: Non-toxic, No Acute Distress





- Head Exam


Head Exam: NORMAL INSPECTION





- Neck Exam


Neck Exam: absent: Meningismus





- Respiratory Exam


Respiratory Exam: Decreased Breath Sounds





- Cardiovascular Exam


Cardiovascular Exam: +S1, +S2





- GI/Abdominal Exam


GI & Abdominal Exam: Soft.  absent: Tenderness





Assessment and Plan





- Assessment and Plan (Free Text)


Plan: 





Assessment


Intra-abdominal abscess 


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 

microabscesses, previously grew Vancomycin-resistant Enterococcus faecium from 3

/23, growing gram positive cocci and yeast from 3/26, in a patient with poorly-

controlled Crohn's disease, now growing VRE again





Plan


continue Zyvox, Zosyn and Diflucan - discussed with Dr. Buitrago - should have 

formal evaluation by interventional Radiology to see if abscess can be drained


Will continue to follow clinically while the patient is in the hospital

## 2017-06-19 NOTE — PN
DATE: 06/19/2017



Seen and examined at the bedside.  Earlier today, patient still having right upper quadrant pain.  No
 reports of nausea or vomiting.  The patient tolerating oral intake.  No acute overnight events repor
artis.



VITAL SIGNS:  Temperature 98.6, blood pressure 105/70, pulse 88, respirations 20, 100% on room air.



LABORATORIES:  WBC is 9.3, H and H is 8.4 and 27.4, platelets are 537.  Sodium is 136, K 3.9, BUN 5, 
creatinine 0.8.  LFTs are within normal limits.  Abdominal stain is positive for gram-negative rods.



PHYSICAL EXAMINATION:

HEENT:  Sclera is anicteric.

NECK:  Supple.

CARDIAC:  S1, S2.

LUNG SOUNDS:  With decreased breath sounds, but good air entry.  No rales or wheeze.

ABDOMEN:  With bowel sounds, soft, positive tenderness, no rebound or guarding.  The patient with cut
aneous fistula, minimal drainage and erythema.



ASSESSMENT:  This is a 21-year-old male with a past medical history of Crohn's disease.   Came with l
ower abdominal pain, has fistula and abscess, hypokalemia.  The patient was on Remicade in the past a
nd last treatment was more than a month ago.



PLAN:  Request for IR to evaluate if this abscess has a drainable collection.  The patient does have 
an appointment to be followed up at [facility].  The patient currently not candidate for any
 biological therapy in the presence of active infection.  Continue diet as tolerated.  The patient is
 also being followed by ID, is on IV antibiotics.  Continue GI prophylaxis, on Protonix.



The patient was seen and case discussed with Dr. Andrews.





__________________________________________

Priscila SHERWOOD







cc:



DD: 06/19/2017 14:46:55  [id number]

TT: 06/19/2017 15:37:09

Confirmation # [id number]

Dictation # [id number]

en

## 2017-06-20 VITALS — RESPIRATION RATE: 20 BRPM

## 2017-06-20 LAB
ADD MANUAL DIFF?: NO
ALBUMIN/GLOB SERPL: 0.9 {RATIO} (ref 1.1–1.8)
ALP SERPL-CCNC: 82 U/L (ref 38–133)
ALT SERPL-CCNC: 26 U/L (ref 7–56)
AST SERPL-CCNC: 34 U/L (ref 15–59)
BASOPHILS # BLD AUTO: 0.02 K/MM3 (ref 0–2)
BASOPHILS NFR BLD: 0.2 % (ref 0–3)
BILIRUB SERPL-MCNC: 0.4 MG/DL (ref 0.2–1.3)
BUN SERPL-MCNC: 5 MG/DL (ref 7–21)
CALCIUM SERPL-MCNC: 9 MG/DL (ref 8.4–10.5)
CHLORIDE SERPL-SCNC: 99 MMOL/L (ref 95–110)
CO2 SERPL-SCNC: 29 MMOL/L (ref 21–33)
EOSINOPHIL # BLD: 0.2 10*3/UL (ref 0–0.7)
EOSINOPHIL NFR BLD: 2.1 % (ref 1.5–5)
ERYTHROCYTE [DISTWIDTH] IN BLOOD BY AUTOMATED COUNT: 20.9 % (ref 11.5–14.5)
GLOBULIN SER-MCNC: 3.9 GM/DL
GLUCOSE SERPL-MCNC: 80 MG/DL (ref 70–110)
GRANULOCYTES # BLD: 7.32 10*3/UL (ref 1.4–6.5)
GRANULOCYTES NFR BLD: 72.3 % (ref 50–68)
HCT VFR BLD CALC: 29.1 % (ref 42–52)
LYMPHOCYTES # BLD: 1.7 10*3/UL (ref 1.2–3.4)
LYMPHOCYTES NFR BLD AUTO: 16.5 % (ref 22–35)
MAGNESIUM SERPL-MCNC: 1.9 MG/DL (ref 1.7–2.2)
MCH RBC QN AUTO: 23 PG (ref 25–35)
MCHC RBC AUTO-ENTMCNC: 30.9 G/DL (ref 31–37)
MCV RBC AUTO: 74.4 FL (ref 80–105)
MONOCYTES # BLD AUTO: 0.9 10*3/UL (ref 0.1–0.6)
MONOCYTES NFR BLD: 8.9 % (ref 1–6)
PHOSPHATE SERPL-MCNC: 4.6 MG/DL (ref 2.5–4.5)
PLATELET # BLD: 513 10^3/UL (ref 120–450)
PMV BLD AUTO: 7.7 FL (ref 7–11)
POTASSIUM SERPL-SCNC: 3.9 MMOL/L (ref 3.6–5)
PROT SERPL-MCNC: 7.4 G/DL (ref 5.8–8.3)
SODIUM SERPL-SCNC: 135 MMOL/L (ref 132–148)
WBC # BLD AUTO: 10.1 10^3/UL (ref 4.5–11)

## 2017-06-20 RX ADMIN — MORPHINE SULFATE PRN MG: 2 INJECTION, SOLUTION INTRAMUSCULAR; INTRAVENOUS at 21:33

## 2017-06-20 RX ADMIN — PIPERACILLIN AND TAZOBACTAM SCH MLS/HR: 3; .375 INJECTION, POWDER, LYOPHILIZED, FOR SOLUTION INTRAVENOUS; PARENTERAL at 05:13

## 2017-06-20 RX ADMIN — PIPERACILLIN AND TAZOBACTAM SCH MLS/HR: 3; .375 INJECTION, POWDER, LYOPHILIZED, FOR SOLUTION INTRAVENOUS; PARENTERAL at 18:02

## 2017-06-20 RX ADMIN — FLUCONAZOLE SCH MLS/HR: 200 INJECTION, SOLUTION INTRAVENOUS at 10:53

## 2017-06-20 RX ADMIN — PIPERACILLIN AND TAZOBACTAM SCH MLS/HR: 3; .375 INJECTION, POWDER, LYOPHILIZED, FOR SOLUTION INTRAVENOUS; PARENTERAL at 23:32

## 2017-06-20 RX ADMIN — OXYCODONE HYDROCHLORIDE AND ACETAMINOPHEN PRN TAB: 5; 325 TABLET ORAL at 05:13

## 2017-06-20 RX ADMIN — PANTOPRAZOLE SODIUM SCH MG: 40 TABLET, DELAYED RELEASE ORAL at 10:55

## 2017-06-20 RX ADMIN — PIPERACILLIN AND TAZOBACTAM SCH MLS/HR: 3; .375 INJECTION, POWDER, LYOPHILIZED, FOR SOLUTION INTRAVENOUS; PARENTERAL at 13:13

## 2017-06-20 RX ADMIN — OXYCODONE HYDROCHLORIDE AND ACETAMINOPHEN PRN TAB: 5; 325 TABLET ORAL at 12:49

## 2017-06-20 NOTE — PN
DATE: 06/19/2017



ADDENDUM



This is an addendum to the GI progress report dictated by KATHRINE Fenton.  The patient is still com
plaining of pain in the right side of the abdomen.  The patient was evaluated by IR and the recommend
ation was no drainable collection noticed.  The plan is to continue the antibiotics.  The patient is 
due to have followup at the [facility].  We will hold off treating anything ____ at the pres
ent time in the present setting of an active infection.  We will continue the PPI prophylaxis.  This 
is an addendum to the GI progress report dictated by KATHRINE Fenton.





__________________________________________

Rowdy Andrews MD







cc:



DD: 06/19/2017 19:59:48  [id number]

TT: 06/19/2017 20:18:43

Confirmation # [id number]

Dictation # [id number]

fabricio

## 2017-06-20 NOTE — CP.PCM.PN
<Issac Romero - Last Filed: 06/20/17 12:07>





Subjective





- Date & Time of Evaluation


Date of Evaluation: 06/20/17


Time of Evaluation: 12:05





- Subjective


Subjective: 











Med progress note.








Attending: Dr. Oneill








Pt seen/examined at bedside. No acute distress. No events overnight. Pt 

constantly asking for pain medication, now asking for IV dilaudid. 





Objective





- Vital Signs/Intake and Output


Vital Signs (last 24 hours): 


 











Temp Pulse Resp BP Pulse Ox


 


 98.4 F   63   17   97/54 L  100 


 


 06/20/17 06:00  06/20/17 06:00  06/20/17 06:00  06/20/17 06:00  06/20/17 06:00








Intake and Output: 


 











 06/20/17 06/20/17





 06:59 18:59


 


Intake Total 720 


 


Balance 720 














- Medications


Medications: 


 Current Medications





Piperacillin Sod/Tazobactam Sod (Zosyn 3.375 In Ns 100ml)  100 mls @ 200 mls/hr 

IVPB Q6 [facility]


   PRN Reason: Protocol


   Stop: 06/24/17 18:01


   Last Admin: 06/20/17 05:13 Dose:  200 mls/hr


Fluconazole (Diflucan Iv 200 Mg/100 Ml Ns)  100 mls @ 100 mls/hr IVPB DAILY [facility]


   PRN Reason: Protocol


   Last Admin: 06/20/17 10:53 Dose:  100 mls/hr


Linezolid (Zyvox)  600 mg PO BID [facility]


   PRN Reason: Protocol


   Stop: 06/23/17 10:01


   Last Admin: 06/20/17 10:55 Dose:  600 mg


Metronidazole (Flagyl)  500 mg PO TID [facility]


   PRN Reason: Protocol


   Last Admin: 06/20/17 10:54 Dose:  500 mg


Morphine Sulfate (Morphine)  2 mg IVP Q4H PRN


   PRN Reason: pain, sever


   Last Admin: 06/18/17 06:31 Dose:  2 mg


Ondansetron HCl (Zofran Tab)  4 mg PO Q6 PRN


   PRN Reason: Nausea/Vomiting


Oxycodone/Acetaminophen (Percocet 5/325 Mg Tab)  2 tab PO Q6 PRN


   PRN Reason: Pain, moderate (4-7)


   Stop: 06/21/17 12:01


   Last Admin: 06/20/17 05:13 Dose:  2 tab


Pantoprazole Sodium (Protonix Ec Tab)  40 mg PO DAILY [facility]


   Last Admin: 06/20/17 10:55 Dose:  40 mg


Vitamin A (Vitamin A & D Oint Ud Foilpak)  1 ea TOP Q4 PRN


   PRN Reason: Other











- Labs


Labs: 


 





 06/20/17 08:00 





 06/20/17 08:00 





 











PT  12.0 Seconds (9.9-11.8)  H  06/15/17  08:55    


 


INR  1.11  (0.93-1.08)  H  06/15/17  08:55    


 


APTT  24.7 Seconds (23.7-30.8)   06/15/17  08:55    














- Constitutional


Appears: Non-toxic, No Acute Distress





- Head Exam


Head Exam: ATRAUMATIC, NORMAL INSPECTION, NORMOCEPHALIC





- Eye Exam


Eye Exam: EOMI





- ENT Exam


ENT Exam: Mucous Membranes Moist





- Respiratory Exam


Respiratory Exam: NORMAL BREATHING PATTERN.  absent: Respiratory Distress





- Cardiovascular Exam


Cardiovascular Exam: +S1, +S2





- GI/Abdominal Exam


GI & Abdominal Exam: Soft, Normal Bowel Sounds


Additional comments: 





Dressing changed, minimal drainage. Mild tenderness to palpation rlq.





- Extremities Exam


Extremities Exam: Full ROM, Normal Inspection





- Neurological Exam


Neurological Exam: Alert, Awake, Oriented x3





- Psychiatric Exam


Psychiatric exam: Normal Affect, Normal Mood





- Skin


Skin Exam: Dry, Intact, Normal Color, Warm





Assessment and Plan





- Assessment and Plan (Free Text)


Assessment: 











This is a 22 yo male with past medical hx of Crohn's and drug seeking behavior 

presents with lower abdominal pain, fistula and abscess.  





1. Crohn's disease with cutaneous fistula and abscess on CT


- afebrile, no leukocytosis 


-ID consult. Dr. Mckee. recs appreciated. 


-continue zyvox, diflucan, flagyl, zosyn 


-surgery consult Cont IV abx no surgical intervention at this time 


-f/u IR regarding drainage -no drainage possible at this time 


-CT scan abdomen/pelvis shows 2.4 x 3.4 cm abscess in right abdominal wall


-cultures negative so far


-pt has picc in place


-pending discharge planning; previous infusion center is not accepting him 


-f/u am labs





2. Chronic pain


-pt continuously requesting pain meds


- Increased morphine 1mg-->2mg >> morphine placed on hold 


-percocet increased to 2 q 6





3. hypokalemia


-repleted with K


-will f/u and continue to monitor 





4. GI/DVT ppx


-protonix


-SCDs





Dispo: will f/u with  regarding infusion of abx.








Case and plan was seen, reviewed and discussed in detail with Dr Oneill. 





<Marisol Oneill - Last Filed: 06/20/17 17:11>





Objective





- Vital Signs/Intake and Output


Vital Signs (last 24 hours): 


 











Temp Pulse Resp BP Pulse Ox


 


 98.4 F   63   17   97/54 L  100 


 


 06/20/17 06:00  06/20/17 06:00  06/20/17 06:00  06/20/17 06:00  06/20/17 06:00








Intake and Output: 


 











 06/20/17 06/20/17





 06:59 18:59


 


Intake Total 720 


 


Balance 720 














- Medications


Medications: 


 Current Medications





Piperacillin Sod/Tazobactam Sod (Zosyn 3.375 In Ns 100ml)  100 mls @ 200 mls/hr 

IVPB Q6 [facility]


   PRN Reason: Protocol


   Stop: 06/24/17 18:01


   Last Admin: 06/20/17 13:13 Dose:  200 mls/hr


Fluconazole (Diflucan Iv 200 Mg/100 Ml Ns)  100 mls @ 100 mls/hr IVPB DAILY [facility]


   PRN Reason: Protocol


   Last Admin: 06/20/17 10:53 Dose:  100 mls/hr


Linezolid (Zyvox)  600 mg PO BID [facility]


   PRN Reason: Protocol


   Stop: 06/23/17 10:01


   Last Admin: 06/20/17 10:55 Dose:  600 mg


Metronidazole (Flagyl)  500 mg PO TID [facility]


   PRN Reason: Protocol


   Last Admin: 06/20/17 14:54 Dose:  500 mg


Morphine Sulfate (Morphine)  2 mg IVP Q4H PRN


   PRN Reason: Pain, severe (8-10)


Ondansetron HCl (Zofran Tab)  4 mg PO Q6 PRN


   PRN Reason: Nausea/Vomiting


Pantoprazole Sodium (Protonix Ec Tab)  40 mg PO DAILY [facility]


   Last Admin: 06/20/17 10:55 Dose:  40 mg


Vitamin A (Vitamin A & D Oint Ud Foilpak)  1 ea TOP Q4 PRN


   PRN Reason: Other











- Labs


Labs: 


 





 06/20/17 08:00 





 06/20/17 08:00 





 











PT  12.0 Seconds (9.9-11.8)  H  06/15/17  08:55    


 


INR  1.11  (0.93-1.08)  H  06/15/17  08:55    


 


APTT  24.7 Seconds (23.7-30.8)   06/15/17  08:55    














Attending/Attestation





- Attestation


I have personally seen and examined this patient.: Yes


I have fully participated in the care of the patient.: Yes


I have reviewed all pertinent clinical information, including history, physical 

exam and plan: Yes


Notes (Text): 





06/20/17 17:09


21 year old male with past medical history of Crohn's disease, chronic 

abdominal abscess and vesicular cutaneous fistula, opiate dependency on chronic 

opiates who presented with complaint of abdominal pain and increased discharge.

  He was recently discharged from [facility] after abscess drainage and also 

reported recently finishing antibiotics.  CT scan was reviewed as above; no 

drainable collection as per IR.  He is being followed by GI, surgery and ID.  

Continue with antibiotics as per ID.  Case was discussed with  

regarding why his infusion company has been denied.





He has history of opioid dependency on chronic opioids.  He is on percocet prn 

and now asking for iv morphine or dilaudid.  He was counselled on risks of 

narcotic abuse.  He will need to follow up with pain management as outpatient.





Upon discharge patient will be referred back to colorectal surgery at [facility].





Marisol Oneill MD


Hospitalist.

## 2017-06-20 NOTE — PN
DATE: 06/20/2017



ADDENDUM



This patient was seen and evaluated earlier.  This is an addendum to the GI progress report dictated 
by KATHRINE Fenton.



The patient also discussed with Dr. Jorge Luis Gregory, interventional radiologist.  The patient's small col
lection is not drainable.  Would recommend at this point to continue the aggressive _____.  This anabella
ent was discussed with Dr. Jorge Luis Gregory.



This is an addendum to the GI progress report dictated by Priscila Adams.



The patient had a CT scan.  The abscess is not drainable.  Technically, there is no significant colle
ction drainable.  Would recommend at this point to continue the antibiotics as per ID.  Would recomme
nd strongly holding off any Remicade or biological therapy until the active process is controlled ful
ly.  Will _____ .





__________________________________________

Rowdy Andrews MD







cc:



DD: 06/20/2017 20:53:12  [id number]

TT: 06/20/2017 21:29:54

Confirmation # [id number]

Dictation # [id number]

[email or web address]

## 2017-06-20 NOTE — CP.PCM.PN
Subjective





- Date & Time of Evaluation


Date of Evaluation: 06/20/17


Time of Evaluation: 10:15





- Subjective


Subjective: 





Still complaining of pain in his abdomen, no fevers overnight.





Objective





- Vital Signs/Intake and Output


Vital Signs (last 24 hours): 


 











Temp Pulse Resp BP Pulse Ox


 


 99.3 F   94 H  20   97/60 L  100 


 


 06/19/17 16:00  06/19/17 16:00  06/19/17 16:00  06/19/17 16:00  06/19/17 16:00








Intake and Output: 


 











 06/20/17 06/20/17





 06:59 18:59


 


Intake Total 720 


 


Balance 720 














- Medications


Medications: 


 Current Medications





Piperacillin Sod/Tazobactam Sod (Zosyn 3.375 In Ns 100ml)  100 mls @ 200 mls/hr 

IVPB Q6 [facility]


   PRN Reason: Protocol


   Stop: 06/24/17 18:01


   Last Admin: 06/20/17 05:13 Dose:  200 mls/hr


Fluconazole (Diflucan Iv 200 Mg/100 Ml Ns)  100 mls @ 100 mls/hr IVPB DAILY [facility]


   PRN Reason: Protocol


   Last Admin: 06/19/17 09:18 Dose:  100 mls/hr


Linezolid (Zyvox)  600 mg PO BID BARBARA


   PRN Reason: Protocol


   Stop: 06/23/17 10:01


   Last Admin: 06/19/17 17:34 Dose:  600 mg


Metronidazole (Flagyl)  500 mg PO TID [facility]BARA


   PRN Reason: Protocol


   Last Admin: 06/19/17 17:34 Dose:  500 mg


Morphine Sulfate (Morphine)  2 mg IVP Q4H PRN


   PRN Reason: pain, sever


   Last Admin: 06/18/17 06:31 Dose:  2 mg


Ondansetron HCl (Zofran Tab)  4 mg PO Q6 PRN


   PRN Reason: Nausea/Vomiting


Oxycodone/Acetaminophen (Percocet 5/325 Mg Tab)  2 tab PO Q6 PRN


   PRN Reason: Pain, moderate (4-7)


   Stop: 06/21/17 12:01


   Last Admin: 06/20/17 05:13 Dose:  2 tab


Pantoprazole Sodium (Protonix Ec Tab)  40 mg PO DAILY [facility]


   Last Admin: 06/19/17 09:18 Dose:  40 mg


Vitamin A (Vitamin A & D Oint Ud Foilpak)  1 ea TOP Q4 PRN


   PRN Reason: Other











- Labs


Labs: 


 





 06/19/17 06:00 





 06/19/17 06:00 





 











PT  12.0 Seconds (9.9-11.8)  H  06/15/17  08:55    


 


INR  1.11  (0.93-1.08)  H  06/15/17  08:55    


 


APTT  24.7 Seconds (23.7-30.8)   06/15/17  08:55    














- Constitutional


Appears: Non-toxic, No Acute Distress





- Head Exam


Head Exam: NORMAL INSPECTION





- Neck Exam


Neck Exam: absent: Lymphadenopathy, Meningismus





- Respiratory Exam


Respiratory Exam: Decreased Breath Sounds





- Cardiovascular Exam


Cardiovascular Exam: +S1, +S2





- GI/Abdominal Exam


GI & Abdominal Exam: Soft.  absent: Tenderness


Additional comments: 





dry dressings in place





Assessment and Plan





- Assessment and Plan (Free Text)


Plan: 





Assessment


Intra-abdominal abscess; externally in this patient with possible fistula, 

growing klebsiella


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 

microabscesses, previously grew Vancomycin-resistant Enterococcus faecium from 3

/23, growing gram positive cocci and yeast from 3/26, in a patient with poorly-

controlled Crohn's disease, now growing VRE again





Plan


continue Zyvox, Zosyn and Diflucan - discussed with Dr. Andrews - should have 

formal evaluation by interventional Radiology to see if abscess can be drained


Will continue to follow clinically while the patient is in the hospital

## 2017-06-20 NOTE — PN
DATE: 06/20/2017



SUBJECTIVE:  Seen and examined at the bedside earlier today.  The patient reports he is having pain. 
 He is upset regarding his pain medication and feels that it was not enough.  He has had no episodes 
of nausea or vomiting.  He is tolerating what he can with the food.  He reports he is moving his kevin
ls.  No reports of any bleeding.  No acute overnight events reported.



VITAL SIGNS:  Temperature is 98.4, blood pressure 97/54, pulse rate 62, respirations 17, and 100% on 
room air.



LABORATORY DATA:  Labs today:  WBC is 10.1, H and H are 9.0 and 29.1, platelets of 513.  Sodium of 13
5, K is 3.9, BUN is 5, creatinine 0.9.  LFTs are within normal limits.  His abdominal Gram-stain is p
ositive for Klebsiella pneumoniae.



PHYSICAL EXAMINATION:

HEENT:  Sclera is anicteric.

NECK:  Supple.

CARDIAC:  S1, S2.

LUNGS:  Decreased breath sounds but good air entry, no rales or wheeze.

ABDOMEN:  With bowel sounds, soft, mild tenderness to right lower quadrant.  He has the dressings and
 minimal drainage and did not see any blood.

EXTREMITIES:  No edema.

NEUROLOGIC:  He is awake, alert, and oriented.



ASSESSMENT:  This is a 21-year-old male with history of Crohn disease with cutaneous fistula and absc
ess on CT.  The patient has chronic pain.  The patient with hypokalemia, which has improved now.  He 
is positive for Klebsiella pneumoniae in his abdominal wound culture.



PLAN:  The patient is on Zyvox.  He is on Diflucan, Flagyl, and on Zosyn as per ID.  Continue PPI.  A
s far as pain medication, he is getting morphine.  Continue the diet as tolerated.  The patient was e
valuated by IR, and there is no drainable collection noted.  The patient is due to have followup at [facility].  His last Remicade was about a month ago.  Continue his GI prophylaxis.  Currentl
y hold off on treatment at the present time in view of active infection.  The patient was seen and ca
se discussed with Dr. Andrews.





__________________________________________

Priscila SHERWOOD







cc:



DD: 06/20/2017 16:27:09  [id number]

TT: 06/20/2017 16:58:59

Confirmation # [id number]

Dictation # [id number]

bn

## 2017-06-20 NOTE — CP.PCM.PN
Subjective





- Date & Time of Evaluation


Date of Evaluation: 06/20/17


Time of Evaluation: 07:40





- Subjective


Subjective: 





General Surgery Progress Note for Dr. Charles





Patient seen and examined at bedside. No acute events overnight. Patient 

continues to complain about this pain medication is not enough. Patient reports 

he takes "30mg of oxy every 4 to 6 hours". Patient states he would rather go to 

another hospital and get treated there. Patient continues to have yellow 

drainage coming out of his abdominal fistula. Otherwise patient has no other 

complains.





Objective





- Vital Signs/Intake and Output


Vital Signs (last 24 hours): 


 











Temp Pulse Resp BP Pulse Ox


 


 98.4 F   63   17   97/54 L  100 


 


 06/20/17 06:00  06/20/17 06:00  06/20/17 06:00  06/20/17 06:00  06/20/17 06:00








Intake and Output: 


 











 06/20/17 06/20/17





 06:59 18:59


 


Intake Total 720 


 


Balance 720 














- Medications


Medications: 


 Current Medications





Piperacillin Sod/Tazobactam Sod (Zosyn 3.375 In Ns 100ml)  100 mls @ 200 mls/hr 

IVPB Q6 [facility]


   PRN Reason: Protocol


   Stop: 06/24/17 18:01


   Last Admin: 06/20/17 05:13 Dose:  200 mls/hr


Fluconazole (Diflucan Iv 200 Mg/100 Ml Ns)  100 mls @ 100 mls/hr IVPB DAILY [facility]


   PRN Reason: Protocol


   Last Admin: 06/19/17 09:18 Dose:  100 mls/hr


Linezolid (Zyvox)  600 mg PO BID [facility]


   PRN Reason: Protocol


   Stop: 06/23/17 10:01


   Last Admin: 06/19/17 17:34 Dose:  600 mg


Metronidazole (Flagyl)  500 mg PO TID [facility]


   PRN Reason: Protocol


   Last Admin: 06/19/17 17:34 Dose:  500 mg


Morphine Sulfate (Morphine)  2 mg IVP Q4H PRN


   PRN Reason: pain, sever


   Last Admin: 06/18/17 06:31 Dose:  2 mg


Ondansetron HCl (Zofran Tab)  4 mg PO Q6 PRN


   PRN Reason: Nausea/Vomiting


Oxycodone/Acetaminophen (Percocet 5/325 Mg Tab)  2 tab PO Q6 PRN


   PRN Reason: Pain, moderate (4-7)


   Stop: 06/21/17 12:01


   Last Admin: 06/20/17 05:13 Dose:  2 tab


Pantoprazole Sodium (Protonix Ec Tab)  40 mg PO DAILY [facility]


   Last Admin: 06/19/17 09:18 Dose:  40 mg


Vitamin A (Vitamin A & D Oint Ud Foilpak)  1 ea TOP Q4 PRN


   PRN Reason: Other











- Labs


Labs: 


 





 06/20/17 08:00 





 06/19/17 06:00 





 











PT  12.0 Seconds (9.9-11.8)  H  06/15/17  08:55    


 


INR  1.11  (0.93-1.08)  H  06/15/17  08:55    


 


APTT  24.7 Seconds (23.7-30.8)   06/15/17  08:55    














- Constitutional


Appears: Non-toxic, No Acute Distress





- Head Exam


Head Exam: ATRAUMATIC, NORMAL INSPECTION





- Eye Exam


Eye Exam: EOMI, Normal appearance





- ENT Exam


ENT Exam: Mucous Membranes Moist





- Neck Exam


Neck Exam: Normal Inspection





- Respiratory Exam


Respiratory Exam: absent: Respiratory Distress





- Cardiovascular Exam


Cardiovascular Exam: +S1, +S2





- GI/Abdominal Exam


GI & Abdominal Exam: Tenderness


Additional comments: 





Three abdominal fistula located at RLQ draining light yellow fluid. 


Dressing changed with sterile gauze x3 and surgical tape. 


Diffused tenderness to the touch.





- Extremities Exam


Extremities Exam: Normal Capillary Refill





- Neurological Exam


Neurological Exam: Alert, Awake, Oriented x3





- Psychiatric Exam


Psychiatric exam: Normal Affect, Normal Mood





- Skin


Skin Exam: Warm





Assessment and Plan





- Assessment and Plan (Free Text)


Assessment: 





21 year old  male with past medical history of Crohn's disease, 

vesicular cutaneous fistula, pain medication seeking behavior and iron 

deficiency anemia presents with right lower abdominal pain. Surgical consult 

was request to evaluate patient's right lower abdominal abscess found on CT 

scan.


Plan: 





Right lower abdominal abscess


-No surgical intervention indicated at this time


-Topical vitamin A&D as skin protectant


-IR recommends no drainable collection noticed


-Continue IV Antibiotic per ID


-D/w attending Dr. Charles

## 2017-06-21 VITALS
OXYGEN SATURATION: 98 % | TEMPERATURE: 99.7 F | HEART RATE: 100 BPM | SYSTOLIC BLOOD PRESSURE: 103 MMHG | DIASTOLIC BLOOD PRESSURE: 67 MMHG

## 2017-06-21 LAB
ADD MANUAL DIFF?: NO
ALBUMIN/GLOB SERPL: 1 {RATIO} (ref 1.1–1.8)
ALP SERPL-CCNC: 83 U/L (ref 38–133)
ALT SERPL-CCNC: 23 U/L (ref 7–56)
AST SERPL-CCNC: 40 U/L (ref 15–59)
BASOPHILS # BLD AUTO: 0.01 K/MM3 (ref 0–2)
BASOPHILS NFR BLD: 0.1 % (ref 0–3)
BILIRUB SERPL-MCNC: 0.4 MG/DL (ref 0.2–1.3)
BUN SERPL-MCNC: 6 MG/DL (ref 7–21)
CALCIUM SERPL-MCNC: 9.2 MG/DL (ref 8.4–10.5)
CHLORIDE SERPL-SCNC: 98 MMOL/L (ref 98–107)
CO2 SERPL-SCNC: 30 MMOL/L (ref 21–33)
EOSINOPHIL # BLD: 0.1 10*3/UL (ref 0–0.7)
EOSINOPHIL NFR BLD: 1 % (ref 1.5–5)
ERYTHROCYTE [DISTWIDTH] IN BLOOD BY AUTOMATED COUNT: 20.7 % (ref 11.5–14.5)
GLOBULIN SER-MCNC: 4 GM/DL
GLUCOSE SERPL-MCNC: 106 MG/DL (ref 70–110)
GRANULOCYTES # BLD: 8.8 10*3/UL (ref 1.4–6.5)
GRANULOCYTES NFR BLD: 78.4 % (ref 50–68)
HCT VFR BLD CALC: 30.9 % (ref 42–52)
LYMPHOCYTES # BLD: 1.4 10*3/UL (ref 1.2–3.4)
LYMPHOCYTES NFR BLD AUTO: 12.7 % (ref 22–35)
MAGNESIUM SERPL-MCNC: 1.9 MG/DL (ref 1.7–2.2)
MCH RBC QN AUTO: 23 PG (ref 25–35)
MCHC RBC AUTO-ENTMCNC: 31.1 G/DL (ref 31–37)
MCV RBC AUTO: 74.1 FL (ref 80–105)
MONOCYTES # BLD AUTO: 0.9 10*3/UL (ref 0.1–0.6)
MONOCYTES NFR BLD: 7.8 % (ref 1–6)
PHOSPHATE SERPL-MCNC: 4 MG/DL (ref 2.5–4.5)
PLATELET # BLD: 568 10^3/UL (ref 120–450)
PMV BLD AUTO: 8 FL (ref 7–11)
POTASSIUM SERPL-SCNC: 4 MMOL/L (ref 3.6–5)
PROT SERPL-MCNC: 7.9 G/DL (ref 5.8–8.3)
SODIUM SERPL-SCNC: 136 MMOL/L (ref 132–148)
WBC # BLD AUTO: 11.2 10^3/UL (ref 4.5–11)

## 2017-06-21 RX ADMIN — MORPHINE SULFATE PRN MG: 2 INJECTION, SOLUTION INTRAMUSCULAR; INTRAVENOUS at 05:46

## 2017-06-21 RX ADMIN — MORPHINE SULFATE PRN MG: 2 INJECTION, SOLUTION INTRAMUSCULAR; INTRAVENOUS at 01:49

## 2017-06-21 RX ADMIN — MORPHINE SULFATE PRN MG: 2 INJECTION, SOLUTION INTRAMUSCULAR; INTRAVENOUS at 10:34

## 2017-06-21 RX ADMIN — PIPERACILLIN AND TAZOBACTAM SCH MLS/HR: 3; .375 INJECTION, POWDER, LYOPHILIZED, FOR SOLUTION INTRAVENOUS; PARENTERAL at 11:44

## 2017-06-21 RX ADMIN — PANTOPRAZOLE SODIUM SCH MG: 40 TABLET, DELAYED RELEASE ORAL at 10:34

## 2017-06-21 RX ADMIN — PIPERACILLIN AND TAZOBACTAM SCH MLS/HR: 3; .375 INJECTION, POWDER, LYOPHILIZED, FOR SOLUTION INTRAVENOUS; PARENTERAL at 05:47

## 2017-06-21 RX ADMIN — FLUCONAZOLE SCH MLS/HR: 200 INJECTION, SOLUTION INTRAVENOUS at 10:34

## 2017-06-21 NOTE — CP.PCM.PN
Subjective





- Date & Time of Evaluation


Date of Evaluation: 06/21/17


Time of Evaluation: 10:35





- Subjective


Subjective: 





Patient seen this morning, continues to complain about pain in his abdomen, no 

fevers overnight, no diarrhea.





Objective





- Vital Signs/Intake and Output


Vital Signs (last 24 hours): 


 











Temp Pulse Resp BP Pulse Ox


 


 99.7 F H  100 H  20   103/67   98 


 


 06/21/17 06:00  06/21/17 06:00  06/21/17 06:00  06/21/17 06:00  06/21/17 06:00








Intake and Output: 


 











 06/21/17 06/21/17





 06:59 18:59


 


Intake Total 660 


 


Output Total 1600 


 


Balance -940 














- Medications


Medications: 


 Current Medications





Piperacillin Sod/Tazobactam Sod (Zosyn 3.375 In Ns 100ml)  100 mls @ 200 mls/hr 

IVPB Q6 [facility]


   PRN Reason: Protocol


   Stop: 06/24/17 18:01


   Last Admin: 06/21/17 05:47 Dose:  200 mls/hr


Fluconazole (Diflucan Iv 200 Mg/100 Ml Ns)  100 mls @ 100 mls/hr IVPB DAILY [facility]


   PRN Reason: Protocol


   Last Admin: 06/20/17 10:53 Dose:  100 mls/hr


Linezolid (Zyvox)  600 mg PO BID [facility]


   PRN Reason: Protocol


   Stop: 06/23/17 10:01


   Last Admin: 06/20/17 18:30 Dose:  600 mg


Metronidazole (Flagyl)  500 mg PO TID [facility]


   PRN Reason: Protocol


   Last Admin: 06/20/17 18:00 Dose:  500 mg


Morphine Sulfate (Morphine)  2 mg IVP Q4H PRN


   PRN Reason: Pain, severe (8-10)


   Last Admin: 06/21/17 05:46 Dose:  2 mg


Ondansetron HCl (Zofran Tab)  4 mg PO Q6 PRN


   PRN Reason: Nausea/Vomiting


Pantoprazole Sodium (Protonix Ec Tab)  40 mg PO DAILY Counts include [facility] beds at the [facility]


   Last Admin: 06/20/17 10:55 Dose:  40 mg


Vitamin A (Vitamin A & D Oint Ud Foilpak)  1 ea TOP Q4 PRN


   PRN Reason: Other











- Labs


Labs: 


 





 06/20/17 08:00 





 06/20/17 08:00 





 











PT  12.0 Seconds (9.9-11.8)  H  06/15/17  08:55    


 


INR  1.11  (0.93-1.08)  H  06/15/17  08:55    


 


APTT  24.7 Seconds (23.7-30.8)   06/15/17  08:55    














- Constitutional


Appears: Non-toxic, No Acute Distress





- Head Exam


Head Exam: NORMAL INSPECTION





- Neck Exam


Neck Exam: absent: Meningismus





- Respiratory Exam


Respiratory Exam: Decreased Breath Sounds





- Cardiovascular Exam


Cardiovascular Exam: +S1, +S2





- GI/Abdominal Exam


GI & Abdominal Exam: Soft.  absent: Tenderness


Additional comments: 





dressings in place





Assessment and Plan





- Assessment and Plan (Free Text)


Plan: 





Assessment


Intra-abdominal abscess; externally in this patient with possible fistula, 

growing klebsiella


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 

microabscesses, previously grew Vancomycin-resistant Enterococcus faecium from 3

/23, growing gram positive cocci and yeast from 3/26, in a patient with poorly-

controlled Crohn's disease, now growing VRE again





Plan


continue Zyvox, Zosyn and Diflucan - discussed with Dr. Andrews - per 

interventional Radiology abscess cannot be drained

## 2017-06-21 NOTE — PN
DATE: 06/21/2017



SUBJECTIVE:  Seen and examined at the bedside earlier today.  The patient was seen ambulating around 
the lucero with a steady gait.  The patient reports that he still has abdominal pain, but it is slightl
y improved, although he is still unhappy with his pain medication regimen.  He is moving his bowels, 
which is loose, but does not report any blood.  The abscess continues to be purulent drain, but no bl
ood.  He is eating.  He is tolerating his oral intake.



VITAL SIGNS:  Temperature is 99.7, pulse rate is 100, blood pressure is 103/67, respirations 20, 98 o
n room air.



LABORATORY DATA:  WBC is 11.2, H and H is 9.6 and 30.9, this is steady, platelets are 568. Sodium 136
, K 4.0, BUN 6, creatinine is 0.9.  LFTs are within normal limits.



PHYSICAL EXAMINATION:

HEENT:  Sclerae are anicteric.

NECK:  Supple.

CARDIAC:  S1, S2.

LUNGS:  Sounds are clear.

ABDOMEN:  With bowel sounds, soft.  The fistula is draining, but no blood noted.  Drainage site is mi
nimal.  He does have some tenderness around the area.  No rebound or guarding.  

EXTREMITIES:  No edema. 



ASSESSMENT:  This is a 21-year-old male with a past medical history of Crohn's disease with a cutaneo
us fistula and abscess noted on CT. He was evaluated by Dr. Jorge Luis Gregory, but there is no drainable co
llection.  The patient also with history of chronic pain.  



PLAN:  Continue IV antibiotics.  He is on Flagyl, Zyvox and Diflucan.  He is also on Zosyn and contin
ue GI prophylaxis.  We would also strongly recommend holding off any Remicade or biological therapy u
ntil this active infection is fully controlled.  Recommended the patient to follow up at [facility].  The 
patient states he has an appointment on the 23rd with the ID doctor and the GI doctor.  This is uncon
firmed.  I spoke to medical team earlier. The patient was seen and case discussed with Dr. Andrews.





__________________________________________

Priscila SHERWOOD







cc:



DD: 06/21/2017 15:03:43  [id number]

TT: 06/21/2017 15:33:02

Confirmation # [id number]

Dictation # [id number]

rn

## 2017-06-21 NOTE — CP.PCM.PN
Subjective





- Date & Time of Evaluation


Date of Evaluation: 06/21/17


Time of Evaluation: 06:35





- Subjective


Subjective: 





Surgery Progress Note for Dr. Charles





Patient seen and examined at bedside. No acute events overnight. Patient 

reports to have less drainage coming out of his abdominal fistula, but still 

complaining of pain. Patient has no other complaints.





Objective





- Vital Signs/Intake and Output


Vital Signs (last 24 hours): 


 











Temp Pulse Resp BP Pulse Ox


 


 98.4 F   91 H  20   116/62   100 


 


 06/20/17 06:00  06/20/17 16:00  06/20/17 16:00  06/20/17 16:00  06/20/17 16:00








Intake and Output: 


 











 06/21/17 06/21/17





 06:59 18:59


 


Intake Total 660 


 


Output Total 1600 


 


Balance -940 














- Medications


Medications: 


 Current Medications





Piperacillin Sod/Tazobactam Sod (Zosyn 3.375 In Ns 100ml)  100 mls @ 200 mls/hr 

IVPB Q6 [facility]


   PRN Reason: Protocol


   Stop: 06/24/17 18:01


   Last Admin: 06/21/17 05:47 Dose:  200 mls/hr


Fluconazole (Diflucan Iv 200 Mg/100 Ml Ns)  100 mls @ 100 mls/hr IVPB DAILY [facility]


   PRN Reason: Protocol


   Last Admin: 06/20/17 10:53 Dose:  100 mls/hr


Linezolid (Zyvox)  600 mg PO BID [facility]


   PRN Reason: Protocol


   Stop: 06/23/17 10:01


   Last Admin: 06/20/17 18:30 Dose:  600 mg


Metronidazole (Flagyl)  500 mg PO TID [facility]


   PRN Reason: Protocol


   Last Admin: 06/20/17 18:00 Dose:  500 mg


Morphine Sulfate (Morphine)  2 mg IVP Q4H PRN


   PRN Reason: Pain, severe (8-10)


   Last Admin: 06/21/17 05:46 Dose:  2 mg


Ondansetron HCl (Zofran Tab)  4 mg PO Q6 PRN


   PRN Reason: Nausea/Vomiting


Pantoprazole Sodium (Protonix Ec Tab)  40 mg PO DAILY [facility]


   Last Admin: 06/20/17 10:55 Dose:  40 mg


Vitamin A (Vitamin A & D Oint Ud Foilpak)  1 ea TOP Q4 PRN


   PRN Reason: Other











- Labs


Labs: 


 





 06/20/17 08:00 





 06/20/17 08:00 





 











PT  12.0 Seconds (9.9-11.8)  H  06/15/17  08:55    


 


INR  1.11  (0.93-1.08)  H  06/15/17  08:55    


 


APTT  24.7 Seconds (23.7-30.8)   06/15/17  08:55    














- Constitutional


Appears: Non-toxic, No Acute Distress





- Head Exam


Head Exam: ATRAUMATIC, NORMAL INSPECTION





- Eye Exam


Eye Exam: EOMI, Normal appearance





- ENT Exam


ENT Exam: Mucous Membranes Moist





- Neck Exam


Neck Exam: Normal Inspection





- Respiratory Exam


Respiratory Exam: absent: Respiratory Distress





- Cardiovascular Exam


Cardiovascular Exam: +S1, +S2





- GI/Abdominal Exam


GI & Abdominal Exam: Soft


Additional comments: 





Three abdominal fistula located at RLQ. Dressing intact, with minimal light 

yellow drainage. Diffused tenderness to the touch.








- Extremities Exam


Extremities Exam: Normal Capillary Refill





- Neurological Exam


Neurological Exam: Alert, Awake, Oriented x3





- Psychiatric Exam


Psychiatric exam: Normal Affect, Normal Mood





- Skin


Skin Exam: Normal Color, Warm





Assessment and Plan





- Assessment and Plan (Free Text)


Assessment: 





21 year old male with past medical history of Crohn's disease, vesicular 

cutaneous fistula, pain medication seeking behavior and iron deficiency anemia 

presents with right lower abdominal pain. Surgical consult was request to 

evaluate patient's right lower abdominal abscess found on CT scan.


Plan: 





Right lower abdominal abscess


-No surgical intervention indicated at this time


-Continue topical vitamin A&D as skin protectant


-Continue IV Antibiotic per ID


-Medical management per primary team


-IR recommends no drainable collection noticed


-D/w attending Dr. Charles

## 2017-06-21 NOTE — CP.PCM.PN
<Issac Romero - Last Filed: 06/21/17 10:59>





Subjective





- Date & Time of Evaluation


Date of Evaluation: 06/21/17


Time of Evaluation: 11:00





- Subjective


Subjective: 











Med progress note.








Attending: Dr. Oneill








Pt seen and examined at bedside. No acute distress, lying comfortably in bed. 

Continuously requesting more pain medication. Said morphine does not work for 

him, but noted he did request it again this morning 1030 am. No fevers, chills, 

vomiting.





Objective





- Vital Signs/Intake and Output


Vital Signs (last 24 hours): 


 











Temp Pulse Resp BP Pulse Ox


 


 99.7 F H  100 H  20   103/67   98 


 


 06/21/17 06:00  06/21/17 06:00  06/21/17 06:00  06/21/17 06:00  06/21/17 06:00








Intake and Output: 


 











 06/21/17 06/21/17





 06:59 18:59


 


Intake Total 660 


 


Output Total 1600 


 


Balance -940 














- Medications


Medications: 


 Current Medications





Piperacillin Sod/Tazobactam Sod (Zosyn 3.375 In Ns 100ml)  100 mls @ 200 mls/hr 

IVPB Q6 BARBARA


   PRN Reason: Protocol


   Stop: 06/24/17 18:01


   Last Admin: 06/21/17 05:47 Dose:  200 mls/hr


Fluconazole (Diflucan Iv 200 Mg/100 Ml Ns)  100 mls @ 100 mls/hr IVPB DAILY BARBARA


   PRN Reason: Protocol


   Last Admin: 06/21/17 10:34 Dose:  100 mls/hr


Linezolid (Zyvox)  600 mg PO BID BARBARA


   PRN Reason: Protocol


   Stop: 06/23/17 10:01


   Last Admin: 06/21/17 10:33 Dose:  600 mg


Metronidazole (Flagyl)  500 mg PO TID BARBARA


   PRN Reason: Protocol


   Last Admin: 06/21/17 10:33 Dose:  500 mg


Morphine Sulfate (Morphine)  2 mg IVP Q4H PRN


   PRN Reason: Pain, severe (8-10)


   Last Admin: 06/21/17 10:34 Dose:  2 mg


Ondansetron HCl (Zofran Tab)  4 mg PO Q6 PRN


   PRN Reason: Nausea/Vomiting


Pantoprazole Sodium (Protonix Ec Tab)  40 mg PO DAILY [facility]


   Last Admin: 06/21/17 10:34 Dose:  40 mg


Vitamin A (Vitamin A & D Oint Ud Foilpak)  1 ea TOP Q4 PRN


   PRN Reason: Other











- Labs


Labs: 


 





 06/21/17 09:00 





 06/21/17 09:00 





 











PT  12.0 Seconds (9.9-11.8)  H  06/15/17  08:55    


 


INR  1.11  (0.93-1.08)  H  06/15/17  08:55    


 


APTT  24.7 Seconds (23.7-30.8)   06/15/17  08:55    














- Constitutional


Appears: Non-toxic, No Acute Distress





- Head Exam


Head Exam: ATRAUMATIC, NORMAL INSPECTION, NORMOCEPHALIC





- Eye Exam


Eye Exam: EOMI





- ENT Exam


ENT Exam: Mucous Membranes Moist





- Neck Exam


Neck Exam: Full ROM, Normal Inspection





- Respiratory Exam


Respiratory Exam: NORMAL BREATHING PATTERN.  absent: Respiratory Distress





- Cardiovascular Exam


Cardiovascular Exam: +S1, +S2





- GI/Abdominal Exam


GI & Abdominal Exam: Tenderness


Additional comments: 





Mild tenderness RLQ, dressing changed, minimal drainage





- Extremities Exam


Extremities Exam: Full ROM, Normal Inspection





- Back Exam


Back Exam: NORMAL INSPECTION





- Neurological Exam


Neurological Exam: Alert, Awake, Oriented x3





- Psychiatric Exam


Psychiatric exam: Normal Affect, Normal Mood





- Skin


Skin Exam: Dry, Intact, Normal Color, Warm





Assessment and Plan





- Assessment and Plan (Free Text)


Assessment: 











This is a 22 yo male with past medical hx of Crohn's and drug seeking behavior 

presents with lower abdominal pain, fistula and abscess.  





1. Crohn's disease with cutaneous fistula and abscess on CT


- afebrile, no leukocytosis 


-ID consult. Dr. Mckee. recs appreciated. 


-continue PO zyvox, IV diflucan, PO flagyl, IV zosyn 


-surgery consult Cont IV abx no surgical intervention at this time 


-f/u IR regarding drainage -no drainage possible at this time 


-CT scan abdomen/pelvis shows 2.4 x 3.4 cm abscess in right abdominal wall


-cultures negative so far


-pt has picc in place


-pt not getting good blood return from picc>>>will have IR evaluate


-pending discharge planning; previous infusion center is not accepting him 


-f/u am labs





2. Chronic pain


-pt continuously requesting pain meds


-pt this morning saying morphine does not work for him; though requested 

morphine 10 am


-wants percocet


-will stop morphine and put percocet 2 q 6





3. hypokalemia


-repleted with K


-will f/u and continue to monitor 





4. GI/DVT ppx


-protonix


-SCDs





Dispo: will f/u with  regarding infusion of abx.








Case and plan was seen, reviewed and discussed in detail with Dr Oneill. 





<Marisol Oneill - Last Filed: 06/22/17 06:43>





Objective





- Vital Signs/Intake and Output


Vital Signs (last 24 hours): 


 











Temp Pulse Resp BP Pulse Ox


 


 99.7 F H  100 H  20   103/67   98 


 


 06/21/17 06:00  06/21/17 06:00  06/21/17 06:00  06/21/17 06:00  06/21/17 06:00











- Labs


Labs: 


 





 06/21/17 09:00 





 06/21/17 09:00 





 











PT  12.0 Seconds (9.9-11.8)  H  06/15/17  08:55    


 


INR  1.11  (0.93-1.08)  H  06/15/17  08:55    


 


APTT  24.7 Seconds (23.7-30.8)   06/15/17  08:55    














Attending/Attestation





- Attestation


I have personally seen and examined this patient.: Yes


I have fully participated in the care of the patient.: Yes


I have reviewed all pertinent clinical information, including history, physical 

exam and plan: Yes


Notes (Text): 





06/21/17


21 year old male with past medical history of Crohn's disease, chronic 

abdominal abscess and vesicular cutaneous fistula, opiate dependency on chronic 

opiates who presented with complaint of abdominal pain and increased discharge.

  He was recently discharged from [facility] after abscess drainage and also 

reported recently finishing antibiotics.  CT scan was reviewed as above; no 

drainable collection as per IR.  He is being followed by GI, surgery and ID.  

Continue with antibiotics as per ID.  Case was discussed with  

regarding why his infusion company has been denied.





He has history of opioid dependency on chronic opioids.  He was counselled on 

risks of narcotic abuse.  He shows more concern regarding his pain regimen then 

he does regarding his overall medication condition.  He is constantly going 

back at forth between requesting morphine and percoce.  He will need to follow 

up with pain management as outpatient.





Upon discharge patient will be referred back to colorectal surgery at [facility].





I was informed by nurse later in the afternoon patient was by the elevator 

about to leave the hospital.


House doctor was notified who removed picc line prior to him leaving due to 

noncompliance and risk of misusing picc (see  note).


Patient signed out against medical advise.





Marisol Oneill MD


Hospitalist.

## 2017-06-22 NOTE — CP.PCM.DIS
<Issac Romero - Last Filed: 06/22/17 15:09>





Provider





- Provider


Date of Admission: 


06/15/17 14:29





Attending physician: 


Marisol Oneill MD





Consults: 











NHAN Segura- Dr. Charles





Time Spent in preparation of Discharge (in minutes): 45





Hospital Course





- Lab Results


Lab Results: 


 Micro Results





06/18/17 01:15   Abdomen   Gram Stain - Final


06/18/17 01:15   Abdomen   Wound Culture - Final


                            Klebsiella Pneumoniae Ssp Pneu





 Most Recent Lab Values











WBC  11.2 10^3/ul (4.5-11.0)  H  06/21/17  09:00    


 


RBC  4.17 10^6/uL (3.5-6.1)   06/21/17  09:00    


 


Hgb  9.6 gm/dL (14.0-18.0)  L  06/21/17  09:00    


 


Hct  30.9 % (42.0-52.0)  L  06/21/17  09:00    


 


MCV  74.1 fL (80.0-105.0)  L  06/21/17  09:00    


 


MCH  23.0 pg (25.0-35.0)  L  06/21/17  09:00    


 


MCHC  31.1 g/dl (31.0-37.0)   06/21/17  09:00    


 


RDW  20.7 % (11.5-14.5)  H  06/21/17  09:00    


 


Plt Count  568 10^3/uL (120.0-450.0)  H  06/21/17  09:00    


 


MPV  8.0 fl (7.0-11.0)   06/21/17  09:00    


 


Gran %  78.4 % (50.0-68.0)  H  06/21/17  09:00    


 


Lymph % (Auto)  12.7 % (22.0-35.0)  L  06/21/17  09:00    


 


Mono % (Auto)  7.8 % (1.0-6.0)  H  06/21/17  09:00    


 


Eos % (Auto)  1.0 % (1.5-5.0)  L  06/21/17  09:00    


 


Baso % (Auto)  0.1 % (0.0-3.0)   06/21/17  09:00    


 


Gran #  8.80  (1.4-6.5)  H  06/21/17  09:00    


 


Lymph #  1.4  (1.2-3.4)   06/21/17  09:00    


 


Mono #  0.9  (0.1-0.6)  H  06/21/17  09:00    


 


Eos #  0.1  (0.0-0.7)   06/21/17  09:00    


 


Baso #  0.01 K/mm3 (0.0-2.0)   06/21/17  09:00    


 


PT  12.0 Seconds (9.9-11.8)  H  06/15/17  08:55    


 


INR  1.11  (0.93-1.08)  H  06/15/17  08:55    


 


APTT  24.7 Seconds (23.7-30.8)   06/15/17  08:55    


 


Sodium  136 mmol/L (132-148)   06/21/17  09:00    


 


Potassium  4.0 mmol/L (3.6-5.0)   06/21/17  09:00    


 


Chloride  98 mmol/L ()   06/21/17  09:00    


 


Carbon Dioxide  30 mmol/L (21-33)   06/21/17  09:00    


 


Anion Gap  12  (10-20)   06/21/17  09:00    


 


BUN  6 mg/dL (7-21)  L  06/21/17  09:00    


 


Creatinine  0.9 mg/dL (0.5-1.4)   06/21/17  09:00    


 


Est GFR ( Amer)  > 60   06/21/17  09:00    


 


Est GFR (Non-Af Amer)  > 60   06/21/17  09:00    


 


Random Glucose  106 mg/dL ()   06/21/17  09:00    


 


Calcium  9.2 mg/dL (8.4-10.5)   06/21/17  09:00    


 


Phosphorus  4.0 mg/dL (2.5-4.5)   06/21/17  09:00    


 


Magnesium  1.9 mg/dL (1.7-2.2)   06/21/17  09:00    


 


Total Bilirubin  0.4 mg/dL (0.2-1.3)   06/21/17  09:00    


 


AST  40 U/L (15-59)   06/21/17  09:00    


 


ALT  23 U/L (7-56)   06/21/17  09:00    


 


Alkaline Phosphatase  83 U/L ()   06/21/17  09:00    


 


Total Protein  7.9 g/dL (5.8-8.3)   06/21/17  09:00    


 


Albumin  3.8 g/dL (3.0-4.8)   06/21/17  09:00    


 


Globulin  4.0 gm/dL  06/21/17  09:00    


 


Albumin/Globulin Ratio  1.0  (1.1-1.8)  L  06/21/17  09:00    


 


Lipase  86 U/L ()   06/15/17  08:55    














- Hospital Course


Hospital Course: 

















Admit date- 6/15/17





AMA date- 6/21/17








Consults


GI


Surgery


ID








Discharge diagnoses








1. Crohn's disease


2. Chronic pain











Procedures- none








HPI: see h/p








Labs- see lab data section








Hospital course





This is a 20 yo male with past medical hx of Crohn's and drug seeking behavior 

presents with lower abdominal pain, fistula and abscess.  





1. Crohn's disease with cutaneous fistula and abscess on CT


- afebrile, no leukocytosis 


-ID consult. Dr. Mckee. recs appreciated. 


-PO zyvox, IV diflucan, PO flagyl, IV zosyn 


-surgery consult Cont IV abx no surgical intervention at this time 


-f/u IR regarding drainage -no drainage possible at this time 


-CT scan abdomen/pelvis shows 2.4 x 3.4 cm abscess in right abdominal wall


-cultures negative 


-pt has picc in place from outside infusion center


-pt not getting good blood return from picc>>>will have IR evaluate, given 

cathflow


-pending discharge planning; previous infusion center is not accepting him 





2. Chronic pain


-pt continuously requesting pain meds


-we managed him between percocet and morphine


-pt not satisfied with pain regimen and continuously asking adjustments, more 

doses, and stronger pain meds





3. hypokalemia


-repleted with K


-will f/u and continue to monitor 





4. GI/DVT ppx


-protonix


-SCDs


-pt left AMA and picc line removed afternoon of June 21st, 2017























DC meds





N/A. Pt left against medical advice.





DC instructions





N/A. Pt left against medical advice.





- Date & Time of H&P


Date of H&P: 06/15/17


Time of H&P: 14:49





Discharge Exam





- Head Exam


Head Exam: NORMAL INSPECTION





Discharge Plan





- Discharge Medications


Prescriptions: 


guaiFENesin/Codeine [Robitussin w/Codeine] 5 ml PO Q4H #10 udc





- Follow Up Plan


Condition: FAIR


Disposition: AGAINST MEDICAL ADVICE


Referrals: 


[facility], [Non-Staff] - 





<Marisol Oneill - Last Filed: 06/22/17 18:10>





Provider





- Provider


Date of Admission: 


06/15/17 14:29





Attending physician: 


Marisol Oneill MD








Hospital Course





- Lab Results


Lab Results: 


 Micro Results





06/18/17 01:15   Abdomen   Gram Stain - Final


06/18/17 01:15   Abdomen   Wound Culture - Final


                            Klebsiella Pneumoniae Ssp Pneu





 Most Recent Lab Values











WBC  11.2 10^3/ul (4.5-11.0)  H  06/21/17  09:00    


 


RBC  4.17 10^6/uL (3.5-6.1)   06/21/17  09:00    


 


Hgb  9.6 gm/dL (14.0-18.0)  L  06/21/17  09:00    


 


Hct  30.9 % (42.0-52.0)  L  06/21/17  09:00    


 


MCV  74.1 fL (80.0-105.0)  L  06/21/17  09:00    


 


MCH  23.0 pg (25.0-35.0)  L  06/21/17  09:00    


 


MCHC  31.1 g/dl (31.0-37.0)   06/21/17  09:00    


 


RDW  20.7 % (11.5-14.5)  H  06/21/17  09:00    


 


Plt Count  568 10^3/uL (120.0-450.0)  H  06/21/17  09:00    


 


MPV  8.0 fl (7.0-11.0)   06/21/17  09:00    


 


Gran %  78.4 % (50.0-68.0)  H  06/21/17  09:00    


 


Lymph % (Auto)  12.7 % (22.0-35.0)  L  06/21/17  09:00    


 


Mono % (Auto)  7.8 % (1.0-6.0)  H  06/21/17  09:00    


 


Eos % (Auto)  1.0 % (1.5-5.0)  L  06/21/17  09:00    


 


Baso % (Auto)  0.1 % (0.0-3.0)   06/21/17  09:00    


 


Gran #  8.80  (1.4-6.5)  H  06/21/17  09:00    


 


Lymph #  1.4  (1.2-3.4)   06/21/17  09:00    


 


Mono #  0.9  (0.1-0.6)  H  06/21/17  09:00    


 


Eos #  0.1  (0.0-0.7)   06/21/17  09:00    


 


Baso #  0.01 K/mm3 (0.0-2.0)   06/21/17  09:00    


 


PT  12.0 Seconds (9.9-11.8)  H  06/15/17  08:55    


 


INR  1.11  (0.93-1.08)  H  06/15/17  08:55    


 


APTT  24.7 Seconds (23.7-30.8)   06/15/17  08:55    


 


Sodium  136 mmol/L (132-148)   06/21/17  09:00    


 


Potassium  4.0 mmol/L (3.6-5.0)   06/21/17  09:00    


 


Chloride  98 mmol/L ()   06/21/17  09:00    


 


Carbon Dioxide  30 mmol/L (21-33)   06/21/17  09:00    


 


Anion Gap  12  (10-20)   06/21/17  09:00    


 


BUN  6 mg/dL (7-21)  L  06/21/17  09:00    


 


Creatinine  0.9 mg/dL (0.5-1.4)   06/21/17  09:00    


 


Est GFR ( Amer)  > 60   06/21/17  09:00    


 


Est GFR (Non-Af Amer)  > 60   06/21/17  09:00    


 


Random Glucose  106 mg/dL ()   06/21/17  09:00    


 


Calcium  9.2 mg/dL (8.4-10.5)   06/21/17  09:00    


 


Phosphorus  4.0 mg/dL (2.5-4.5)   06/21/17  09:00    


 


Magnesium  1.9 mg/dL (1.7-2.2)   06/21/17  09:00    


 


Total Bilirubin  0.4 mg/dL (0.2-1.3)   06/21/17  09:00    


 


AST  40 U/L (15-59)   06/21/17  09:00    


 


ALT  23 U/L (7-56)   06/21/17  09:00    


 


Alkaline Phosphatase  83 U/L ()   06/21/17  09:00    


 


Total Protein  7.9 g/dL (5.8-8.3)   06/21/17  09:00    


 


Albumin  3.8 g/dL (3.0-4.8)   06/21/17  09:00    


 


Globulin  4.0 gm/dL  06/21/17  09:00    


 


Albumin/Globulin Ratio  1.0  (1.1-1.8)  L  06/21/17  09:00    


 


Lipase  86 U/L ()   06/15/17  08:55    














Attending/Attestation





- Attestation


I have personally seen and examined this patient.: Yes


I have fully participated in the care of the patient.: Yes


I have reviewed all pertinent clinical information, including history, physical 

exam and plan: Yes


Notes (Text): 





06/22/17 18:08


21 year old male with past medical history of Crohn's disease, chronic 

abdominal abscess and vesicular cutaneous fistula, opiate dependency on chronic 

opiates who presented with complaint of abdominal pain and increased discharge.

  He was recently discharged from [facility] after abscess drainage and also 

reported recently finishing antibiotics.  CT scan was reviewed as above; no 

drainable collection as per IR.  He was being followed by GI, surgery and ID 

and on antibiotics as per ID.  Case was discussed with  regarding 

why his infusion company has been denied.





He has history of opioid dependency on chronic opioids.  He was counselled on 

risks of narcotic abuse.  He showed more concern regarding his pain regimen 

then he does regarding his overall medication condition.  He was constantly 

going back at forth between requesting morphine and percocet.  





He signed out AMA on 6/21/17.  Picc line was removed prior to him leaving due 

to noncompliance and risk of misusing picc (see  note).





Marisol Oneill MD


Hospitalist.

## 2017-07-09 ENCOUNTER — HOSPITAL ENCOUNTER (EMERGENCY)
Dept: HOSPITAL 42 - ED | Age: 22
Discharge: HOME | End: 2017-07-09
Payer: MEDICAID

## 2017-07-09 VITALS — SYSTOLIC BLOOD PRESSURE: 115 MMHG | HEART RATE: 82 BPM | DIASTOLIC BLOOD PRESSURE: 70 MMHG

## 2017-07-09 VITALS — TEMPERATURE: 98.3 F | OXYGEN SATURATION: 99 % | RESPIRATION RATE: 18 BRPM

## 2017-07-09 VITALS — BODY MASS INDEX: 17.1 KG/M2

## 2017-07-09 DIAGNOSIS — R05: Primary | ICD-10-CM

## 2017-07-09 NOTE — RAD
HISTORY:

cough x 2 weeks  



COMPARISON:

04/28/2017 



FINDINGS:



LUNGS:

No active pulmonary disease.



PLEURA:

No significant pleural effusion identified, no pneumothorax apparent.



CARDIOVASCULAR:

Normal.



OSSEOUS STRUCTURES:

No significant abnormalities.



VISUALIZED UPPER ABDOMEN:

Normal.



OTHER FINDINGS:

None.



IMPRESSION:

No active disease.

## 2017-07-09 NOTE — ED PDOC
Arrival/HPI





- General


Chief Complaint: Cough, Cold, Congestion


Time Seen by Provider: 07/09/17 14:39


Historian: Patient





- History of Present Illness


Narrative History of Present Illness (Text): 





07/09/17 14:53


22 y/o male, pmh including crohn's disease, allergic to nsaid and benadryl, c/o 

coughing x 2 weeks.  Pt. stated that he has been dry coughing x 2 weeks, no 

chest pain or shortness of breath, no palpitation, no leg or calf pain history, 

no dizziness, no night sweat, cough been causing him to have throat pain, no 

fever or chills, no other medical or psychological complaints. 





Past Medical History





- Provider Review


Nursing Documentation Reviewed: Yes





- Infectious Disease


Hx of Infectious Diseases: None





- Tetanus Immunization


Tetanus Immunization: Unknown





- Reproductive


Currently Pregnant: No





- Cardiac


Hx Cardiac Disorders: No





- Pulmonary


Hx Respiratory Disorders: Yes


Hx Pneumonia: Yes





- Neurological


Hx Neurological Disorder: Yes


Hx Dizziness: Yes (SYNCOPE)





- HEENT


Hx HEENT Disorder: No





- Renal


Hx Renal Disorder: No





- Endocrine/Metabolic


Hx Endocrine Disorders: No





- Hematological/Oncological


Hx Blood Disorders: Yes


Hx Anemia: Yes





- Integumentary


Hx Dermatological Disorder: Yes (RIGHT LOWER BACK FISTULA.DID I&D MAY 30 17 

[facility],)


Other/Comment: 6-15-17 3 RIGHT ABDOMINAL WALL ABSCESS. WITH LIGHT GREEN 

DRAINAGE.MEDIUM AMT.





- Musculoskeletal/Rheumatological


Hx Musculoskeletal Disorders: No


Hx Falls: No





- Gastrointestinal


Hx Gastrointestinal Disorders: Yes


Hx Crohn's Disease: Yes





- Genitourinary/Gynecological


Hx Genitourinary Disorders: Yes


Hx Urinary Tract Infection: Yes





- Psychiatric


Hx Psychophysiologic Disorder: Yes


Hx Anxiety: Yes


Hx Substance Use: Yes (H/O)





- Surgical History


Other/Comment: Colon resection





- Anesthesia


Hx Anesthesia: Yes


Hx Anesthesia Reactions: No


Hx Malignant Hyperthermia: No





- Suicidal Assessment


Feels Threatened In Home Enviroment: No





Family/Social History





- Physician Review


Nursing Documentation Reviewed: Yes


Family/Social History: Unknown Family HX


Smoking Status: Current Some Days Smoker


Hx Alcohol Use: No


Hx Substance Use: Yes (H/O)





Allergies/Home Meds


Allergies/Adverse Reactions: 


Allergies





diphenhydramine [From Benadryl] Allergy (Verified 07/09/17 14:50)


 RASH


FISH Allergy (Verified 07/09/17 14:50)


 RASH


ketorolac [From Toradol] Allergy (Verified 07/09/17 14:50)


 RASH


shellfish derived Adverse Reaction (Verified 07/09/17 14:50)


 ANGIOEDEMA











Review of Systems





- Review of Systems


Constitutional: absent: Fatigue, Fevers


Eyes: absent: Vision Changes


ENT: Sore Throat.  absent: Hearing Changes


Respiratory: Cough.  absent: SOB


Cardiovascular: absent: Chest Pain


Gastrointestinal: absent: Abdominal Pain, Diarrhea, Nausea, Vomiting


Musculoskeletal: absent: Arthralgias, Myalgias


Skin: absent: Rash, Pruritis, Skin Lesions


Neurological: absent: Headache, Dizziness, Gait Changes





Physical Exam


Vital Signs Reviewed: Yes


Vital Signs











  Temp Pulse Resp BP Pulse Ox


 


 07/09/17 15:15   82  18  115/70  99


 


 07/09/17 14:39  98.3 F  87  18  110/60  99











Temperature: Afebrile


Blood Pressure: Normal


Pulse: Regular


Respiratory Rate: Normal


Appearance: Positive for: Well-Appearing, Non-Toxic, Comfortable


Pain Distress: None


Mental Status: Positive for: Alert and Oriented X 3





- Systems Exam


Head: Present: Atraumatic, Normocephalic


Pupils: Present: PERRL


Extroacular Muscles: Present: EOMI


Conjunctiva: Present: Normal


Mouth: Present: Moist Mucous Membranes


Pharnyx: No: ERYTHEMA, EXUDATE, TONSILS ENLARGED, Peritonsilar Swelling, Uvular 

Deviation, Muffled/Hoarse Voice, Strider, Soft Palate/Uvular Edema


Neck: Present: Normal Range of Motion, Trachea Midline.  No: Lymphadenopathy


Respiratory/Chest: Present: Clear to Auscultation, Good Air Exchange.  No: 

Respiratory Distress, Accessory Muscle Use, Wheezes, Decreased Breath Sounds, 

Rales, Retracting, Rhonchi, Tachypneic, Tender to Palpation, Other


Cardiovascular: Present: Regular Rate and Rhythm, Normal S1, S2, Other (no 

pedal edema).  No: Murmurs


Abdomen: Present: Normal Bowel Sounds.  No: Tenderness, Distention, Peritoneal 

Signs


Back: Present: Normal Inspection


Upper Extremity: Present: Normal Inspection.  No: Cyanosis, Edema


Lower Extremity: Present: Normal Inspection.  No: Edema


Neurological: Present: GCS=15, Speech Normal, Motor Func Grossly Intact, Gait 

Normal, Memory Normal


Skin: Present: Warm, Dry, Normal Color.  No: Rashes


Psychiatric: Present: Alert, Oriented x 3, Normal Insight, Normal Concentration





Medical Decision Making


ED Course and Treatment: 





07/09/17 14:54


-chest xray


-pt. stated that he is chronically on the 2 tablets of percocet 5/325 for his 

crohn's disease, doesn't have the medication with him and due for his dosage, 

percocet 5/325 2 tablets po ordered.





07/09/17 15:30


-Chest xray show no active disease, no indication of antibiotic.


-Pt. request coughing medication with codeine or hydrocodone, I checked the 

[facility] and the patient which he has received 3 prescriptions of controlled 

substances including coughing medication in 07/2017, 4 prescriptions of 

controlled substances including coughing medication in 06/2017.  


-Discharge home with tylenol for pain as needed, salt water gargling, tessalon, 

stay hydrated, bed rest, follow up with your own pmd within 2 days, return to 

the ER for any new or worsening signs or symptoms. 





- RAD Interpretation


Radiology Orders: 








07/09/17 14:59


CHEST PORTABLE [RAD] Stat 








no active disease


: Radiologist





- Medication Orders


Current Medication Orders: 











Discontinued Medications





Oxycodone/Acetaminophen (Percocet 5/325 Mg Tab)  2 tab PO STAT STA


   Stop: 07/09/17 15:00


   Last Admin: 07/09/17 15:16  Dose: 2 tab











- PA / NP / Resident Statement


MD/ has reviewed & agrees with the documentation as recorded.





Disposition/Present on Arrival





- Present on Arrival


Any Indicators Present on Arrival: No


History of DVT/PE: No


History of Uncontrolled Diabetes: No


Urinary Catheter: No


History of Decub. Ulcer: No


History Surgical Site Infection Following: None





- Disposition


Have Diagnosis and Disposition been Completed?: Yes


Diagnosis: 


 Cough





Disposition: HOME/ ROUTINE


Disposition Time: 14:54


Patient Plan: Discharge


Patient Problems: 


 Current Active Problems











Problem Status Onset


 


Cough Acute  











Condition: GOOD


Additional Instructions: 


Discharge home with tylenol for pain as needed, salt water gargling, tessalon, 

stay hydrated, bed rest, follow up with your own pmd within 2 days, return to 

the ER for any new or worsening signs or symptoms. 


Prescriptions: 


Benzonatate [Tessalon Perles] 100 mg PO TID PRN #21 sgl


 PRN Reason: Other

## 2017-07-10 ENCOUNTER — HOSPITAL ENCOUNTER (EMERGENCY)
Dept: HOSPITAL 42 - ED | Age: 22
Discharge: HOME | End: 2017-07-10
Payer: MEDICAID

## 2017-07-10 VITALS
TEMPERATURE: 98.1 F | HEART RATE: 99 BPM | SYSTOLIC BLOOD PRESSURE: 113 MMHG | DIASTOLIC BLOOD PRESSURE: 55 MMHG | RESPIRATION RATE: 18 BRPM | OXYGEN SATURATION: 99 %

## 2017-07-10 VITALS — BODY MASS INDEX: 17.1 KG/M2

## 2017-07-10 DIAGNOSIS — Z51.89: Primary | ICD-10-CM

## 2017-07-10 NOTE — ED PDOC
Arrival/HPI





- General


Chief Complaint: Wound Check


Time Seen by Provider: 07/10/17 22:27


Historian: Patient





- History of Present Illness


Narrative History of Present Illness (Text): 





07/10/17 22:36


This 21 year old male, whose past medical history includes Crohn's disease, 

vesicular cutaneous fistula and chronic abdominal abscess, presents to the 

emergency department complaining of chronic wound pain.  Patient stated he only 

needs pain control.  He has an appointment to see his doctor tomorrow.  Denies 

fever, chill,s, sob, cp, rectal bleeding, or abnormal gait.


Time/Duration: Other (chronic painful wound.)


Context: Home





Past Medical History





- Provider Review


Nursing Documentation Reviewed: Yes





- Infectious Disease


Hx of Infectious Diseases: None





- Tetanus Immunization


Tetanus Immunization: Unknown





- Reproductive


Currently Pregnant: No





- Cardiac


Hx Cardiac Disorders: No





- Pulmonary


Hx Respiratory Disorders: Yes


Hx Pneumonia: Yes





- Neurological


Hx Neurological Disorder: Yes


Hx Dizziness: Yes (SYNCOPE)





- HEENT


Hx HEENT Disorder: No





- Renal


Hx Renal Disorder: No





- Endocrine/Metabolic


Hx Endocrine Disorders: No





- Hematological/Oncological


Hx Blood Disorders: Yes


Hx Anemia: Yes





- Integumentary


Hx Dermatological Disorder: Yes (RIGHT LOWER BACK FISTULA.DID I&D MAY 30 17 

[facility],)


Other/Comment: 6-15-17 3 RIGHT ABDOMINAL WALL ABSCESS. WITH LIGHT GREEN 

DRAINAGE.MEDIUM AMT.





- Musculoskeletal/Rheumatological


Hx Musculoskeletal Disorders: No


Hx Falls: No





- Gastrointestinal


Hx Gastrointestinal Disorders: Yes


Hx Crohn's Disease: Yes





- Genitourinary/Gynecological


Hx Genitourinary Disorders: Yes


Hx Urinary Tract Infection: Yes





- Psychiatric


Hx Psychophysiologic Disorder: Yes


Hx Anxiety: Yes


Hx Substance Use: Yes (H/O)





- Surgical History


Other/Comment: Colon resection





- Anesthesia


Hx Anesthesia: Yes


Hx Anesthesia Reactions: No


Hx Malignant Hyperthermia: No





- Suicidal Assessment


Feels Threatened In Home Enviroment: No





Family/Social History





- Physician Review


Nursing Documentation Reviewed: Yes


Family/Social History: No Known Family HX


Smoking Status: Current Some Days Smoker


Hx Alcohol Use: No


Hx Substance Use: Yes (H/O)





Allergies/Home Meds


Allergies/Adverse Reactions: 


Allergies





diphenhydramine [From Benadryl] Allergy (Verified 07/10/17 21:59)


 RASH


FISH Allergy (Verified 07/10/17 21:59)


 RASH


ketorolac [From Toradol] Allergy (Verified 07/10/17 21:59)


 RASH


shellfish derived Adverse Reaction (Verified 07/10/17 21:59)


 ANGIOEDEMA








Home Medications: 


 Home Meds











 Medication  Instructions  Recorded  Confirmed


 


No Known Home Med  07/10/17 07/10/17














Review of Systems





- Review of Systems


Constitutional: Normal.  absent: Fatigue, Weight Change, Fevers


Eyes: Normal


ENT: Normal


Respiratory: Normal.  absent: SOB, Cough


Cardiovascular: Normal.  absent: Chest Pain


Gastrointestinal: Normal.  absent: Abdominal Pain, Stool Changes, Constipation, 

Diarrhea, Nausea, Vomiting


Genitourinary Male: Normal


Musculoskeletal: Normal


Skin: Other (painful chronic abdominal wound.)


Neurological: Normal


Endocrine: Normal


Hemo/Lymphatic: Normal


Psychiatric: Normal





Physical Exam





Vital Signs











  Temp Pulse Resp BP Pulse Ox


 


 07/10/17 22:03  98.1 F  99 H  18  113/55 L  99











Temperature: Afebrile


Blood Pressure: Normal


Pulse: Regular


Respiratory Rate: Normal


Appearance: Positive for: Well-Appearing, Non-Toxic, Comfortable


Pain Distress: None


Mental Status: Positive for: Alert and Oriented X 3





- Systems Exam


Head: Present: Atraumatic, Normocephalic


Pupils: Present: PERRL


Extroacular Muscles: Present: EOMI


Conjunctiva: Present: Normal


Mouth: Present: Moist Mucous Membranes


Neck: Present: Normal Range of Motion


Respiratory/Chest: Present: Clear to Auscultation, Good Air Exchange.  No: 

Respiratory Distress, Accessory Muscle Use


Cardiovascular: Present: Regular Rate and Rhythm, Normal S1, S2.  No: Murmurs


Abdomen: Present: Tenderness (mild tenderness over abdominal wound, with trace 

of clear discharge.  No erythema, or swelling), Normal Bowel Sounds, Other.  No

: Distention, Peritoneal Signs, Rebound, Guarding


Back: Present: Normal Inspection


Upper Extremity: Present: Normal Inspection.  No: Cyanosis, Edema


Lower Extremity: Present: Normal Inspection.  No: Edema


Neurological: Present: GCS=15, CN II-XII Intact, Speech Normal


Skin: Present: Warm, Dry, Normal Color.  No: Rashes


Psychiatric: Present: Alert, Oriented x 3, Normal Insight, Normal Concentration





Medical Decision Making


ED Course and Treatment: 





07/10/17 22:45


Patient is resting comfortably, in no distress, abdomen is soft, no rebound or 

guarding, and is tolerating PO. Patient has no signs or symptoms to suggest 

surgical pathology. Patient was advised to follow up without fail with 

physician or to return to the ER for reevaluation in 1-2 days.


Re-evaluation Time: 22:45


Reassessment Condition: Re-examined, Improved





Disposition/Present on Arrival





- Present on Arrival


Any Indicators Present on Arrival: No


History of DVT/PE: No


History of Uncontrolled Diabetes: No


Urinary Catheter: No


History of Decub. Ulcer: No


History Surgical Site Infection Following: None





- Disposition


Have Diagnosis and Disposition been Completed?: Yes


Diagnosis: 


 Encounter for wound re-check





Disposition: HOME/ ROUTINE


Disposition Time: 22:46


Patient Plan: Discharge


Condition: GOOD


Discharge Instructions (ExitCare):  Chronic Wound Care (ED)


Additional Instructions: 


Make sure to see your doctor tomorrow as scheduled by you.  Return to emergency 

if symptoms worsen, fever.


Referrals: 


Gary Colby MD [Primary Care Provider] - Follow up with primary

## 2017-07-25 ENCOUNTER — HOSPITAL ENCOUNTER (EMERGENCY)
Dept: HOSPITAL 14 - H.ER | Age: 22
Discharge: HOME | End: 2017-07-25
Payer: MEDICAID

## 2017-07-25 VITALS
TEMPERATURE: 98 F | HEART RATE: 110 BPM | OXYGEN SATURATION: 99 % | DIASTOLIC BLOOD PRESSURE: 72 MMHG | SYSTOLIC BLOOD PRESSURE: 128 MMHG | RESPIRATION RATE: 18 BRPM

## 2017-07-25 VITALS — BODY MASS INDEX: 17.1 KG/M2

## 2017-07-25 DIAGNOSIS — K50.90: ICD-10-CM

## 2017-07-25 DIAGNOSIS — F41.9: ICD-10-CM

## 2017-07-25 DIAGNOSIS — G89.29: ICD-10-CM

## 2017-07-25 DIAGNOSIS — R10.9: Primary | ICD-10-CM

## 2017-07-25 DIAGNOSIS — R05: ICD-10-CM

## 2017-07-25 NOTE — ED PDOC
HPI: CCC, URI, Sore Throat


Time Seen by Provider: 07/25/17 13:35


Chief Complaint (Nursing): Abdominal Pain


Chief Complaint (Provider): Cough 


History Per: Patient


History/Exam Limitations: no limitations


Onset/Duration Of Symptoms: Days


Current Symptoms Are (Timing): Still Present


Location Of Pain: None


Additional Complaint(s): 





Pt reports cough for 3 days. Pt states he has also been having abdominal pain 

and has no more percocet. Pt states this abdominal pain is not new, or worse 

than normal. PT request cough medications and percocet in ER. 





Pt has multiple prescriptions over the last few months for percocet, morphine 

and cough medications with codeine. 





Past Medical History


Reviewed: Historical Data, Nursing Documentation, Vital Signs


Vital Signs: 





 Last Vital Signs











Temp  98 F   07/25/17 13:35


 


Pulse  110 H  07/25/17 13:35


 


Resp  18   07/25/17 13:35


 


BP  128/72   07/25/17 13:35


 


Pulse Ox  99   07/25/17 13:35














- Medical History


PMH: Anemia, Anxiety, Crohn's Disease, Pneumonia


   Denies: Chronic Kidney Disease, TIA





- Family History


Family History: States: Unknown Family Hx





- Living Arrangements


Living Arrangements: With Family





- Social History


Current smoker - smoking cessation education provided: No


Alcohol: None


Drugs: Denies





- Immunization History


Hx Tetanus Toxoid Vaccination: No


Hx Influenza Vaccination: Yes


Hx Pneumococcal Vaccination: No





- Home Medications


Home Medications: 


 Ambulatory Orders











 Medication  Instructions  Recorded


 


Acetaminophen [Tylenol 325mg tab] 650 mg PO Q6H PRN #20 tab 07/10/17


 


Dextromethorphan Polistirex 30 mg PO Q12H PRN #50 ml 07/25/17





[Delsym]  














- Allergies


Allergies/Adverse Reactions: 


 Allergies











Allergy/AdvReac Type Severity Reaction Status Date / Time


 


diphenhydramine Allergy  RASH Verified 07/10/17 21:59





[From Benadryl]     


 


FISH Allergy  RASH Verified 07/10/17 21:59


 


ketorolac [From Toradol] Allergy  RASH Verified 07/10/17 21:59


 


shellfish derived AdvReac  ANGIOEDEMA Verified 07/10/17 21:59














Review of Systems


ROS Statement: Except As Marked, All Systems Reviewed And Found Negative


Respiratory: Positive for: Cough


Gastrointestinal: Positive for: Abdominal Pain





Physical Exam





- Reviewed


Nursing Documentation Reviewed: Yes


Vital Signs Reviewed: Yes





- Physical Exam


Appears: Positive for: Well, Non-toxic, No Acute Distress


Head Exam: Positive for: ATRAUMATIC, NORMAL INSPECTION, NORMOCEPHALIC


Skin: Positive for: Normal Color, Warm, DRY


Eye Exam: Positive for: Normal appearance


ENT: Positive for: Normal ENT Inspection


Neck: Positive for: Normal, Painless ROM


Cardiovascular/Chest: Positive for: Regular Rate, Rhythm


Respiratory: Positive for: Normal Breath Sounds.  Negative for: Accessory 

Muscle Use


Gastrointestinal/Abdominal: Positive for: Normal Exam, Bowel Sounds, Soft, 

Other ((+) fistula inferior to umbilicus ).  Negative for: Tenderness


Back: Positive for: Normal Inspection


Extremity: Positive for: Normal ROM


Neurologic/Psych: Positive for: Alert, Oriented





- ECG


O2 Sat by Pulse Oximetry: 99





Medical Decision Making


Medical Decision Making: 


Pt states umbilical fistua is much improved since beginning remicade a few 

months ago. GI and surgeon aware. 





#30 percocet given on 7/19/17 and promethazine with codeine given on 7/11/17. 





On discharge patient requests cough medications with codeine. 





Disposition





- Clinical Impression


Clinical Impression: 


 Chronic abdominal pain, Cough








- Patient ED Disposition


Is Patient to be Admitted: No


Counseled Patient/Family Regarding: Diagnosis, Need For Followup





- Disposition


Referrals: 


[facility] [Outside]


Disposition: Routine/Home


Disposition Time: 15:37


Condition: GOOD


Prescriptions: 


Dextromethorphan Polistirex [Delsym] 30 mg PO Q12H PRN #50 ml


 PRN Reason: Cough


Instructions:  Acute Cough (ED)

## 2017-08-06 ENCOUNTER — HOSPITAL ENCOUNTER (INPATIENT)
Dept: HOSPITAL 42 - ED | Age: 22
LOS: 3 days | Discharge: HOME | DRG: 552 | End: 2017-08-09
Attending: INTERNAL MEDICINE | Admitting: INTERNAL MEDICINE
Payer: MEDICAID

## 2017-08-06 VITALS — BODY MASS INDEX: 17.4 KG/M2

## 2017-08-06 DIAGNOSIS — Z91.19: ICD-10-CM

## 2017-08-06 DIAGNOSIS — B96.20: ICD-10-CM

## 2017-08-06 DIAGNOSIS — F11.20: ICD-10-CM

## 2017-08-06 DIAGNOSIS — K68.12: ICD-10-CM

## 2017-08-06 DIAGNOSIS — N32.2: ICD-10-CM

## 2017-08-06 DIAGNOSIS — D64.9: ICD-10-CM

## 2017-08-06 DIAGNOSIS — K50.90: ICD-10-CM

## 2017-08-06 DIAGNOSIS — B96.1: ICD-10-CM

## 2017-08-06 DIAGNOSIS — L02.211: ICD-10-CM

## 2017-08-06 DIAGNOSIS — B95.61: ICD-10-CM

## 2017-08-06 DIAGNOSIS — K63.2: Primary | ICD-10-CM

## 2017-08-06 LAB
ALBUMIN SERPL-MCNC: 4.3 G/DL (ref 3–4.8)
ALBUMIN/GLOB SERPL: 0.9 {RATIO} (ref 1.1–1.8)
ALT SERPL-CCNC: 14 U/L (ref 7–56)
AST SERPL-CCNC: 16 U/L (ref 15–59)
BASE EXCESS BLDV CALC-SCNC: 3.5 MMOL/L (ref 0–2)
BUN SERPL-MCNC: 7 MG/DL (ref 7–21)
CALCIUM SERPL-MCNC: 9.6 MG/DL (ref 8.4–10.5)
ERYTHROCYTE [DISTWIDTH] IN BLOOD BY AUTOMATED COUNT: 18.1 % (ref 11.5–14.5)
GFR NON-AFRICAN AMERICAN: > 60
HGB BLD-MCNC: 10.5 GM/DL (ref 14–18)
MCH RBC QN AUTO: 22.3 PG (ref 25–35)
MCHC RBC AUTO-ENTMCNC: 31.4 G/DL (ref 31–37)
MCV RBC AUTO: 71.1 FL (ref 80–105)
PH BLDV: 7.34 [PH] (ref 7.32–7.43)
PLATELET # BLD: 705 10^3/UL (ref 120–450)
PMV BLD AUTO: 7.9 FL (ref 7–11)
RBC # BLD AUTO: 4.7 10^6/UL (ref 3.5–6.1)
VENOUS BLOOD FIO2: 21 %
VENOUS BLOOD GAS PCO2: 57 (ref 40–60)
VENOUS BLOOD GAS PO2: 28 MM/HG (ref 30–55)
WBC # BLD AUTO: 10.3 10^3/UL (ref 4.5–11)

## 2017-08-06 RX ADMIN — OXYCODONE HYDROCHLORIDE PRN MG: 15 TABLET ORAL at 17:33

## 2017-08-06 RX ADMIN — OXYCODONE HYDROCHLORIDE PRN MG: 15 TABLET ORAL at 11:52

## 2017-08-06 RX ADMIN — PIPERACILLIN AND TAZOBACTAM SCH MLS/HR: 3; .375 INJECTION, POWDER, LYOPHILIZED, FOR SOLUTION INTRAVENOUS; PARENTERAL at 11:51

## 2017-08-06 RX ADMIN — PIPERACILLIN AND TAZOBACTAM SCH MLS/HR: 3; .375 INJECTION, POWDER, LYOPHILIZED, FOR SOLUTION INTRAVENOUS; PARENTERAL at 17:33

## 2017-08-06 RX ADMIN — OXYCODONE HYDROCHLORIDE PRN MG: 15 TABLET ORAL at 23:13

## 2017-08-06 NOTE — ED PDOC
Arrival/HPI





<Derian King - Last Filed: 08/06/17 05:52>





- General


Historian: Patient





- History of Present Illness


Time/Duration: < week


Symptom Onset: Other (chronic)


Symptom Course: Unchanged


Severity Level: 6





<Mable Chavez - Last Filed: 08/06/17 06:47>





<Allen Dupree - Last Filed: 08/06/17 10:13>





- General


Time Seen by Provider: 08/06/17 04:45





- History of Present Illness


Narrative History of Present Illness (Text): 





08/06/17 05:04


21 year old male with past medical history of Crohn's disease, vesicular 

cutaneous fistula and chronic abdominal abscess, presents to the emergency 

department complaining of right sided back pain at site of fistula. Patient had 

right lumbar abscess drainage at [facility] in June.  He states that since then, he 

has wound pain at that site. Patient denies having any fevers or chills.  

Patient has been using percocet at home for pain which he received from the 

surgeon to performed the I&D.  Patient states that he received 30 day script 

last week but finished the pills earlier this week.   


 (Mable Chavez)





Past Medical History





- Provider Review


Nursing Documentation Reviewed: Yes





- Infectious Disease


Hx of Infectious Diseases: None





- Tetanus Immunization


Tetanus Immunization: Unknown





- Reproductive


Currently Pregnant: No





- Cardiac


Hx Cardiac Disorders: No





- Pulmonary


Hx Pneumonia: Yes





- Neurological


Hx Transient Ischemic Attacks (TIA): No





- HEENT


Hx HEENT Disorder: No





- Renal


Hx Renal Disorder: No





- Endocrine/Metabolic


Hx Endocrine Disorders: No





- Hematological/Oncological


Hx Anemia: Yes





- Integumentary


Hx Dermatological Disorder: Yes (RIGHT LOWER BACK FISTULA.DID I&D MAY 30 17 

[facility],)


Other/Comment: 6-15-17 3 RIGHT ABDOMINAL WALL ABSCESS. WITH LIGHT GREEN 

DRAINAGE.MEDIUM AMT.





- Musculoskeletal/Rheumatological


Hx Musculoskeletal Disorders: No


Hx Falls: No





- Gastrointestinal


Hx Crohn's Disease: Yes





- Genitourinary/Gynecological


Hx Genitourinary Disorders: Yes


Hx Urinary Tract Infection: Yes





- Psychiatric


Hx Anxiety: Yes


Hx Substance Use: Yes (H/O)





- Surgical History


Other/Comment: Colon resection





- Anesthesia


Hx Anesthesia: Yes


Hx Anesthesia Reactions: No


Hx Malignant Hyperthermia: No





- Suicidal Assessment


Feels Threatened In Home Enviroment: No





<Mable Chavez - Last Filed: 08/06/17 06:47>





Family/Social History





- Physician Review


Nursing Documentation Reviewed: Yes


Family/Social History: Unknown Family HX


Smoking Status: Current Some Days Smoker


Hx Alcohol Use: No


Hx Substance Use: Yes (H/O)





<Mable Chavez - Last Filed: 08/06/17 06:47>





Allergies/Home Meds





<Derian King - Last Filed: 08/06/17 05:52>





<Mable Chavez - Last Filed: 08/06/17 06:47>





<Allen Dupree - Last Filed: 08/06/17 10:13>


Allergies/Adverse Reactions: 


Allergies





diphenhydramine [From Benadryl] Allergy (Verified 08/06/17 04:52)


 RASH


FISH Allergy (Verified 08/06/17 04:52)


 RASH


ketorolac [From Toradol] Allergy (Verified 08/06/17 04:52)


 RASH


shellfish derived Adverse Reaction (Verified 08/06/17 04:52)


 ANGIOEDEMA








Home Medications: 


 Home Meds











 Medication  Instructions  Recorded  Confirmed


 


No Known Home Med  08/06/17 08/06/17














Review of Systems





- Review of Systems


Constitutional: Normal.  absent: Fevers


ENT: Normal.  absent: Hearing Changes, Sore Throat


Respiratory: Normal.  absent: SOB, Cough


Cardiovascular: Normal.  absent: Chest Pain, Palpitations, Edema


Gastrointestinal: Normal.  absent: Abdominal Pain, Constipation, Diarrhea, 

Nausea, Vomiting


Genitourinary Male: Normal.  absent: Dysuria, Frequency


Musculoskeletal: Normal.  absent: Arthralgias, Back Pain


Skin: Abscess (draining abcesses note don abdomen and right lower back )


Neurological: Normal.  absent: Headache, Dizziness


Psychiatric: Normal.  absent: Anxiety, Depression





<Mable Chavez - Last Filed: 08/06/17 06:47>





Physical Exam


Temperature: Afebrile


Blood Pressure: Normal


Pulse: Regular


Respiratory Rate: Normal


Appearance: Positive for: Uncomfortable


Pain Distress: Moderate


Mental Status: Positive for: Alert and Oriented X 3





- Systems Exam


Head: Present: Atraumatic, Normocephalic


Pupils: Present: PERRL


Mouth: Present: Moist Mucous Membranes


Respiratory/Chest: Present: Clear to Auscultation.  No: Good Air Exchange, 

Respiratory Distress, Accessory Muscle Use, Wheezes, Rales, Rhonchi


Cardiovascular: Present: Regular Rate and Rhythm, Normal S1, S2.  No: Murmurs, 

Irregular Rhythm


Abdomen: Present: Tenderness, Normal Bowel Sounds, Other (fistulas present on 

abdomen ).  No: Distention, Peritoneal Signs, Guarding


Back: Present: Other (abcess noted on right lumbar region draining small amount 

of purulent fluid )


Neurological: Present: GCS=15, Speech Normal


Skin: Present: Warm, Dry, Normal Color.  No: Rashes


Psychiatric: Present: Alert, Oriented x 3, Normal Insight, Normal Concentration





<Mable Chavez - Last Filed: 08/06/17 06:47>





Medical Decision Making





<Derian King - Last Filed: 08/06/17 05:52>





<Mable Chavez - Last Filed: 08/06/17 06:47>





<Allen Dupree - Last Filed: 08/06/17 10:13>


ED Course and Treatment: 


Impression:


Pt seen and evaluated with medical resident. Pt, whose past medical history 

includes  Crohn's disease, vesicular cutaneous fistula and chronic abdominal 

abscess, presented for right-sided back pain at fistula site. Aware and agree 

with HPI, clinical findings, plan, and management.





Plan:


-- Dilaudid


-- Reassess and disposition


 (Derian King)





08/06/17 05:14


21 year old male presents for right sided back back 2/2 to chronic fistula 





Will give Dilaudid 2 mg SC  and reassess 


08/06/17 06:10


Will check: CBC, CMP, blood cultures, VBG with shock panel and CT of abd/pelvis 

without contrast 





 (Mable Chavez)





08/06/17 08:44


Approver : Wai Oneil MD


Report Date : 08/06/2017 08:43:04


PROCEDURE:  CT Abdomen and Pelvis without intravenous contrast


HISTORY:


abd and back fistula


COMPARISON:


None.


FINDINGS:


IMPRESSION:


As demonstrated previously there is a a right-sided psoas abscess and severe 

swelling of the iliacus muscle. Gas can be seen within the psoas abscess on the 

current study extending up to the level of the right kidney. Overall size of 

the iliopsoas muscle is not significantly changed.  The gas collections are new 

finding.














08/06/17 08:56


Case discussed with Dr. Brown and he states he will come down with the 

resident shortly. 





08/06/17 10:12


Dr. Brown examined pt, states to admit for further treatment


signed out to Dr. Buitrago, accepted admission to his service


pt aware of and agrees with plan (Allen Dupree)





- Lab Interpretations


Lab Results: 








 08/06/17 06:09 





 08/06/17 06:45 





 Lab Results





08/06/17 06:45: Sodium 141, Chloride 97 L, Potassium 4.6, Carbon Dioxide 28, 

Anion Gap 21 H, BUN 7, Creatinine 0.7, Est GFR (African Amer) > 60, Est GFR (Non

-Af Amer) > 60, Random Glucose 110, Calcium 9.6, Total Bilirubin 0.4, AST 16, 

ALT 14, Alkaline Phosphatase 111, Total Protein 9.3 H, Albumin 4.3, Globulin 5.0

, Albumin/Globulin Ratio 0.9 L


08/06/17 06:30: pO2 28 L, VBG pH 7.34, VBG pCO2 57.0, VBG HCO3 30.8 H, VBG 

Total CO2 32.5 H, VBG O2 Sat (Calc) 51.4, VBG Base Excess 3.5 H, VBG Potassium 

4.7, Sodium 138.0, Chloride 102.0, Glucose 111 H, Lactate 1.8, FiO2 21.0, 

Venous Blood Potassium 4.7


08/06/17 06:09: WBC 10.3, RBC 4.70, Hgb 10.5 L, Hct 33.4 L, MCV 71.1 L, MCH 

22.3 L, MCHC 31.4, RDW 18.1 H, Plt Count 705 H* D, MPV 7.9











- RAD Interpretation


Radiology Orders: 








08/06/17 06:09


ABD & PELVIS W/O PO OR IV CONT [CT] Stat 














- Medication Orders


Current Medication Orders: 











Discontinued Medications





Hydromorphone HCl (Dilaudid)  1 mg SC STAT STA


   Stop: 08/06/17 05:07


   Last Admin: 08/06/17 05:39  Dose: 1 mg





Hydromorphone HCl (Dilaudid)  2 mg IVP STAT STA


   Stop: 08/06/17 08:57


   Last Admin: 08/06/17 09:17  Dose: 2 mg





Piperacillin Sod/Tazobactam Sod (Zosyn 4.5 Gm In Ns 100ml)  4.5 gm in 100 mls @ 

200 mls/hr IVPB STAT STA


   PRN Reason: Protocol


   Stop: 08/06/17 09:21


   Last Admin: 08/06/17 09:18  Dose: 200 mls/hr





Sodium Chloride (Sodium Chloride 0.9%)  1,000 mls @ 1,000 mls/hr IV .Q1H STA


   Stop: 08/06/17 09:55


   Last Admin: 08/06/17 09:19  Dose: 1,000 mls/hr











Disposition/Present on Arrival





<Derian King - Last Filed: 08/06/17 05:52>





- Present on Arrival


Any Indicators Present on Arrival: No


History of DVT/PE: No


History of Uncontrolled Diabetes: No


Urinary Catheter: No


History of Decub. Ulcer: No


History Surgical Site Infection Following: None





- Disposition


Have Diagnosis and Disposition been Completed?: Yes


Disposition Time: 06:48





<Mable Chavez - Last Filed: 08/06/17 06:47>





- Disposition


Patient Plan: Admission





<Allen Dupree - Last Filed: 08/06/17 10:13>





- Disposition


Diagnosis: 


 Abdominal fistula





Disposition: HOSPITALIZED


Patient Problems: 


 Current Active Problems











Problem Status Onset


 


Abdominal fistula Acute  











Condition: STABLE

## 2017-08-06 NOTE — CP.PCM.HP
<Jose Enrique Harrell - Last Filed: 08/06/17 19:27>





History of Present Illness





- History of Present Illness


History of Present Illness: 





Patient is a 21 year old male with a past medical history of Crohn's disease, 

vesicular cutaneous fistula and chronic abdominal abscess who presented to the 

ED for evaluation of right lower abdominal pain and right lower back pain near 

his fistula sites. He reports his pain started 3 days prior to presentation. He 

describes the pain as sharp without radiation from fistula sites. He reports 

pus drainage from his fistula sites. Patient recently underwent a washout of 

his [place] [facility] [place] a Dr. Nielsen. The patient 

reports diarrhea, chills, weakness and inability to ambulate at home 2/2 to 

pain. Denies fever, chest pain, shortness of breath, nausea, vomiting and 

generalized abdominal pain. 





PMH: Crohns on Remicaide Q4-6 wks, anemia


PSH: multiple abdominal surgeries, IR  & D for abscesses, surgery for fistula 

excisions x 2 


All: Benadryl, Shellfish, FISH, ketoralac


SH: Denies ETOH use, current tobacco use, illicit drug use


PMD: Antoinette


Outpt GI: Spara





Present on Admission





- Present on Admission


Any Indicators Present on Admission: No


History of DVT/PE: No


History of Uncontrolled Diabetes: No


Urinary Catheter: No





Review of Systems





- Constitutional


Constitutional: As Per HPI





- EENT


Eyes: As Per HPI


Ears: As Per HPI


Nose/Mouth/Throat: As Per HPI





- Cardiovascular


Cardiovascular: As Per HPI





- Respiratory


Respiratory: As Per HPI





- Gastrointestinal


Gastrointestinal: As Per HPI





- Genitourinary


Genitourinary: As Per HPI





- Reproductive: Male


Reproductive:Male: As Per HPI





- Musculoskeletal


Musculoskeletal: As Per HPI





- Integumentary


Additional comments: 





anterior abdominal fistula open with yellow pus drainage, posterior lumbar 

fistula open with yellow pus drainage 





- Neurological


Neurological: As Per HPI





- Psychiatric


Psychiatric: As Per HPI





- Endocrine


Endocrine: As Per HPI





Past Patient History





- Infectious Disease


Hx of Infectious Diseases: None





- Tetanus Immunizations


Tetanus Immunization: Unknown





- Past Medical History & Family History


Past Medical History?: Yes





- Past Social History


Smoking Status: Never Smoked





- CARDIAC


Hx Cardiac Disorders: No





- PULMONARY


Hx Pneumonia: Yes





- NEUROLOGICAL


Hx Transient Ischemic Attacks (TIA): No





- HEENT


Hx HEENT Problems: No





- RENAL


Hx Chronic Kidney Disease: No





- ENDOCRINE/METABOLIC


Hx Endocrine Disorders: No





- HEMATOLOGICAL/ONCOLOGICAL


Hx Anemia: Yes





- INTEGUMENTARY


Hx Dermatological Problems: Yes (RIGHT LOWER BACK FISTULA.DID I&D MAY 30 17 

[facility],)


Other/Comment: 6-15-17 3 RIGHT ABDOMINAL WALL ABSCESS. WITH LIGHT GREEN 

DRAINAGE.MEDIUM AMT.





- MUSCULOSKELETAL/RHEUMATOLOGICAL


Hx Musculoskeletal Disorders: No


Hx Falls: No





- GASTROINTESTINAL


Hx Crohn's Disease: Yes





- GENITOURINARY/GYNECOLOGICAL


Hx Genitourinary Disorders: Yes


Hx Urinary Tract Infection: Yes





- PSYCHIATRIC


Hx Anxiety: Yes





- SURGICAL HISTORY


Other/Comment: Colon resection





- ANESTHESIA


Hx Anesthesia: Yes


Hx Anesthesia Reactions: No


Hx Malignant Hyperthermia: No





Meds


Allergies/Adverse Reactions: 


 Allergies











Allergy/AdvReac Type Severity Reaction Status Date / Time


 


diphenhydramine Allergy  RASH Verified 08/06/17 04:52





[From Benadryl]     


 


FISH Allergy  RASH Verified 08/06/17 04:52


 


ketorolac [From Toradol] Allergy  RASH Verified 08/06/17 04:52


 


shellfish derived AdvReac  ANGIOEDEMA Verified 08/06/17 04:52














Physical Exam





- Head Exam


Head Exam: ATRAUMATIC, NORMAL INSPECTION, NORMOCEPHALIC





- Eye Exam


Eye Exam: EOMI, PERRL





- ENT Exam


ENT Exam: Mucous Membranes Moist, Normal Exam





- Neck Exam


Neck exam: Positive for: Full Rom, Normal Inspection





- Respiratory Exam


Respiratory Exam: Clear to Auscultation Bilateral, NORMAL BREATHING PATTERN





- Cardiovascular Exam


Cardiovascular Exam: REGULAR RHYTHM, +S1, +S2





- GI/Abdominal Exam


GI & Abdominal Exam: Normal Bowel Sounds, Soft


Additional comments: 





anterior fistula with yellow pus drainage





- Extremities Exam


Extremities exam: Positive for: normal capillary refill, normal inspection





- Back Exam


Additional comments: 





open fistula with drainage of yellow pus 





- Neurological Exam


Neurological exam: Alert, CN II-XII Intact, Normal Gait, Oriented x3





- Psychiatric Exam


Psychiatric exam: Normal Affect, Normal Mood





- Skin


Skin Exam: Dry, Normal Color, Warm


Additional comments: 





anterior and posterior fistula that are open with yellow pus drainage





Results





- Vital Signs


Recent Vital Signs: 





 Last Vital Signs











Temp  98.8 F   08/06/17 18:42


 


Pulse  82   08/06/17 18:42


 


Resp  20   08/06/17 18:42


 


BP  116/79   08/06/17 18:42


 


Pulse Ox  100   08/06/17 18:42














- Labs


Result Diagrams: 


 08/06/17 06:09





 08/06/17 06:45





Assessment & Plan





- Assessment and Plan (Free Text)


Assessment: 





Patient is a 21 year old male with a past medical history of Crohn's disease, 

vesicular cutaneous fistula and chronic abdominal abscess who presented to the 

ED for evaluation of right lower abdominal pain and right lower back pain near 

his fistula sites. 


Plan: 





1. Chron's disease with draining fistula's


- Hx of episodes of draining fistula


- CT abdomen and pelvis showing right sided psoas abscess and severe swelling 

of the iliacus muscle. Gas can be seen within th epsoas abscess on the current 

study extending up to the level of the right kidney. Gas collections noted to 

be new finding. 


- Wound culture


- Consult cardiology


- Consult Interventional radiology 


- Oxycodone 10 prn pain


- Linezolid antibiotics 





2. History of anemia


- H/H 10.5/33.4


- monitor 





3. GI/DVT ppx


- Protonix 


- SCDs








- Date & Time


Date: 08/06/17


Time: 19:47





<Trever Buitrago - Last Filed: 08/07/17 16:15>





Results





- Vital Signs


Recent Vital Signs: 





 Last Vital Signs











Temp  98.1 F   08/07/17 06:00


 


Pulse  70   08/07/17 06:00


 


Resp  17   08/07/17 06:00


 


BP  114/72   08/07/17 06:00


 


Pulse Ox  100   08/07/17 06:00














- Labs


Result Diagrams: 


 08/07/17 08:07





 08/07/17 08:07


Labs: 





 Laboratory Results - last 24 hr











  08/07/17 08/07/17





  08:07 08:07


 


WBC   6.1  D


 


RBC   3.98


 


Hgb   8.7 L


 


Hct   28.3 L


 


MCV   71.1 L


 


MCH   21.9 L


 


MCHC   30.7 L


 


RDW   18.1 H


 


Plt Count   588 H


 


MPV   8.3


 


Gran %   66.6


 


Lymph % (Auto)   15.0 L


 


Mono % (Auto)   15.5 H


 


Eos % (Auto)   2.6


 


Baso % (Auto)   0.3


 


Gran #   4.07


 


Lymph #   0.9 L


 


Mono #   1.0 H


 


Eos #   0.2


 


Baso #   0.02


 


Sodium  139 


 


Potassium  4.6 


 


Chloride  99 


 


Carbon Dioxide  27 


 


Anion Gap  18 


 


BUN  6 L 


 


Creatinine  0.7 


 


Est GFR ( Amer)  > 60 


 


Est GFR (Non-Af Amer)  > 60 


 


Random Glucose  75 


 


Calcium  9.0 


 


Total Bilirubin  0.4 


 


AST  17 


 


ALT  14 


 


Alkaline Phosphatase  91 


 


Total Protein  6.7 


 


Albumin  3.2 


 


Globulin  3.4 


 


Albumin/Globulin Ratio  0.9 L 














Attending/Attestation





- Attestation


I have personally seen and examined this patient.: Yes


I have fully participated in the care of the patient.: Yes


I have reviewed all pertinent clinical information: Yes


Notes (Text): 





08/07/17 16:12





attending note;





patient seen and examined with resident in ER.





Patient is a 21 year old male with a past medical history of Crohn's disease, 

vesicular cutaneous fistula and chronic abdominal abscess who presented to the 

ED for evaluation of right lower abdominal pain and right lower back pain/ 

fistula sites.


started on IV Zosyn. Wound culture ordered.


Surgery evaluation appreciated.


 CT scan showed  right iliopsoas abscess with gas. The patient had recent 

surgical [place] [facility] [place] Nielsen.


Patient also follows up with Dr. Doyle for remicaide injections.


We will review CT scan with IR tomorrow.





Opiate dependency; continue OxyContin.





Patient does not follow up with PMD regularly.


 Prognosis is poor.


08/07/17 16:14

## 2017-08-06 NOTE — CT
PROCEDURE:  CT Abdomen and Pelvis without intravenous contrast



HISTORY:

abd and back fistula



COMPARISON:

None.



TECHNIQUE:

Without oral or IV contrast.  The study is limited by absence of 

abdominal fat. 



Contrast Dose: 



Radiation dose:



Total exam DLP = 231 mGy-cm.



This CT exam was performed using one or more of the following dose 

reduction techniques: Automated exposure control, adjustment of the 

mA and/or kV according to patient size, and/or use of iterative 

reconstruction technique.



FINDINGS:



LOWER THORAX:

Unremarkable. 



LIVER:

Unremarkable. No gross lesion or ductal dilatation.  



GALLBLADDER AND BILE DUCTS:

Unremarkable. 



PANCREAS:

Unremarkable. No gross lesion or ductal dilatation.



SPLEEN:

Unremarkable. 



ADRENALS:

Unremarkable. No mass. 



KIDNEYS AND URETERS:

Unremarkable. No hydronephrosis. No solid mass. 



VASCULATURE:

Unremarkable 



BOWEL:

Unremarkable. No obstruction. No gross mural thickening. The study is 

limited by lack of oral contrast material and absence of abdominal fat



APPENDIX:

Unremarkable. Normal appendix. 



PERITONEUM:

As demonstrated previously there is a a right-sided psoas abscess and 

severe swelling of the iliacus muscle. A gas can be seen within the 

psoas abscess on the current study extending up to the level of the 

right kidney. Overall size of the iliopsoas muscle is not 

significantly changed.  The gas collections are new finding. 



LYMPH NODES:

Unremarkable. No enlarged lymph nodes. 



BLADDER:

Unremarkable. 



REPRODUCTIVE:

Unremarkable. 



BONES:

No acute fracture. 



OTHER FINDINGS:

None.



IMPRESSION:

As demonstrated previously there is a a right-sided psoas abscess and 

severe swelling of the iliacus muscle. Gas can be seen within the 

psoas abscess on the current study extending up to the level of the 

right kidney. Overall size of the iliopsoas muscle is not 

significantly changed.  The gas collections are new finding.

## 2017-08-06 NOTE — CP.PCM.CON
History of Present Illness





- History of Present Illness


History of Present Illness: 








Surgery Consult Note:





Patient is a 21 year old  male with a PMHx of Crohn's disease, 

vesicular cutaneous fistula and chronic abdominal abscess who presented to the 

ED for evaluation of right lower abdominal pain and right lower back pain near 

his fistula sites. His baseline abdominal pain was exacerbated 3 days ago after 

eating pizza. It is important to note that the patient [place] 

[place] [facility] [place] Admits to nonbloody diarrhea, chills, and 

cough. Denies fever, chest pain, SOB, N/V, and urinary symptoms. 











PMH: Crohns on Remicaide Q4-6 wks, anemia


PSH: multiple abdominal surgeries, IR  & D for abscesses, surgery for fistula 

excisions x 2 


All: Benadryl, Shellfish, FISH, ketoralac


SH: Denies ETOH use, current tobacco use, illicit drug use


PMD: Antoinette


Outpt GI: Spirr








Review of Systems





- Review of Systems


Review of Systems: 





12 points ROS negative except as indicated in the HPI.





Past Patient History





- Infectious Disease


Hx of Infectious Diseases: None





- Tetanus Immunizations


Tetanus Immunization: Unknown





- Past Medical History & Family History


Past Medical History?: Yes





- Past Social History


Smoking Status: Current Some Days Smoker





- CARDIAC


Hx Cardiac Disorders: No





- PULMONARY


Hx Pneumonia: Yes





- NEUROLOGICAL


Hx Transient Ischemic Attacks (TIA): No





- HEENT


Hx HEENT Problems: No





- RENAL


Hx Chronic Kidney Disease: No





- ENDOCRINE/METABOLIC


Hx Endocrine Disorders: No





- HEMATOLOGICAL/ONCOLOGICAL


Hx Anemia: Yes





- INTEGUMENTARY


Hx Dermatological Problems: Yes (RIGHT LOWER BACK FISTULA.DID I&D MAY 30 17 

[facility],)


Other/Comment: 6-15-17 3 RIGHT ABDOMINAL WALL ABSCESS. WITH LIGHT GREEN 

DRAINAGE.MEDIUM AMT.





- MUSCULOSKELETAL/RHEUMATOLOGICAL


Hx Musculoskeletal Disorders: No


Hx Falls: No





- GASTROINTESTINAL


Hx Crohn's Disease: Yes





- GENITOURINARY/GYNECOLOGICAL


Hx Genitourinary Disorders: Yes


Hx Urinary Tract Infection: Yes





- PSYCHIATRIC


Hx Anxiety: Yes


Hx Substance Use: Yes (H/O)





- SURGICAL HISTORY


Other/Comment: Colon resection





- ANESTHESIA


Hx Anesthesia: Yes


Hx Anesthesia Reactions: No


Hx Malignant Hyperthermia: No





Meds


Allergies/Adverse Reactions: 


 Allergies











Allergy/AdvReac Type Severity Reaction Status Date / Time


 


diphenhydramine Allergy  RASH Verified 08/06/17 04:52





[From Benadryl]     


 


FISH Allergy  RASH Verified 08/06/17 04:52


 


ketorolac [From Toradol] Allergy  RASH Verified 08/06/17 04:52


 


shellfish derived AdvReac  ANGIOEDEMA Verified 08/06/17 04:52














Physical Exam





- Additional Findings


Additional findings: 





Head: Present: Atraumatic, Normocephalic


Pupils: Present: PERRL


Mouth: Present: Moist Mucous Membranes


Respiratory/Chest: Present: Clear to Auscultation.  No: Good Air Exchange, 

Respiratory Distress, Accessory Muscle Use, Wheezes, Rales, Rhonchi


Cardiovascular: Present: Regular Rate and Rhythm, Normal S1, S2.  No: Murmurs, 

Irregular Rhythm


Abdomen: Present: Tenderness, Normal Bowel Sounds, Other (fistulas present on 

abdomen).  No: Distention, Peritoneal Signs, Guarding


Back: Present: Other (abcess noted on right lumbar region draining small amount 

of purulent fluid )


Neurological: AAO x 3, no focal deficits


Skin: Present: Warm, Dry, Scar tissue present on abdomen, Draining fistulas 

present on abdomen and back  No: Rashes


Psychiatric: Present: Alert, Oriented x 3, Normal Insight, Normal Concentration





Results





- Vital Signs


Recent Vital Signs: 


 Last Vital Signs











Temp  99.4 F   08/06/17 04:58


 


Pulse  84   08/06/17 04:58


 


Resp  14   08/06/17 04:58


 


BP  108/53 L  08/06/17 04:58


 


Pulse Ox  98   08/06/17 04:58














- Labs


Result Diagrams: 


 08/06/17 06:09





 08/06/17 06:45





Assessment & Plan





- Assessment and Plan (Free Text)


Assessment: 








1. Fistula/Crohn's disease


- CT of abd/pelvis reviewed-  right-sided psoas abscess and severe swelling of 

the iliacus muscle. Gas can be seen within the psoas abscess on the current 

study extending up to the level of the right kidney. Overall size of the 

iliopsoas muscle is not significantly changed.  The gas collections are new 

finding


- Wounds cultures drawn


- GI consult


- IR consult


- F/u am labs


- NPO


- IVF


- Ppx


- SCDs


- Pain control as per primary


- Further medical management as per primary





D/w attending.

## 2017-08-07 VITALS — RESPIRATION RATE: 20 BRPM

## 2017-08-07 LAB
ALBUMIN SERPL-MCNC: 3.2 G/DL (ref 3–4.8)
ALBUMIN/GLOB SERPL: 0.9 {RATIO} (ref 1.1–1.8)
ALT SERPL-CCNC: 14 U/L (ref 7–56)
AST SERPL-CCNC: 17 U/L (ref 15–59)
BASOPHILS # BLD AUTO: 0.02 K/MM3 (ref 0–2)
BASOPHILS NFR BLD: 0.3 % (ref 0–3)
BUN SERPL-MCNC: 6 MG/DL (ref 7–21)
CALCIUM SERPL-MCNC: 9 MG/DL (ref 8.4–10.5)
EOSINOPHIL # BLD: 0.2 10*3/UL (ref 0–0.7)
EOSINOPHIL NFR BLD: 2.6 % (ref 1.5–5)
ERYTHROCYTE [DISTWIDTH] IN BLOOD BY AUTOMATED COUNT: 18.1 % (ref 11.5–14.5)
GFR NON-AFRICAN AMERICAN: > 60
GRANULOCYTES # BLD: 4.07 10*3/UL (ref 1.4–6.5)
GRANULOCYTES NFR BLD: 66.6 % (ref 50–68)
HGB BLD-MCNC: 8.7 GM/DL (ref 14–18)
LYMPHOCYTES # BLD: 0.9 10*3/UL (ref 1.2–3.4)
LYMPHOCYTES NFR BLD AUTO: 15 % (ref 22–35)
MCH RBC QN AUTO: 21.9 PG (ref 25–35)
MCHC RBC AUTO-ENTMCNC: 30.7 G/DL (ref 31–37)
MCV RBC AUTO: 71.1 FL (ref 80–105)
MONOCYTES # BLD AUTO: 1 10*3/UL (ref 0.1–0.6)
MONOCYTES NFR BLD: 15.5 % (ref 1–6)
PLATELET # BLD: 588 10^3/UL (ref 120–450)
PMV BLD AUTO: 8.3 FL (ref 7–11)
RBC # BLD AUTO: 3.98 10^6/UL (ref 3.5–6.1)
WBC # BLD AUTO: 6.1 10^3/UL (ref 4.5–11)

## 2017-08-07 RX ADMIN — LINEZOLID SCH MLS/HR: 600 INJECTION, SOLUTION INTRAVENOUS at 21:40

## 2017-08-07 RX ADMIN — OXYCODONE HYDROCHLORIDE PRN MG: 15 TABLET ORAL at 05:30

## 2017-08-07 RX ADMIN — SIMETHICONE CHEW TAB 80 MG PRN MG: 80 TABLET ORAL at 22:30

## 2017-08-07 RX ADMIN — PIPERACILLIN AND TAZOBACTAM SCH MLS/HR: 3; .375 INJECTION, POWDER, LYOPHILIZED, FOR SOLUTION INTRAVENOUS; PARENTERAL at 05:30

## 2017-08-07 RX ADMIN — OXYCODONE HYDROCHLORIDE PRN MG: 15 TABLET ORAL at 11:10

## 2017-08-07 RX ADMIN — OXYCODONE HYDROCHLORIDE PRN MG: 15 TABLET ORAL at 17:15

## 2017-08-07 RX ADMIN — PIPERACILLIN AND TAZOBACTAM SCH MLS/HR: 3; .375 INJECTION, POWDER, LYOPHILIZED, FOR SOLUTION INTRAVENOUS; PARENTERAL at 17:12

## 2017-08-07 RX ADMIN — PIPERACILLIN AND TAZOBACTAM SCH MLS/HR: 3; .375 INJECTION, POWDER, LYOPHILIZED, FOR SOLUTION INTRAVENOUS; PARENTERAL at 00:51

## 2017-08-07 RX ADMIN — PIPERACILLIN AND TAZOBACTAM SCH MLS/HR: 3; .375 INJECTION, POWDER, LYOPHILIZED, FOR SOLUTION INTRAVENOUS; PARENTERAL at 11:10

## 2017-08-07 RX ADMIN — OXYCODONE HYDROCHLORIDE PRN MG: 15 TABLET ORAL at 23:04

## 2017-08-07 NOTE — CP.PCM.PN
<Jose Enrique Harrell - Last Filed: 08/07/17 13:28>





Subjective





- Date & Time of Evaluation


Date of Evaluation: 08/07/17


Time of Evaluation: 13:09





- Subjective


Subjective: 





Pt s/e at bedside on GMF this AM. No acute events overnight. The patient 

reports continued expression of yellow pus from fistula sites. He denies chest 

pain, shortness of breath, abdominal pain, nausea, vomiting, fever.





Objective





- Vital Signs/Intake and Output


Vital Signs (last 24 hours): 


 











Temp Pulse Resp BP Pulse Ox


 


 98.1 F   70   17   114/72   100 


 


 08/07/17 06:00  08/07/17 06:00  08/07/17 06:00  08/07/17 06:00  08/07/17 06:00








Intake and Output: 


 











 08/07/17 08/07/17





 06:59 18:59


 


Intake Total 360 


 


Output Total 600 


 


Balance -240 














- Medications


Medications: 


 Current Medications





Piperacillin Sod/Tazobactam Sod (Zosyn 3.375 In Ns 100ml)  100 mls @ 200 mls/hr 

IVPB Q6 [facility]


   PRN Reason: Protocol


   Stop: 08/13/17 12:01


   Last Admin: 08/07/17 11:10 Dose:  200 mls/hr


Ondansetron HCl (Zofran Inj)  4 mg IVP Q4H PRN


   PRN Reason: Nausea/Vomiting


Oxycodone HCl (Oxycodone Immediate Release Tab)  15 mg PO Q6H PRN


   PRN Reason: Pain, moderate (4-7)


   Last Admin: 08/07/17 11:10 Dose:  15 mg


Pantoprazole Sodium (Protonix Inj)  40 mg IVP DAILY [facility]


   Last Admin: 08/07/17 11:09 Dose:  40 mg











- Labs


Labs: 


 





 08/07/17 08:07 





 08/07/17 08:07 











- Constitutional


Appears: Well





- Head Exam


Head Exam: ATRAUMATIC, NORMAL INSPECTION





- Eye Exam


Eye Exam: EOMI, PERRL





- ENT Exam


ENT Exam: Mucous Membranes Moist, Normal Exam





- Neck Exam


Neck Exam: Full ROM





- Respiratory Exam


Respiratory Exam: Clear to Ausculation Bilateral, NORMAL BREATHING PATTERN





- Cardiovascular Exam


Cardiovascular Exam: REGULAR RHYTHM, +S1, +S2





- GI/Abdominal Exam


GI & Abdominal Exam: Soft, Normal Bowel Sounds


Additional comments: 





open draining fistula present on mid anterior portion of abdomen 





- Extremities Exam


Extremities Exam: Full ROM, Normal Capillary Refill, Normal Inspection





- Back Exam


Back Exam: NORMAL INSPECTION





- Neurological Exam


Neurological Exam: Alert, CN II-XII Intact, Normal Gait, Oriented x3


Neuro motor strength exam: Left Upper Extremity: 5, Right Upper Extremity: 5, 

Left Lower Extremity: 5, Right Lower Extremity: 5





- Psychiatric Exam


Psychiatric exam: Normal Affect, Normal Mood





- Skin


Skin Exam: Intact, Warm


Additional comments: 





Anterior and posterior fistulas present with drainage of yellow pus





Assessment and Plan


(1) Abdominal fistula


Status: Acute   





(2) Vesicocutaneous fistula


Status: Chronic   





- Assessment and Plan (Free Text)


Assessment: 





Pt is a 21 year old male with PMH of Chron's disease and vesicocutaneous 

fistulas present on anterior abdomen and posterior lower back that presents 

with drainage of his fistulas and abdominal pain. 


Plan: 





1. Chron's disease with draining fistula's


- Hx of episodes of draining fistula


- CT abdomen and pelvis showing right sided psoas abscess and severe swelling 

of the iliacus muscle. Gas can be seen within th epsoas abscess on the current 

study extending up to the level of the right kidney. Gas collections noted to 

be new finding. 


- Wound culture of back showed GNR and Gram positive Cocci


- Wound culture of abdomen positive for GNR


- Consult surgery, appreciate recs


- Consult ID, recommend add Zyvox in addition to Zosyn and discontinue flagyl


- Consult Interventional radiology, appreciate recs


- Oxycodone 10 prn pain


- Continue to monitor 





2. History of anemia


- H/H decreased to 8.7/28.3





3. GI/DVT ppx


- Protonix


- SCDs 





dispo: Will discuss with consulting services with plan for possible discharge 

in next 24-48 hours





<Trever Buitrago - Last Filed: 08/07/17 16:17>





Objective





- Vital Signs/Intake and Output


Vital Signs (last 24 hours): 


 











Temp Pulse Resp BP Pulse Ox


 


 98.1 F   70   17   114/72   100 


 


 08/07/17 06:00  08/07/17 06:00  08/07/17 06:00  08/07/17 06:00  08/07/17 06:00








Intake and Output: 


 











 08/07/17 08/07/17





 06:59 18:59


 


Intake Total 360 


 


Output Total 600 


 


Balance -240 














- Medications


Medications: 


 Current Medications





Acetaminophen (Tylenol 325mg Tab)  650 mg PO Q6H PRN


   PRN Reason: Pain, moderate (4-7)


   Last Admin: 08/07/17 14:31 Dose:  650 mg


Piperacillin Sod/Tazobactam Sod (Zosyn 3.375 In Ns 100ml)  100 mls @ 200 mls/hr 

IVPB Q6 [facility]


   PRN Reason: Protocol


   Stop: 08/13/17 12:01


   Last Admin: 08/07/17 11:10 Dose:  200 mls/hr


Linezolid (Zyvox 600mg/300ml D5w)  600 mg in 300 mls @ 200 mls/hr IVPB Q12 [facility]


   PRN Reason: Protocol


   Stop: 08/12/17 22:01


Ondansetron HCl (Zofran Inj)  4 mg IVP Q4H PRN


   PRN Reason: Nausea/Vomiting


Oxycodone HCl (Oxycodone Immediate Release Tab)  15 mg PO Q6H PRN


   PRN Reason: Pain, moderate (4-7)


   Last Admin: 08/07/17 11:10 Dose:  15 mg


Pantoprazole Sodium (Protonix Inj)  40 mg IVP DAILY [facility]


   Last Admin: 08/07/17 11:09 Dose:  40 mg











- Labs


Labs: 


 





 08/07/17 08:07 





 08/07/17 08:07 











Attending/Attestation





- Attestation


I have personally seen and examined this patient.: Yes


I have fully participated in the care of the patient.: Yes


I have reviewed all pertinent clinical information, including history, physical 

exam and plan: Yes


Notes (Text): 





08/07/17 16:16


attending note;





patient seen and examined with resident in ER.





Patient is a 21 year old male with a past medical history of Crohn's disease, 

vesicular cutaneous fistula and chronic abdominal abscess who presented to the 

ED for evaluation of right lower abdominal pain and right lower back pain/ 

fistula sites.


started on IV Zosyn. Wound culture ordered.


Surgery evaluation appreciated.


 CT scan showed  right iliopsoas abscess with gas. The patient had recent 

surgical procedure at [facility] [place] by DR. Nielsen.


Patient also follows up with Dr. Doyle for remicaide injections.


CT scan reviewed with Dr. Jorge Luis Gregory. Advised to continue IV antibiotics.


Monitor cbc closely.





Opiate dependency; continue OxyContin.





Patient does not follow up with PMD regularly.

## 2017-08-07 NOTE — CP.PCM.CON
<Scarlet Valle - Last Filed: 08/07/17 09:14>





History of Present Illness





- History of Present Illness


History of Present Illness: 





PGY-2 for Dr. Tejada





ID consult: Intraabdominal infection in setting of IBD 





21 year old male with PMH Crohn's disease, vesicular cutaneous fistula and 

chronic abdominal abscess presented to the ED for evaluation of sudden 

worsening of right lower abdominal pain and right lower back pain near his 

fistula sites. The pain at both sites started 3 days prior. The pain is sharp, 7

-8/10, without radiation, associated with pus drainage from his fistula sites (

3 under umbilicus, draining; RLQ, closed; L back, draining). Patient recently 

[place] [facility] [place] Dr. Nielsen, finished a course of IV and PO antibiotics. (+) Pt reports diarrhea, 

chills, weakness and inability to ambulate at home due to pain. For his Crohn's

, at baseline, pt has 2-3 diarrhea/loose formed stool, and abdominal cramps 

every other day. Denies blood in stool or change in color. Denies sick contact. 

(+) contact with dog at home





Upon ED arrival (8/6), T 99/4, HR 84, 108/53


WBC 10.3, H/H 10.5/33.4, Platelet 705 (high)


Lactate 1.8


Bun/Cre = 7/0.7


AST/ALT/AP = wnl


Pt is on zosyn day 2.





Blood culture (8/6) - no growth


Abdominal wound culture (8/6) - pending (G neg rods)


Back wound culture (8/6) pending (no organism)


Hx abdominal wound 


   (6/18/17) - Klebsiella pneumonia (resistamnt to ampicillin, aztreonam, 

cefazolin, cefepime, ceftriaxone)


   (4/29/17) - Klebsiella pneumonia, citrobacter freundii, candida


   (4/2/17) - Vanco resistant E faecium, coagulase neg staph


   (3/26/17) - VRE and Candida albicans





ROS: (+) Diarrhea, Chills, pain preventing ambulation


Denies fever, chest pain, shortness of breath, nausea, vomiting and generalized 

abdominal pain. 





PMH: Crohns on infliximab (Remicaide) Q4-6 wks, anemia





PSH:    Colon resection


   multiple abdominal surgeries, I & D for abscesses, surgery for fistula 

excisions x 2 


   5/30/17, R lower back fistular, s/p I&D, [facility]


   6-15-17, 3 R abdominal wall abscess, with light green drainage, medium





SH: Denies ETOH use, current tobacco use, illicit drug use. Live with mom and 

dog





All: Benadryl (rash), Shellfish (angioedema), FISH, ketoralac (rash)





Med: No





PMD: Pt states that he has no PMD


Outpt GI: [facility]





Past Patient History





- Infectious Disease


Hx of Infectious Diseases: None





- Tetanus Immunizations


Tetanus Immunization: Unknown





- Past Medical History & Family History


Past Medical History?: Yes





- Past Social History


Smoking Status: Never Smoked





- CARDIAC


Hx Cardiac Disorders: No





- PULMONARY


Hx Pneumonia: Yes





- NEUROLOGICAL


Hx Transient Ischemic Attacks (TIA): No





- HEENT


Hx HEENT Problems: No





- RENAL


Hx Chronic Kidney Disease: No





- ENDOCRINE/METABOLIC


Hx Endocrine Disorders: No





- HEMATOLOGICAL/ONCOLOGICAL


Hx Anemia: Yes





- INTEGUMENTARY


Hx Dermatological Problems: Yes (RIGHT LOWER BACK FISTULA.DID I&D MAY 30 17 

[facility],)


Other/Comment: 6-15-17 3 RIGHT ABDOMINAL WALL ABSCESS. WITH LIGHT GREEN 

DRAINAGE.MEDIUM AMT.





- MUSCULOSKELETAL/RHEUMATOLOGICAL


Hx Musculoskeletal Disorders: No


Hx Falls: No





- GASTROINTESTINAL


Hx Crohn's Disease: Yes





- GENITOURINARY/GYNECOLOGICAL


Hx Genitourinary Disorders: Yes


Hx Urinary Tract Infection: Yes





- PSYCHIATRIC


Hx Anxiety: Yes





- SURGICAL HISTORY


Other/Comment: Colon resection





- ANESTHESIA


Hx Anesthesia: Yes


Hx Anesthesia Reactions: No


Hx Malignant Hyperthermia: No





Meds


Allergies/Adverse Reactions: 


 Allergies











Allergy/AdvReac Type Severity Reaction Status Date / Time


 


diphenhydramine Allergy  RASH Verified 08/06/17 04:52





[From Benadryl]     


 


FISH Allergy  RASH Verified 08/06/17 04:52


 


ketorolac [From Toradol] Allergy  RASH Verified 08/06/17 04:52


 


shellfish derived AdvReac  ANGIOEDEMA Verified 08/06/17 04:52














- Medications


Medications: 


 Current Medications





Piperacillin Sod/Tazobactam Sod (Zosyn 3.375 In Ns 100ml)  100 mls @ 200 mls/hr 

IVPB Q6 [facility]


   PRN Reason: Protocol


   Stop: 08/13/17 12:01


   Last Admin: 08/07/17 05:30 Dose:  200 mls/hr


Metronidazole (Flagyl)  500 mg PO Q8 [facility]


   PRN Reason: Protocol


   Last Admin: 08/07/17 06:57 Dose:  500 mg


Ondansetron HCl (Zofran Inj)  4 mg IVP Q4H PRN


   PRN Reason: Nausea/Vomiting


Oxycodone HCl (Oxycodone Immediate Release Tab)  15 mg PO Q6H PRN


   PRN Reason: Pain, moderate (4-7)


   Last Admin: 08/07/17 05:30 Dose:  15 mg


Pantoprazole Sodium (Protonix Inj)  40 mg IVP DAILY [facility]


   Last Admin: 08/06/17 11:51 Dose:  40 mg











Physical Exam





- Constitutional


Appears: Chronically Ill, Other (underwt)





- Head Exam


Head Exam: ATRAUMATIC, NORMAL INSPECTION, NORMOCEPHALIC





- Eye Exam


Eye Exam: EOMI, Normal appearance, PERRL.  absent: Scleral icterus


Pupil Exam: NORMAL ACCOMODATION





- ENT Exam


ENT Exam: Mucous Membranes Moist





- Neck Exam


Additional comments: 





supple





- Respiratory Exam


Respiratory Exam: Clear to Auscultation Bilateral.  absent: Rales, Rhonchi, 

Wheezes





- Cardiovascular Exam


Cardiovascular Exam: REGULAR RHYTHM, +S1, +S2.  absent: Systolic Murmur





- GI/Abdominal Exam


GI & Abdominal Exam: Guarding, Normal Bowel Sounds, Soft, Tenderness (epigastric

, RLQ, R back. (+) serous/yellowish drainage; 3 fistula under umbilicus, 

draining; RLQ fistula, closed; L back fistula, draining).  absent: Distended, 

Firm, Rigid





- Extremities Exam


Extremities exam: Positive for: normal capillary refill, pedal pulses present.  

Negative for: calf tenderness, pedal edema





- Neurological Exam


Neurological exam: Alert, Oriented x3





- Psychiatric Exam


Psychiatric exam: Normal Affect, Normal Mood





- Skin


Skin Exam: Dry, Warm





Results





- Vital Signs


Recent Vital Signs: 


 Last Vital Signs











Temp  98.8 F   08/06/17 18:42


 


Pulse  82   08/06/17 18:42


 


Resp  20   08/06/17 18:42


 


BP  116/79   08/06/17 18:42


 


Pulse Ox  100   08/06/17 18:42














- Labs


Result Diagrams: 


 08/07/17 08:07





 08/06/17 06:45





Assessment & Plan





- Assessment and Plan (Free Text)


Plan: 








21 year old male with PMH Crohn's disease, vesicular cutaneous fistula and 

chronic abdominal abscess admitted for sudden worsening of right lower 

abdominal pain and right lower back pain near his fistula sites.


- R posias abscess with gas


- Myositis at iliacus


- Draining fistulas


- R/O Intraabdominal infection


- Microcytic anemia, acute on chronic, Hb drops 2, got only 1L NS 


- Thrombocytosis - reactive?





Plan


- continue monitor clinical course


- Pending IR/surg decision on I&D


- continue zosyn (day 2) and flagyl PO (day 1)





Culture


Blood culture (8/6) - no growth


Abdominal wound culture (8/6) - pending (G neg rods)


Back wound culture (8/6) pending (no organism)


Hx abdominal wound 


   (6/18/17) - Klebsiella pneumonia (resistamnt to ampicillin, aztreonam, 

cefazolin, cefepime, ceftriaxone)


   (4/29/17) - Klebsiella pneumonia, citrobacter freundii, candida


   (4/2/17) - Vanco resistant E faecium, coagulase neg staph


   (3/26/17) - VRE and Candida albicans





Imaging


(8/6) CT abdomen and pelvis showing right sided psoas abscess and severe 

swelling of the iliacus muscle. Gas can be seen within psoas abscess on the 

current study extending up to the level of the right kidney. Gas collections 

noted to be new finding. No hydronephrosis





Will s/d/r/w Dr. Tejada





- Date & Time


Date: 08/07/17


Time: 09:02





<Frank Tejada - Last Filed: 08/07/17 10:32>





Meds





- Medications


Medications: 


 Current Medications





Piperacillin Sod/Tazobactam Sod (Zosyn 3.375 In Ns 100ml)  100 mls @ 200 mls/hr 

IVPB Q6 [facility]


   PRN Reason: Protocol


   Stop: 08/13/17 12:01


   Last Admin: 08/07/17 05:30 Dose:  200 mls/hr


Ondansetron HCl (Zofran Inj)  4 mg IVP Q4H PRN


   PRN Reason: Nausea/Vomiting


Oxycodone HCl (Oxycodone Immediate Release Tab)  15 mg PO Q6H PRN


   PRN Reason: Pain, moderate (4-7)


   Last Admin: 08/07/17 05:30 Dose:  15 mg


Pantoprazole Sodium (Protonix Inj)  40 mg IVP DAILY [facility]


   Last Admin: 08/06/17 11:51 Dose:  40 mg











Results





- Vital Signs


Recent Vital Signs: 


 Last Vital Signs











Temp  98.1 F   08/07/17 06:00


 


Pulse  70   08/07/17 06:00


 


Resp  17   08/07/17 06:00


 


BP  114/72   08/07/17 06:00


 


Pulse Ox  100   08/07/17 06:00














- Labs


Result Diagrams: 


 08/07/17 08:07





 08/06/17 06:45


Labs: 


 Laboratory Results - last 24 hr











  08/07/17





  08:07


 


WBC  6.1  D


 


RBC  3.98


 


Hgb  8.7 L


 


Hct  28.3 L


 


MCV  71.1 L


 


MCH  21.9 L


 


MCHC  30.7 L


 


RDW  18.1 H


 


Plt Count  588 H


 


MPV  8.3


 


Gran %  66.6


 


Lymph % (Auto)  15.0 L


 


Mono % (Auto)  15.5 H


 


Eos % (Auto)  2.6


 


Baso % (Auto)  0.3


 


Gran #  4.07


 


Lymph #  0.9 L


 


Mono #  1.0 H


 


Eos #  0.2


 


Baso #  0.02














Assessment & Plan





- Assessment and Plan (Free Text)


Plan: 





Infectious Diseases Attending Physician Attestation


Patient seen and examined, discussed with medical resident. I have reviewed the 

pertinent clinical information for this patient. I agree with the above findings

, assessment and plan. In addition, we will add Zyvox in addition to Zosyn for 

this patient with right psoas abscess in a patient with uncontrolled Crohn's 

disease with multiple enterocutaneous fistulas. Awaiting further surgical plans 

for this patient and plan for the abscess (ie. plan for drainage). Will follow 

up wound cx and blood cx. Will monitor clinically.

## 2017-08-07 NOTE — CP.PCM.PN
Subjective





- Date & Time of Evaluation


Date of Evaluation: 08/07/17


Time of Evaluation: 07:10





- Subjective


Subjective: 





Surgery Progress Note for Dr. Brown





Patient seen and examined at bedside.  No acute events overnight reported.  

Patient receiving AM medications at time of exam.  Still complaining of 

abdominal pain, reports persistence x2-3 days prior to admission.  Reports 

greenish drainage on bandages after last dressing changed.  Refused observation 

of drainage sites at this time, requested team examine sites later.  Denies 

chest pain, shortness of breath, nausea, emesis.





Objective





- Vital Signs/Intake and Output


Vital Signs (last 24 hours): 


 











Temp Pulse Resp BP Pulse Ox


 


 98.8 F   82   20   116/79   100 


 


 08/06/17 18:42  08/06/17 18:42  08/06/17 18:42  08/06/17 18:42  08/06/17 18:42








Intake and Output: 


 











 08/07/17 08/07/17





 06:59 18:59


 


Intake Total 360 


 


Output Total 600 


 


Balance -240 














- Medications


Medications: 


 Current Medications





Piperacillin Sod/Tazobactam Sod (Zosyn 3.375 In Ns 100ml)  100 mls @ 200 mls/hr 

IVPB Q6 [facility]


   PRN Reason: Protocol


   Stop: 08/13/17 12:01


   Last Admin: 08/07/17 05:30 Dose:  200 mls/hr


Metronidazole (Flagyl)  500 mg PO Q8 [facility]


   PRN Reason: Protocol


   Last Admin: 08/07/17 06:57 Dose:  500 mg


Ondansetron HCl (Zofran Inj)  4 mg IVP Q4H PRN


   PRN Reason: Nausea/Vomiting


Oxycodone HCl (Oxycodone Immediate Release Tab)  15 mg PO Q6H PRN


   PRN Reason: Pain, moderate (4-7)


   Last Admin: 08/07/17 05:30 Dose:  15 mg


Pantoprazole Sodium (Protonix Inj)  40 mg IVP DAILY [facility]


   Last Admin: 08/06/17 11:51 Dose:  40 mg











- Labs


Labs: 


 





 08/07/17 08:07 











- Additional Findings


Additional findings: 





- Head Exam


Head Exam: ATRAUMATIC, NORMAL INSPECTION, NORMOCEPHALIC





- Eye Exam


Eye Exam: EOMI, Normal Appearance.  Absent: Scleral icterus, Conjunctival 

injection





- ENT Exam


ENT Exam: Mucous Membranes Moist, Normal Exam





- Neck Exam


Neck exam: Normal Inspection.  Absent: Thyromegaly





- Respiratory Exam


Respiratory Exam: Clear to Auscultation Bilateral, NORMAL BREATHING PATTERN.  

Absent: Rales, Wheezes, Ronchi, Tachypnea, John Cyanosis





- Cardiovascular Exam


Cardiovascular Exam: REGULAR RHYTHM, RRR, +S1, +S2.  Absent: Bradycardia, 

Tachycardia, Irregular Rhythm





- GI/Abdominal Exam


GI & Abdominal Exam: Normal Bowel Sounds, Soft, Guarding, Tenderness to 

palpation (at Lower quadrants and at/around fistula sites), anterior fistula 

bandaging in place, no drainage through bandaging.  Absent: Firm/Rigid, Absent/

Diminished breath sounds





- Extremities Exam


Extremities exam: Normal Inspection, Normal capillary refill, Pedal pulses (+2 

dorsalis pedis bilaterally)





- Neurological Exam


Neurological exam: Alert, Awake, Oriented x3, following commands appropriately, 

spontaneously moving all extremities





- Psychiatric Exam


Psychiatric exam: Normal Affect, Normal Mood





- Skin


Skin Exam: Dry, Normal Color, Warm, Bandaged fistula as noted in Abd exam








Assessment and Plan





- Assessment and Plan (Free Text)


Assessment: 





This is a 22 yo AA M with a PMH of Crohn's, vesicular cutaneous fistula, 

chronic abdominal abscess, and non-compliance who presented for right lower 

abdominal pain and right lower back pain near fistula sites. 


Plan: 





1) Crohn's with multiple fistulas


-CT of abd/pelvis notable for right-sided psoas abscess and severe swelling of 

the iliacus muscle. Gas within the psoas abscess extending up to the level of 

the right kidney. The gas collections are new finding compared to prior scans


-Wounds cultures drawn, defer to IR for management of abscess


-Advanced to regular diet


-Abx coverage with PO Flagyl, IV Zosyn


-GI ppx with Protonix, DVT ppx with SCDs


-Pain control as per primary, hx of pain seeking behavior


-Further medical management as per primary team





No indication for surgical intervention at this time, we will continue to 

follow peripherally while patient remains in hospital.





Will discuss with Dr. Brown.

## 2017-08-08 LAB
ALBUMIN SERPL-MCNC: 3.7 G/DL (ref 3–4.8)
ALBUMIN/GLOB SERPL: 1 {RATIO} (ref 1.1–1.8)
ALT SERPL-CCNC: 17 U/L (ref 7–56)
AST SERPL-CCNC: 42 U/L (ref 15–59)
BASOPHILS # BLD AUTO: 0.02 K/MM3 (ref 0–2)
BASOPHILS NFR BLD: 0.2 % (ref 0–3)
BUN SERPL-MCNC: 4 MG/DL (ref 7–21)
CALCIUM SERPL-MCNC: 9.2 MG/DL (ref 8.4–10.5)
EOSINOPHIL # BLD: 0.1 10*3/UL (ref 0–0.7)
EOSINOPHIL NFR BLD: 0.8 % (ref 1.5–5)
ERYTHROCYTE [DISTWIDTH] IN BLOOD BY AUTOMATED COUNT: 17.8 % (ref 11.5–14.5)
GFR NON-AFRICAN AMERICAN: > 60
GRANULOCYTES # BLD: 7.56 10*3/UL (ref 1.4–6.5)
GRANULOCYTES NFR BLD: 78.1 % (ref 50–68)
HGB BLD-MCNC: 9.1 G/DL (ref 14–18)
LYMPHOCYTES # BLD: 0.8 10*3/UL (ref 1.2–3.4)
LYMPHOCYTES NFR BLD AUTO: 8.7 % (ref 22–35)
MCH RBC QN AUTO: 21.8 PG (ref 25–35)
MCHC RBC AUTO-ENTMCNC: 30.7 G/DL (ref 31–37)
MCV RBC AUTO: 70.8 FL (ref 80–105)
MONOCYTES # BLD AUTO: 1.2 10*3/UL (ref 0.1–0.6)
MONOCYTES NFR BLD: 12.2 % (ref 1–6)
PLATELET # BLD: 693 10^3/UL (ref 120–450)
PMV BLD AUTO: 8.1 FL (ref 7–11)
RBC # BLD AUTO: 4.18 10^6/UL (ref 3.5–6.1)
WBC # BLD AUTO: 9.7 10^3/UL (ref 4.5–11)

## 2017-08-08 RX ADMIN — PIPERACILLIN AND TAZOBACTAM SCH MLS/HR: 3; .375 INJECTION, POWDER, LYOPHILIZED, FOR SOLUTION INTRAVENOUS; PARENTERAL at 01:06

## 2017-08-08 RX ADMIN — OXYCODONE HYDROCHLORIDE PRN MG: 15 TABLET ORAL at 10:13

## 2017-08-08 RX ADMIN — OXYCODONE HYDROCHLORIDE PRN MG: 15 TABLET ORAL at 16:01

## 2017-08-08 RX ADMIN — LINEZOLID SCH: 600 INJECTION, SOLUTION INTRAVENOUS at 11:01

## 2017-08-08 RX ADMIN — SIMETHICONE CHEW TAB 80 MG PRN MG: 80 TABLET ORAL at 04:37

## 2017-08-08 RX ADMIN — CEFTRIAXONE SCH MLS/HR: 1 INJECTION, SOLUTION INTRAVENOUS at 11:01

## 2017-08-08 RX ADMIN — OXYCODONE HYDROCHLORIDE PRN MG: 15 TABLET ORAL at 22:10

## 2017-08-08 RX ADMIN — OXYCODONE HYDROCHLORIDE PRN MG: 15 TABLET ORAL at 04:36

## 2017-08-08 RX ADMIN — PIPERACILLIN AND TAZOBACTAM SCH: 3; .375 INJECTION, POWDER, LYOPHILIZED, FOR SOLUTION INTRAVENOUS; PARENTERAL at 06:06

## 2017-08-08 NOTE — CP.PCM.PN
<Jose Enrique Harrell - Last Filed: 08/08/17 13:32>





Subjective





- Date & Time of Evaluation


Date of Evaluation: 08/08/17


Time of Evaluation: 13:06





- Subjective


Subjective: 





Patient s/e bedside on F this AM. No acute events overnight. Patient 

continues to have purulent drainage from posterior and anterior fistula sites. 

Per notes patient is refusing wound dressing. Pt complains of lower back pain 

and tenderness as well as abdominal discomfort located in proximity to his 

draining cutaneous fistulas. Patient denies chest pain, shortness of breath, 

change in bowel, fever, chills, nausea and vomiting. 





Objective





- Vital Signs/Intake and Output


Vital Signs (last 24 hours): 


 











Temp Pulse Resp BP Pulse Ox


 


 98.6 F   70   20   103/65   100 


 


 08/08/17 06:00  08/08/17 06:00  08/08/17 06:00  08/08/17 06:00  08/08/17 06:00








Intake and Output: 


 











 08/08/17 08/08/17





 06:59 18:59


 


Intake Total 780 480


 


Output Total 1600 600


 


Balance -820 -120














- Medications


Medications: 


 Current Medications





Acetaminophen (Tylenol 325mg Tab)  650 mg PO Q6H PRN


   PRN Reason: Pain, moderate (4-7)


   Last Admin: 08/08/17 02:07 Dose:  650 mg


Ceftriaxone Sodium (Rocephin 1 Gram Ivpb)  1 gm in 100 mls @ 100 mls/hr IVPB 

DAILY [facility]


   PRN Reason: Protocol


   Last Admin: 08/08/17 11:01 Dose:  100 mls/hr


Ondansetron HCl (Zofran Inj)  4 mg IVP Q4H PRN


   PRN Reason: Nausea/Vomiting


Oxycodone HCl (Oxycodone Immediate Release Tab)  15 mg PO Q6H PRN


   PRN Reason: Pain, moderate (4-7)


   Last Admin: 08/08/17 10:13 Dose:  15 mg


Pantoprazole Sodium (Protonix Inj)  40 mg IVP DAILY [facility]


   Last Admin: 08/08/17 10:13 Dose:  40 mg


Simethicone (Mylicon Chew Tab)  80 mg PO [facility] PRN


   PRN Reason: GI distress


   Last Admin: 08/08/17 04:37 Dose:  80 mg











- Labs


Labs: 


 





 08/08/17 07:00 





 08/08/17 07:00 











- Head Exam


Head Exam: ATRAUMATIC, NORMAL INSPECTION, NORMOCEPHALIC





- Eye Exam


Eye Exam: EOMI, PERRL





- ENT Exam


ENT Exam: Mucous Membranes Moist, Normal Exam





- Neck Exam


Neck Exam: Full ROM, Normal Inspection





- Respiratory Exam


Respiratory Exam: Clear to Ausculation Bilateral, NORMAL BREATHING PATTERN





- Cardiovascular Exam


Cardiovascular Exam: REGULAR RHYTHM, +S1, +S2





- GI/Abdominal Exam


GI & Abdominal Exam: Tenderness (right lower quadrant surrounding draining 

fistula site ), Normal Bowel Sounds





- Extremities Exam


Extremities Exam: Full ROM, Normal Capillary Refill





- Back Exam


Additional comments: 





draining cutaneous fistula with yellow purulent drainage noted





- Neurological Exam


Neurological Exam: Alert, Awake, CN II-XII Intact, Oriented x3


Neuro motor strength exam: Left Upper Extremity: 5, Right Upper Extremity: 5, 

Left Lower Extremity: 5, Right Lower Extremity: 5





- Psychiatric Exam


Psychiatric exam: Normal Affect, Normal Mood





- Skin


Skin Exam: Dry, Normal Color, Warm





Assessment and Plan


(1) Abdominal fistula


Status: Acute   





(2) Vesicocutaneous fistula


Status: Chronic   





- Assessment and Plan (Free Text)


Assessment: 


Pt is a 21 year old male with PMH of Chron's disease and vesicocutaneous 

fistulas present on anterior abdomen and posterior lower back that presents 

with drainage of his fistulas and abdominal pain.


Plan: 


1. Chron's disease with draining fistula's


- Uncontrolled Chron's disease


- Wound culture growing Klebsiella, MSSA, E.coli


- ID following, rec switching patient to ceftriaxone


- Pain control with oxycontin


- IR consulted


- Monitor patient for elevation of WBC, fever, or increase in wound drainage


- upon dc plan for patient to follow up with Dr. Nielsen





2. History of Microcytic anemia


- H/H stable at 9.1/29.6





3. GI/DVT ppx


- Protonix


- SCDs 





dispo: Continue to monitor patient for next 24-48 hours for worsening of 

condition with possible plan for dc if stable





<Trever Buitrago - Last Filed: 08/08/17 15:44>





Objective





- Vital Signs/Intake and Output


Vital Signs (last 24 hours): 


 











Temp Pulse Resp BP Pulse Ox


 


 98.6 F   70   20   103/65   100 


 


 08/08/17 06:00  08/08/17 06:00  08/08/17 06:00  08/08/17 06:00  08/08/17 06:00








Intake and Output: 


 











 08/08/17 08/08/17





 06:59 18:59


 


Intake Total 780 480


 


Output Total 1600 600


 


Balance -820 -120














- Medications


Medications: 


 Current Medications





Acetaminophen (Tylenol 325mg Tab)  650 mg PO Q6H PRN


   PRN Reason: Pain, moderate (4-7)


   Last Admin: 08/08/17 02:07 Dose:  650 mg


Ceftriaxone Sodium (Rocephin 1 Gram Ivpb)  1 gm in 100 mls @ 100 mls/hr IVPB 

DAILY [facility]


   PRN Reason: Protocol


   Last Admin: 08/08/17 11:01 Dose:  100 mls/hr


Ondansetron HCl (Zofran Inj)  4 mg IVP Q4H PRN


   PRN Reason: Nausea/Vomiting


Oxycodone HCl (Oxycodone Immediate Release Tab)  15 mg PO Q6H PRN


   PRN Reason: Pain, moderate (4-7)


   Last Admin: 08/08/17 10:13 Dose:  15 mg


Pantoprazole Sodium (Protonix Inj)  40 mg IVP DAILY [facility]


   Last Admin: 08/08/17 10:13 Dose:  40 mg


Simethicone (Mylicon Chew Tab)  80 mg PO [facility] PRN


   PRN Reason: GI distress


   Last Admin: 08/08/17 04:37 Dose:  80 mg











- Labs


Labs: 


 





 08/08/17 07:00 





 08/08/17 07:00 











Attending/Attestation





- Attestation


I have personally seen and examined this patient.: Yes


I have fully participated in the care of the patient.: Yes


I have reviewed all pertinent clinical information, including history, physical 

exam and plan: Yes


Notes (Text): 





08/08/17 15:41





attending note;





patient seen and examined with resident in ER.





Patient is a 21 year old male with a past medical history of Crohn's disease, 

vesicular cutaneous fistula and chronic abdominal abscess who presented to the 

ED for evaluation of right lower abdominal pain and right lower back pain/ 

fistula sites.


 ON Iv Zosyn. Wound culture grew Escherichia coli, MSSA and Klebsiella. 

currently on IV Rocephin.





Surgery evaluation appreciated.





 CT scan showed  right iliopsoas abscess with gas. The patient [place] 

[place] [facility] [place].


Patient also follows up with Dr. Doyle for remicaide injections.





CT scan reviewed with Dr. Jorge Luis Gregory. Advised to continue IV antibiotics. 

patient is not interested in percutaneous drainage.


He will follow up with his primary surgeon .





If clinically stable possible discharge within 24-48 hours.

## 2017-08-08 NOTE — CP.PCM.PN
Addendum entered and electronically signed by Scarlet Valle DO  08/08/17 08:44

: 





Pt refused IV ABX because the burning pain from IV site compounded with abd/

back pain is intolerable. Not controlled by oxycodone IR 15mg PRN


will give morphine 1mg x 1 with today's IV abx





Original Note:








<Scarlet Valle - Last Filed: 08/08/17 08:38>





Subjective





- Date & Time of Evaluation


Date of Evaluation: 08/08/17


Time of Evaluation: 06:34





- Subjective


Subjective: 





PGY-2 for Dr. Tejada





T 99.2 last evening


Pt refused SCD boots.


Pt did his own dressing change. Refused wound care by RN


+ BM yesterday, formed stool. Pt ambulate to bathroom.


No c/o f/c, n/v/d/c, cp, sob, dysuria








Objective





- Vital Signs/Intake and Output


Vital Signs (last 24 hours): 


 











Temp Pulse Resp BP Pulse Ox


 


 99.2 F   76   20   118/66   98 


 


 08/07/17 16:00  08/07/17 16:00  08/07/17 16:00  08/07/17 16:00  08/07/17 16:00








Intake and Output: 


 











 08/07/17 08/08/17





 18:59 06:59


 


Intake Total  780


 


Output Total  1600


 


Balance  -820














- Medications


Medications: 


 Current Medications





Acetaminophen (Tylenol 325mg Tab)  650 mg PO Q6H PRN


   PRN Reason: Pain, moderate (4-7)


   Last Admin: 08/08/17 02:07 Dose:  650 mg


Piperacillin Sod/Tazobactam Sod (Zosyn 3.375 In Ns 100ml)  100 mls @ 200 mls/hr 

IVPB Q6 BARBARA


   PRN Reason: Protocol


   Stop: 08/13/17 12:01


   Last Admin: 08/08/17 06:06 Dose:  Not Given


Linezolid (Zyvox 600mg/300ml D5w)  600 mg in 300 mls @ 200 mls/hr IVPB Q12 BARBARA


   PRN Reason: Protocol


   Stop: 08/12/17 22:01


   Last Admin: 08/07/17 21:40 Dose:  200 mls/hr


Ondansetron HCl (Zofran Inj)  4 mg IVP Q4H PRN


   PRN Reason: Nausea/Vomiting


Oxycodone HCl (Oxycodone Immediate Release Tab)  15 mg PO Q6H PRN


   PRN Reason: Pain, moderate (4-7)


   Last Admin: 08/08/17 04:36 Dose:  15 mg


Pantoprazole Sodium (Protonix Inj)  40 mg IVP DAILY BARBARA


   Last Admin: 08/07/17 11:10 Dose:  Not Given


Simethicone (Mylicon Chew Tab)  80 mg PO PCHS PRN


   PRN Reason: GI distress


   Last Admin: 08/08/17 04:37 Dose:  80 mg











- Labs


Labs: 


 





 08/07/17 08:07 





 08/07/17 08:07 











- Constitutional


Appears: No Acute Distress





- Head Exam


Head Exam: ATRAUMATIC, NORMAL INSPECTION, NORMOCEPHALIC





- Eye Exam


Eye Exam: EOMI, Normal appearance, PERRL.  absent: Scleral icterus


Pupil Exam: NORMAL ACCOMODATION





- ENT Exam


ENT Exam: Mucous Membranes Moist





- Respiratory Exam


Respiratory Exam: Clear to Ausculation Bilateral.  absent: Rales, Rhonchi, 

Wheezes





- Cardiovascular Exam


Cardiovascular Exam: REGULAR RHYTHM, +S1, +S2





- GI/Abdominal Exam


GI & Abdominal Exam: Soft, Tenderness (Pt refused abdominal exam. fistula at 

abdomen and back decreases in draining)





- Extremities Exam


Extremities Exam: Normal Capillary Refill.  absent: Calf Tenderness, Pedal Edema





- Neurological Exam


Neurological Exam: Alert, Awake, Oriented x3





- Psychiatric Exam


Psychiatric exam: Normal Affect, Normal Mood





- Skin


Skin Exam: Dry, Warm





Assessment and Plan





- Assessment and Plan (Free Text)


Plan: 





21 year old male with PMH uncontrolled Crohn's disease and med non-compliance, 

vesicular cutaneous fistula and chronic abdominal abscess admitted for sudden 

worsening of right lower abdominal pain and right lower back pain near his 

fistula sites.


- R posias abscess with gas


- Myositis at iliacus


- Draining enterocutanous fistulas


- R/O Intraabdominal infection


- Microcytic anemia


- Thrombocytosis - reactive to Crohn exacerbation and infection


- Uncontrolled Crohn's disease with multiple enterocutaneous fistulas





Plan


- continue monitor clinical course. Trend Temp/fever


- Pending IR/surg decision on I&D


- continue zosyn (day 3) and Zyvox (day 2)  





Culture


Blood culture (8/6) - no growth x 1 day


Abdominal wound culture (8/6) - Gram neg rods


Back wound culture (8/6) Gram neg rods & Gram Pos Cocci


Hx abdominal wound 


   (6/18/17) - Klebsiella pneumonia (resistamnt to ampicillin, aztreonam, 

cefazolin, cefepime, ceftriaxone)


   (4/29/17) - Klebsiella pneumonia, citrobacter freundii, candida


   (4/2/17) - Vanco resistant E faecium, coagulase neg staph


   (3/26/17) - VRE and Candida albicans





Imaging


(8/6) CT abdomen and pelvis showing right sided psoas abscess and severe 

swelling of the iliacus muscle. Gas can be seen within psoas abscess on the 

current study extending up to the level of the right kidney. Gas collections 

noted to be new finding. No hydronephrosis





Will s/d/r/w Dr. Tejada





<Frank Tejada S - Last Filed: 08/08/17 10:37>





Objective





- Vital Signs/Intake and Output


Vital Signs (last 24 hours): 


 











Temp Pulse Resp BP Pulse Ox


 


 98.6 F   70   20   103/65   100 


 


 08/08/17 06:00  08/08/17 06:00  08/08/17 06:00  08/08/17 06:00  08/08/17 06:00








Intake and Output: 


 











 08/08/17 08/08/17





 06:59 18:59


 


Intake Total 780 480


 


Output Total 1600 600


 


Balance -820 -120














- Medications


Medications: 


 Current Medications





Acetaminophen (Tylenol 325mg Tab)  650 mg PO Q6H PRN


   PRN Reason: Pain, moderate (4-7)


   Last Admin: 08/08/17 02:07 Dose:  650 mg


Piperacillin Sod/Tazobactam Sod (Zosyn 3.375 In Ns 100ml)  100 mls @ 200 mls/hr 

IVPB Q6 BARBARA


   PRN Reason: Protocol


   Stop: 08/13/17 12:01


   Last Admin: 08/08/17 06:06 Dose:  Not Given


Linezolid (Zyvox 600mg/300ml D5w)  600 mg in 300 mls @ 200 mls/hr IVPB Q12 BARBARA


   PRN Reason: Protocol


   Stop: 08/12/17 22:01


   Last Admin: 08/07/17 21:40 Dose:  200 mls/hr


Ondansetron HCl (Zofran Inj)  4 mg IVP Q4H PRN


   PRN Reason: Nausea/Vomiting


Oxycodone HCl (Oxycodone Immediate Release Tab)  15 mg PO Q6H PRN


   PRN Reason: Pain, moderate (4-7)


   Last Admin: 08/08/17 10:13 Dose:  15 mg


Pantoprazole Sodium (Protonix Inj)  40 mg IVP DAILY BARBARA


   Last Admin: 08/08/17 10:13 Dose:  40 mg


Simethicone (Mylicon Chew Tab)  80 mg PO PCHS PRN


   PRN Reason: GI distress


   Last Admin: 08/08/17 04:37 Dose:  80 mg











- Labs


Labs: 


 





 08/08/17 07:00 





 08/08/17 07:00 











Assessment and Plan





- Assessment and Plan (Free Text)


Plan: 





Infectious diseases Attending Physician attestation


Patient seen and examined, discussed with medical resident. I have reviewed the 

pertinent clinical information for this patient. I agree with the above findings

, assessment and plan. Wound cx is showing MSSA, E. coli and Klebsiella. We can 

switch antibiotics to CEftriaxone and will monitor patient. Follow up surgical 

plans.

## 2017-08-09 VITALS
TEMPERATURE: 98.8 F | OXYGEN SATURATION: 99 % | DIASTOLIC BLOOD PRESSURE: 60 MMHG | SYSTOLIC BLOOD PRESSURE: 110 MMHG | HEART RATE: 82 BPM

## 2017-08-09 LAB
ALBUMIN SERPL-MCNC: 3.6 G/DL (ref 3–4.8)
ALBUMIN/GLOB SERPL: 0.9 {RATIO} (ref 1.1–1.8)
ALT SERPL-CCNC: 17 U/L (ref 7–56)
AST SERPL-CCNC: 15 U/L (ref 15–59)
BASOPHILS # BLD AUTO: 0.01 K/MM3 (ref 0–2)
BASOPHILS NFR BLD: 0.1 % (ref 0–3)
BUN SERPL-MCNC: 3 MG/DL (ref 7–21)
CALCIUM SERPL-MCNC: 9.1 MG/DL (ref 8.4–10.5)
EOSINOPHIL # BLD: 0 10*3/UL (ref 0–0.7)
EOSINOPHIL NFR BLD: 0.3 % (ref 1.5–5)
ERYTHROCYTE [DISTWIDTH] IN BLOOD BY AUTOMATED COUNT: 17.9 % (ref 11.5–14.5)
GFR NON-AFRICAN AMERICAN: > 60
GRANULOCYTES # BLD: 9.01 10*3/UL (ref 1.4–6.5)
GRANULOCYTES NFR BLD: 78.2 % (ref 50–68)
HGB BLD-MCNC: 9.6 G/DL (ref 14–18)
LYMPHOCYTES # BLD: 0.9 10*3/UL (ref 1.2–3.4)
LYMPHOCYTES NFR BLD AUTO: 8.1 % (ref 22–35)
MCH RBC QN AUTO: 22 PG (ref 25–35)
MCHC RBC AUTO-ENTMCNC: 31.3 G/DL (ref 31–37)
MCV RBC AUTO: 70.4 FL (ref 80–105)
MONOCYTES # BLD AUTO: 1.5 10*3/UL (ref 0.1–0.6)
MONOCYTES NFR BLD: 13.3 % (ref 1–6)
PLATELET # BLD: 717 10^3/UL (ref 120–450)
PMV BLD AUTO: 8 FL (ref 7–11)
RBC # BLD AUTO: 4.36 10^6/UL (ref 3.5–6.1)
WBC # BLD AUTO: 11.5 10^3/UL (ref 4.5–11)

## 2017-08-09 RX ADMIN — OXYCODONE HYDROCHLORIDE PRN MG: 15 TABLET ORAL at 04:05

## 2017-08-09 RX ADMIN — OXYCODONE HYDROCHLORIDE PRN MG: 15 TABLET ORAL at 10:04

## 2017-08-09 RX ADMIN — OXYCODONE HYDROCHLORIDE PRN MG: 15 TABLET ORAL at 16:15

## 2017-08-09 RX ADMIN — CEFTRIAXONE SCH MLS/HR: 1 INJECTION, SOLUTION INTRAVENOUS at 11:15

## 2017-08-09 NOTE — CP.PCM.DIS
<Blayne Harrellophe - Last Filed: 08/09/17 16:21>





Provider





- Provider


Date of Admission: 


08/06/17 10:19





Attending physician: 


Trever Buitrago MD





Consults: 





Infectious Disease: Dr. Frank Tejada


Interventional Radiology: Dr. Jorge Luis Gregory


Time Spent in preparation of Discharge (in minutes): 30





Diagnosis





- Discharge Diagnosis


(1) Abdominal fistula


Status: Acute   





(2) Vesicocutaneous fistula


Status: Chronic   





Hospital Course





- Lab Results


Lab Results: 


 Most Recent Lab Values











WBC  11.5 10^3/ul (4.5-11.0)  H  08/09/17  05:15    


 


RBC  4.36 10^6/uL (3.5-6.1)   08/09/17  05:15    


 


Hgb  9.6 g/dL (14.0-18.0)  L  08/09/17  05:15    


 


Hct  30.7 % (42.0-52.0)  L  08/09/17  05:15    


 


MCV  70.4 fl (80.0-105.0)  L  08/09/17  05:15    


 


MCH  22.0 pg (25.0-35.0)  L  08/09/17  05:15    


 


MCHC  31.3 g/dl (31.0-37.0)   08/09/17  05:15    


 


RDW  17.9 % (11.5-14.5)  H  08/09/17  05:15    


 


Plt Count  717 10^3/uL (120.0-450.0)  H*  08/09/17  05:15    


 


MPV  8.0 fl (7.0-11.0)   08/09/17  05:15    


 


Gran %  78.2 % (50.0-68.0)  H  08/09/17  05:15    


 


Lymph % (Auto)  8.1 % (22.0-35.0)  L  08/09/17  05:15    


 


Mono % (Auto)  13.3 % (1.0-6.0)  H  08/09/17  05:15    


 


Eos % (Auto)  0.3 % (1.5-5.0)  L  08/09/17  05:15    


 


Baso % (Auto)  0.1 % (0.0-3.0)   08/09/17  05:15    


 


Gran #  9.01  (1.4-6.5)  H  08/09/17  05:15    


 


Lymph #  0.9  (1.2-3.4)  L  08/09/17  05:15    


 


Mono #  1.5  (0.1-0.6)  H  08/09/17  05:15    


 


Eos #  0.0  (0.0-0.7)   08/09/17  05:15    


 


Baso #  0.01 K/mm3 (0.0-2.0)   08/09/17  05:15    


 


pO2  28 mm/Hg (30-55)  L  08/06/17  06:30    


 


VBG pH  7.34  (7.32-7.43)   08/06/17  06:30    


 


VBG pCO2  57.0  (40-60)   08/06/17  06:30    


 


VBG HCO3  30.8 mmol/l (21-28)  H  08/06/17  06:30    


 


VBG Total CO2  32.5 mmol.L (22-28)  H  08/06/17  06:30    


 


VBG O2 Sat (Calc)  51.4 % (40-65)   08/06/17  06:30    


 


VBG Base Excess  3.5 mmol/L (0.0-2.0)  H  08/06/17  06:30    


 


VBG Potassium  4.7 mmol/L (3.6-5.2)   08/06/17  06:30    


 


Sodium  138.0 mmol/L (132-148)   08/06/17  06:30    


 


Chloride  102.0 mmol/L ()   08/06/17  06:30    


 


Glucose  111 mg/dl ()  H  08/06/17  06:30    


 


Lactate  1.8 mmol/L (0.7-2.1)   08/06/17  06:30    


 


FiO2  21.0 %  08/06/17  06:30    


 


Sodium  138 mmol/L (132-148)   08/09/17  05:15    


 


Potassium  3.9 mmol/L (3.6-5.0)   08/09/17  05:15    


 


Chloride  96 mmol/L ()  L  08/09/17  05:15    


 


Carbon Dioxide  29 mmol/L (21-33)   08/09/17  05:15    


 


Anion Gap  17  (10-20)   08/09/17  05:15    


 


BUN  3 mg/dL (7-21)  L  08/09/17  05:15    


 


Creatinine  0.7 mg/dL (0.5-1.4)   08/09/17  05:15    


 


Est GFR ( Amer)  > 60   08/09/17  05:15    


 


Est GFR (Non-Af Amer)  > 60   08/09/17  05:15    


 


Random Glucose  88 mg/dL ()   08/09/17  05:15    


 


Calcium  9.1 mg/dL (8.4-10.5)   08/09/17  05:15    


 


Total Bilirubin  0.3 mg/dL (0.2-1.3)   08/09/17  05:15    


 


AST  15 U/L (15-59)   08/09/17  05:15    


 


ALT  17 U/L (7-56)   08/09/17  05:15    


 


Alkaline Phosphatase  94 U/L ()   08/09/17  05:15    


 


Total Protein  7.4 g/dL (5.8-8.3)   08/09/17  05:15    


 


Albumin  3.6 g/dL (3.0-4.8)   08/09/17  05:15    


 


Globulin  3.8 gm/dL  08/09/17  05:15    


 


Albumin/Globulin Ratio  0.9  (1.1-1.8)  L  08/09/17  05:15    


 


Venous Blood Potassium  4.7 mmol/L (3.6-5.2)   08/06/17  06:30    














- Hospital Course


Hospital Course: 





Pt is a 21 year old male with poorly controlled Chron's diseas and 

vesicocutaneous fistulas who was admitted after presenting to the [facility] ED for 

purulent drainage of his fistulas, general weakness, and lower back/abdominal 

pain. The patient was evaluated in the ED and found to be afebrile and without 

elevation of WBC. Patient was admitted to the [facility] for management of draining 

fistulas. ID was consulted and recommended zosyn and and flagyl. Blood cultures 

were taken and have continued to be negative. Wound cultures were taken and 

shown to grow Klebseilla. IR was consulted and case discussed with Dr. Gregory. 

IR recommended that the patient continue with antibiotic regiment and hold off 

on any intervention unless patient became febrile or found to have elevation in 

WBC. Patient remained afebrile throughout his course and found to have 

minimally elevated WBC. Plan was discussed with patient that involved him 

continuing his antibiotic regiment outpatient and to follow up with his surgeon 

Dr. Nielsen in [facility] for further management of his cutaneous fistulas as result of 

his poorly controlled Chron's disease. Patient understood plan and was 

agreeable with plan. 





- Date & Time of H&P


Date of H&P: 08/06/17


Time of H&P: 19:27





Discharge Exam





- Head Exam


Head Exam: ATRAUMATIC, NORMAL INSPECTION, NORMOCEPHALIC





- Eye Exam


Eye Exam: EOMI, PERRL





- ENT Exam


ENT Exam: Mucous Membranes Dry, Normal Exam





- Neck Exam


Neck exam: Full Rom





- Respiratory Exam


Respiratory Exam: Clear to PA & Lateral, NORMAL BREATHING PATTERN, UNREMARKABLE





- Cardiovascular Exam


Cardiovascular Exam: REGULAR RHYTHM, +S1, +S2





- GI/Abdominal Exam


GI & Abdominal Exam: Normal Bowel Sounds, Tenderness (RLQ near draining fistula)





- Extremities Exam


Extremities exam: full ROM, normal capillary refill, pedal pulses present





- Back Exam


Additional comments: 





lower back tenderness to palpation, presence of fistula draining purulent 

yellow fluid 





- Neurological Exam


Neurological exam: Alert, CN II-XII Intact, Normal Gait, Oriented x3





- Psychiatric Exam


Psychiatric exam: Normal Affect, Normal Mood





- Skin


Skin Exam: Dry, Normal Color, Warm


Additional comments: 





cutaneousfistula present on anterior abdomen and lower back that show minimal 

purulent drainage, dressings applied 





Discharge Plan





- Discharge Medications


Prescriptions: 


Amoxicillin/Clavulanate [Augmentin 875 MG-125 MG] 1 tab PO BID #14 tab


Linezolid [Zyvox] 600 mg PO BID #14 tab


oxyCODONE [oxyCODONE Immediate Release Tab] 15 mg PO Q6 #12 tab





- Follow Up Plan


Condition: STABLE


Disposition: HOME/ ROUTINE


Instructions:  Acute Abdominal Pain (DC), Acute Abdominal Pain (GEN)


Additional Instructions: 


1. Follow up with your primary doctor within 1 week of discharge.


2. Follow up with your surgeon, Dr. Nielsen on your scheduled appointment 

Wednesday, August 16th, 2017.


3. Follow up with your gastroenterologist at [facility]


4. Fill the medications prescribed to you and take as directed. Augmentin 875 

twice a day and Zyvox 600mg twice a day for a total of 7 days.


5. Return to the emergency room should your condition worsen.








<Trever Buitrago - Last Filed: 08/09/17 17:27>





Provider





- Provider


Date of Admission: 


08/06/17 10:19





Attending physician: 


Trever Buitrago MD








Hospital Course





- Lab Results


Lab Results: 


 Most Recent Lab Values











WBC  11.5 10^3/ul (4.5-11.0)  H  08/09/17  05:15    


 


RBC  4.36 10^6/uL (3.5-6.1)   08/09/17  05:15    


 


Hgb  9.6 g/dL (14.0-18.0)  L  08/09/17  05:15    


 


Hct  30.7 % (42.0-52.0)  L  08/09/17  05:15    


 


MCV  70.4 fl (80.0-105.0)  L  08/09/17  05:15    


 


MCH  22.0 pg (25.0-35.0)  L  08/09/17  05:15    


 


MCHC  31.3 g/dl (31.0-37.0)   08/09/17  05:15    


 


RDW  17.9 % (11.5-14.5)  H  08/09/17  05:15    


 


Plt Count  717 10^3/uL (120.0-450.0)  H*  08/09/17  05:15    


 


MPV  8.0 fl (7.0-11.0)   08/09/17  05:15    


 


Gran %  78.2 % (50.0-68.0)  H  08/09/17  05:15    


 


Lymph % (Auto)  8.1 % (22.0-35.0)  L  08/09/17  05:15    


 


Mono % (Auto)  13.3 % (1.0-6.0)  H  08/09/17  05:15    


 


Eos % (Auto)  0.3 % (1.5-5.0)  L  08/09/17  05:15    


 


Baso % (Auto)  0.1 % (0.0-3.0)   08/09/17  05:15    


 


Gran #  9.01  (1.4-6.5)  H  08/09/17  05:15    


 


Lymph #  0.9  (1.2-3.4)  L  08/09/17  05:15    


 


Mono #  1.5  (0.1-0.6)  H  08/09/17  05:15    


 


Eos #  0.0  (0.0-0.7)   08/09/17  05:15    


 


Baso #  0.01 K/mm3 (0.0-2.0)   08/09/17  05:15    


 


pO2  28 mm/Hg (30-55)  L  08/06/17  06:30    


 


VBG pH  7.34  (7.32-7.43)   08/06/17  06:30    


 


VBG pCO2  57.0  (40-60)   08/06/17  06:30    


 


VBG HCO3  30.8 mmol/l (21-28)  H  08/06/17  06:30    


 


VBG Total CO2  32.5 mmol.L (22-28)  H  08/06/17  06:30    


 


VBG O2 Sat (Calc)  51.4 % (40-65)   08/06/17  06:30    


 


VBG Base Excess  3.5 mmol/L (0.0-2.0)  H  08/06/17  06:30    


 


VBG Potassium  4.7 mmol/L (3.6-5.2)   08/06/17  06:30    


 


Sodium  138.0 mmol/L (132-148)   08/06/17  06:30    


 


Chloride  102.0 mmol/L ()   08/06/17  06:30    


 


Glucose  111 mg/dl ()  H  08/06/17  06:30    


 


Lactate  1.8 mmol/L (0.7-2.1)   08/06/17  06:30    


 


FiO2  21.0 %  08/06/17  06:30    


 


Sodium  138 mmol/L (132-148)   08/09/17  05:15    


 


Potassium  3.9 mmol/L (3.6-5.0)   08/09/17  05:15    


 


Chloride  96 mmol/L ()  L  08/09/17  05:15    


 


Carbon Dioxide  29 mmol/L (21-33)   08/09/17  05:15    


 


Anion Gap  17  (10-20)   08/09/17  05:15    


 


BUN  3 mg/dL (7-21)  L  08/09/17  05:15    


 


Creatinine  0.7 mg/dL (0.5-1.4)   08/09/17  05:15    


 


Est GFR ( Amer)  > 60   08/09/17  05:15    


 


Est GFR (Non-Af Amer)  > 60   08/09/17  05:15    


 


Random Glucose  88 mg/dL ()   08/09/17  05:15    


 


Calcium  9.1 mg/dL (8.4-10.5)   08/09/17  05:15    


 


Total Bilirubin  0.3 mg/dL (0.2-1.3)   08/09/17  05:15    


 


AST  15 U/L (15-59)   08/09/17  05:15    


 


ALT  17 U/L (7-56)   08/09/17  05:15    


 


Alkaline Phosphatase  94 U/L ()   08/09/17  05:15    


 


Total Protein  7.4 g/dL (5.8-8.3)   08/09/17  05:15    


 


Albumin  3.6 g/dL (3.0-4.8)   08/09/17  05:15    


 


Globulin  3.8 gm/dL  08/09/17  05:15    


 


Albumin/Globulin Ratio  0.9  (1.1-1.8)  L  08/09/17  05:15    


 


Venous Blood Potassium  4.7 mmol/L (3.6-5.2)   08/06/17  06:30    














Attending/Attestation





- Attestation


I have personally seen and examined this patient.: Yes


I have fully participated in the care of the patient.: Yes


I have reviewed all pertinent clinical information, including history, physical 

exam and plan: Yes


Notes (Text): 





08/09/17 17:21





attending note;





patient seen and examined with resident.





Patient is a 21 year old male with a past medical history of Crohn's disease, 

vesicular cutaneous fistula and chronic abdominal abscess admitted with right 

lower abdominal pain and right lower back pain/ fistula sites.


Wound culture grew Escherichia coli, MSSA and Klebsiella. Treated with  IV 

Rocephin.





Surgery evaluation appreciated.





CT scan showed  right iliopsoas abscess with gas. The patient had recent 

surgical procedure at [facility] by DR. Nielsen.


Patient also follows up with Dr. Doyle for remicaide injections.





patient is not interested in percutaneous drainage.


He will follow up with his primary surgeon .





will be discharged home with by mouth Zyvox and augmentin.


The patient will follow-up with  next wednesday.





diagnosis;


Crohn's disease


Multiple fistula


Multiple surgical drainage


Noncompliance with follow-up


Opiate dependency














08/09/17 17:24

## 2017-08-22 ENCOUNTER — HOSPITAL ENCOUNTER (EMERGENCY)
Dept: HOSPITAL 42 - ED | Age: 22
Discharge: LEFT BEFORE DISCHARGE | End: 2017-08-22
Payer: MEDICAID

## 2017-08-22 VITALS
TEMPERATURE: 98.4 F | OXYGEN SATURATION: 100 % | RESPIRATION RATE: 16 BRPM | DIASTOLIC BLOOD PRESSURE: 73 MMHG | HEART RATE: 71 BPM | SYSTOLIC BLOOD PRESSURE: 120 MMHG

## 2017-08-22 VITALS — BODY MASS INDEX: 17.4 KG/M2

## 2017-08-22 DIAGNOSIS — R10.9: Primary | ICD-10-CM

## 2017-08-22 DIAGNOSIS — Z91.19: ICD-10-CM

## 2017-08-22 PROCEDURE — 99284 EMERGENCY DEPT VISIT MOD MDM: CPT

## 2017-08-22 PROCEDURE — 96372 THER/PROPH/DIAG INJ SC/IM: CPT

## 2017-08-22 NOTE — ED PDOC
Arrival/HPI





- General


Chief Complaint: Abdominal Pain


Time Seen by Provider: 08/22/17 10:03


Historian: Patient





- History of Present Illness


Narrative History of Present Illness (Text): 





08/22/17 10:04


22 y/o male, pmh including chronic crohn's disease/abdominal abscess with 

fistula, allergic to toradol, c/o chronic abdominal pain.  Pt. stated that he 

has chronic abdominal pain from the crohn's disease which he has medication, 

started to have abdominal pain for the past 2-3 days which is his usual crohn's 

disease flared up.  Pt. stated that he feels fine, no nausea or vomiting, doesn'

t want any labs or radiology tests to be performed, only wants the pain 

medication as oral and he would like to leave the ER.  Pt. has no homicidal or 

suicidal ideation, no auditory or visual hallucinations, no chest pain or 

shortness of breath, no palpitation, no night sweat, no other medical or 

psychological complaints. 





Past Medical History





- Provider Review


Nursing Documentation Reviewed: Yes





- Infectious Disease


Hx of Infectious Diseases: None





- Tetanus Immunization


Tetanus Immunization: Unknown





- Reproductive


Currently Pregnant: No





- Cardiac


Hx Cardiac Disorders: No





- Pulmonary


Hx Respiratory Disorders: Yes


Hx Pneumonia: Yes





- Neurological


Hx Neurological Disorder: No


Hx Transient Ischemic Attacks (TIA): No





- HEENT


Hx HEENT Disorder: No





- Renal


Hx Renal Disorder: No





- Endocrine/Metabolic


Hx Endocrine Disorders: No





- Hematological/Oncological


Hx Blood Disorders: Yes


Hx Anemia: Yes





- Integumentary


Hx Dermatological Disorder: Yes (RIGHT LOWER BACK FISTULA.DID I&D MAY 30 17 

[facility],)


Other/Comment: 6-15-17 3 RIGHT ABDOMINAL WALL ABSCESS. WITH LIGHT GREEN 

DRAINAGE.MEDIUM AMT.





- Musculoskeletal/Rheumatological


Hx Musculoskeletal Disorders: No


Hx Falls: No





- Gastrointestinal


Hx Gastrointestinal Disorders: Yes


Hx Crohn's Disease: Yes





- Genitourinary/Gynecological


Hx Genitourinary Disorders: Yes


Hx Urinary Tract Infection: Yes





- Psychiatric


Hx Psychophysiologic Disorder: Yes


Hx Anxiety: Yes


Hx Substance Use: No





- Surgical History


Other/Comment: Colon resection





- Anesthesia


Hx Anesthesia: Yes


Hx Anesthesia Reactions: No


Hx Malignant Hyperthermia: No





- Suicidal Assessment


Feels Threatened In Home Enviroment: No





Family/Social History





- Physician Review


Nursing Documentation Reviewed: Yes


Family/Social History: Unknown Family HX


Smoking Status: Never Smoked


Hx Alcohol Use: No


Hx Substance Use: No





Allergies/Home Meds


Allergies/Adverse Reactions: 


Allergies





FISH Allergy (Verified 08/22/17 09:58)


 RASH


ketorolac [From Toradol] Allergy (Verified 08/22/17 09:58)


 RASH


shellfish derived Adverse Reaction (Verified 08/22/17 09:58)


 ANGIOEDEMA








Home Medications: 


 Home Meds











 Medication  Instructions  Recorded  Confirmed


 


inFLIXimab [Remicade] 1 vial INJ .EVERY6 WEEKS 08/22/17 08/22/17














Review of Systems





- Review of Systems


Constitutional: absent: Fatigue, Fevers


Eyes: absent: Vision Changes


ENT: absent: Hearing Changes


Respiratory: absent: SOB, Cough


Cardiovascular: absent: Chest Pain


Gastrointestinal: Abdominal Pain, Diarrhea.  absent: Constipation, Nausea, 

Vomiting


Musculoskeletal: absent: Arthralgias, Back Pain


Skin: absent: Rash, Pruritis, Skin Lesions, Other


Neurological: absent: Dizziness, Focal Weakness, Gait Changes, Speech Changes, 

Facial Droop, Seizure





Physical Exam


Vital Signs Reviewed: Yes


Vital Signs











  Temp Pulse Resp BP Pulse Ox


 


 08/22/17 09:58  98.4 F  71  16  120/73  100











Temperature: Afebrile


Blood Pressure: Normal


Pulse: Regular


Respiratory Rate: Normal


Appearance: Positive for: Well-Appearing, Non-Toxic, Comfortable


Pain Distress: Moderate


Mental Status: Positive for: Alert and Oriented X 3





- Systems Exam


Head: Present: Atraumatic, Normocephalic


Pupils: Present: PERRL


Extroacular Muscles: Present: EOMI


Conjunctiva: Present: Normal


Mouth: Present: Moist Mucous Membranes


Neck: Present: Normal Range of Motion


Respiratory/Chest: Present: Clear to Auscultation, Good Air Exchange.  No: 

Respiratory Distress, Accessory Muscle Use


Cardiovascular: Present: Regular Rate and Rhythm, Normal S1, S2.  No: Murmurs


Abdomen: Present: Tenderness (generalized abdominal tenderness), Normal Bowel 

Sounds, Scars.  No: Distention, Peritoneal Signs, Rebound, Guarding, Hernias, 

Feeding Tubes


Back: Present: Normal Inspection.  No: CVA Tenderness, Midline Tenderness


Upper Extremity: Present: Normal Inspection.  No: Cyanosis, Edema


Lower Extremity: Present: Normal Inspection.  No: Edema


Neurological: Present: GCS=15, Speech Normal, Motor Func Grossly Intact, Gait 

Normal, Memory Normal


Skin: Present: Warm, Dry, Normal Color.  No: Rashes


Psychiatric: Present: Alert, Oriented x 3, Normal Insight, Normal Concentration





Medical Decision Making


ED Course and Treatment: 





08/22/17 10:18


-Pt. request 2 percocets, will order 2 percocets for analagesic control.  

However, I still recommend labs and radiology studies along with IV medications 

but he is refusing, risks and benefits explained.  Pt. will like to sign 

against medical advice after pain is controlled. 


-ER RN Sugar is awared of that we offered standard care of the patient for the 

patient, and the patient is refusing. 





08/22/17 11:20


-Pt. is requesting more pain med and be discharged, dilaudid 1mg IM ordered, 

pt. is taking taxicab home.  Pt. is drinking juice.


-Pt. still refused labs/radiology studies, request to sign out AMA.





AMA ER


The patient refuses to stay in the Emergency Room (ER) to continue the care and 

wishes to leave the emergency department against my medical advice. Patient was 

told that staying in the ER is necessary and a full explanation of the reasons 

why was given, and understood by the patient with alert and oriented x4. The 

risk of leaving were explained in laymans term and including but not limited 

to intra-abdominal disease or infections, ischemic GI tracts, organ failure, 

sepsis, cancer, abscess, fistulas, renal or gall stone, cholecystitis, 

cholangitis, colitis, peritonitis, psychiatric disease , pain,  worsening of 

condition, permanent disability and death from an undiagnosed or untreated 

condition. The patient accepts these risks, and is in my judgment is competent 

and capable of understanding the clinical situation and explanation of the risk 

of leaving. Patient was given the opportunity to ask questions and change mind. 

The patient was instructed regarding the best care for the present symptoms, 

and to follow up as soon as possible with the primary care doctor including 

specialist or return to the emergency department at an y time for continuing 

care.





YOU SIGN OUT AGAINST AMA.  YOU ARE ADVISED TO STAY IN THE ER FOR LABS AND 

RADIOLOGY STUDIES INCLUDING IV MEDICATION BUT YOU REFUSED.  PLEASE FOLLOW UP 

WITH YOUR OWN PMD AND GI AS SOON AS POSSIBLE.  RETURN TO THE ER FOR ANY NEW OR 

WORSENING SIGNS OR SYMPTOMS. 








- Medication Orders


Current Medication Orders: 











Discontinued Medications





Hydromorphone HCl (Dilaudid)  1 mg IM STAT STA


   Stop: 08/22/17 11:20


   Last Admin: 08/22/17 11:29  Dose: 1 mg





Oxycodone/Acetaminophen (Percocet 5/325 Mg Tab)  2 tab PO STAT STA


   Stop: 08/22/17 10:12


   Last Admin: 08/22/17 10:30  Dose: 2 tab











- PA / NP / Resident Statement


MD/ has reviewed & agrees with the documentation as recorded.





Disposition/Present on Arrival





- Present on Arrival


Any Indicators Present on Arrival: No


History of DVT/PE: No


History of Uncontrolled Diabetes: No


Urinary Catheter: No


History of Decub. Ulcer: No


History Surgical Site Infection Following: None





- Disposition


Have Diagnosis and Disposition been Completed?: Yes


Diagnosis: 


 Abdominal pain, Noncompliance by refusing service, Non compliance with medical 

treatment





Disposition: AGAINST MEDICAL ADVICE


Disposition Time: 10:19


Patient Plan: Discharge


Condition: STABLE


Additional Instructions: 


YOU SIGN OUT AGAINST AMA.  YOU ARE ADVISED TO STAY IN THE ER FOR LABS AND 

RADIOLOGY STUDIES INCLUDING IV MEDICATION BUT YOU REFUSED.  PLEASE FOLLOW UP 

WITH YOUR OWN PMD AND GI AS SOON AS POSSIBLE.  RETURN TO THE ER FOR ANY NEW OR 

WORSENING SIGNS OR SYMPTOMS. 


Referrals: 


PCP,NO [Primary Care Provider] - Follow up with primary


[place] MD [Staff Provider] - Follow up with primary


Forms:  [employer] (English), WORK NOTE

## 2017-09-08 ENCOUNTER — HOSPITAL ENCOUNTER (EMERGENCY)
Dept: HOSPITAL 42 - ED | Age: 22
Discharge: LEFT BEFORE DISCHARGE | End: 2017-09-08
Payer: MEDICAID

## 2017-09-08 VITALS
SYSTOLIC BLOOD PRESSURE: 145 MMHG | RESPIRATION RATE: 18 BRPM | DIASTOLIC BLOOD PRESSURE: 64 MMHG | HEART RATE: 86 BPM | OXYGEN SATURATION: 100 % | TEMPERATURE: 98 F

## 2017-09-08 VITALS — BODY MASS INDEX: 19.8 KG/M2

## 2017-09-08 DIAGNOSIS — R10.9: Primary | ICD-10-CM

## 2017-09-08 NOTE — ED PDOC
Arrival/HPI





- General


Time Seen by Provider: 09/08/17 10:00





- History of Present Illness


Narrative History of Present Illness (Text): 





20yo male presents with abdominal discomfort. States he feels his chronic 

abdominal pain. Reports that he has ulcerations from Crohn's on his abd. wall 

which have significantly improved drainage. Pt denies n/v, denies f/v, denies 

any  symptoms. No other complaints. 





Past Medical History





- Provider Review


Nursing Documentation Reviewed: Yes





- Infectious Disease


Hx of Infectious Diseases: None





- Tetanus Immunization


Tetanus Immunization: Unknown





- Reproductive


Currently Pregnant: No





- Cardiac


Hx Cardiac Disorders: No





- Pulmonary


Hx Respiratory Disorders: Yes


Hx Pneumonia: Yes





- Neurological


Hx Neurological Disorder: No


Hx Transient Ischemic Attacks (TIA): No





- HEENT


Hx HEENT Disorder: No





- Renal


Hx Renal Disorder: No





- Endocrine/Metabolic


Hx Endocrine Disorders: No





- Hematological/Oncological


Hx Blood Disorders: Yes


Hx Anemia: Yes





- Integumentary


Hx Dermatological Disorder: Yes (RIGHT LOWER BACK FISTULA.DID I&D MAY 30 17 

[facility],)


Other/Comment: 6-15-17 3 RIGHT ABDOMINAL WALL ABSCESS. WITH LIGHT GREEN 

DRAINAGE.MEDIUM AMT.





- Musculoskeletal/Rheumatological


Hx Musculoskeletal Disorders: No


Hx Falls: No





- Gastrointestinal


Hx Gastrointestinal Disorders: Yes


Hx Crohn's Disease: Yes





- Genitourinary/Gynecological


Hx Genitourinary Disorders: Yes


Hx Urinary Tract Infection: Yes





- Psychiatric


Hx Psychophysiologic Disorder: Yes


Hx Anxiety: Yes


Hx Substance Use: No





- Surgical History


Other/Comment: Colon resection





- Anesthesia


Hx Anesthesia: Yes


Hx Anesthesia Reactions: No


Hx Malignant Hyperthermia: No





- Suicidal Assessment


Feels Threatened In Home Enviroment: No





Family/Social History


Family/Social History: Unknown Family HX


Smoking Status: Never Smoked


Hx Alcohol Use: No


Hx Substance Use: No





Allergies/Home Meds


Allergies/Adverse Reactions: 


Allergies





FISH Allergy (Verified 08/22/17 09:58)


 RASH


ketorolac [From Toradol] Allergy (Verified 08/22/17 09:58)


 RASH


shellfish derived Adverse Reaction (Verified 08/22/17 09:58)


 ANGIOEDEMA








Home Medications: 


 Home Meds











 Medication  Instructions  Recorded  Confirmed


 


inFLIXimab [Remicade] 1 vial INJ .EVERY6 WEEKS 08/22/17 08/22/17














Physical Exam





- Physical Exam


Narrative Physical Exam (Text): 





- Review of Systems





Constitutional: Normal.  absent: Fatigue, Weight Change, Fevers


Eyes: Normal


ENT: denies sore throat, denies tristhmus


Respiratory: Normal.  absent: SOB, Cough, Sputum


Cardiovascular: absent: Chest Pain, Palpitations, Syncope 


Gastrointestinal: Abdominal pain.  absent: Diarrhea, Nausea, Vomiting


Genitourinary: Normal.  absent: Dysuria, Frequency, Hematuria


Musculoskeletal: Normal.  absent: Arthralgias, Back Pain, Neck Pain


Skin: no rashes, no erythema


Neurological: absent: Focal Weakness


Endocrine: Normal


Hemo/Lymphatic: Normal


Psychiatric: No suicidal or homicidal ideations





Physical exam





Patient appears age appropriate in no distress, speaking full sentences without 

difficulty





- Systems Exam


Head: Present: Atraumatic, Normocephalic


Pupils: Present: PERRL


Extroacular Muscles: Present: EOMI


Conjunctiva: Present: Normal


Mouth: Present: Moist Mucous Membranes


Neck: Present: Normal Range of Motion.  No: MIDLINE TENDERNESS, Paraspinal 

Tenderness


Respiratory/Chest: Present: Clear to Auscultation, Good Air Exchange.  No: 

Respiratory Distress, Accessory Muscle Use, Tachypneic


Cardiovascular: Present: Regular Rate and Rhythm, Normal S1, S2, Peripheal 

Pulses Present.  No: Murmurs


Abdomen: Present: Normal Bowel Sounds.  Multiple healing ulcerations with no 

active drainage. pt well known to the department. wounds appear better than on 

his previous visits. No: Tenderness, Distention, Peritoneal Signs, Rebound, 

Guarding


Back: Present: Normal Inspection.  No: Midline Tenderness, Paraspinal Tenderness


Upper Extremity: Present: Normal Inspection.  No: Cyanosis, Edema


Lower Extremity: Present: Normal Inspection.  No: Edema


Neurological: Present: GCS=15, Speech Normal, cranial nerves II through XII 

fully intact with no cerebellar abnormality, neurosensory fully intact. No 

focal neurological deficits.


Skin: Present: Warm, Dry, Normal Color.  No: Rashes


Lymphatic: Present: OX3, NI, NC


Psychiatric: Present: Alert, Oriented x 3, Normal Insight, Normal Concentration


Vital Signs Reviewed: Yes


Vital Signs











  Temp Pulse Resp BP Pulse Ox


 


 09/08/17 10:05  98.0 F  86  18  145/64  100











Temperature: Afebrile


Blood Pressure: Normal


Pulse: Regular


Respiratory Rate: Normal


Appearance: Positive for: Well-Appearing


Pain Distress: None


Mental Status: Positive for: Alert and Oriented X 3





Medical Decision Making


ED Course and Treatment: 





09/08/17 10:04


Patient's previous records reviewed, patient was admitted on 8/6/17 for 

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 

Crohn's disease. Patient was not treated with invasive intervention, he 

remained on antibiotics.





pt repeatedly asking for dilaudid for pain


I expressed my concern for opiate dependence and offered alternative therapies 

to opioids, and pain management referral. I also expressed to pt that I would 

like to do a workup during this visit, which includes labs and imaging. Pt 

refused. Informed by RN that pt eloped. 





Disposition/Present on Arrival





- Present on Arrival


Any Indicators Present on Arrival: No


History of DVT/PE: No


History of Uncontrolled Diabetes: No


Urinary Catheter: No


History Surgical Site Infection Following: None





- Disposition


Have Diagnosis and Disposition been Completed?: Yes


Diagnosis: 


 Abdominal pain





Disposition: ELOPEMENT - ER ONLY


Disposition Time: 10:30


Patient Plan: Discharge


Condition: GUARDED

## 2017-09-23 ENCOUNTER — HOSPITAL ENCOUNTER (EMERGENCY)
Dept: HOSPITAL 42 - ED | Age: 22
Discharge: LEFT BEFORE DISCHARGE | End: 2017-09-23
Payer: MEDICAID

## 2017-09-23 VITALS — BODY MASS INDEX: 19.8 KG/M2

## 2017-09-23 VITALS
OXYGEN SATURATION: 100 % | SYSTOLIC BLOOD PRESSURE: 124 MMHG | TEMPERATURE: 98.7 F | RESPIRATION RATE: 18 BRPM | DIASTOLIC BLOOD PRESSURE: 72 MMHG | HEART RATE: 89 BPM

## 2017-09-23 DIAGNOSIS — G89.29: ICD-10-CM

## 2017-09-23 DIAGNOSIS — R10.9: Primary | ICD-10-CM

## 2017-09-23 NOTE — ED PDOC
Arrival/HPI





- General


Chief Complaint: Abdominal Pain


Time Seen by Provider: 09/23/17 10:28





- History of Present Illness


Narrative History of Present Illness (Text): 





09/23/17 10:47


21yo male with hx of crohn's disease and mult. abd abscesses presents with abd 

pain. Pt states that he also felt weak yesterday and fell, hitting his head and 

R. leg. Pt denies f/c, denies n/v. States he found a surgeon and has an appt 

next week. 





Past Medical History





- Provider Review


Nursing Documentation Reviewed: Yes





- Infectious Disease


Hx of Infectious Diseases: None





- Tetanus Immunization


Tetanus Immunization: Unknown





- Reproductive


Currently Pregnant: No





- Cardiac


Hx Cardiac Disorders: No





- Pulmonary


Hx Respiratory Disorders: Yes


Hx Pneumonia: Yes





- Neurological


Hx Neurological Disorder: No


Hx Transient Ischemic Attacks (TIA): No





- HEENT


Hx HEENT Disorder: No





- Renal


Hx Renal Disorder: No





- Endocrine/Metabolic


Hx Endocrine Disorders: No





- Hematological/Oncological


Hx Blood Disorders: Yes


Hx Anemia: Yes





- Integumentary


Hx Dermatological Disorder: Yes (RIGHT LOWER BACK FISTULA.DID I&D MAY 30 17 

[facility],)


Other/Comment: 6-15-17 3 RIGHT ABDOMINAL WALL ABSCESS. WITH LIGHT GREEN 

DRAINAGE.MEDIUM AMT.





- Musculoskeletal/Rheumatological


Hx Musculoskeletal Disorders: No


Hx Falls: No





- Gastrointestinal


Hx Gastrointestinal Disorders: Yes


Hx Crohn's Disease: Yes





- Genitourinary/Gynecological


Hx Genitourinary Disorders: Yes


Hx Urinary Tract Infection: Yes





- Psychiatric


Hx Psychophysiologic Disorder: Yes


Hx Anxiety: Yes


Hx Substance Use: No





- Surgical History


Other/Comment: Colon resection





- Anesthesia


Hx Anesthesia: Yes


Hx Anesthesia Reactions: No


Hx Malignant Hyperthermia: No





- Suicidal Assessment


Feels Threatened In Home Enviroment: No





Family/Social History


Family/Social History: Unknown Family HX


Smoking Status: Never Smoked


Hx Alcohol Use: No


Hx Substance Use: No





Allergies/Home Meds


Allergies/Adverse Reactions: 


Allergies





FISH Allergy (Verified 09/23/17 10:26)


 RASH


ketorolac [From Toradol] Allergy (Verified 09/23/17 10:26)


 RASH


shellfish derived Adverse Reaction (Verified 09/23/17 10:26)


 ANGIOEDEMA








Home Medications: 


 Home Meds











 Medication  Instructions  Recorded  Confirmed


 


inFLIXimab [Remicade] 1 vial INJ .EVERY4 WEEKS 08/22/17 09/23/17














Physical Exam





- Physical Exam


Narrative Physical Exam (Text): 








- Review of Systems





Constitutional: Normal.  absent: Fatigue, Weight Change, Fevers


Eyes: Normal


ENT: denies sore throat, denies tristhmus


Respiratory: Normal.  absent: SOB, Cough, Sputum


Cardiovascular: absent: Chest Pain, Palpitations, Syncope 


Gastrointestinal: Abdominal pain.  absent: Diarrhea, Nausea, Vomiting


Genitourinary: Normal.  absent: Dysuria, Frequency, Hematuria


Musculoskeletal: Leg pain.  absent: Back Pain, Neck Pain


Skin: abscesses. no rashes, no erythema


Neurological: absent: Focal Weakness


Endocrine: Normal


Hemo/Lymphatic: Normal


Psychiatric: No suicidal or homicidal ideations





Physical exam





Patient appears age appropriate in no distress, speaking full sentences without 

difficulty





Head with a superficial abrasion lateral to the L. eyebrow and a small 

hematoma. No nasal bone deformity or tenderness, no facial or jaw pain/

swelling. No neck midline tenderness, thoracic and lumbar spine with no midline 

tenderness. Pt moving b/l upper and lower extremities without difficulty, 5/5 

strength, with full active and passive ROM. Distal neurovasc fully intact. Abd 

soft/nt/ng, no hematomas, no peritoneal signs. Neg. pelvic rock. 





- Systems Exam





Pupils: Present: PERRL


Extroacular Muscles: Present: EOMI


Conjunctiva: Present: Normal


Mouth: Present: Moist Mucous Membranes


Neck: Present: Normal Range of Motion.  No: MIDLINE TENDERNESS, Paraspinal 

Tenderness


Respiratory/Chest: Present: Clear to Auscultation, Good Air Exchange.  No: 

Respiratory Distress, Accessory Muscle Use, Tachypneic


Cardiovascular: Present: Regular Rate and Rhythm, Normal S1, S2, Peripheal 

Pulses Present.  No: Murmurs


Abdomen: Present: Normal Bowel Sounds. Mult abscesses with purulent drainage.  

No: Distention, Peritoneal Signs, Rebound, Guarding


Back: Present: Normal Inspection.  No: Midline Tenderness, Paraspinal Tenderness


Upper Extremity: Present: Normal Inspection.  No: Cyanosis, Edema


Lower Extremity: Present: Normal Inspection.  No: Edema


Neurological: Present: GCS=15, Speech Normal, cranial nerves II through XII 

fully intact with no cerebellar abnormality, neurosensory fully intact. No 

focal neurological deficits.


Lymphatic: Present: OX3, NI, NC


Psychiatric: Present: Alert, Oriented x 3, Normal Insight, Normal Concentration


Vital Signs Reviewed: Yes





Vital Signs











  Temp Pulse Resp BP Pulse Ox


 


 09/23/17 10:17  98.7 F  89  18  124/72  100











Temperature: Afebrile


Blood Pressure: Normal


Pulse: Regular


Respiratory Rate: Normal


Appearance: Positive for: Well-Appearing


Pain Distress: None


Mental Status: Positive for: Alert and Oriented X 3





Medical Decision Making


ED Course and Treatment: 





09/23/17 10:58





pt after a fall , with abd pain and abscesses. Hx of crohns. 


I explained to pt that abd imaging, labs, pain meds, and iv abx are necessary 

to start his workup. 


pt states he needs opioid pain meds for his symptoms. 


I had a long dw patient about my concern for opioid dependance and offered 

alternative therapies. 


pt refused


I again expressed my concern and need for workup and treatment in the ER


I was informed that pt walked out of the ER





Disposition/Present on Arrival





- Present on Arrival


Any Indicators Present on Arrival: No


History of DVT/PE: No


History of Uncontrolled Diabetes: No


Urinary Catheter: No


History of Decub. Ulcer: No


History Surgical Site Infection Following: None





- Disposition


Have Diagnosis and Disposition been Completed?: Yes


Diagnosis: 


 Abdominal pain, Chronic abdominal pain





Disposition: LEFT W/O TREATMENT - ER ONLY


Disposition Time: 10:47


Patient Plan: Discharge


Condition: GUARDED

## 2017-12-27 ENCOUNTER — HOSPITAL ENCOUNTER (EMERGENCY)
Dept: HOSPITAL 42 - ED | Age: 22
Discharge: LEFT BEFORE DISCHARGE | End: 2017-12-27
Payer: MEDICAID

## 2017-12-27 VITALS — BODY MASS INDEX: 17.1 KG/M2

## 2017-12-27 VITALS
DIASTOLIC BLOOD PRESSURE: 54 MMHG | TEMPERATURE: 98.3 F | HEART RATE: 79 BPM | RESPIRATION RATE: 18 BRPM | SYSTOLIC BLOOD PRESSURE: 104 MMHG | OXYGEN SATURATION: 99 %

## 2017-12-27 DIAGNOSIS — K50.90: Primary | ICD-10-CM

## 2017-12-27 DIAGNOSIS — R10.9: ICD-10-CM

## 2017-12-27 PROCEDURE — 99284 EMERGENCY DEPT VISIT MOD MDM: CPT

## 2017-12-27 PROCEDURE — 96372 THER/PROPH/DIAG INJ SC/IM: CPT

## 2017-12-27 NOTE — ED PDOC
Arrival/HPI





- General


Chief Complaint: Abdominal Pain


Time Seen by Provider: 12/27/17 16:29


Historian: Patient





- History of Present Illness


Narrative History of Present Illness (Text): 





12/27/17 17:05


21 yo M w/ PMHx of crohn's disease, chronic abdominal pain, multiple abdominal 

abscess with fistula, presents requesting for a dose of pain medication for his 

chronic abdominal pain with diarrhea. States that he ran out of his oxycodone 

15 mg (which is Rx by his pain management doctor) 3 days ago. Patient states 

that he did receive his infusion of remicade from his GI doctor yesterday, but 

was not given a Rx to control his pain. Patient states that he does not want 

any labs or radiology tests to be performed, as he just had them done 2 days 

ago at [facility] [place] and only wants the pain medication as oral and he would like to 

leave the ER afterwards and will f/u with his pmd or GI doctor in the AM.  

Patient has no fever, chills, recent travel, sick contacts, chest pain, 

shortness of breath, palpitation, urinary symptoms, and has no other medical or 

psychological complaints





PMD Sushila Doyle


[facility]





Past Medical History





- Provider Review


Nursing Documentation Reviewed: Yes





- Infectious Disease


Hx of Infectious Diseases: None





- Tetanus Immunization


Tetanus Immunization: Unknown





- Cardiac


Hx Cardiac Disorders: No





- Pulmonary


Hx Respiratory Disorders: Yes


Hx Pneumonia: Yes





- Neurological


Hx Neurological Disorder: No


Hx Transient Ischemic Attacks (TIA): No





- HEENT


Hx HEENT Disorder: No





- Renal


Hx Renal Disorder: No





- Endocrine/Metabolic


Hx Endocrine Disorders: No





- Hematological/Oncological


Hx Blood Disorders: Yes


Hx Anemia: Yes





- Integumentary


Hx Dermatological Disorder: Yes (RIGHT LOWER BACK FISTULA.DID I&D MAY 30 17 

[facility],)


Other/Comment: 6-15-17 3 RIGHT ABDOMINAL WALL ABSCESS. WITH LIGHT GREEN 

DRAINAGE.MEDIUM AMT.





- Musculoskeletal/Rheumatological


Hx Musculoskeletal Disorders: No


Hx Falls: No





- Gastrointestinal


Hx Gastrointestinal Disorders: Yes


Hx Crohn's Disease: Yes





- Genitourinary/Gynecological


Hx Genitourinary Disorders: Yes


Hx Urinary Tract Infection: Yes





- Psychiatric


Hx Psychophysiologic Disorder: Yes


Hx Anxiety: Yes


Hx Substance Use: No





- Surgical History


Other/Comment: Colon resection





- Anesthesia


Hx Anesthesia: Yes


Hx Anesthesia Reactions: No


Hx Malignant Hyperthermia: No





- Suicidal Assessment


Feels Threatened In Home Enviroment: No





Family/Social History





- Physician Review


Nursing Documentation Reviewed: Yes


Family/Social History: Unknown Family HX


Smoking Status: Never Smoked


Hx Alcohol Use: No


Hx Substance Use: No





Allergies/Home Meds


Allergies/Adverse Reactions: 


Allergies





FISH Allergy (Verified 09/23/17 10:26)


 RASH


ketorolac [From Toradol] Allergy (Verified 09/23/17 10:26)


 RASH


shellfish derived Adverse Reaction (Verified 09/23/17 10:26)


 ANGIOEDEMA








Home Medications: 


 Home Meds











 Medication  Instructions  Recorded  Confirmed


 


inFLIXimab [Remicade] 1 vial INJ .EVERY4 WEEKS 08/22/17 12/27/17














Review of Systems





- Review of Systems


Constitutional: Fatigue.  absent: Weight Change, Fevers


ENT: absent: Sore Throat, Rhinorrhea, Epistaxis


Respiratory: absent: SOB, Cough, Sputum


Cardiovascular: absent: Chest Pain, Palpitations, Orthopnea


Gastrointestinal: Abdominal Pain, Diarrhea, Nausea.  absent: Stool Changes, 

Vomiting, Appetite Changes


Genitourinary Male: absent: Dysuria, Frequency, Hematuria


Musculoskeletal: absent: Arthralgias, Back Pain, Neck Pain


Skin: absent: Rash, Pruritis, Skin Lesions





Physical Exam





- Physical Exam


Narrative Physical Exam (Text): 





12/27/17 17:03


GENERAL APPEARANCE: Patient is awake, alert, oriented x 3, in no acute 

distress. 


SKIN:  Warm, dry; (-) cyanosis.


EYES:  (-) conjunctival pallor, (-) scleral icterus.


ENMT:  Mucous membranes moist.


NECK:  (-) tenderness, (-) stiffness, (-) lymphadenopathy.


CHEST AND RESPIRATORY:  (-) rales, (-) rhonchi, (-) wheezes; breath sounds 

equal bilaterally.


HEART AND CARDIOVASCULAR:  (-) irregularity; (-) murmur, (-) gallop.


ABDOMEN AND GI:  (+) 2 cm horizontal healing wound with yellow d/c to the RUQ, (

+) long vertical healed surgical scar to the mid abdomen, with yellow purulent d

/c noted from the lower 1/3 of the wound below the umbilicus, (-) distention.  

Bowel sounds active; (+) R sided abdominal tenderness, (-) guarding, (-) rebound

, (-) palpable masses, (-) CVA tenderness.


EXTREMITIES:  (-) deformity, (-) edema, (+) distal pulses.


NEURO AND PSYCH:  Mental status as above; (-) focal findings.


Vital Signs











  Temp Pulse Resp BP Pulse Ox


 


 12/27/17 16:40  98.3 F  79  18  104/54 L  99














Medical Decision Making


ED Course and Treatment: 





12/27/17 17:10


21 yo M w/ PMHx of crohn's disease, chronic abdominal pain, multiple abdominal 

abscess with fistula, presents requesting for a dose of pain medication for his 

chronic abdominal pain with diarrhea.





Previous medica records reviewed : patient last seen in the ER ojn 9/23/17 and 9 /08/17 for similar complaints, was given pain medication and he eloped both 

times.





Patient given 2 tabs of percocet PO.  However, I still recommend labs and 

radiology studies, but he is still refusing, the risks and benefits were 

explained to the patient and he verbalize understanding of the conversation.  





On re-evaluation, patient states he has improvement of pain, however is 

requesting for another dose of pain medication, and he still is refusing any 

lab or radiology testing. Patient given dilaudid 1 mg IM. 





Patient will like to sign against medical advice after pain is controlled.  

Patient is ambulatory in the ER. Patient still refused labs/radiology studies, 

request to sign out AMA.





The patient refuses to stay in the Emergency Room (ER) to continue the care and 

wishes to leave the emergency department against my medical advice. Patient was 

told that staying in the ER is necessary and a full explanation of the reasons 

why was given, and understood by the patient with alert and oriented x4. The 

risk of leaving were explained in layman's term and including but not limited 

to intra-abdominal disease or infections, ischemic GI tracts, organ failure, 

sepsis, cancer, abscess, fistulas, renal or gall stone, cholecystitis, 

cholangitis, colitis, peritonitis, psychiatric disease , pain,  worsening of 

condition, permanent disability and death from an undiagnosed or untreated 

condition. The patient accepts these risks, and is in my judgment is competent 

and capable of understanding the clinical situation and explanation of the risk 

of leaving. Patient was given the opportunity to ask questions and change mind. 

The patient was instructed regarding the best care for the present symptoms, 

and to follow up as soon as possible with the primary care doctor including 

specialist or return to the emergency department at an y time for continuing 

care.








- Medication Orders


Current Medication Orders: 











Discontinued Medications





Oxycodone/Acetaminophen (Percocet 5/325 Mg Tab)  2 tab PO STAT STA


   Stop: 12/27/17 16:52


   Last Admin: 12/27/17 17:07  Dose: 2 tab





MAR Pain Assessment


 Document     12/27/17 17:07  RD  (Rec: 12/27/17 17:07  RD  [facility] [place], [place]-[id number])


     Pain Reassessment


      Is this a pain reassessment?               No


     Sleep


      Is patient sleeping during reassessment?   No


     Presence of Pain


      Presence of Pain                           Yes














- PA / NP / Resident Statement


MD/DO has reviewed & agrees with the documentation as recorded.





Disposition/Present on Arrival





- Present on Arrival


Any Indicators Present on Arrival: No


History of DVT/PE: No


History of Uncontrolled Diabetes: No


Urinary Catheter: No


History of Decub. Ulcer: No


History Surgical Site Infection Following: None





- Disposition


Have Diagnosis and Disposition been Completed?: Yes


Diagnosis: 


 Abdominal pain, Crohn disease





Disposition: AGAINST MEDICAL ADVICE


Disposition Time: 17:30


Patient Plan: Other (Pt wishes to leave AMA)


Patient Problems: 


 Current Active Problems











Problem Status Onset


 


Abdominal pain Acute  


 


Crohn disease Acute  











Condition: UNKNOWN


Discharge Instructions (ExitCare):  Abdominal Pain (ED), Against Medical Advice 

(ED)


Print Language: ENGLISH


Additional Instructions: 


Thank you for letting us take care of you today. You were treated for abdominal 

pain, history of Crohn's. The emergency medical care you received today was 

directed at your acute symptoms. Return to the Emergency Department if your 

symptoms worsen, do not improve, if you have any other problems or if you 

change your mind.





YOU SIGNED OUT AGAINST MEDICAL ADVICE.  YOU ARE ADVISED TO STAY IN THE ER FOR 

LABS AND RADIOLOGY STUDIES INCLUDING IV MEDICATION BUT YOU REFUSED.  PLEASE 

FOLLOW UP WITH YOUR OWN PMD AND GI AS SOON AS POSSIBLE.  RETURN TO THE ER FOR 

ANY NEW OR WORSENING SIGNS OR SYMPTOMS. 





Our treatment cannot replace ongoing medical care by a primary care provider (

PCP) outside of the emergency department. Thank you for allowing the [facility] team to be part of your care today.








Referrals: 


Trell Rocha [Medical Doctor] - Follow up with primary


Forms:  [employer] (English), WORK NOTE

## 2017-12-31 ENCOUNTER — HOSPITAL ENCOUNTER (EMERGENCY)
Dept: HOSPITAL 42 - ED | Age: 22
Discharge: LEFT BEFORE DISCHARGE | End: 2017-12-31
Payer: MEDICAID

## 2017-12-31 VITALS — TEMPERATURE: 97.8 F

## 2017-12-31 VITALS
HEART RATE: 80 BPM | OXYGEN SATURATION: 99 % | SYSTOLIC BLOOD PRESSURE: 129 MMHG | RESPIRATION RATE: 18 BRPM | DIASTOLIC BLOOD PRESSURE: 80 MMHG

## 2017-12-31 VITALS — BODY MASS INDEX: 17.1 KG/M2

## 2017-12-31 DIAGNOSIS — K50.90: ICD-10-CM

## 2017-12-31 DIAGNOSIS — R10.9: Primary | ICD-10-CM

## 2017-12-31 PROCEDURE — 99283 EMERGENCY DEPT VISIT LOW MDM: CPT

## 2017-12-31 PROCEDURE — 96372 THER/PROPH/DIAG INJ SC/IM: CPT

## 2017-12-31 NOTE — ED PDOC
Arrival/HPI





- General


Chief Complaint: Abdominal Pain


Time Seen by Provider: 12/31/17 17:27


Historian: Patient





- History of Present Illness


Narrative History of Present Illness (Text): 





12/31/17 17:54


23 yo M w/ PMHx of crohn's disease, chronic abdominal pain, multiple abdominal 

abscess with fistula, returns to the ER for R sided abdominal pain after eating 

mac n cheese, and increase in d/c from one of his fistula's. He is here 

requesting for a dose of pain medication for his chronic abdominal pain. He 

admits that since his last visit here in the ER he could not see his GI or pain 

management doctor due to the holiday season. States that he ran out of his 

oxycodone 15 mg (which is Rx by his pain management doctor). Patient states he 

does not want any labs or CT to be done today. Patient has no fever, chills, 

recent travel, sick contacts, chest pain, shortness of breath, palpitation, 

urinary symptoms, and has no other medical or psychological complaints





PMD Sushila Doyle


[facility]








Past Medical History





- Provider Review


Nursing Documentation Reviewed: Yes





- Infectious Disease


Hx of Infectious Diseases: None





- Tetanus Immunization


Tetanus Immunization: Unknown





- Cardiac


Hx Cardiac Disorders: No





- Pulmonary


Hx Respiratory Disorders: Yes


Hx Pneumonia: Yes





- Neurological


Hx Neurological Disorder: No


Hx Transient Ischemic Attacks (TIA): No





- HEENT


Hx HEENT Disorder: No





- Renal


Hx Renal Disorder: No





- Endocrine/Metabolic


Hx Endocrine Disorders: No





- Hematological/Oncological


Hx Blood Disorders: Yes


Hx Anemia: Yes





- Integumentary


Hx Dermatological Disorder: Yes (RIGHT LOWER BACK FISTULA.DID I&D MAY 30 17 

[facility],)


Other/Comment: 6-15-17 3 RIGHT ABDOMINAL WALL ABSCESS. WITH LIGHT GREEN 

DRAINAGE.MEDIUM AMT.





- Musculoskeletal/Rheumatological


Hx Musculoskeletal Disorders: No


Hx Falls: No





- Gastrointestinal


Hx Gastrointestinal Disorders: Yes


Hx Crohn's Disease: Yes





- Genitourinary/Gynecological


Hx Genitourinary Disorders: Yes


Hx Urinary Tract Infection: Yes





- Psychiatric


Hx Psychophysiologic Disorder: Yes


Hx Anxiety: Yes


Hx Substance Use: No





- Surgical History


Other/Comment: Colon resection





- Anesthesia


Hx Anesthesia: Yes


Hx Anesthesia Reactions: No


Hx Malignant Hyperthermia: No





- Suicidal Assessment


Feels Threatened In Home Enviroment: No





Family/Social History





- Physician Review


Nursing Documentation Reviewed: Yes


Family/Social History: Unknown Family HX


Smoking Status: Never Smoked


Hx Alcohol Use: No


Hx Substance Use: No





Allergies/Home Meds


Allergies/Adverse Reactions: 


Allergies





FISH Allergy (Verified 12/31/17 17:33)


 RASH


ketorolac [From Toradol] Allergy (Verified 12/31/17 17:33)


 RASH


shellfish derived Adverse Reaction (Verified 12/31/17 17:33)


 ANGIOEDEMA








Home Medications: 


 Home Meds











 Medication  Instructions  Recorded  Confirmed


 


No Known Home Med  12/31/17 12/31/17














Physical Exam





- Physical Exam


Narrative Physical Exam (Text): 





ROS : 


Constitutional: absent: Fatigue, Weight Change, Fevers


ENT: absent: Sore Throat, Rhinorrhea, Epistaxis


Respiratory: absent: SOB, Cough, Sputum


Cardiovascular: absent: Chest Pain, Palpitations, Orthopnea


Gastrointestinal: Chronic Abdominal Pain, Diarrhea.  absent: Stool Changes, 

Nausea, Vomiting, Appetite Changes


Genitourinary Male: absent: Dysuria, Frequency, Hematuria


Musculoskeletal: absent: Arthralgias, Back Pain, Neck Pain


Skin: absent: Rash, Pruritis, Skin Lesions








PE : 


GENERAL APPEARANCE: Patient is awake, alert, oriented x 3, in no acute 

distress. 


SKIN:  Warm, dry; (-) cyanosis.


EYES:  (-) conjunctival pallor, (-) scleral icterus.


ENMT:  Mucous membranes moist.


NECK:  (-) tenderness, (-) stiffness, (-) lymphadenopathy.


CHEST AND RESPIRATORY:  (-) rales, (-) rhonchi, (-) wheezes; breath sounds 

equal bilaterally.


HEART AND CARDIOVASCULAR:  (-) irregularity; (-) murmur, (-) gallop.


ABDOMEN AND GI:  (+) 2 cm horizontal healing wound with no d/c to the RUQ, (+) 

long vertical healed surgical scar to the mid abdomen, with yellow purulent d/c 

noted from the lower 1/3 of the wound below the umbilicus, (-) distention.  

Bowel sounds active; (+) mild R sided abdominal tenderness, (-) guarding, (-) 

rebound, (-) palpable masses, (-) CVA tenderness.


EXTREMITIES:  (-) deformity, (-) edema, (+) distal pulses.


NEURO AND PSYCH:  Mental status as above; (-) focal findings.





Vital Signs











  Temp Pulse Resp BP Pulse Ox


 


 12/31/17 19:00   80  18  129/80  99


 


 12/31/17 17:26  97.8 F  78  19  131/89  100














Medical Decision Making


ED Course and Treatment: 





12/31/17 17:46


23 yo M w/ PMHx of crohn's disease, chronic abdominal pain, multiple abdominal 

abscess with fistula, presents c/o abdominal pain due to his crohn's, is 

requesting for a dose of pain medication, still is refuses to have any labs or 

CT to be done today. 





Previous medical records reviewed : patient last seen in the ER on 12/27/17 for 

similar complaints, was given pain medication and he left AMA.





Patient given 2 tabs of percocet PO. I still recommend labs and radiology 

studies, but patient is still refusing, the risks and benefits were explained 

to the patient and he verbalize understanding.  





On re-evaluation, patient states he has improvement of pain, patient is 

requesting for another dose of pain medication, he is asking for dialudid IM, 

he still refuses any lab or radiology testing. Patient given dilaudid 1 mg IM, 

he was advised that he should not use the ER to receive repeated doses of pain 

medication and that he must f/u with his GI and pain management doctor to 

obtain pain medication and that the ER is not the appropriate place to receive 

narcotic pain medications for chronic pain. Patient states that he understands, 

he intends to see his GI and pain management doctor after the holiday. 





On second re-evaluation, patient states that he feels his pain is controlled 

and he will like to sign out against medical advice and leave the ER, he is 

still refusing labs/radiology studies.





Patient refuses further care, evaluation or treatment in the ER. Patient 

informed of the reasons for the following and planned treatment, which patient 

understands, however still refuses. Patient informed of the risk and benefits 

of treatment. Informed that the risk could include worsening of current 

conditions, undiagnosed conditions, disability or even death. Patient 

understands the following risk and the benefits of treatment. Patient has the 

capacity to make decisions and still refuses treatment by RN, PA and ER MD. 

Patient encouraged to return to the ER at any time and to follow up with pmd. 











- Medication Orders


Current Medication Orders: 











Discontinued Medications





Hydromorphone HCl (Dilaudid)  1 mg IM STAT STA


   Stop: 12/31/17 18:33


   Last Admin: 12/31/17 18:55  Dose: 1 mg





MAR Pain Assessment


 Document     12/31/17 18:55  EQ  (Rec: 12/31/17 18:55  EQ  [id number])


     Pain Reassessment


      Is this a pain reassessment?               No


     Sleep


      Is patient sleeping during reassessment?   No


     Presence of Pain


      Presence of Pain                           Yes


     Pain Scale Used


      Pain Scale Used                            Numeric


IM Administration Charges


 Document     12/31/17 18:55  EQ  (Rec: 12/31/17 18:55  EQ  [id number])


     Charges for Administration


      # of IM Administrations                    1





Oxycodone/Acetaminophen (Percocet 5/325 Mg Tab)  2 tab PO STAT STA


   Stop: 12/31/17 17:42


   Last Admin: 12/31/17 18:20  Dose: 2 tab





MAR Pain Assessment


 Document     12/31/17 18:20  EQ  (Rec: 12/31/17 18:20  EQ  [id number])


     Pain Reassessment


      Is this a pain reassessment?               No


     Sleep


      Is patient sleeping during reassessment?   No


     Presence of Pain


      Presence of Pain                           Yes


     Pain Scale Used


      Pain Scale Used                            Numeric














- PA / NP / Resident Statement


MD/DO has reviewed & agrees with the documentation as recorded.





Disposition/Present on Arrival





- Present on Arrival


Any Indicators Present on Arrival: No


History of DVT/PE: No


History of Uncontrolled Diabetes: No


Urinary Catheter: No


History of Decub. Ulcer: No


History Surgical Site Infection Following: None





- Disposition


Have Diagnosis and Disposition been Completed?: Yes


Diagnosis: 


 Abdominal pain, History of Crohn's disease





Disposition: AGAINST MEDICAL ADVICE


Disposition Time: 18:00


Patient Plan: Other (Pt wishes to leave AMA)


Patient Problems: 


 Current Active Problems











Problem Status Onset


 


Abdominal pain Acute  


 


History of Crohn's disease Acute  











Condition: UNKNOWN


Discharge Instructions (ExitCare):  Abdominal Pain (ED), Against Medical Advice 

(ED)


Print Language: ENGLISH


Additional Instructions: 


Thank you for letting us take care of you today. You were treated for abdominal 

pain, history of Crohn's. The emergency medical care you received today was 

directed at your acute symptoms. Return to the Emergency Department if your 

symptoms worsen, do not improve, if you have any other problems or if you 

change your mind.





YOU SIGNED OUT AGAINST MEDICAL ADVICE.  YOU ARE ADVISED TO STAY IN THE ER FOR 

LABS AND RADIOLOGY STUDIES INCLUDING IV MEDICATION BUT YOU REFUSED.  PLEASE 

FOLLOW UP WITH YOUR OWN PMD AND GI AS SOON AS POSSIBLE.  RETURN TO THE ER FOR 

ANY NEW OR WORSENING SIGNS OR SYMPTOMS. 





Our treatment cannot replace ongoing medical care by a primary care provider (

PCP) outside of the emergency department. Thank you for allowing the [facility] team to be part of your care today.


Forms:  [employer] (English)

## 2018-01-15 ENCOUNTER — HOSPITAL ENCOUNTER (EMERGENCY)
Dept: HOSPITAL 42 - ED | Age: 23
Discharge: HOME | End: 2018-01-15
Payer: MEDICAID

## 2018-01-15 VITALS — SYSTOLIC BLOOD PRESSURE: 132 MMHG | DIASTOLIC BLOOD PRESSURE: 70 MMHG | HEART RATE: 81 BPM

## 2018-01-15 VITALS — OXYGEN SATURATION: 99 % | TEMPERATURE: 98.4 F | RESPIRATION RATE: 18 BRPM

## 2018-01-15 VITALS — BODY MASS INDEX: 17.4 KG/M2

## 2018-01-15 DIAGNOSIS — R10.9: Primary | ICD-10-CM

## 2018-01-15 PROCEDURE — 96372 THER/PROPH/DIAG INJ SC/IM: CPT

## 2018-01-15 PROCEDURE — 99283 EMERGENCY DEPT VISIT LOW MDM: CPT

## 2018-01-16 ENCOUNTER — HOSPITAL ENCOUNTER (EMERGENCY)
Dept: HOSPITAL 42 - ED | Age: 23
Discharge: HOME | End: 2018-01-16
Payer: MEDICAID

## 2018-01-16 VITALS — DIASTOLIC BLOOD PRESSURE: 59 MMHG | TEMPERATURE: 98 F | HEART RATE: 72 BPM | SYSTOLIC BLOOD PRESSURE: 116 MMHG

## 2018-01-16 VITALS — BODY MASS INDEX: 17.4 KG/M2

## 2018-01-16 VITALS — RESPIRATION RATE: 18 BRPM | OXYGEN SATURATION: 100 %

## 2018-01-16 DIAGNOSIS — K50.90: Primary | ICD-10-CM

## 2018-01-16 DIAGNOSIS — K63.2: ICD-10-CM

## 2018-01-16 NOTE — ED PDOC
Arrival/HPI





<Yi Katz - Last Filed: 01/16/18 16:50>





- General


Historian: Patient





- History of Present Illness


Time/Duration: 4-6 hours


Symptom Onset: Gradual


Symptom Course: Worsening


Quality: Cramping


Severity Level: 8





<Maru Vaca - Last Filed: 01/16/18 17:31>





- General


Chief Complaint: Abdominal Pain


Time Seen by Provider: 01/16/18 14:41





- History of Present Illness


Narrative History of Present Illness (Text): 


21 y/o male w/ pmhx of crohn's colitis, s/p multiple coloresective surgeries, 

multiple intestino-cutaneous fistulae , on remicade infusion, likley chronic 

opiate dependence, ecent ED visit for similar complaint presents c/o cold 

sweates/ cough productive of mucoid expectoration, and exacerbation of his 

chronic rt. sided familiar pain. Denying any change in character thereof , nor 

sonal fever/ no change in his chronic watery diarrhea/ denying ny hematochezia/

hemoptysis/hematemeisis nor melena. Appointment with his regular surgeon/

[facility] pain mgmt doc on JANUARY 24TH. He believes that he is the middle of a 

possible Crohn's disease exacerbation and just needs a dose of opiates, he will 

be seeing his regualr GI doc within this week. Pt unwilling to stay for lab 

testing as he states he waited in triage for close to 3 hours and now has to 

 his daughter from school. 


01/16/18 17:10





01/16/18 17:14


 (Maru Vaca)





Past Medical History





- Infectious Disease


Hx of Infectious Diseases: None





- Tetanus Immunization


Tetanus Immunization: Unknown





- Cardiac


Hx Cardiac Disorders: No





- Pulmonary


Hx Respiratory Disorders: Yes


Hx Pneumonia: Yes





- Neurological


Hx Neurological Disorder: No


Hx Transient Ischemic Attacks (TIA): No





- HEENT


Hx HEENT Disorder: No





- Renal


Hx Renal Disorder: No





- Endocrine/Metabolic


Hx Endocrine Disorders: No





- Hematological/Oncological


Hx Blood Disorders: Yes


Hx Anemia: Yes





- Integumentary


Hx Dermatological Disorder: Yes (RIGHT LOWER BACK FISTULA.DID I&D MAY 30 17 

[facility],)


Other/Comment: 6-15-17 3 RIGHT ABDOMINAL WALL ABSCESS. WITH LIGHT GREEN 

DRAINAGE.MEDIUM AMT.





- Musculoskeletal/Rheumatological


Hx Musculoskeletal Disorders: No


Hx Falls: No





- Gastrointestinal


Hx Gastrointestinal Disorders: Yes


Hx Crohn's Disease: Yes





- Genitourinary/Gynecological


Hx Genitourinary Disorders: Yes


Hx Urinary Tract Infection: Yes





- Psychiatric


Hx Psychophysiologic Disorder: Yes


Hx Anxiety: Yes


Hx Substance Use: No





- Surgical History


Other/Comment: Colon resection





- Anesthesia


Hx Anesthesia: Yes


Hx Anesthesia Reactions: No


Hx Malignant Hyperthermia: No





- Suicidal Assessment


Feels Threatened In Home Enviroment: No





<Sofi Katzanda - Last Filed: 01/16/18 16:50>





- Provider Review


Nursing Documentation Reviewed: Yes





<LioMaru - Last Filed: 01/16/18 17:31>





Family/Social History


Smoking Status: Never Smoked


Hx Alcohol Use: No


Hx Substance Use: No





<GiannaYi - Last Filed: 01/16/18 16:50>





- Physician Review


Nursing Documentation Reviewed: Yes


Family/Social History: No Known Family HX





<LioMaru - Last Filed: 01/16/18 17:31>





Allergies/Home Meds





<GiannaYi - Last Filed: 01/16/18 16:50>





<LioMaru - Last Filed: 01/16/18 17:31>


Allergies/Adverse Reactions: 


Allergies





FISH Allergy (Verified 01/16/18 14:59)


 RASH


ketorolac [From Toradol] Allergy (Verified 01/16/18 14:59)


 RASH


shellfish derived Adverse Reaction (Verified 01/16/18 14:59)


 ANGIOEDEMA











Review of Systems





- Physician Review


All systems were reviewed & negative as marked: Yes





- Review of Systems


Constitutional: Normal


Eyes: Normal


ENT: Normal


Respiratory: Cough


Cardiovascular: Normal


Gastrointestinal: Abdominal Pain, Diarrhea


Genitourinary Male: Normal


Musculoskeletal: Normal


Skin: Normal


Neurological: Normal


Endocrine: Normal


Hemo/Lymphatic: Normal


Psychiatric: Normal





<LioMaru - Last Filed: 01/16/18 17:31>





Physical Exam





- Systems Exam


Head: Present: Atraumatic, Normocephalic


Pupils: Present: PERRL


Extroacular Muscles: Present: EOMI


Conjunctiva: Present: Normal


Mouth: Present: Moist Mucous Membranes


Neck: Present: Normal Range of Motion


Respiratory/Chest: Present: Clear to Auscultation, Good Air Exchange.  No: 

Respiratory Distress, Accessory Muscle Use


Cardiovascular: Present: Regular Rate and Rhythm, Normal S1, S2.  No: Murmurs


Abdomen: Present: Normal Bowel Sounds, Other (soft, midline and oblong surgical 

scars, 3 sviualized intestinocutaneous fistulae , all improved since last 

visits  excepting the midline fistulae. bs throughout).  No: Tenderness, 

Distention, Peritoneal Signs


Back: Present: Normal Inspection


Upper Extremity: Present: Normal Inspection.  No: Cyanosis, Edema


Lower Extremity: Present: Normal Inspection.  No: Edema


Neurological: Present: GCS=15, CN II-XII Intact, Speech Normal, Motor Func 

Grossly Intact, Normal Sensory Function, Normal Cerebellar Funct, Norm Deep 

Tendon Reflexes, Gait Normal, Memory Normal


Skin: Present: Warm, Dry, Normal Color.  No: Rashes


Psychiatric: Present: Alert, Oriented x 3, Normal Insight, Normal Concentration





<Maru Vaca - Last Filed: 01/16/18 17:31>





Vital Signs











  Temp Pulse Resp BP Pulse Ox


 


 01/16/18 16:16  98.0 F  72  18  116/59 L  100


 


 01/16/18 14:56  97.9 F  88  18  109/67  100














Medical Decision Making





<Yi Katz - Last Filed: 01/16/18 16:50>





<Maru Vaca - Last Filed: 01/16/18 17:31>


ED Course and Treatment: 


21 yo male w/ pmhx of crohn's on remicade presents c/o exacerbation of his Crohn

's disease . 





Will be started on bentyl/ steroid taper/ pepcd /tylenol prn and advised to do 

stat follow up with his GI 


01/16/18 17:19


 (Maru Vaca)





Disposition/Present on Arrival





- Present on Arrival


History of DVT/PE: No


History of Uncontrolled Diabetes: No


Urinary Catheter: No


History of Decub. Ulcer: No


History Surgical Site Infection Following: None





<Yi Katz - Last Filed: 01/16/18 16:50>





- Present on Arrival


Any Indicators Present on Arrival: No





- Disposition


Have Diagnosis and Disposition been Completed?: Yes


Disposition Time: 17:21


Patient Plan: Discharge





<Maru Vaca - Last Filed: 01/16/18 17:31>





- Disposition


Diagnosis: 


 Abdominal fistula, Crohns disease





Disposition: HOME/ ROUTINE


Condition: GOOD


Discharge Instructions (ExitCare):  Crohn Disease (ED), Viral Syndrome (ED)


Print Language: ENGLISH


Additional Instructions: 


These medicines will help your pain although not with the agents you may hve 

hoped for, these medicines will treat the crohn's at its core. 





If symptoms worsen esepcially with fever then return urgently for further 

evaluation. 





YOU MUST FOLLOW UP WITH YOUR GI specialist.  


Prescriptions: 


Acetaminophen [Tylenol 325mg tab] 650 mg PO Q6 PRN #30 tab


 PRN Reason: Pain, Moderate (4-7)


Ciprofloxacin HCl [Cipro] 500 mg PO BID #20 tablet


Dicyclomine [Bentyl] 20 mg PO Q6 PRN #20 tab


 PRN Reason: Pain, Moderate (4-7)


Famotidine [Pepcid] 20 mg PO BID PRN #20 tab


 PRN Reason: Dyspepsia


Ipratropium [Atrovent HFA] 0.018 mg IH Q6 PRN 5 Days #1 bottle


 PRN Reason: Cough


Methylprednisolone [Medrol Dose Pack (21 tabs)] 4 mg PO DAILY #21 mg


Metronidazole [Flagyl] 500 mg PO TID #30 tablet


Referrals: 


Renny Conti MD [Primary Care Provider] - Follow up with primary


Forms:  [employer] (English)

## 2018-01-16 NOTE — ED PDOC
Arrival/HPI





- General


Chief Complaint: GI Problem


Time Seen by Provider: 01/15/18 20:53


Historian: Patient





- History of Present Illness


Narrative History of Present Illness (Text): 


01/16/18 22:33


A 22 year old male, whose past medical history includes Crohn's disease s/p 

coloresection, multiple abdominal intestinocutaneous fistulae, and chronic 

abdominal pain syndrome, presents to the emergency department requesting for 

pain medication for chronic abodminal pain. Patient reports experiencing nausea

, but denies any vomiting or any other complaints. Also, patient mentions he 

has follow-up with surgeon in 2 days.





PMD: Dr. Gary Colby








Past Medical History





- Provider Review


Nursing Documentation Reviewed: Yes





- Infectious Disease


Hx of Infectious Diseases: None





- Tetanus Immunization


Tetanus Immunization: Unknown





- Cardiac


Hx Cardiac Disorders: No





- Pulmonary


Hx Respiratory Disorders: Yes


Hx Pneumonia: Yes





- Neurological


Hx Neurological Disorder: No


Hx Transient Ischemic Attacks (TIA): No





- HEENT


Hx HEENT Disorder: No





- Renal


Hx Renal Disorder: No





- Endocrine/Metabolic


Hx Endocrine Disorders: No





- Hematological/Oncological


Hx Blood Disorders: Yes


Hx Anemia: Yes





- Integumentary


Hx Dermatological Disorder: Yes (RIGHT LOWER BACK FISTULA.DID I&D MAY 30 17 

[facility],)


Other/Comment: 6-15-17 3 RIGHT ABDOMINAL WALL ABSCESS. WITH LIGHT GREEN 

DRAINAGE.MEDIUM AMT.





- Musculoskeletal/Rheumatological


Hx Musculoskeletal Disorders: No


Hx Falls: No





- Gastrointestinal


Hx Gastrointestinal Disorders: Yes


Hx Crohn's Disease: Yes





- Genitourinary/Gynecological


Hx Genitourinary Disorders: Yes


Hx Urinary Tract Infection: Yes





- Psychiatric


Hx Psychophysiologic Disorder: Yes


Hx Anxiety: Yes


Hx Substance Use: No





- Surgical History


Other/Comment: Colon resection





- Anesthesia


Hx Anesthesia: Yes


Hx Anesthesia Reactions: No


Hx Malignant Hyperthermia: No





- Suicidal Assessment


Feels Threatened In Home Enviroment: No





Family/Social History





- Physician Review


Nursing Documentation Reviewed: Yes


Family/Social History: No Known Family HX


Smoking Status: Never Smoked


Hx Alcohol Use: No


Hx Substance Use: No





Allergies/Home Meds


Allergies/Adverse Reactions: 


Allergies





FISH Allergy (Verified 01/07/18 16:59)


 RASH


ketorolac [From Toradol] Allergy (Verified 01/07/18 16:59)


 RASH


shellfish derived Adverse Reaction (Verified 01/07/18 16:59)


 ANGIOEDEMA











Review of Systems





- Physician Review


All systems were reviewed & negative as marked: Yes





- Review of Systems


Constitutional: absent: Fevers, Night Sweats


Gastrointestinal: Abdominal Pain (chronic; requests for pain medications), 

Nausea.  absent: Vomiting





Physical Exam


Vital Signs Reviewed: Yes


Vital Signs











  Temp Pulse Resp BP Pulse Ox


 


 01/15/18 23:26   81  18  132/70  99


 


 01/15/18 20:41  98.4 F  82  18  128/77  99











Temperature: Afebrile


Blood Pressure: Normal


Pulse: Regular


Respiratory Rate: Normal


Appearance: Positive for: Well-Appearing


Pain Distress: None


Mental Status: Positive for: Alert and Oriented X 3





- Systems Exam


Respiratory/Chest: Present: Clear to Auscultation, Good Air Exchange.  No: 

Respiratory Distress, Accessory Muscle Use


Cardiovascular: Present: Regular Rate and Rhythm, Normal S1, S2.  No: Murmurs


Abdomen: Present: Tenderness (mild diffused abodminal tenderness), Other (

healing fistula above amicus)


Neurological: Present: GCS=15, CN II-XII Intact, Speech Normal


Skin: Present: Warm, Dry, Normal Color.  No: Rashes


Psychiatric: Present: Alert, Oriented x 3, Normal Insight, Normal Concentration





Medical Decision Making


ED Course and Treatment: 


01/16/18 22:36


Impression: 22 year old male requesting for pain medications for chronic 

abdominal pain; also experiencing nausea. Physical exam shows mild diffused 

tenderness to abdomen, and healing fistula above amicus.





Plan:


-- Percocet


-- Dilaudid


-- Reassess and disposition





Prior Visits:


Notes and results from previous visits were reviewed. Patient was last seen in 

the emergency department on 01/07/2018 for complaint of running out of opiate 

prescription. Patient was d/c home.





Progress Notes:














- Medication Orders


Current Medication Orders: 











Discontinued Medications





Hydromorphone HCl (Dilaudid)  1 mg IM STAT STA


   Stop: 01/15/18 23:31


   Last Admin: 01/15/18 23:38  Dose: 1 mg





MAR Pain Assessment


 Document     01/15/18 23:38  RD  (Rec: 01/15/18 23:39  RD  [id number])


     Pain Reassessment


      Is this a pain reassessment?               No


     Sleep


      Is patient sleeping during reassessment?   No


     Presence of Pain


      Presence of Pain                           Yes


IM Administration Charges


 Document     01/15/18 23:38  RD  (Rec: 01/15/18 23:39  RD  [id number])


     Injection Site


      MAR Injection Site                         Left Deltoid


     Charges for Administration


      # of IM Administrations                    1





Oxycodone/Acetaminophen (Percocet 5/325 Mg Tab)  2 tab PO STAT STA


   Stop: 01/15/18 22:37


   Last Admin: 01/15/18 22:44  Dose: 2 tab





MAR Pain Assessment


 Document     01/15/18 22:44  RD  (Rec: 01/15/18 22:44  RD  [id number])


     Pain Reassessment


      Is this a pain reassessment?               No


     Sleep


      Is patient sleeping during reassessment?   No


     Presence of Pain


      Presence of Pain                           Yes














- Scribe Statement


The provider has reviewed the documentation as recorded by the Johnathonibnorma Chavez





Provider Scribe Attestation:


All medical record entries made by the Scribe were at my direction and 

personally dictated by me. I have reviewed the chart and agree that the record 

accurately reflects my personal performance of the history, physical exam, 

medical decision making, and the department course for this patient. I have 

also personally directed, reviewed, and agree with the discharge instructions 

and disposition.








Disposition/Present on Arrival





- Present on Arrival


Any Indicators Present on Arrival: No


History of DVT/PE: No


History of Uncontrolled Diabetes: No


Urinary Catheter: No


History of Decub. Ulcer: No


History Surgical Site Infection Following: None





- Disposition


Have Diagnosis and Disposition been Completed?: Yes


Diagnosis: 


 Chronic abdominal pain





Disposition: HOME/ ROUTINE


Disposition Time: 23:45


Condition: IMPROVED


Discharge Instructions (ExitCare):  Abdominal Pain (ED)


Additional Instructions: 





Thank you for letting us take care of you today. The emergency medical care you 

received today was directed at your acute symptoms. If you were prescribed any 

medication, please fill it and take as directed. It may take several days for 

your symptoms to resolve. Return to the Emergency Department if your symptoms 

worsen, do not improve, or if you have any other problems.





Please contact your doctor or call one of the physicians/clinics you have been 

referred to that are listed on the Patient Visit Information form that is 

included in your discharge packet. Bring any paperwork you were given at 

discharge with you along with any medications you are taking to your follow up 

visit. Our treatment cannot replace ongoing medical care by a primary care 

provider (PCP) outside of the emergency department.





Thank you for allowing the [facility] team to be part of your care today.

















Follow up with your doctor in 2-3 days for re-evaluation and further management.


Referrals: 


Gary Colby MD [Primary Care Provider] - Follow up with primary


Forms:  [employer] (English)

## 2018-01-20 ENCOUNTER — HOSPITAL ENCOUNTER (EMERGENCY)
Dept: HOSPITAL 14 - H.ER | Age: 23
Discharge: HOME | End: 2018-01-20
Payer: MEDICAID

## 2018-01-20 ENCOUNTER — HOSPITAL ENCOUNTER (EMERGENCY)
Dept: HOSPITAL 42 - ED | Age: 23
Discharge: HOME | End: 2018-01-20
Payer: MEDICAID

## 2018-01-20 VITALS
RESPIRATION RATE: 18 BRPM | TEMPERATURE: 98.8 F | OXYGEN SATURATION: 100 % | HEART RATE: 85 BPM | SYSTOLIC BLOOD PRESSURE: 97 MMHG | DIASTOLIC BLOOD PRESSURE: 51 MMHG

## 2018-01-20 VITALS — RESPIRATION RATE: 18 BRPM | OXYGEN SATURATION: 100 % | TEMPERATURE: 98.3 F

## 2018-01-20 VITALS — BODY MASS INDEX: 17.4 KG/M2

## 2018-01-20 VITALS — SYSTOLIC BLOOD PRESSURE: 109 MMHG | DIASTOLIC BLOOD PRESSURE: 61 MMHG | HEART RATE: 79 BPM

## 2018-01-20 DIAGNOSIS — R10.9: Primary | ICD-10-CM

## 2018-01-20 DIAGNOSIS — G89.29: ICD-10-CM

## 2018-01-20 DIAGNOSIS — K50.90: Primary | ICD-10-CM

## 2018-01-20 DIAGNOSIS — F41.9: ICD-10-CM

## 2018-01-20 PROCEDURE — 96372 THER/PROPH/DIAG INJ SC/IM: CPT

## 2018-01-20 PROCEDURE — 99283 EMERGENCY DEPT VISIT LOW MDM: CPT

## 2018-01-20 NOTE — ED PDOC
Arrival/HPI





- General


Chief Complaint: Abdominal Pain


Time Seen by Provider: 01/20/18 11:26





- History of Present Illness


Narrative History of Present Illness (Text): 


you were treated in the ED today for history of crohn's disease and having 

healing with your abdomen fistulas with remicade infusion and having abdomen 

flare-up which you states improves with 2 percocet by mouth and dilouded 1mg 

intramuscular but otherwise tolerating eating, passing gas, having your normal 

loose bowel movements without blood, and without any nausea/vomiting/headache/

dizziness/difficulty breathing/chest pain/numbness/tingling/loss of limb 

function/pain with urination. 


01/20/18 12:45





01/20/18 12:55





Time/Duration: Other (1 day)


Symptom Onset: Gradual


Symptom Course: Unchanged


Quality: Aching


Severity Level: 5


Activities at Onset: Rest


Context: Sitting





Past Medical History





- Provider Review


Nursing Documentation Reviewed: Yes





- Travel History


Have you recently traveled outside [place] w/in the past 3 mons?: No





- Infectious Disease


Hx of Infectious Diseases: None





- Tetanus Immunization


Tetanus Immunization: Unknown





- Cardiac


Hx Cardiac Disorders: No





- Pulmonary


Hx Respiratory Disorders: Yes


Hx Pneumonia: Yes





- Neurological


Hx Neurological Disorder: No


Hx Transient Ischemic Attacks (TIA): No





- HEENT


Hx HEENT Disorder: No





- Renal


Hx Renal Disorder: No





- Endocrine/Metabolic


Hx Endocrine Disorders: No





- Hematological/Oncological


Hx Blood Disorders: Yes


Hx Anemia: Yes





- Integumentary


Hx Dermatological Disorder: Yes (RIGHT LOWER BACK FISTULA.DID I&D MAY 30 17 

[facility],)


Other/Comment: 6-15-17 3 RIGHT ABDOMINAL WALL ABSCESS. WITH LIGHT GREEN 

DRAINAGE.MEDIUM AMT.





- Musculoskeletal/Rheumatological


Hx Musculoskeletal Disorders: No


Hx Falls: No





- Gastrointestinal


Hx Gastrointestinal Disorders: Yes


Hx Crohn's Disease: Yes





- Genitourinary/Gynecological


Hx Genitourinary Disorders: Yes


Hx Urinary Tract Infection: Yes





- Psychiatric


Hx Psychophysiologic Disorder: Yes


Hx Anxiety: Yes


Hx Substance Use: No





- Surgical History


Other/Comment: Colon resection





- Anesthesia


Hx Anesthesia: Yes


Hx Anesthesia Reactions: No


Hx Malignant Hyperthermia: No





- Suicidal Assessment


Feels Threatened In Home Enviroment: No





Family/Social History





- Physician Review


Nursing Documentation Reviewed: Yes


Family/Social History: No Known Family HX


Smoking Status: Never Smoked


Hx Alcohol Use: No


Hx Substance Use: No





Allergies/Home Meds


Allergies/Adverse Reactions: 


Allergies





FISH Allergy (Verified 01/16/18 14:59)


 RASH


ketorolac [From Toradol] Allergy (Verified 01/16/18 14:59)


 RASH


shellfish derived Adverse Reaction (Verified 01/16/18 14:59)


 ANGIOEDEMA











Review of Systems





- Review of Systems


Constitutional: Normal


Eyes: Normal


ENT: Normal


Respiratory: Normal


Cardiovascular: Normal


Gastrointestinal: Abdominal Pain.  absent: Normal, Stool Changes, Constipation, 

Diarrhea, Nausea, Vomiting, Appetite Changes, Hematochezia, Hematemesis, 

Anorexia, Food Intolerance, Other


Genitourinary Male: Normal


Musculoskeletal: Normal


Skin: Normal


Neurological: Normal


Endocrine: Normal


Hemo/Lymphatic: Normal


Psychiatric: Normal





Physical Exam


Vital Signs Reviewed: Yes





Vital Signs











  Temp Pulse Resp BP Pulse Ox


 


 01/20/18 12:38   79  18  109/61  100


 


 01/20/18 11:08  98.3 F  86  18  107/59 L  100











Temperature: Afebrile


Blood Pressure: Normal


Pulse: Regular


Respiratory Rate: Normal


Appearance: Positive for: Well-Appearing, Non-Toxic, Comfortable


Pain Distress: None


Mental Status: Positive for: Alert and Oriented X 3





- Systems Exam


Head: Present: Atraumatic, Normocephalic


Pupils: Present: PERRL


Extroacular Muscles: Present: EOMI


Conjunctiva: Present: Normal


Ears: Present: Normal


Mouth: Present: Moist Mucous Membranes


Pharnyx: Present: Normal


Nose (External): Present: Atraumatic


Nose (Internal): Present: Normal Inspection


Neck: Present: Normal Range of Motion


Respiratory/Chest: Present: Clear to Auscultation, Good Air Exchange


Cardiovascular: Present: Regular Rate and Rhythm


Abdomen: Present: Other (healing wounds, no specific discomfort.)


Back: Present: Normal Inspection


Upper Extremity: Present: Normal Inspection


Lower Extremity: Present: Normal Inspection


Neurological: Present: GCS=15, CN II-XII Intact, Speech Normal, Motor Func 

Grossly Intact


Skin: Present: Warm, Normal Color


Psychiatric: Present: Alert, Oriented x 3, Normal Insight, Normal Concentration





Medical Decision Making


ED Course and Treatment: 


you were treated in the ED today for history of crohn's disease and having 

healing with your abdomen fistulas with remicade infusion and having abdomen 

flare-up which you states improves with 2 percocet by mouth and dilouded 1mg 

intramuscular but otherwise tolerating eating, passing gas, having your normal 

loose bowel movements without blood, and without any nausea/vomiting/headache/

dizziness/difficulty breathing/chest pain/numbness/tingling/loss of limb 

function/pain with urination. You were otherwise breathing easily, talking 

easily, good strength/sensation, walking easily, clear lungs, no specific 

abdomen tenderness with healing fistulas that were dry, no fever temp 98.3, 

stable heart rate 86, stable breathing rate 18, excellent oxygen level 100% 

room air, stable blood pressure 107/59, percocet 2 tabs and 1mg dilouded done 

in the ED with improvement, counselled to regarding the risk of opioid usage 

and thus discharged home with a stated ride from your mother. 1. Recommend 

follow-up primary care 2-3 days to review symptoms, referral to surgery and 

gastroenterology clinic. 2. If any worsening pain, fever, chills, nausea, 

vomiting, difficulty breathing, numbness, loss of limb function, pain with 

urination or any medical condition then return to the ED.


01/20/18 12:51








- Medication Orders


Current Medication Orders: 














Discontinued Medications





Hydromorphone HCl (Dilaudid)  1 mg IM STAT STA


   Stop: 01/20/18 11:48


Oxycodone/Acetaminophen (Percocet 5/325 Mg Tab)  2 tab PO STAT STA


   Stop: 01/20/18 11:47


   Last Admin: 01/20/18 12:03  Dose: 2 tab





MAR Pain Assessment


 Document     01/20/18 12:03  EQ  (Rec: 01/20/18 12:04  EQ  [id number]


     Pain Reassessment


      Is this a pain reassessment?               No


     Sleep


      Is patient sleeping during reassessment?   No


     Presence of Pain


      Presence of Pain                           Yes














Disposition/Present on Arrival





- Present on Arrival


Any Indicators Present on Arrival: No


History of DVT/PE: No


History of Uncontrolled Diabetes: No


Urinary Catheter: No


History of Decub. Ulcer: No


History Surgical Site Infection Following: None





- Disposition


Have Diagnosis and Disposition been Completed?: Yes


Diagnosis: 


 Pain in the abdomen





Disposition: HOME/ ROUTINE


Disposition Time: 12:52


Patient Plan: Discharge


Condition: IMPROVED


Additional Instructions: 


you were treated in the ED today for history of crohn's disease and having 

healing with your abdomen fistulas with remicade infusion and having abdomen 

flare-up which you states improves with 2 percocet by mouth and dilouded 1mg 

intramuscular but otherwise tolerating eating, passing gas, having your normal 

loose bowel movements without blood, and without any nausea/vomiting/headache/

dizziness/difficulty breathing/chest pain/numbness/tingling/loss of limb 

function/pain with urination. You were otherwise breathing easily, talking 

easily, good strength/sensation, walking easily, clear lungs, no specific 

abdomen tenderness with healing fistulas that were dry, no fever temp 98.3, 

stable heart rate 86, stable breathing rate 18, excellent oxygen level 100% 

room air, stable blood pressure 107/59, percocet 2 tabs and 1mg dilouded done 

in the ED with improvement, counselled to regarding the risk of opioid usage 

and thus discharged home with a stated ride from your mother. 1. Recommend 

follow-up primary care 2-3 days to review symptoms, referral to surgery and 

gastroenterology clinic. 2. If any worsening pain, fever, chills, nausea, 

vomiting, difficulty breathing, numbness, loss of limb function, pain with 

urination or any medical condition then return to the ED.


Referrals: 


Renny Conti MD [Primary Care Provider] - Follow up with primary

## 2018-01-20 NOTE — ED PDOC
HPI: Abdomen


Time Seen by Provider: 01/20/18 17:02


Chief Complaint (Nursing): Abdominal Pain


Chief Complaint (Provider): Abdominal pain, normal for patient 


History Per: Patient


History/Exam Limitations: no limitations


Onset/Duration Of Symptoms: Days (2)


Outside of [place] travel?: No


Location Of Pain/Discomfort: Diffuse


Quality Of Discomfort: Cramping


Associated Symptoms: denies: Fever, Chills, Nausea, Vomiting, Diarrhea, Loss Of 

Appetite


Additional Complaint(s): 





23 yo male with history of cronhs presents with abdominal pain. Pt reports pain 

as normal but states he ran out of his percocet. Pt states he has appoinment 

with his PMD Monday and his GI doctor Wednesday (today saturday). Pt states he 

has been getting ramicad which ahs been really helping 





Past Medical History


Reviewed: Historical Data, Nursing Documentation, Vital Signs


Vital Signs: 





 Last Vital Signs











Temp  98.8 F   01/20/18 16:35


 


Pulse  85   01/20/18 16:35


 


Resp  18   01/20/18 16:35


 


BP  97/51 L  01/20/18 16:35


 


Pulse Ox  100   01/20/18 16:35














- Medical History


PMH: Anemia, Anxiety, Crohn's Disease, Pneumonia


   Denies: Chronic Kidney Disease, TIA





- Surgical History


Surgical History: No Surg Hx





- Family History


Family History: States: Unknown Family Hx





- Living Arrangements


Living Arrangements: With Family





- Social History


Current smoker - smoking cessation education provided: No





- Immunization History


Hx Tetanus Toxoid Vaccination: No


Hx Influenza Vaccination: Yes


Hx Pneumococcal Vaccination: No





- Home Medications


Home Medications: 


 Ambulatory Orders











 Medication  Instructions  Recorded


 


Alprazolam [Xanax] 0.5 mg PO BID PRN #6 tab 01/07/18


 


Acetaminophen [Tylenol 325mg tab] 650 mg PO Q6 PRN #30 tab 01/16/18


 


Ciprofloxacin HCl [Cipro] 500 mg PO BID #20 tablet 01/16/18


 


Dicyclomine [Bentyl] 20 mg PO Q6 PRN #20 tab 01/16/18


 


Famotidine [Pepcid] 20 mg PO BID PRN #20 tab 01/16/18


 


Ipratropium [Atrovent HFA] 0.018 mg IH Q6 PRN 5 Days #1 bottle 01/16/18


 


Methylprednisolone [Medrol Dose 4 mg PO DAILY #21 mg 01/16/18





Pack (21 tabs)]  


 


Metronidazole [Flagyl] 500 mg PO TID #30 tablet 01/16/18














- Allergies


Allergies/Adverse Reactions: 


 Allergies











Allergy/AdvReac Type Severity Reaction Status Date / Time


 


FISH Allergy  RASH Verified 01/20/18 16:35


 


ketorolac [From Toradol] Allergy  RASH Verified 01/16/18 14:59


 


shellfish derived AdvReac  ANGIOEDEMA Verified 01/16/18 14:59














Review of Systems


ROS Statement: Except As Marked, All Systems Reviewed And Found Negative


Constitutional: Negative for: Fever, Chills


Gastrointestinal: Positive for: Abdominal Pain.  Negative for: Nausea, Vomiting

, Diarrhea





Physical Exam





- Reviewed


Nursing Documentation Reviewed: Yes


Vital Signs Reviewed: Yes





- Physical Exam


Appears: Positive for: Well, Non-toxic, No Acute Distress


Head Exam: Positive for: ATRAUMATIC, NORMAL INSPECTION, NORMOCEPHALIC


Skin: Positive for: Normal Color, Warm, DRY


Eye Exam: Positive for: Normal appearance


ENT: Positive for: Normal ENT Inspection


Neck: Positive for: Normal, Painless ROM


Cardiovascular/Chest: Positive for: Regular Rate, Rhythm


Respiratory: Positive for: Normal Breath Sounds.  Negative for: Accessory 

Muscle Use, Respiratory Distress


Gastrointestinal/Abdominal: Positive for: Bowel Sounds, Soft, Other (WHealing 

opening at umbillicus).  Negative for: Normal Exam, Tenderness


Back: Positive for: Normal Inspection


Extremity: Positive for: Normal ROM


Neurologic/Psych: Positive for: Alert, Oriented





- ECG


O2 Sat by Pulse Oximetry: 100





Medical Decision Making


Medical Decision Making: 





Pt does not want blood work at this time. 





Disposition





- Clinical Impression


Clinical Impression: 


 Chronic abdominal pain








- Patient ED Disposition


Is Patient to be Admitted: No





- Disposition


Disposition: Routine/Home


Disposition Time: 17:26


Condition: GOOD


Instructions:  Crohn Disease (ED)

## 2018-01-21 ENCOUNTER — HOSPITAL ENCOUNTER (EMERGENCY)
Dept: HOSPITAL 42 - ED | Age: 23
Discharge: HOME | End: 2018-01-21
Payer: MEDICAID

## 2018-01-21 VITALS — HEART RATE: 88 BPM | OXYGEN SATURATION: 100 % | SYSTOLIC BLOOD PRESSURE: 115 MMHG | DIASTOLIC BLOOD PRESSURE: 69 MMHG

## 2018-01-21 VITALS — RESPIRATION RATE: 18 BRPM | TEMPERATURE: 98.2 F

## 2018-01-21 VITALS — BODY MASS INDEX: 17.4 KG/M2

## 2018-01-21 DIAGNOSIS — M25.571: Primary | ICD-10-CM

## 2018-01-21 NOTE — ED PDOC
Arrival/HPI





- General


Chief Complaint: Lower Extremity Problem/Injury


Time Seen by Provider: 01/21/18 15:10





- History of Present Illness


Narrative History of Present Illness (Text): 


21 y/o M c PMHx Crohn's disease p/w R ankle pain after a fall yesterday. He 

denies deformity, headstrike, LOC, nausea, vomiting, numbness, or weakness. 





Past Medical History





- Infectious Disease


Hx of Infectious Diseases: None





- Tetanus Immunization


Tetanus Immunization: Unknown





- Cardiac


Hx Cardiac Disorders: No





- Pulmonary


Hx Pneumonia: Yes





- Neurological


Hx Transient Ischemic Attacks (TIA): No





- HEENT


Hx HEENT Disorder: No





- Renal


Hx Renal Disorder: No





- Endocrine/Metabolic


Hx Endocrine Disorders: No





- Hematological/Oncological


Hx Anemia: Yes





- Integumentary


Hx Dermatological Disorder: Yes (RIGHT LOWER BACK FISTULA.DID I&D MAY 30 17 

[facility],)


Other/Comment: ABDOMINAL WALL ABSCESS.





- Musculoskeletal/Rheumatological


Hx Musculoskeletal Disorders: No


Hx Falls: No





- Gastrointestinal


Hx Crohn's Disease: Yes





- Genitourinary/Gynecological


Hx Genitourinary Disorders: Yes


Hx Urinary Tract Infection: Yes





- Psychiatric


Hx Anxiety: Yes


Hx Substance Use: No





- Surgical History


Other/Comment: Colon resection





- Anesthesia


Hx Anesthesia: Yes


Hx Anesthesia Reactions: No


Hx Malignant Hyperthermia: No





- Suicidal Assessment


Feels Threatened In Home Enviroment: No





Family/Social History


Family/Social History: No Known Family HX


Smoking Status: Never Smoked


Hx Alcohol Use: No


Hx Substance Use: No





Allergies/Home Meds


Allergies/Adverse Reactions: 


Allergies





FISH Allergy (Verified 01/21/18 15:02)


 RASH


ketorolac [From Toradol] Allergy (Verified 01/21/18 15:02)


 RASH


shellfish derived Adverse Reaction (Verified 01/21/18 15:02)


 ANGIOEDEMA











Review of Systems





- Physician Review


All systems were reviewed & negative as marked: Yes





- Review of Systems


Constitutional: absent: Fevers


Cardiovascular: absent: Chest Pain





Physical Exam





- Physical Exam


Narrative Physical Exam (Text): 


Gen: NAD


Head: Atraumatic


Eyes: PERRL


Extremities: FROM pelvis, knee, and ankle and digits of R leg. No visible 

swelling. No tenderness at either malleolus, over midfoot, base of 5th digit.


Neuro: Moves all extremities.


Vital Signs











  Temp Pulse Resp BP Pulse Ox


 


 01/21/18 15:08  98.2 F  88  18  115/69  100


 


 01/21/18 15:02  98.6 F  88  16  115/69  100














Medical Decision Making


ED Course and Treatment: 


Offered pain medication, patient states he typically gets Percocet for his 

pains and is not requesting a prescription. Counseled on risk of drug 

addiction. Patient did not want XR of ankle.





Disposition/Present on Arrival





- Present on Arrival


Any Indicators Present on Arrival: No


History of DVT/PE: No


History of Uncontrolled Diabetes: No


Urinary Catheter: No


History of Decub. Ulcer: No


History Surgical Site Infection Following: None





- Disposition


Have Diagnosis and Disposition been Completed?: Yes


Diagnosis: 


 Ankle pain





Disposition: HOME/ ROUTINE


Disposition Time: 15:19


Patient Plan: Discharge


Condition: STABLE


Discharge Instructions (ExitCare):  Ankle Sprain (ED)


Forms:  [facility] (English)

## 2018-01-22 ENCOUNTER — HOSPITAL ENCOUNTER (EMERGENCY)
Dept: HOSPITAL 14 - H.ER | Age: 23
Discharge: LEFT BEFORE DISCHARGE | End: 2018-01-22
Payer: MEDICAID

## 2018-01-22 VITALS
SYSTOLIC BLOOD PRESSURE: 130 MMHG | TEMPERATURE: 98.7 F | DIASTOLIC BLOOD PRESSURE: 58 MMHG | HEART RATE: 96 BPM | RESPIRATION RATE: 16 BRPM | OXYGEN SATURATION: 100 %

## 2018-01-22 VITALS — BODY MASS INDEX: 17.4 KG/M2

## 2018-01-22 DIAGNOSIS — K50.90: ICD-10-CM

## 2018-01-22 DIAGNOSIS — R10.9: Primary | ICD-10-CM

## 2018-01-22 DIAGNOSIS — G89.29: ICD-10-CM

## 2018-01-22 NOTE — ED PDOC
HPI: General Adult


Time Seen by Provider: 01/22/18 15:52


Chief Complaint (Nursing): Back Pain


Chief Complaint (Provider): Generalized Pain


History Per: Patient


History/Exam Limitations: no limitations


Onset/Duration Of Symptoms: Days (x3)


Current Symptoms Are (Timing): Still Present


Recently: Seen In ED


Additional Complaint(s): 


22 year old male with a past medical history of Crohn's disease presents to the 

Emergency Room complaining of worsening generalized pain over the past 2-3 

days. Specifically he complains of pain to his right side, and describes having 

open abdominal wounds associated with his Crohn's. Denies any constipation, 

diarrhea, fevers, or chills. Additionally patient reports cough, congestion, 

and runny nose. States he usually takes Percocet for management of his chronic 

Crohn's pain. Patient reports he cannot take Tylenol or Toradol, requesting 

Percocet. Patient has had multiple ER visits for the same.





Discussion was held regarding [facility] narcotics policy, and it was 

explained that patient cannot have Percocet rx refill written from the ER. 


Patient now requesting oxycodone, stating he does not take Percocet.





PMD: Dr. Jose Elias Bassett 





Past Medical History


Reviewed: Historical Data, Nursing Documentation, Vital Signs


Vital Signs: 


 Last Vital Signs











Temp  98.7 F   01/22/18 15:39


 


Pulse  96 H  01/22/18 15:39


 


Resp  16   01/22/18 15:39


 


BP  130/58 L  01/22/18 15:39


 


Pulse Ox  100   01/22/18 16:24














- Medical History


PMH: Anemia, Anxiety, Crohn's Disease, Pneumonia, Chronic Pain


   Denies: Chronic Kidney Disease, TIA





- Family History


Family History: States: Unknown Family Hx





- Immunization History


Hx Tetanus Toxoid Vaccination: No


Hx Influenza Vaccination: Yes


Hx Pneumococcal Vaccination: No





- Home Medications


Home Medications: 


 Ambulatory Orders











 Medication  Instructions  Recorded


 


Alprazolam [Xanax] 0.5 mg PO BID PRN #6 tab 01/07/18


 


Acetaminophen [Tylenol 325mg tab] 650 mg PO Q6 PRN #30 tab 01/16/18


 


Ciprofloxacin HCl [Cipro] 500 mg PO BID #20 tablet 01/16/18


 


Dicyclomine [Bentyl] 20 mg PO Q6 PRN #20 tab 01/16/18


 


Famotidine [Pepcid] 20 mg PO BID PRN #20 tab 01/16/18


 


Ipratropium [Atrovent HFA] 0.018 mg IH Q6 PRN 5 Days #1 bottle 01/16/18


 


Methylprednisolone [Medrol Dose 4 mg PO DAILY #21 mg 01/16/18





Pack (21 tabs)]  


 


Metronidazole [Flagyl] 500 mg PO TID #30 tablet 01/16/18














- Allergies


Allergies/Adverse Reactions: 


 Allergies











Allergy/AdvReac Type Severity Reaction Status Date / Time


 


FISH Allergy  RASH Verified 01/21/18 15:02


 


ketorolac [From Toradol] Allergy  RASH Verified 01/21/18 15:02


 


shellfish derived AdvReac  ANGIOEDEMA Verified 01/21/18 15:02














Review of Systems


ROS Statement: Except As Marked, All Systems Reviewed And Found Negative


Constitutional: Negative for: Fever, Chills


ENT: Positive for: Nose Discharge, Nose Congestion


Respiratory: Positive for: Cough


Gastrointestinal: Positive for: Abdominal Pain (chronic per pt, hx of crohn's).

  Negative for: Diarrhea, Constipation





Physical Exam





- Reviewed


Nursing Documentation Reviewed: Yes


Vital Signs Reviewed: Yes





- Physical Exam


Appears: Positive for: Non-toxic, No Acute Distress


Head Exam: Positive for: ATRAUMATIC, NORMAL INSPECTION, NORMOCEPHALIC


Skin: Positive for: Normal Color, Warm, Dry


Eye Exam: Positive for: EOMI, Normal appearance, PERRL


ENT: Positive for: Nasal Congestion


Neck: Positive for: Normal, Painless ROM, Supple


Cardiovascular/Chest: Positive for: Regular Rate, Rhythm.  Negative for: Murmur


Respiratory: Positive for: Normal Breath Sounds.  Negative for: Accessory 

Muscle Use, Wheezing, Respiratory Distress


Pulses-Dorsalis Pedis (L): 2+


Pulses-Dorsalis Pedis (R): 2+


Gastrointestinal/Abdominal: Positive for: Soft, Other (with open wounds noted 

near umbilical region, chronic per patient. Site appears gray in color. No 

erythema, fluctuance, or induration).  Negative for: Tenderness


Back: Positive for: Normal Inspection.  Negative for: L CVA Tenderness, R CVA 

Tenderness, Vertebral Tenderness


Extremity: Positive for: Normal ROM.  Negative for: Tenderness, Deformity, 

Swelling


Neurologic/Psych: Positive for: Alert, Oriented (x3), Gait (steady).  Negative 

for: Motor/Sensory Deficits





- ECG


O2 Sat by Pulse Oximetry: 100 (RA)


Pulse Ox Interpretation: Normal





Medical Decision Making


Medical Decision Making: 


Initial Impression: Chronic Pain, Cough and Congestion  





Time: 16:10


Initial Plan:


Offered non-narcotic pain medication, which patient declined. Offered 

medication for cough/congestion relief, patient declined.  Is aaox3.  Has 

capacity to make his own decisions.  





Patient left without treatment complete.  Walked out of ER.  States he is 

leaving if we are not giving narcotics. 





--------------------------------------------------------------------------------

-----------------


Scribe Attestation: 


Documented by Yulissa Pinon, acting as a scribe for Federico Philip MD 





Provider Scribe Attestation:


All medical record entries made by the Scribe were at my direction and 

personally dictated by me. I have reviewed the chart and agree that the record 

accurately reflects my personal performance of the history, physical exam, 

medical decision making, and the department course for this patient. I have 

also personally directed, reviewed, and agree with the discharge instructions 

and disposition





Disposition





- Clinical Impression


Clinical Impression: 


 Chronic abdominal pain








- Patient ED Disposition


Is Patient to be Admitted: No





- Disposition


Disposition: Left W/O Treatment


Disposition Time: 16:10


Condition: STABLE

## 2018-01-25 ENCOUNTER — HOSPITAL ENCOUNTER (EMERGENCY)
Dept: HOSPITAL 42 - ED | Age: 23
Discharge: HOME | End: 2018-01-25
Payer: MEDICAID

## 2018-01-25 VITALS
SYSTOLIC BLOOD PRESSURE: 114 MMHG | RESPIRATION RATE: 20 BRPM | HEART RATE: 85 BPM | DIASTOLIC BLOOD PRESSURE: 66 MMHG | OXYGEN SATURATION: 100 % | TEMPERATURE: 98.2 F

## 2018-01-25 VITALS — BODY MASS INDEX: 17.4 KG/M2

## 2018-01-25 DIAGNOSIS — K50.90: Primary | ICD-10-CM

## 2018-01-25 NOTE — ED PDOC
Arrival/HPI





- General


Chief Complaint: Abdominal Pain


Time Seen by Provider: 01/25/18 13:34


Historian: Patient





- History of Present Illness


Narrative History of Present Illness (Text): 





01/25/18 18:29


pt p/w acute on chronic lower abd pain due to pt's underlying crohns disease; 

pt states the abd pain started today and at most pain is 8-9/10; pt states in 

addition to pain, he is coughing/vomited x 1-2 episodes and occasionally food 

material/discharge is expressed through the mid abd wound region; pt states 

these symptoms usually occurs during his acute abd pain exacerbation due to his 

underlying Crohns; pt usually require a single dose of pain medication and for 

outpt (at home treatment), will need an anti-cough medication (pt prefers 

phenergan + codeine); pt states no fever/chills/sweats, no cp/sob/palpitations, 

no new abd pain, no urinary/bowel changes (diarrhea is non-bloody), pt denied 

fall/trauma/sick contact, no travel; pt denied rashes; pt denied other 

complaints; pt is here for further eval.


Time/Duration: 24 hours


Symptom Onset: Sudden


Symptom Course: Worsening


Quality: Aching, Cramping, Throbbing


Severity Level: 8, Severe


Activities at Onset: Rest


Context: Home





Past Medical History





- Provider Review


Nursing Documentation Reviewed: Yes





- Travel History


Have you recently traveled outside [place] w/in the past 3 mons?: No





- Past History


Past History: No Previous





- Infectious Disease


Hx of Infectious Diseases: None





- Tetanus Immunization


Tetanus Immunization: Unknown





- Cardiac


Hx Cardiac Disorders: No





- Pulmonary


Hx Pneumonia: Yes





- Neurological


Hx Transient Ischemic Attacks (TIA): No





- HEENT


Hx HEENT Disorder: No





- Renal


Hx Renal Disorder: No





- Endocrine/Metabolic


Hx Endocrine Disorders: No





- Hematological/Oncological


Hx Anemia: Yes





- Integumentary


Hx Dermatological Disorder: Yes (RIGHT LOWER BACK FISTULA.DID I&D MAY 30 17 

[facility],)


Other/Comment: ABDOMINAL WALL ABSCESS.





- Musculoskeletal/Rheumatological


Hx Musculoskeletal Disorders: No


Hx Falls: No





- Gastrointestinal


Hx Crohn's Disease: Yes





- Genitourinary/Gynecological


Hx Genitourinary Disorders: Yes


Hx Urinary Tract Infection: Yes





- Psychiatric


Hx Anxiety: Yes


Hx Substance Use: No





- Surgical History


Other/Comment: Colon resection





- Anesthesia


Hx Anesthesia: Yes


Hx Anesthesia Reactions: No


Hx Malignant Hyperthermia: No





- Suicidal Assessment


Feels Threatened In Home Enviroment: No





Family/Social History





- Physician Review


Nursing Documentation Reviewed: Yes


Family/Social History: No Known Family HX


Smoking Status: Never Smoked


Hx Alcohol Use: No


Hx Substance Use: No





Allergies/Home Meds


Allergies/Adverse Reactions: 


Allergies





FISH Allergy (Verified 01/21/18 15:02)


 RASH


ketorolac [From Toradol] Allergy (Verified 01/21/18 15:02)


 RASH


shellfish derived Adverse Reaction (Verified 01/21/18 15:02)


 ANGIOEDEMA











Review of Systems





- Review of Systems


Constitutional: Normal


Eyes: Normal


ENT: Normal


Respiratory: Normal


Cardiovascular: Normal


Gastrointestinal: Abdominal Pain, Diarrhea, Nausea, Vomiting


Genitourinary Male: Normal


Musculoskeletal: Normal


Skin: Normal


Neurological: Normal


Endocrine: Normal


Hemo/Lymphatic: Normal


Psychiatric: Normal





Physical Exam


Vital Signs Reviewed: Yes


Vital Signs











  Temp Pulse Resp BP Pulse Ox


 


 01/25/18 13:21  98.2 F  85  20  114/66  100











Temperature: Afebrile


Blood Pressure: Normal


Pulse: Regular


Respiratory Rate: Normal


Appearance: Positive for: Well-Appearing, Non-Toxic, Other (uncomfortable, mild 

distress due to pain, resting in bed, alert/awake, GCS = 15, oriented x 3, 

cooperative, follow commands with ease)


Pain Distress: Mild


Mental Status: Positive for: Alert and Oriented X 3





- Systems Exam


Head: Present: Atraumatic, Normocephalic, Other (mild bi-temporal wasting is 

noted)


Pupils: Present: PERRL


Extroacular Muscles: Present: EOMI


Conjunctiva: Present: Normal


Ears: Present: Normal


Mouth: Present: Moist Mucous Membranes, Normal Teeth


Pharnyx: Present: Normal


Nose (External): Present: Atraumatic


Neck: Present: Normal Range of Motion, Trachea Midline.  No: MIDLINE TENDERNESS


Respiratory/Chest: Present: Clear to Auscultation, Good Air Exchange.  No: 

Respiratory Distress


Cardiovascular: Present: Regular Rate and Rhythm, Normal S1, S2


Abdomen: Present: Normal Bowel Sounds, Other (noted diffuse right mid abd 

tenderness, + post surg wounds are noted, well healed, noted mid abd wound with 

grey/yellow material discharge, NON-tender, non-bloody (pt states chronic - 

possible fistula tract, non-foul odor material); no mendes's sign, no mcburney'

s point tenderness, no masses/rebound/guarding/rigidity).  No: Tenderness, 

Distention


Back: Present: Normal Inspection.  No: CVA Tenderness, Midline Tenderness


Upper Extremity: Present: Normal Inspection, Normal ROM, NORMAL PULSES, 

Neurovascularly Intact, Capillary Refill < 2s


Lower Extremity: Present: Normal Inspection, NORMAL PULSES, Normal ROM, 

Neurovascularly Intact, Capillary Refill < 2 s


Neurological: Present: GCS=15, CN II-XII Intact, Speech Normal


Skin: Present: Warm, Normal Color, Other (cap refill < 1sec, no ulcerations, no 

petechiae, no rashes)


Psychiatric: Present: Alert, Oriented x 3, Normal Insight





Medical Decision Making


ED Course and Treatment: 





01/25/18 13:41


Impression: acute on chronic abd pain (chron's) exacerbation





i have consider all the differential diagnosis regarding pt's chief medical 

complaints/clinical findings, including but are not limited to: abd pain





A/P: abd pain


- observe, supportive care


01/25/18 18:43





01/25/18 18:50


pt is comfortable, not in acute distress





pt will be following up with his GI/surg at [facility] as 

scheduled





pt is given wound cleaning material for his mid abd fistula wound





pt is made aware of his medical results


pt is encouraged bland diet


pt is encouraged hydration


pt will f/u as directed


pt will be discharged home


Re-evaluation Time: 13:42


Reassessment Condition: Improving,but remains with symptoms





- Medication Orders


Current Medication Orders: 











Discontinued Medications





Oxycodone/Acetaminophen (Percocet 5/325 Mg Tab)  2 tab PO STAT STA


   Stop: 01/25/18 13:41


   Last Admin: 01/25/18 13:55  Dose: 2 tab





MAR Pain Assessment


 Document     01/25/18 13:55  SF  (Rec: 01/25/18 13:55  SF  [id number])


     Pain Reassessment


      Is this a pain reassessment?               Yes


     Sleep


      Is patient sleeping during reassessment?   No


     Presence of Pain


      Presence of Pain                           Yes


     Pain Scale Used


      Pain Scale Used                            Numeric


     Location


      Pain Location Body Site                    Abdomen


     Description


      Description                                Intermittent














Disposition/Present on Arrival





- Present on Arrival


Any Indicators Present on Arrival: No


History of DVT/PE: No


History of Uncontrolled Diabetes: No


Urinary Catheter: No


History of Decub. Ulcer: No


History Surgical Site Infection Following: None





- Disposition


Have Diagnosis and Disposition been Completed?: Yes


Diagnosis: 


 Crohns disease, Abdominal pain





Disposition: HOME/ ROUTINE


Disposition Time: 13:42


Patient Plan: Discharge


Condition: STABLE


Discharge Instructions (ExitCare):  Crohn Disease (ED), Abdominal Pain (ED)


Print Language: ENGLISH


Additional Instructions: 


Make sure to see your doctor in 1-2 days


Make sure to see Dr Doyle (YOUR GI doctor) as scheduled


DRINK PLENTY OF FLUIDS


take your medications as prescribed


RETURN TO ED IF worse pain, cant breath, persistent vomiting, high fever >101-

102 for hours, altered behavior, unable to urinate, heavy/persistent bleeding, 

passing out, chest pain, or other medical emergencies


Prescriptions: 


Promethazine/Codeine [Codeine/Promethazine 10 MG/5 Ml-6.25 MG/5 Ml] 10 ml PO 

TID #120 ml


Referrals: 


Kathleen Conti MD [Primary Care Provider] - Follow up with primary


Forms:  [facility] (English)

## 2018-01-27 ENCOUNTER — HOSPITAL ENCOUNTER (EMERGENCY)
Dept: HOSPITAL 42 - ED | Age: 23
Discharge: HOME | End: 2018-01-27
Payer: MEDICAID

## 2018-01-27 VITALS
DIASTOLIC BLOOD PRESSURE: 68 MMHG | SYSTOLIC BLOOD PRESSURE: 113 MMHG | HEART RATE: 95 BPM | RESPIRATION RATE: 18 BRPM | TEMPERATURE: 98.3 F | OXYGEN SATURATION: 100 %

## 2018-01-27 VITALS — BODY MASS INDEX: 18.1 KG/M2

## 2018-01-27 DIAGNOSIS — K50.90: ICD-10-CM

## 2018-01-27 DIAGNOSIS — R10.9: ICD-10-CM

## 2018-01-27 DIAGNOSIS — T81.4XXA: Primary | ICD-10-CM
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
## 2018-01-27 NOTE — ED PDOC
Arrival/HPI





- General


Chief Complaint: Abdominal Pain


Time Seen by Provider: 01/27/18 11:35


Historian: Patient





- History of Present Illness


Narrative History of Present Illness (Text): 





01/27/18 11:38


pt p/w < 1 day onset of left lower abd wound drainage, pt states abd pain is 

worsen as a result; pt described similiar episodes in the past; pt states no 

fever/chills/sweats, no cp/sob/palpitations, no nausea/vomiting, no changes to 

bowel is noted, no urinary changes; pt states no fall/trauma/sick contact, no 

travel; pt arrived to ED for wound care and further eval


pt's without other complaints.


01/27/18 19:32





Time/Duration: 24 hours


Symptom Onset: Sudden


Symptom Course: Unchanged


Severity Level: Severe


Activities at Onset: Rest


Context: Home





Past Medical History





- Provider Review


Nursing Documentation Reviewed: Yes





- Travel History


Have you recently traveled outside [place] w/in the past 3 mons?: No





- Past History


Past History: No Previous





- Infectious Disease


Hx of Infectious Diseases: None





- Tetanus Immunization


Tetanus Immunization: Unknown





- Cardiac


Hx Cardiac Disorders: No





- Pulmonary


Hx Pneumonia: Yes





- Neurological


Hx Transient Ischemic Attacks (TIA): No





- HEENT


Hx HEENT Disorder: No





- Renal


Hx Renal Disorder: No





- Endocrine/Metabolic


Hx Endocrine Disorders: No





- Hematological/Oncological


Hx Anemia: Yes





- Integumentary


Hx Dermatological Disorder: Yes (RIGHT LOWER BACK FISTULA.DID I&D MAY 30 17 

[facility],)


Other/Comment: ABDOMINAL WALL ABSCESS.





- Musculoskeletal/Rheumatological


Hx Musculoskeletal Disorders: No


Hx Falls: No





- Gastrointestinal


Hx Crohn's Disease: Yes





- Genitourinary/Gynecological


Hx Genitourinary Disorders: Yes


Hx Urinary Tract Infection: Yes





- Psychiatric


Hx Anxiety: Yes


Hx Substance Use: No





- Surgical History


Other/Comment: Colon resection





- Anesthesia


Hx Anesthesia: Yes


Hx Anesthesia Reactions: No


Hx Malignant Hyperthermia: No





- Suicidal Assessment


Feels Threatened In Home Enviroment: No





Family/Social History





- Physician Review


Nursing Documentation Reviewed: Yes


Family/Social History: No Known Family HX


Smoking Status: Never Smoked


Hx Alcohol Use: No


Hx Substance Use: No


Hx Substance Use Treatment: No





Allergies/Home Meds


Allergies/Adverse Reactions: 


Allergies





FISH Allergy (Verified 01/21/18 15:02)


 RASH


ketorolac [From Toradol] Allergy (Verified 01/21/18 15:02)


 RASH


shellfish derived Adverse Reaction (Verified 01/21/18 15:02)


 ANGIOEDEMA











Review of Systems





- Review of Systems


Constitutional: Normal


Eyes: Normal


ENT: Normal


Respiratory: Normal


Cardiovascular: Normal


Gastrointestinal: Abdominal Pain, Other (left mid abd wound discharge)


Genitourinary Male: Normal


Musculoskeletal: Normal


Skin: Normal


Neurological: Normal


Endocrine: Normal


Hemo/Lymphatic: Normal





Physical Exam


Vital Signs Reviewed: Yes


Vital Signs











  Temp Pulse Resp BP Pulse Ox


 


 01/27/18 11:31  98.3 F  95 H  18  113/68  100











Temperature: Afebrile


Blood Pressure: Normal


Pulse: Regular


Respiratory Rate: Normal


Appearance: Positive for: Well-Appearing, Uncomfortable, Other (resting in bed, 

alert/awake, GCS = 15, oriented x 3, uncomfortable, cooperative, follows 

command with ease).  No: Non-Toxic


Pain Distress: None


Mental Status: Positive for: Alert and Oriented X 3





- Systems Exam


Head: Present: Atraumatic, Normocephalic


Pupils: Present: PERRL


Extroacular Muscles: Present: EOMI


Conjunctiva: Present: Normal


Ears: Present: Normal


Mouth: Present: Moist Mucous Membranes, Normal Teeth


Pharnyx: Present: Normal


Nose (External): Present: Atraumatic


Nose (Internal): Present: Normal Inspection


Neck: Present: Normal Range of Motion


Respiratory/Chest: Present: Clear to Auscultation


Cardiovascular: Present: Regular Rate and Rhythm


Abdomen: Present: Tenderness, Other (+ mild diffuse mid/right abd tenderness 

over prior surg wound sites, noted faint discharge over healed surg wound site, 

non-bloody, NO fluctuance is noted, no rebound/guarding/rigidity; no mendes's 

sign, no rebound/guarding; pt with multiple well healed surg site, no 

expressible discharge is noted)


Back: Present: Normal Inspection.  No: Midline Tenderness


Upper Extremity: Present: Normal Inspection, Normal ROM, NORMAL PULSES, 

Neurovascularly Intact, Capillary Refill < 2s


Lower Extremity: Present: Normal Inspection, NORMAL PULSES, Normal ROM, 

Neurovascularly Intact, Capillary Refill < 2 s


Neurological: Present: GCS=15, CN II-XII Intact


Skin: Present: Warm


Psychiatric: Present: Alert





Medical Decision Making


ED Course and Treatment: 





01/27/18 11:38


Impression: abd wound drainage, local infection





i have consider all the differential diagnosis regarding pt's chief medical 

complaints/clinical findings, including but are not limited to: r/o infection, 

local infection, abscess/drainage





A/P: abd wound drainage


- observe


- wound care


- pain control


- supportive care


01/27/18 19:37





pt is again requesting 1 pain medication and an anxiolytic medication (xanax) 

for his symptoms





pt is offered labs and possible CT; pt refused it all, stating that he has 

already had CT last week at [facility] and his lab works are usually WNL


pt does not want any tests


pt is agreeable with abx


pt is agreeable with no prescription for his pain, pt is to see pain specialists





prior to re-eval, i was notified by nursing staff that he had eloped


pt left prior to my completion of pt's medical evaluation


Re-evaluation Time: 13:30


Reassessment Condition: Improving,but remains with symptoms





- Medication Orders


Current Medication Orders: 











Discontinued Medications





Alprazolam (Xanax)  1 mg PO ONCE ONE


   PRN Reason: Protocol


   Stop: 01/27/18 11:36


   Last Admin: 01/27/18 11:54  Dose: 1 mg





Clindamycin HCl (Cleocin)  300 mg PO STAT STA


   PRN Reason: Protocol


   Stop: 01/27/18 11:37


   Last Admin: 01/27/18 11:54  Dose: 300 mg





Oxycodone/Acetaminophen (Percocet 5/325 Mg Tab)  2 tab PO STAT STA


   Stop: 01/27/18 11:37


   Last Admin: 01/27/18 11:54  Dose: 2 tab





MAR Pain Assessment


 Document     01/27/18 11:54  GMD  (Rec: 01/27/18 11:54  GMD  [facility] [id number]


     Pain Reassessment


      Is this a pain reassessment?               No


     Sleep


      Is patient sleeping during reassessment?   No


     Presence of Pain


      Presence of Pain                           Yes


     Location


      Pain Location Body Site                    Abdomen














Disposition/Present on Arrival





- Present on Arrival


Any Indicators Present on Arrival: No


History of DVT/PE: No


History of Uncontrolled Diabetes: No


Urinary Catheter: No


History of Decub. Ulcer: No


History Surgical Site Infection Following: None





- Disposition


Have Diagnosis and Disposition been Completed?: Yes


Diagnosis: 


 Chronic abdominal wound infection, Chronic abdominal pain, Fistula, Crohn 

disease





Disposition: HOME/ ROUTINE


Disposition Time: 15:00


Patient Plan: Discharge


Condition: STABLE


Discharge Instructions (ExitCare):  Chronic Wound Care (ED), Abdominal Pain (ED)


Print Language: ENGLISH


Additional Instructions: 


Make sure to see your doctor in 1-2 days


YOU NEED TO SEE YOUR PAIN specialists next week


DRINK PLENTY OF FLUIDS


KEEP WOUNDS CLEAN AND DRY


take your medications as prescribed


RETURN TO ED IF worse pain, cant breath, persistent vomiting, high fever >101-

102 for hours, altered behavior, unable to urinate, heavy/persistent bleeding, 

passing out, chest pain, or other medical emergencies


Referrals: 


Renny Conti MD [Primary Care Provider] - Follow up with primary


Dinh Prather MD [Staff Provider] - Follow up with primary


Forms:  [facility] (English)

## 2018-01-28 ENCOUNTER — HOSPITAL ENCOUNTER (EMERGENCY)
Dept: HOSPITAL 42 - ED | Age: 23
Discharge: LEFT BEFORE DISCHARGE | End: 2018-01-28
Payer: MEDICAID

## 2018-01-28 VITALS — TEMPERATURE: 98 F | RESPIRATION RATE: 18 BRPM

## 2018-01-28 VITALS — SYSTOLIC BLOOD PRESSURE: 108 MMHG | DIASTOLIC BLOOD PRESSURE: 60 MMHG | HEART RATE: 88 BPM | OXYGEN SATURATION: 100 %

## 2018-01-28 VITALS — BODY MASS INDEX: 17.9 KG/M2

## 2018-01-28 DIAGNOSIS — R10.9: ICD-10-CM
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
## 2018-01-28 DIAGNOSIS — K63.2: Primary | ICD-10-CM

## 2018-01-28 DIAGNOSIS — T81.4XXA: ICD-10-CM

## 2018-01-28 NOTE — ED PDOC
Arrival/HPI





- General


Time Seen by Provider: 01/28/18 10:03


Historian: Patient





- History of Present Illness


Narrative History of Present Illness (Text): 





01/28/18 10:33


23 y/o male w/ pmhx of crohn's colitis, s/p multiple coloresective surgeries, 

multiple intestino-cutaneous fistulae , on remicade infusion, likley chronic 

opiate dependence, presents to this Emergency department complaining of  right 

side abdominal pain.  Patient stated pain is chronic but he does not have pain 

medication.  Patient stated he only wants pain medication and "something' for 

anxiety.  He refused labs, or imaging.





Past Medical History





- Provider Review


Nursing Documentation Reviewed: Yes





- Past History


Past History: No Previous





- Infectious Disease


Hx of Infectious Diseases: None





- Tetanus Immunization


Tetanus Immunization: Unknown





- Cardiac


Hx Cardiac Disorders: No





- Pulmonary


Hx Pneumonia: Yes





- Neurological


Hx Transient Ischemic Attacks (TIA): No





- HEENT


Hx HEENT Disorder: No





- Renal


Hx Renal Disorder: No





- Endocrine/Metabolic


Hx Endocrine Disorders: No





- Hematological/Oncological


Hx Anemia: Yes





- Integumentary


Hx Dermatological Disorder: Yes (RIGHT LOWER BACK FISTULA.DID I&D MAY 30 17 

[facility],)


Other/Comment: ABDOMINAL WALL ABSCESS.





- Musculoskeletal/Rheumatological


Hx Musculoskeletal Disorders: No


Hx Falls: No





- Gastrointestinal


Hx Crohn's Disease: Yes





- Genitourinary/Gynecological


Hx Genitourinary Disorders: Yes


Hx Urinary Tract Infection: Yes





- Psychiatric


Hx Anxiety: Yes


Hx Substance Use: No





- Surgical History


Other/Comment: Colon resection





- Anesthesia


Hx Anesthesia: Yes


Hx Anesthesia Reactions: No


Hx Malignant Hyperthermia: No





- Suicidal Assessment


Feels Threatened In Home Enviroment: No





Family/Social History





- Physician Review


Nursing Documentation Reviewed: Yes


Family/Social History: Other (noncontributory)


Smoking Status: Never Smoked


Hx Alcohol Use: No


Hx Substance Use: No


Hx Substance Use Treatment: No





Allergies/Home Meds


Allergies/Adverse Reactions: 


Allergies





FISH Allergy (Verified 01/28/18 10:42)


 RASH


ketorolac [From Toradol] Allergy (Verified 01/28/18 10:42)


 RASH


shellfish derived Adverse Reaction (Verified 01/28/18 10:42)


 ANGIOEDEMA











Review of Systems





- Review of Systems


Constitutional: Normal.  absent: Fatigue, Weight Change, Fevers


Eyes: Normal


ENT: Normal


Respiratory: Normal.  absent: SOB, Cough, Sputum, Wheezing


Cardiovascular: Normal.  absent: Chest Pain


Gastrointestinal: Abdominal Pain.  absent: Nausea, Vomiting


Genitourinary Male: Normal.  absent: Dysuria, Frequency, Hematuria


Musculoskeletal: Normal


Skin: Normal.  absent: Rash


Neurological: Normal.  absent: Headache, Dizziness, Focal Weakness, Gait Changes

, Speech Changes, Facial Droop, Disequilibrium


Endocrine: Normal


Hemo/Lymphatic: Normal


Psychiatric: Normal





Physical Exam


Vital Signs











  Temp Pulse Resp BP Pulse Ox


 


 01/28/18 10:25  98.0 F  90  18  105/55 L  99











Temperature: Afebrile


Blood Pressure: Normal


Pulse: Regular


Respiratory Rate: Normal


Appearance: Positive for: Well-Appearing, Non-Toxic, Comfortable


Pain Distress: None


Mental Status: Positive for: Alert and Oriented X 3





- Systems Exam


Head: Present: Atraumatic, Normocephalic


Pupils: Present: PERRL


Extroacular Muscles: Present: EOMI


Conjunctiva: Present: Normal


Mouth: Present: Moist Mucous Membranes


Neck: Present: Normal Range of Motion


Respiratory/Chest: Present: Clear to Auscultation, Good Air Exchange.  No: 

Respiratory Distress, Accessory Muscle Use, Wheezes, Rales, Retracting, Rhonchi


Cardiovascular: Present: Regular Rate and Rhythm, Normal S1, S2.  No: Murmurs


Abdomen: Present: Normal Bowel Sounds, Scars, Other ((+) RLQ small fistula with 

serous drainage.  No cellulitis , or induration.  No purulent draiange noted.  

the abdomen is soft, nt/nd).  No: Tenderness, Distention, Peritoneal Signs, 

Rebound, Guarding


Upper Extremity: Present: Normal Inspection, Normal ROM


Lower Extremity: Present: Normal Inspection, Normal ROM.  No: CALF TENDERNESS


Neurological: Present: GCS=15, CN II-XII Intact, Motor Func Grossly Intact, 

Normal Sensory Function, Normal Cerebellar Funct, Gait Normal


Skin: Present: Warm, Dry, Normal Color.  No: Rashes


Psychiatric: Present: Alert, Oriented x 3, Normal Insight, Normal Concentration





Medical Decision Making


ED Course and Treatment: 





01/28/18 10:43


Patient continued refusing Labs, and CT scan.  He stated he has had multiple CT

  and labs in the past.  Patient only wants pain medication and antianxieties 

medication, and he wants to sign 


Leaving Against Medical Advice (AMA):.


The patient is choosing to leave against medical advice.  I have personally 

explained to the patient that choosing to do so may result in permanent bodily 

harm or death.  I have discussed at great length that without further 

evaluation and monitoring there may be unforeseen circumstances and/or 

deterioration causing permanent bodily harm or death as a result of their 

choice. The patient is alert, oriented, and shows the mental capacity to make 

clear decisions regarding the patients health care at this time. The patient 

continues to wish to leave against medical advice.  


In light of the patients decision to leave against medical advice, follow-up 

has been recommended and the patient is aware of the importance to following up 

as instructed.  The patient has been advised that they should return to the 

emergency room immediately if they change their mind at any time, or if their 

condition begins to change or worsen in any way.


01/28/18 10:55


I spoke with patient regarding the use of Percocet or other Opiods medication, 

and also the use Xanax.  I told him the risk of drug addiction with these 

agents.  He understood risk, and he stated he has an appointment with his 

doctor tomorrow, and he will consult with his doctor about pain management.  He 

requested ABX


01/28/18 11:01





Pt is offered labs and possible CT; pt refused it all, stating that [place] 

[place] [facility] [place]


Pt does not want any tests, pt is agreeable with abx


Pt is agreeable with no prescription for his pain, pt is to see pain specialists





01/28/18 11:02


I reviewed the risk of tendonitis and tendon rupture when taking Ciprofloxacin 

for up to 2 months. He was recommended to rest and avoid gym or running.till 

clear by his doctor.


Re-evaluation Time: 10:57


Reassessment Condition: Re-examined, Unchanged





- Medication Orders


Current Medication Orders: 











Discontinued Medications





Alprazolam (Xanax)  0.25 mg PO STAT STA


   PRN Reason: Protocol


   Stop: 01/28/18 10:44


   Last Admin: 01/28/18 10:53  Dose: 0.25 mg





Oxycodone/Acetaminophen (Percocet 5/325 Mg Tab)  2 tab PO STAT STA


   Stop: 01/28/18 10:43


   Last Admin: 01/28/18 10:53  Dose: 2 tab





MAR Pain Assessment


 Document     01/28/18 10:53  SF  (Rec: 01/28/18 10:53  SF  [id number])


     Pain Reassessment


      Is this a pain reassessment?               Yes


     Sleep


      Is patient sleeping during reassessment?   No


     Presence of Pain


      Presence of Pain                           Yes


     Pain Scale Used


      Pain Scale Used                            Numeric


     Location


      Pain Location Body Site                    Abdomen


     Description


      Description                                Constant














Disposition/Present on Arrival





- Present on Arrival


Any Indicators Present on Arrival: No


History of DVT/PE: No


History of Uncontrolled Diabetes: No


Urinary Catheter: No


History Surgical Site Infection Following: None





- Disposition


Have Diagnosis and Disposition been Completed?: Yes


Diagnosis: 


 Abdominal fistula, Abdominal pain, Chronic abdominal wound infection





Disposition: AGAINST MEDICAL ADVICE


Disposition Time: 10:59


Condition: UNKNOWN


Additional Instructions: 


See your doctor tomorrow.  return to emergency if wound worsen or pain worsen.


Prescriptions: 


Ciprofloxacin HCl [Cipro] 500 mg PO BID #14 tablet


Metronidazole [Flagyl] 500 mg PO BID #14 tablet


Referrals: 


 [facility] [Outside] - Follow up with primary


[facility] [Outside] - Follow up with primary

## 2018-01-29 ENCOUNTER — HOSPITAL ENCOUNTER (EMERGENCY)
Dept: HOSPITAL 42 - ED | Age: 23
Discharge: LEFT BEFORE DISCHARGE | End: 2018-01-29
Payer: MEDICAID

## 2018-01-29 VITALS
SYSTOLIC BLOOD PRESSURE: 106 MMHG | HEART RATE: 84 BPM | RESPIRATION RATE: 17 BRPM | OXYGEN SATURATION: 100 % | TEMPERATURE: 98 F | DIASTOLIC BLOOD PRESSURE: 54 MMHG

## 2018-01-29 VITALS — BODY MASS INDEX: 17.9 KG/M2
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
## 2018-01-29 DIAGNOSIS — K50.913: Primary | ICD-10-CM

## 2018-01-29 DIAGNOSIS — T81.4XXA: ICD-10-CM

## 2018-01-30 ENCOUNTER — HOSPITAL ENCOUNTER (EMERGENCY)
Dept: HOSPITAL 42 - ED | Age: 23
Discharge: HOME | End: 2018-01-30
Payer: MEDICAID

## 2018-01-30 VITALS
TEMPERATURE: 97.7 F | DIASTOLIC BLOOD PRESSURE: 62 MMHG | OXYGEN SATURATION: 100 % | SYSTOLIC BLOOD PRESSURE: 121 MMHG | RESPIRATION RATE: 18 BRPM | HEART RATE: 84 BPM

## 2018-01-30 VITALS — BODY MASS INDEX: 17.9 KG/M2

## 2018-01-30 DIAGNOSIS — R10.9: Primary | ICD-10-CM

## 2018-01-30 NOTE — ED PDOC
Arrival/HPI





- General


Chief Complaint: Abdominal Pain


Time Seen by Provider: 01/30/18 07:30





- History of Present Illness


Narrative History of Present Illness (Text): 





Lawrence Chao is a 22 year old male, whose past medical history includes Crohn

's disease on Remicade infusions, multiple intestino-cutaneous fistulae, 

chronic abdominal abscess, and opiate dependence, who presents to the Emergency 

department complaining of right-sided abdominal pain. Patient reports abdominal 

pain is chronic, but he does not have any pain medication and is waiting for 

insurance to be instituted on 2/1/18. Patient only requesting pain and anxiety 

medication. Patient refusing any labwork/imaging. Patient denies any fever, 

chills, chest pain, shortness of breath, nausea, vomiting, neck pain, headache, 

dizziness, or any other complaints.





Past Medical History





- Past History


Past History: No Previous





- Infectious Disease


Hx of Infectious Diseases: None





- Tetanus Immunization


Tetanus Immunization: Unknown





- Cardiac


Hx Cardiac Disorders: No





- Pulmonary


Hx Pneumonia: Yes





- Neurological


Hx Transient Ischemic Attacks (TIA): No





- HEENT


Hx HEENT Disorder: No





- Renal


Hx Renal Disorder: No





- Endocrine/Metabolic


Hx Endocrine Disorders: No





- Hematological/Oncological


Hx Anemia: Yes





- Integumentary


Hx Dermatological Disorder: Yes (RIGHT LOWER BACK FISTULA.DID I&D MAY 30 17 

[facility],)


Other/Comment: ABDOMINAL WALL ABSCESS.





- Musculoskeletal/Rheumatological


Hx Musculoskeletal Disorders: No


Hx Falls: No





- Gastrointestinal


Hx Crohn's Disease: Yes





- Genitourinary/Gynecological


Hx Genitourinary Disorders: Yes


Hx Urinary Tract Infection: Yes





- Psychiatric


Hx Anxiety: Yes


Hx Substance Use: No





- Surgical History


Other/Comment: Colon resection





- Anesthesia


Hx Anesthesia: Yes


Hx Anesthesia Reactions: No


Hx Malignant Hyperthermia: No





- Suicidal Assessment


Feels Threatened In Home Enviroment: No





Family/Social History


Family/Social History: No Known Family HX


Smoking Status: Never Smoked


Hx Alcohol Use: No


Hx Substance Use: No


Hx Substance Use Treatment: No





Allergies/Home Meds


Allergies/Adverse Reactions: 


Allergies





FISH Allergy (Verified 01/30/18 07:34)


 RASH


ketorolac [From Toradol] Allergy (Verified 01/30/18 07:34)


 RASH


shellfish derived Adverse Reaction (Verified 01/30/18 07:34)


 ANGIOEDEMA








Home Medications: 


 Home Meds











 Medication  Instructions  Recorded  Confirmed


 


Ramicade IV 01/30/18 














Review of Systems





- Physician Review


All systems were reviewed & negative as marked: Yes





- Review of Systems


Constitutional: absent: Fevers


Cardiovascular: absent: Chest Pain





Physical Exam





- Physical Exam


Narrative Physical Exam (Text): 





Head: Present: Atraumatic, Normocephalic


Pupils: Present: PERRL


Extroacular Muscles: Present: EOMI


Conjunctiva: Present: Normal


Mouth: Present: Moist Mucous Membranes


Neck: Present: Normal Range of Motion


Respiratory/Chest: Present: Clear to Auscultation, Good Air Exchange.  No: 

Respiratory Distress, Accessory Muscle Use


Cardiovascular: Present: Regular Rate and Rhythm, Normal S1, S2.  No: Murmurs


Abdomen: Present: Normal Bowel Sounds, Other (small RLQ and R flank fistula 

with serous drainage, no surrounding cellulitis/drainage, no purulent drainage)

.  No: Tenderness, Distention, Peritoneal Signs


Back: Present: Normal Inspection.  No: CVA Tenderness, Midline Tenderness, 

Paraspinal Tenderness


Upper Extremity: Present: Normal Inspection.  No: Cyanosis, Edema


Lower Extremity: Present: Normal Inspection.  No: Edema


Neurological: Present: GCS=15, CN II-XII Intact, Speech Normal


Skin: Present: Warm, Dry, Normal Color.  No: Rashes


Psychiatric: Present: Alert, Oriented x 3, Normal Insight, Normal Concentration


Vital Signs











  Temp Pulse Resp BP Pulse Ox


 


 01/30/18 07:34  97.7 F  84  18  121/62  100














Medical Decision Making


ED Course and Treatment: 


Pain control, Xanax, d/c home, f/u primary care, return to ED for fever, 

worsening pain, discharge of pus, chills, dyspnea, or any other problem.





Disposition/Present on Arrival





- Present on Arrival


Any Indicators Present on Arrival: No


History of DVT/PE: No


History of Uncontrolled Diabetes: No


Urinary Catheter: No


History of Decub. Ulcer: No


History Surgical Site Infection Following: None





- Disposition


Have Diagnosis and Disposition been Completed?: Yes


Diagnosis: 


 Abdominal pain





Disposition: HOME/ ROUTINE


Disposition Time: 07:50


Patient Plan: Discharge


Patient Problems: 


 Current Active Problems











Problem Status Onset


 


Abdominal pain Acute  











Condition: STABLE


Discharge Instructions (ExitCare):  Crohn Disease (ED)


Forms:  [facility] (English)

## 2018-01-31 ENCOUNTER — HOSPITAL ENCOUNTER (EMERGENCY)
Dept: HOSPITAL 42 - ED | Age: 23
Discharge: LEFT BEFORE DISCHARGE | End: 2018-01-31
Payer: MEDICAID

## 2018-01-31 VITALS
RESPIRATION RATE: 18 BRPM | SYSTOLIC BLOOD PRESSURE: 110 MMHG | OXYGEN SATURATION: 100 % | TEMPERATURE: 98.4 F | DIASTOLIC BLOOD PRESSURE: 52 MMHG | HEART RATE: 87 BPM

## 2018-01-31 VITALS — BODY MASS INDEX: 17.4 KG/M2

## 2018-01-31 DIAGNOSIS — R10.9: Primary | ICD-10-CM

## 2018-01-31 RX ADMIN — OXYCODONE HYDROCHLORIDE AND ACETAMINOPHEN STA TAB: 5; 325 TABLET ORAL at 06:13

## 2018-01-31 NOTE — ED PDOC
Arrival/HPI





- General


Time Seen by Provider: 01/31/18 05:36


Historian: Patient





- History of Present Illness


Narrative History of Present Illness (Text): 





01/31/18 05:57


Lawrence Wen is a 22 Year old male, whose past medical history includes 

crohn's disease and anemia, who presents to the Emergency department c/o 

abdominal pain and vomiting since 2 days ago. Patient was recently seen in the 

Emergency department. No other complaints were made.


Time/Duration: < week


Symptom Onset: Sudden


Symptom Course: Unchanged





Past Medical History





- Provider Review


Nursing Documentation Reviewed: Yes





- Past History


Past History: No Previous





- Infectious Disease


Hx of Infectious Diseases: None





- Tetanus Immunization


Tetanus Immunization: Unknown





- Cardiac


Hx Cardiac Disorders: No





- Pulmonary


Hx Pneumonia: Yes





- Neurological


Hx Transient Ischemic Attacks (TIA): No





- HEENT


Hx HEENT Disorder: No





- Renal


Hx Renal Disorder: No





- Endocrine/Metabolic


Hx Endocrine Disorders: No





- Hematological/Oncological


Hx Anemia: Yes





- Integumentary


Hx Dermatological Disorder: Yes (RIGHT LOWER BACK FISTULA.DID I&D MAY 30 17 

[facility],)


Other/Comment: ABDOMINAL WALL ABSCESS.





- Musculoskeletal/Rheumatological


Hx Musculoskeletal Disorders: No


Hx Falls: No





- Gastrointestinal


Hx Crohn's Disease: Yes





- Genitourinary/Gynecological


Hx Genitourinary Disorders: Yes


Hx Urinary Tract Infection: Yes





- Psychiatric


Hx Anxiety: Yes


Hx Substance Use: No





- Surgical History


Other/Comment: Colon resection





- Anesthesia


Hx Anesthesia: Yes


Hx Anesthesia Reactions: No


Hx Malignant Hyperthermia: No





- Suicidal Assessment


Feels Threatened In Home Enviroment: No





Family/Social History





- Physician Review


Nursing Documentation Reviewed: Yes


Family/Social History: Unknown Family HX


Smoking Status: Never Smoked


Hx Alcohol Use: No


Hx Substance Use: No


Hx Substance Use Treatment: No





Allergies/Home Meds


Allergies/Adverse Reactions: 


Allergies





FISH Allergy (Verified 01/31/18 05:39)


 RASH


ketorolac [From Toradol] Allergy (Verified 01/31/18 05:39)


 RASH


shellfish derived Adverse Reaction (Verified 01/31/18 05:39)


 ANGIOEDEMA








Home Medications: 


 Home Meds











 Medication  Instructions  Recorded  Confirmed


 


Ramicade IV 01/30/18 














Review of Systems





- Physician Review


All systems were reviewed & negative as marked: Yes





- Review of Systems


Constitutional: absent: Fevers


Respiratory: absent: SOB


Cardiovascular: absent: Chest Pain


Gastrointestinal: Abdominal Pain, Vomiting





Physical Exam


Vital Signs Reviewed: Yes


Vital Signs











  Temp Pulse Resp BP Pulse Ox


 


 01/31/18 05:45  98.4 F  87  18  110/52 L  100











Temperature: Afebrile


Blood Pressure: Hypotensive


Pulse: Regular


Respiratory Rate: Normal


Appearance: Positive for: Well-Appearing, Non-Toxic, Comfortable


Pain Distress: None


Mental Status: Positive for: Alert and Oriented X 3





- Systems Exam


Head: Present: Atraumatic, Normocephalic


Pupils: Present: PERRL


Extroacular Muscles: Present: EOMI


Conjunctiva: Present: Normal


Mouth: Present: Moist Mucous Membranes


Neck: Present: Normal Range of Motion


Respiratory/Chest: Present: Clear to Auscultation, Good Air Exchange.  No: 

Respiratory Distress, Accessory Muscle Use


Cardiovascular: Present: Regular Rate and Rhythm, Normal S1, S2.  No: Murmurs


Abdomen: Present: Tenderness (mild diffused tenderness), Normal Bowel Sounds.  

No: Distention, Peritoneal Signs


Back: Present: Normal Inspection


Upper Extremity: Present: Normal Inspection.  No: Cyanosis, Edema


Lower Extremity: Present: Normal Inspection.  No: Edema


Neurological: Present: GCS=15, CN II-XII Intact, Speech Normal


Skin: Present: Warm, Dry, Normal Color.  No: Rashes


Psychiatric: Present: Alert, Oriented x 3, Normal Insight, Normal Concentration





Medical Decision Making


ED Course and Treatment: 





01/31/18


Impression:


22 Year old male with mild diffused tenderness Complaining of  abdominal pain 

and vomiting. 





Plan:


-- Labs


-- Reassess and disposition





Progress Notes:











- Lab Interpretations


I have reviewed the lab results: Yes





- Medication Orders


Current Medication Orders: 











Discontinued Medications





Alprazolam (Xanax)  0.25 mg PO STAT STA


   PRN Reason: Protocol


   Stop: 01/31/18 06:06


   Last Admin: 01/31/18 06:13  Dose: 0.25 mg





Oxycodone/Acetaminophen (Percocet 5/325 Mg Tab)  2 tab PO STAT STA


   Stop: 01/31/18 06:04


   Last Admin: 01/31/18 06:13  Dose: 2 tab





MAR Pain Assessment


 Document     01/31/18 06:13  CNR  (Rec: 01/31/18 06:13  CNR  [id number]


     Pain Reassessment


      Is this a pain reassessment?               Yes














- Scribe Statement


The provider has reviewed the documentation as recorded by the Scribe





Disposition/Present on Arrival





- Present on Arrival


Any Indicators Present on Arrival: No


History of DVT/PE: No


History of Uncontrolled Diabetes: No


Urinary Catheter: No


History Surgical Site Infection Following: None





- Disposition


Have Diagnosis and Disposition been Completed?: Yes


Diagnosis: 


 Abdominal pain





Disposition: ELOPEMENT - ER ONLY


Disposition Time: 06:38


Condition: UNKNOWN


Forms:  [facility] (English)

## 2018-02-13 ENCOUNTER — HOSPITAL ENCOUNTER (EMERGENCY)
Dept: HOSPITAL 42 - ED | Age: 23
Discharge: HOME | End: 2018-02-13
Payer: MEDICAID

## 2018-02-13 VITALS
OXYGEN SATURATION: 98 % | RESPIRATION RATE: 18 BRPM | TEMPERATURE: 98.6 F | SYSTOLIC BLOOD PRESSURE: 104 MMHG | DIASTOLIC BLOOD PRESSURE: 56 MMHG | HEART RATE: 86 BPM

## 2018-02-13 VITALS — BODY MASS INDEX: 18.3 KG/M2

## 2018-02-13 DIAGNOSIS — J06.9: Primary | ICD-10-CM

## 2018-02-13 DIAGNOSIS — K50.90: ICD-10-CM

## 2018-02-13 NOTE — ED PDOC
Arrival/HPI





- General


Chief Complaint: Flu-like Symptoms


Time Seen by Provider: 02/13/18 10:55


Historian: Patient





- History of Present Illness


Narrative History of Present Illness (Text): 


02/13/18 11:30


A 22 year old male, whose past medical history includes Crohns disease, and 

chronic lower abdominal pain, presents to the emergency department complaining 

of runny nose and fever. Patient states fever currently resolved. Patient 

reports his bones are hurting and his Crohns is "acting up". Patient notes he 

will see his surgeon next week, doesn't have a PMD. Reports diarrhea but denies 

any vomiting or any other complaints at this time.


Symptom Onset: Sudden


Symptom Course: Unchanged


Activities at Onset: Rest


Context: Home





Past Medical History





- Provider Review


Nursing Documentation Reviewed: Yes





- Past History


Past History: No Previous





- Infectious Disease


Hx of Infectious Diseases: None





- Tetanus Immunization


Tetanus Immunization: Unknown





- Cardiac


Hx Cardiac Disorders: No





- Pulmonary


Hx Pneumonia: Yes





- Neurological


Hx Transient Ischemic Attacks (TIA): No





- HEENT


Hx HEENT Disorder: No





- Renal


Hx Renal Disorder: No





- Endocrine/Metabolic


Hx Endocrine Disorders: No





- Hematological/Oncological


Hx Anemia: Yes





- Integumentary


Hx Dermatological Disorder: Yes (RIGHT LOWER BACK FISTULA.DID I&D MAY 30 17 

[facility]


Other/Comment: ABDOMINAL WALL ABSCESS.





- Musculoskeletal/Rheumatological


Hx Musculoskeletal Disorders: No


Hx Falls: No





- Gastrointestinal


Hx Crohn's Disease: Yes





- Genitourinary/Gynecological


Hx Genitourinary Disorders: Yes


Hx Urinary Tract Infection: Yes





- Psychiatric


Hx Anxiety: Yes


Hx Substance Use: No





- Surgical History


Other/Comment: Colon resection





- Anesthesia


Hx Anesthesia: Yes


Hx Anesthesia Reactions: No


Hx Malignant Hyperthermia: No





- Suicidal Assessment


Feels Threatened In Home Enviroment: No





Family/Social History





- Physician Review


Nursing Documentation Reviewed: Yes


Family/Social History: No Known Family HX


Smoking Status: Never Smoked


Hx Alcohol Use: No


Hx Substance Use: No


Hx Substance Use Treatment: No





Allergies/Home Meds


Allergies/Adverse Reactions: 


Allergies





FISH Allergy (Verified 02/04/18 09:54)


 RASH


ketorolac [From Toradol] Allergy (Verified 02/04/18 09:54)


 RASH


shellfish derived Adverse Reaction (Verified 02/04/18 09:54)


 ANGIOEDEMA








Home Medications: 


 Home Meds











 Medication  Instructions  Recorded  Confirmed


 


Ramicade IV 01/30/18 














Review of Systems





- Physician Review


All systems were reviewed & negative as marked: Yes





- Review of Systems


Constitutional: Fevers (resolved)


ENT: Other (runny nose)


Gastrointestinal: Diarrhea.  absent: Vomiting





Physical Exam


Vital Signs Reviewed: Yes


Vital Signs











  Temp Pulse Resp BP Pulse Ox


 


 02/13/18 10:58  98.6 F  86  18  104/56 L  98


 


 02/13/18 10:47  98.6 F  86  18  104/56 L  98











Temperature: Afebrile


Blood Pressure: Hypotensive


Pulse: Regular


Respiratory Rate: Normal


Appearance: Positive for: Well-Appearing, Non-Toxic, Comfortable


Pain Distress: None


Mental Status: Positive for: Alert and Oriented X 3





- Systems Exam


Head: Present: Atraumatic, Normocephalic


Pupils: Present: PERRL


Extroacular Muscles: Present: EOMI


Conjunctiva: Present: Normal


Mouth: Present: Moist Mucous Membranes


Nose (Internal): Present: Other (congestion)


Neck: Present: Normal Range of Motion


Respiratory/Chest: Present: Clear to Auscultation, Good Air Exchange.  No: 

Respiratory Distress, Accessory Muscle Use


Cardiovascular: Present: Regular Rate and Rhythm, Normal S1, S2.  No: Murmurs


Abdomen: Present: Tenderness (diffuse), Normal Bowel Sounds, Guarding (voluntary

).  No: Distention, Peritoneal Signs


Back: Present: Normal Inspection


Upper Extremity: Present: Normal Inspection.  No: Cyanosis, Edema


Lower Extremity: Present: Normal Inspection.  No: Edema


Neurological: Present: GCS=15, CN II-XII Intact, Speech Normal


Skin: Present: Warm, Dry, Normal Color.  No: Rashes


Psychiatric: Present: Alert, Oriented x 3, Normal Insight, Normal Concentration





Medical Decision Making


ED Course and Treatment: 


02/13/18 11:27


Impression:


A 22 year old male with runny nose and fever.





Plan:


-- labs


-- Ultram


-- Reassess and disposition





Prior Visits:


Notes and results from previous visits were reviewed. Patient was last seen in 

the emergency department on 2/4/18 for evaluation of lower abdominal pain and 

left lower dental pain.





Progress Notes:





Patient focused on receiving Percocet tablets and doesn't want to consider 

other pain medications.





- Medication Orders


Current Medication Orders: 











Discontinued Medications





Tramadol HCl (Ultram)  100 mg PO STAT STA


   Stop: 02/13/18 11:26


   Last Admin: 02/13/18 11:35  Dose: 100 mg





MAR Pain Assessment


 Document     02/13/18 11:35  MARY  (Rec: 02/13/18 11:35  [id number]  [id number])


     Pain Reassessment


      Is this a pain reassessment?               No


     Sleep


      Is patient sleeping during reassessment?   No


     Presence of Pain


      Presence of Pain                           Yes














- Scribe Statement


The provider has reviewed the documentation as recorded by the Scribnorma Huertas





Provider Scribe Attestation:


All medical record entries made by the Scribe were at my direction and 

personally dictated by me. I have reviewed the chart and agree that the record 

accurately reflects my personal performance of the history, physical exam, 

medical decision making, and the department course for this patient. I have 

also personally directed, reviewed, and agree with the discharge instructions 

and disposition.











Disposition/Present on Arrival





- Present on Arrival


Any Indicators Present on Arrival: No


History of DVT/PE: No


History of Uncontrolled Diabetes: No


Urinary Catheter: No


History of Decub. Ulcer: No


History Surgical Site Infection Following: None





- Disposition


Have Diagnosis and Disposition been Completed?: Yes


Diagnosis: 


 URI (upper respiratory infection), Crohns disease





Disposition: HOME/ ROUTINE


Disposition Time: 11:30


Patient Plan: Discharge


Patient Problems: 


 Current Active Problems











Problem Status Onset


 


Crohns disease Acute  


 


URI (upper respiratory infection) Acute  











Condition: STABLE


Additional Instructions: 


Decongestants for cough and cold symptoms.


Prescriptions: 


Tramadol HCl [Ultram] 100 mg PO TID PRN #20 tablet


 PRN Reason: Pain, Moderate (4-7)


Forms:  [employer] (English)

## 2018-02-16 ENCOUNTER — HOSPITAL ENCOUNTER (EMERGENCY)
Dept: HOSPITAL 42 - ED | Age: 23
Discharge: LEFT BEFORE DISCHARGE | End: 2018-02-16
Payer: MEDICAID

## 2018-02-16 VITALS — BODY MASS INDEX: 18.3 KG/M2

## 2018-02-16 DIAGNOSIS — Z02.89: Primary | ICD-10-CM

## 2018-02-16 DIAGNOSIS — R10.9: ICD-10-CM

## 2018-02-17 ENCOUNTER — HOSPITAL ENCOUNTER (EMERGENCY)
Dept: HOSPITAL 42 - ED | Age: 23
Discharge: HOME | End: 2018-02-17
Payer: MEDICAID

## 2018-02-17 VITALS — DIASTOLIC BLOOD PRESSURE: 71 MMHG | HEART RATE: 90 BPM | SYSTOLIC BLOOD PRESSURE: 138 MMHG | OXYGEN SATURATION: 99 %

## 2018-02-17 VITALS — RESPIRATION RATE: 18 BRPM

## 2018-02-17 VITALS — BODY MASS INDEX: 17.8 KG/M2

## 2018-02-17 VITALS — TEMPERATURE: 98.2 F

## 2018-02-17 DIAGNOSIS — R10.9: ICD-10-CM

## 2018-02-17 DIAGNOSIS — G89.29: Primary | ICD-10-CM

## 2018-02-17 DIAGNOSIS — K50.90: ICD-10-CM

## 2018-02-17 DIAGNOSIS — K63.2: ICD-10-CM

## 2018-02-17 NOTE — ED PDOC
Arrival/HPI





- General


Chief Complaint: Abdominal Pain





- History of Present Illness


Narrative History of Present Illness (Text): 





02/17/18 19:38


Pt is a 23 yo male with PMH of Crohn's Disease presents to Emergency department 

with increased abdominal pain over baseline pain from crohns disease. Pt also 

complains of diarrhea, which is normal for him. Pt also states that pus 

draining from abdomen. Pt states that this has happened in past, but when he 

recieves his dose of Remicade (every 8 weeks) the drainage stops. Pt requesting 

for pain medication today, until he can follow up with his surgeon.








Past Medical History





- Past History


Past History: No Previous





- Infectious Disease


Hx of Infectious Diseases: None





- Tetanus Immunization


Tetanus Immunization: Unknown





- Cardiac


Hx Cardiac Disorders: No





- Pulmonary


Hx Pneumonia: Yes





- Neurological


Hx Transient Ischemic Attacks (TIA): No





- HEENT


Hx HEENT Disorder: No





- Renal


Hx Renal Disorder: No





- Endocrine/Metabolic


Hx Endocrine Disorders: No





- Hematological/Oncological


Hx Anemia: Yes





- Integumentary


Hx Dermatological Disorder: Yes (RIGHT LOWER BACK FISTULA.DID I&D MAY 30 17 

[facility],)


Other/Comment: ABDOMINAL WALL ABSCESS.





- Musculoskeletal/Rheumatological


Hx Musculoskeletal Disorders: No


Hx Falls: No





- Gastrointestinal


Hx Gastrointestinal Disorders: Yes


Hx Crohn's Disease: Yes





- Genitourinary/Gynecological


Hx Genitourinary Disorders: Yes


Hx Urinary Tract Infection: Yes





- Psychiatric


Hx Anxiety: Yes


Hx Substance Use: No





- Surgical History


Other/Comment: Colon resection





- Anesthesia


Hx Anesthesia: Yes


Hx Anesthesia Reactions: No


Hx Malignant Hyperthermia: No





- Suicidal Assessment


Feels Threatened In Home Enviroment: No





Family/Social History


Family/Social History: No Known Family HX


Smoking Status: Never Smoked


Hx Alcohol Use: No


Hx Substance Use: No


Hx Substance Use Treatment: No





Allergies/Home Meds


Allergies/Adverse Reactions: 


Allergies





FISH Allergy (Verified 02/17/18 18:46)


 RASH


ketorolac [From Toradol] Allergy (Verified 02/17/18 18:46)


 RASH


shellfish derived Adverse Reaction (Verified 02/17/18 18:46)


 ANGIOEDEMA








Home Medications: 


 Home Meds











 Medication  Instructions  Recorded  Confirmed


 


Ramicade IV 01/30/18 














Review of Systems





- Review of Systems


Constitutional: Normal


Eyes: Normal


ENT: Normal


Respiratory: Normal


Cardiovascular: Normal


Gastrointestinal: Abdominal Pain


Genitourinary Male: Normal


Musculoskeletal: Normal


Skin: Normal


Neurological: Normal


Endocrine: Normal


Hemo/Lymphatic: Normal


Psychiatric: Normal





Physical Exam


Vital Signs Reviewed: Yes





Vital Signs











  Temp Pulse Resp BP Pulse Ox


 


 02/17/18 18:42  98.9 F  97 H  18  108/64  95














- Systems Exam


Head: Present: Atraumatic, Normocephalic


Neck: Present: Normal Range of Motion


Respiratory/Chest: Present: Clear to Auscultation.  No: Wheezes, Rales, Rhonchi


Cardiovascular: Present: Regular Rate and Rhythm, Normal S1, S2.  No: Murmurs, 

Rub, Gallop


Abdomen: Present: Tenderness, Other (purulent drainage from abdominal wall)


Back: Present: Normal Inspection


Upper Extremity: Present: Normal Inspection


Lower Extremity: Present: Normal Inspection


Neurological: Present: GCS=15


Skin: Present: Warm, Dry


Psychiatric: Present: Alert, Oriented x 3





Medical Decision Making


ED Course and Treatment: 





02/17/18 20:08


Patient given 1 dose of oxycodone. Patient was educated on the signs and 

symptoms of opioid withdrawal and addiction. Patient acknowledged that he could 

be addicted. Patient stated that he will follow up with his surgeon as 

scheduled for further management.





Disposition/Present on Arrival





- Present on Arrival


Any Indicators Present on Arrival: No


History of DVT/PE: No


History of Uncontrolled Diabetes: No


Urinary Catheter: No


History of Decub. Ulcer: No


History Surgical Site Infection Following: None





- Disposition


Have Diagnosis and Disposition been Completed?: Yes


Diagnosis: 


 Chronic abdominal pain, Abdominal fistula, Crohn disease





Disposition: HOME/ ROUTINE


Disposition Time: 19:34


Patient Plan: Discharge


Condition: STABLE


Additional Instructions: 


1. Follow up with surgeon and GI as scheduled


2. Return to Emergency department if symptoms worsen


Referrals: 


PCP,NO [Primary Care Provider] - Follow up with primary

## 2018-02-21 ENCOUNTER — HOSPITAL ENCOUNTER (EMERGENCY)
Dept: HOSPITAL 42 - ED | Age: 23
Discharge: HOME | End: 2018-02-21
Payer: MEDICAID

## 2018-02-21 VITALS — BODY MASS INDEX: 17.8 KG/M2

## 2018-02-21 VITALS
SYSTOLIC BLOOD PRESSURE: 119 MMHG | TEMPERATURE: 98.4 F | OXYGEN SATURATION: 98 % | RESPIRATION RATE: 18 BRPM | DIASTOLIC BLOOD PRESSURE: 70 MMHG | HEART RATE: 73 BPM

## 2018-02-21 DIAGNOSIS — Z76.5: ICD-10-CM

## 2018-02-21 DIAGNOSIS — R10.9: ICD-10-CM

## 2018-02-21 DIAGNOSIS — K50.90: Primary | ICD-10-CM

## 2018-02-21 DIAGNOSIS — G89.29: ICD-10-CM

## 2018-02-21 DIAGNOSIS — F11.20: ICD-10-CM

## 2018-02-21 DIAGNOSIS — K63.2: ICD-10-CM

## 2018-02-21 NOTE — ED PDOC
Arrival/HPI





- General


Chief Complaint: Back Pain


Time Seen by Provider: 02/21/18 09:55


Historian: Patient





- History of Present Illness


Narrative History of Present Illness (Text): 


02/21/18 10:15


A 22 year old male, whose past medical history includes Crohn's Disease, 

presents to the emergency department complaining of chronic abdominal pain and 

drainage from chronic fistulas. Patient with multiple visits to the emergency 

department for similar complaints. Requesting pain medications. Denies any 

other complaints at this time.





Symptom Onset: Sudden


Symptom Course: Unchanged


Activities at Onset: Rest


Context: Home





Past Medical History





- Provider Review


Nursing Documentation Reviewed: Yes





- Past History


Past History: No Previous





- Infectious Disease


Hx of Infectious Diseases: None





- Tetanus Immunization


Tetanus Immunization: Unknown





- Cardiac


Hx Cardiac Disorders: No





- Pulmonary


Hx Pneumonia: Yes





- Neurological


Hx Transient Ischemic Attacks (TIA): No





- HEENT


Hx HEENT Disorder: No





- Renal


Hx Renal Disorder: No





- Endocrine/Metabolic


Hx Endocrine Disorders: No





- Hematological/Oncological


Hx Anemia: Yes





- Integumentary


Hx Dermatological Disorder: Yes (RIGHT LOWER BACK FISTULA.DID I&D MAY 30 17 

[facility],)


Other/Comment: ABDOMINAL WALL ABSCESS.





- Musculoskeletal/Rheumatological


Hx Musculoskeletal Disorders: No


Hx Falls: No





- Gastrointestinal


Hx Gastrointestinal Disorders: Yes


Hx Crohn's Disease: Yes





- Genitourinary/Gynecological


Hx Genitourinary Disorders: Yes


Hx Urinary Tract Infection: Yes





- Psychiatric


Hx Anxiety: Yes


Hx Substance Use: No





- Surgical History


Other/Comment: Colon resection





- Anesthesia


Hx Anesthesia: Yes


Hx Anesthesia Reactions: No


Hx Malignant Hyperthermia: No





- Suicidal Assessment


Feels Threatened In Home Enviroment: No





Family/Social History





- Physician Review


Nursing Documentation Reviewed: Yes


Family/Social History: No Known Family HX


Smoking Status: Never Smoked


Hx Alcohol Use: No


Hx Substance Use: No


Hx Substance Use Treatment: No





Allergies/Home Meds


Allergies/Adverse Reactions: 


Allergies





FISH Allergy (Verified 02/17/18 18:46)


 RASH


ketorolac [From Toradol] Allergy (Verified 02/17/18 18:46)


 RASH


shellfish derived Adverse Reaction (Verified 02/17/18 18:46)


 ANGIOEDEMA








Home Medications: 


 Home Meds











 Medication  Instructions  Recorded  Confirmed


 


Ramicade IV 01/30/18 














Review of Systems





- Physician Review


All systems were reviewed & negative as marked: Yes





- Review of Systems


Constitutional: absent: Fevers


Gastrointestinal: Abdominal Pain





Physical Exam


Vital Signs Reviewed: Yes


Vital Signs











  Temp Pulse Resp BP Pulse Ox


 


 02/21/18 09:54  98.4 F  73  18  119/70  98











Temperature: Afebrile


Blood Pressure: Normal


Pulse: Regular


Respiratory Rate: Normal


Appearance: Positive for: Well-Appearing, Non-Toxic, Comfortable


Pain Distress: None


Mental Status: Positive for: Alert and Oriented X 3





- Systems Exam


Head: Present: Atraumatic, Normocephalic


Pupils: Present: PERRL


Extroacular Muscles: Present: EOMI


Conjunctiva: Present: Normal


Mouth: Present: Moist Mucous Membranes


Neck: Present: Normal Range of Motion


Respiratory/Chest: Present: Clear to Auscultation, Good Air Exchange.  No: 

Respiratory Distress, Accessory Muscle Use


Cardiovascular: Present: Regular Rate and Rhythm, Normal S1, S2.  No: Murmurs


Abdomen: Present: Normal Bowel Sounds, Other (fistula purulent).  No: Tenderness

, Distention, Peritoneal Signs


Back: Present: Normal Inspection


Upper Extremity: Present: Normal Inspection.  No: Cyanosis, Edema


Lower Extremity: Present: Normal Inspection.  No: Edema


Neurological: Present: GCS=15, CN II-XII Intact, Speech Normal


Skin: Present: Warm, Dry, Normal Color.  No: Rashes


Psychiatric: Present: Alert, Oriented x 3, Normal Insight, Normal Concentration





Medical Decision Making


ED Course and Treatment: 


02/21/18 10:13


Impression:


A 22 year old male with chronic abdominal pain.





Plan:


-- Reassess and disposition





Prior Visits:


Notes and results from previous visits were reviewed. Patient was last seen in 

the emergency department on 2/17/18 for evaluation of abdominal pain, 

requesting pain medication. Patient has reported 6 visits to the emergency 

department in the past 24 days, requesting pain medications.





Progress Notes:


Patient is requesting pain medications.





- Scribe Statement


The provider has reviewed the documentation as recorded by the Amy Huertas





Provider Scribe Attestation:


All medical record entries made by the Scribnorma were at my direction and 

personally dictated by me. I have reviewed the chart and agree that the record 

accurately reflects my personal performance of the history, physical exam, 

medical decision making, and the department course for this patient. I have 

also personally directed, reviewed, and agree with the discharge instructions 

and disposition.











Disposition/Present on Arrival





- Present on Arrival


Any Indicators Present on Arrival: No


History of DVT/PE: No


History of Uncontrolled Diabetes: No


Urinary Catheter: No


History of Decub. Ulcer: No


History Surgical Site Infection Following: None





- Disposition


Have Diagnosis and Disposition been Completed?: Yes


Diagnosis: 


 Crohn disease, Chronic abdominal pain, Abdominal wall fistula, Opioid 

dependence, Drug-seeking behavior





Disposition: HOME/ ROUTINE


Disposition Time: 10:11 (LEFT BEFORE COMPLETING CARE)


Patient Plan: Discharge, Other


Condition: GOOD


Discharge Instructions (ExitCare):  Opioid Use Disorder


Forms:  [employer] (English)

## 2018-02-23 ENCOUNTER — HOSPITAL ENCOUNTER (EMERGENCY)
Dept: HOSPITAL 42 - ED | Age: 23
Discharge: LEFT BEFORE DISCHARGE | End: 2018-02-23
Payer: MEDICAID

## 2018-02-23 VITALS
HEART RATE: 92 BPM | TEMPERATURE: 100.2 F | RESPIRATION RATE: 18 BRPM | DIASTOLIC BLOOD PRESSURE: 66 MMHG | SYSTOLIC BLOOD PRESSURE: 119 MMHG | OXYGEN SATURATION: 97 %

## 2018-02-23 VITALS — BODY MASS INDEX: 17.8 KG/M2

## 2018-02-23 DIAGNOSIS — R10.9: Primary | ICD-10-CM

## 2018-02-23 DIAGNOSIS — Z76.5: ICD-10-CM

## 2018-02-23 NOTE — ED PDOC
Arrival/HPI





- General


Chief Complaint: Abnormal Skin Integrity


Time Seen by Provider: 02/23/18 17:40


Historian: Patient





- History of Present Illness


Narrative History of Present Illness (Text): 





02/23/18 18:16


This 21 yo male , whose past medical history includes Crohn's Disease, presents 

to the emergency department complaining of chronic abdominal pain and drainage 

from chronic fistulas. Patient has multiple visits to the emergency department 

for similar complaints. Requesting pain medications. Patient admits going to 

multiple ED facility requesting pain medication.  Patient stated he was at 

[facility] ED in [place] yesterday, and he said he was treated 

for his chronic abdominal pain with Percocet 10 mg tab.  He is requesting 

Percocet 10 mg tab , "yellow tab", since it worked better for his pain.  


He stated he just want pain medication to "help me for the night"


Time/Duration: Other (see hpi)


Context: Home





Past Medical History





- Provider Review


Nursing Documentation Reviewed: Yes





- Past History


Past History: No Previous





- Infectious Disease


Hx of Infectious Diseases: None





- Tetanus Immunization


Tetanus Immunization: Unknown





- Cardiac


Hx Cardiac Disorders: No





- Pulmonary


Hx Pneumonia: Yes





- Neurological


Hx Transient Ischemic Attacks (TIA): No





- HEENT


Hx HEENT Disorder: No





- Renal


Hx Renal Disorder: No





- Endocrine/Metabolic


Hx Endocrine Disorders: No





- Hematological/Oncological


Hx Anemia: Yes





- Integumentary


Hx Dermatological Disorder: Yes (RIGHT LOWER BACK FISTULA.DID I&D MAY 30 17 

[facility],)


Other/Comment: ABDOMINAL WALL ABSCESS.





- Musculoskeletal/Rheumatological


Hx Musculoskeletal Disorders: No


Hx Falls: No





- Gastrointestinal


Hx Gastrointestinal Disorders: Yes


Hx Crohn's Disease: Yes





- Genitourinary/Gynecological


Hx Genitourinary Disorders: Yes


Hx Urinary Tract Infection: Yes





- Psychiatric


Hx Anxiety: Yes


Hx Substance Use: No





- Surgical History


Other/Comment: Colon resection





- Anesthesia


Hx Anesthesia: Yes


Hx Anesthesia Reactions: No


Hx Malignant Hyperthermia: No





- Suicidal Assessment


Feels Threatened In Home Enviroment: No





Family/Social History





- Physician Review


Nursing Documentation Reviewed: Yes


Family/Social History: Other (noncontributory)


Smoking Status: Never Smoked


Hx Alcohol Use: No


Hx Substance Use: No


Hx Substance Use Treatment: No





Allergies/Home Meds


Allergies/Adverse Reactions: 


Allergies





FISH Allergy (Verified 02/17/18 18:46)


 RASH


ketorolac [From Toradol] Allergy (Verified 02/17/18 18:46)


 RASH


shellfish derived Adverse Reaction (Verified 02/17/18 18:46)


 ANGIOEDEMA








Home Medications: 


 Home Meds











 Medication  Instructions  Recorded  Confirmed


 


Ramicade IV 01/30/18 














Review of Systems





- Review of Systems


Constitutional: Normal.  absent: Fatigue, Weight Change, Fevers, Night Sweats


Eyes: Normal


ENT: Normal.  absent: Sore Throat, Rhinorrhea


Respiratory: Normal.  absent: SOB, Cough


Cardiovascular: Normal


Gastrointestinal: Abdominal Pain (chronic abdominal pain.), Other (see hpi)


Genitourinary Male: Normal.  absent: Dysuria, Frequency, Hematuria


Musculoskeletal: Normal.  absent: Arthralgias, Back Pain, Neck Pain, Myalgias


Skin: Normal.  absent: Rash


Neurological: Normal.  absent: Headache, Dizziness, Focal Weakness


Endocrine: Normal


Hemo/Lymphatic: Normal


Psychiatric: Normal





Physical Exam


Vital Signs











  Temp Pulse Resp BP Pulse Ox


 


 02/23/18 17:23  100.2 F H  92 H  18  119/66  97











Temperature: Other (low grade fever)


Blood Pressure: Normal


Pulse: Regular


Respiratory Rate: Normal


Appearance: Positive for: Well-Appearing, Non-Toxic, Comfortable


Pain Distress: None


Mental Status: Positive for: Alert and Oriented X 3





- Systems Exam


Head: Present: Atraumatic, Normocephalic


Pupils: Present: PERRL


Extroacular Muscles: Present: EOMI


Conjunctiva: Present: Normal


Ears: Present: Normal, NORMAL TM, Normal Canal.  No: Erythema, TM Bulging, Fluid

, TM Perf


Mouth: Present: Moist Mucous Membranes


Pharnyx: Present: Normal.  No: ERYTHEMA, EXUDATE, TONSILS ENLARGED


Nose (External): Present: Atraumatic


Nose (Internal): Present: Normal Inspection


Neck: Present: Normal Range of Motion, Trachea Midline.  No: Meningeal Signs, 

MIDLINE TENDERNESS, Paraspinal Tenderness, Lymphadenopathy


Respiratory/Chest: Present: Clear to Auscultation, Good Air Exchange.  No: 

Respiratory Distress, Accessory Muscle Use, Wheezes, Retracting, Rhonchi, 

Tachypneic


Cardiovascular: Present: Regular Rate and Rhythm, Normal S1, S2.  No: Murmurs


Abdomen: Present: Normal Bowel Sounds, Other (fistula on right anterior and 

right posterior abdome are not tender.  No draiange or cellulitis visualized).  

No: Tenderness, Distention, Peritoneal Signs, Rebound, Guarding


Back: Present: Normal Inspection.  No: CVA Tenderness


Upper Extremity: Present: Normal Inspection, Normal ROM.  No: Cyanosis, Edema


Lower Extremity: Present: Normal Inspection, NORMAL PULSES, Normal ROM.  No: 

Edema


Neurological: Present: GCS=15, CN II-XII Intact, Speech Normal, Motor Func 

Grossly Intact, Normal Sensory Function, Normal Cerebellar Funct, Gait Normal


Skin: Present: Warm, Dry, Normal Color.  No: Rashes, Erythematous, Induration, 

Hot, Abscess


Psychiatric: Present: Alert, Oriented x 3, Normal Insight, Normal Concentration





Medical Decision Making


ED Course and Treatment: 





02/23/18 18:33


Patient is refusing to have blood test.  He stated he only expected to have 

Percocet.  He does not longer wants to be in the ED.  He wants to [facility]


02/23/18 18:35


Patient is leaving by agreed to sign AMA form.  Patient is alert and oriented x 

3.  Patient was constantly using his cellphone for texting, in no acute 

distress. Patient has a normal speech, not distress.  Patient has a normal 

gait.  Lungs CTA b/l.  Abdomen is soft, nt/nd.  Fistulas are healing well, no 

discharge or cellulitis.





Leaving Against Medical Advice (AMA):


   This patient is choosing to leave against medical advice.  The EP has 

personally explained to the pt that choosing to do so may result in permanent 

bodily harm or death.  The EP discussed at great length that without further 

evaluation and monitoring there may be unforeseen circumstances and/or 

deterioration causing permanent bodily harm or death as a result of their 

choice.  The pt verbalized these risks back to the physician in laymans terms.

  The pt is alert, oriented, and shows the mental capacity to make clear 

decisions regarding the pts health care at this time. The pt continues to wish 

to leave against medical advice.  





   In light of the pts decision to leave AMA, follow-up has been recommended 

and the pt is aware of the importance of following up as instructed.  The pt 

has been advised that they should return to the ED immediately if they change 

their mind at any time, or if thier condition begins to change or worsen in any 

way.





Re-evaluation Time: 18:36


Reassessment Condition: Re-examined, Unchanged





- Medication Orders


Current Medication Orders: 











Discontinued Medications





Acetaminophen (Tylenol 325mg Tab)  975 mg PO STAT STA


   Stop: 02/23/18 18:16


   Last Admin: 02/23/18 18:34  Dose: 975 mg





MAR Pain/Vitals


 Document     02/23/18 18:34  HP  (Rec: 02/23/18 18:34  HP  [id number])


     Pain Reassessment


      Is This A Pain ReAssessment?               No














Disposition/Present on Arrival





- Present on Arrival


Any Indicators Present on Arrival: No


History of DVT/PE: No


History of Uncontrolled Diabetes: No


Urinary Catheter: No


History of Decub. Ulcer: No


History Surgical Site Infection Following: None





- Disposition


Have Diagnosis and Disposition been Completed?: Yes


Diagnosis: 


 Chronic abdominal pain, Drug-seeking behavior





Disposition: AGAINST MEDICAL ADVICE


Disposition Time: 18:41


Condition: UNKNOWN


Additional Instructions: 


I am sorry you are leaving against medical advise.  Return to emergency at 

anytime, or if symptoms worsen.


Referrals: 


[facility] - Follow up with primary


Forms:  [employer] (English)

## 2018-03-01 ENCOUNTER — HOSPITAL ENCOUNTER (EMERGENCY)
Dept: HOSPITAL 42 - ED | Age: 23
Discharge: HOME | End: 2018-03-01
Payer: MEDICAID

## 2018-03-01 VITALS — BODY MASS INDEX: 17.8 KG/M2

## 2018-03-01 VITALS
HEART RATE: 76 BPM | TEMPERATURE: 98 F | RESPIRATION RATE: 18 BRPM | OXYGEN SATURATION: 96 % | SYSTOLIC BLOOD PRESSURE: 106 MMHG | DIASTOLIC BLOOD PRESSURE: 48 MMHG

## 2018-03-01 DIAGNOSIS — R10.9: Primary | ICD-10-CM

## 2018-03-01 NOTE — ED PDOC
Arrival/HPI





- General


Time Seen by Provider: 03/01/18 04:32


Historian: Patient





- History of Present Illness


Narrative History of Present Illness (Text): 


03/01/18 04:47


Lawrence Wen is a 22 year old male, whose past medical history includes 

Crohn's disease on Remicade infusions, multiple intestino-cutaneous fistulae, 

chronic abdominal abscess, and opiate dependence, who presents to the Emergency 

department complaining of right-sided abdominal pain tonight. Patient reports 

abdominal pain is chronic, and notes he recently had his Remicade infusion but 

he will not see his surgeon until tomorrow. Patient requesting pain and anxiety 

medication. Patient denies any fever, chills, chest pain, shortness of breath, 

nausea, vomiting, neck pain, headache, dizziness, or any other complaints.


Symptom Onset: Gradual


Symptom Course: Unchanged


Activities at Onset: Light


Context: Home





Past Medical History





- Provider Review


Nursing Documentation Reviewed: Yes





- Past History


Past History: No Previous





- Infectious Disease


Hx of Infectious Diseases: None





- Tetanus Immunization


Tetanus Immunization: Unknown





- Cardiac


Hx Cardiac Disorders: No





- Pulmonary


Hx Pneumonia: Yes





- Neurological


Hx Transient Ischemic Attacks (TIA): No





- HEENT


Hx HEENT Disorder: No





- Renal


Hx Renal Disorder: No





- Endocrine/Metabolic


Hx Endocrine Disorders: No





- Hematological/Oncological


Hx Anemia: Yes





- Integumentary


Hx Dermatological Disorder: Yes (RIGHT LOWER BACK FISTULA.DID I&D MAY 30 17 

[facility],)


Other/Comment: ABDOMINAL WALL ABSCESS.





- Musculoskeletal/Rheumatological


Hx Musculoskeletal Disorders: No


Hx Falls: No





- Gastrointestinal


Hx Gastrointestinal Disorders: Yes


Hx Crohn's Disease: Yes





- Genitourinary/Gynecological


Hx Genitourinary Disorders: Yes


Hx Urinary Tract Infection: Yes





- Psychiatric


Hx Anxiety: Yes


Hx Substance Use: No





- Surgical History


Other/Comment: Colon resection





- Anesthesia


Hx Anesthesia: Yes


Hx Anesthesia Reactions: No


Hx Malignant Hyperthermia: No





- Suicidal Assessment


Feels Threatened In Home Enviroment: No





Family/Social History





- Physician Review


Nursing Documentation Reviewed: Yes


Family/Social History: Unknown Family HX


Smoking Status: Never Smoked


Hx Alcohol Use: No


Hx Substance Use: No


Hx Substance Use Treatment: No





Allergies/Home Meds


Allergies/Adverse Reactions: 


Allergies





FISH Allergy (Verified 03/01/18 04:42)


 RASH


ketorolac [From Toradol] Allergy (Verified 03/01/18 04:42)


 RASH


shellfish derived Adverse Reaction (Verified 03/01/18 04:42)


 ANGIOEDEMA








Home Medications: 


 Home Meds











 Medication  Instructions  Recorded  Confirmed


 


Ramicade IV Q30D 01/30/18 














Review of Systems





- Physician Review


All systems were reviewed & negative as marked: Yes





- Review of Systems


Constitutional: Normal.  absent: Fevers


Eyes: Normal


ENT: Normal


Respiratory: Normal.  absent: SOB, Cough


Cardiovascular: Normal.  absent: Chest Pain


Gastrointestinal: Abdominal Pain.  absent: Diarrhea, Nausea, Vomiting


Genitourinary Male: Normal.  absent: Dysuria, Frequency, Hematuria, Urinary 

Output Changes


Musculoskeletal: Normal.  absent: Back Pain, Neck Pain


Skin: Normal.  absent: Rash


Neurological: Normal.  absent: Headache, Dizziness


Endocrine: Normal


Hemo/Lymphatic: Normal


Psychiatric: Normal





Physical Exam


Vital Signs Reviewed: Yes


Vital Signs











  Temp Pulse Resp BP Pulse Ox


 


 03/01/18 04:44  98.0 F  76  18  106/48 L  96











Temperature: Afebrile


Blood Pressure: Normal


Pulse: Regular


Respiratory Rate: Normal


Appearance: Positive for: Well-Appearing, Non-Toxic, Comfortable


Pain Distress: None


Mental Status: Positive for: Alert and Oriented X 3





- Systems Exam


Head: Present: Atraumatic, Normocephalic


Pupils: Present: PERRL


Extroacular Muscles: Present: EOMI


Conjunctiva: Present: Normal


Mouth: Present: Moist Mucous Membranes


Neck: Present: Normal Range of Motion


Respiratory/Chest: Present: Clear to Auscultation, Good Air Exchange.  No: 

Respiratory Distress, Accessory Muscle Use


Cardiovascular: Present: Regular Rate and Rhythm, Normal S1, S2.  No: Murmurs


Abdomen: Present: Normal Bowel Sounds.  No: Tenderness, Distention, Peritoneal 

Signs


Back: Present: Normal Inspection


Upper Extremity: Present: Normal Inspection.  No: Cyanosis, Edema


Lower Extremity: Present: Normal Inspection.  No: Edema


Neurological: Present: GCS=15, CN II-XII Intact, Speech Normal


Skin: Present: Warm, Dry, Normal Color.  No: Rashes


Psychiatric: Present: Alert, Oriented x 3, Normal Insight, Normal Concentration





Medical Decision Making


ED Course and Treatment: 


03/01/18 04:47


Impression:


22 year old male brought in for chronic abdominal pain, requesting pain and 

anxiety medications.





Plan:


-- Reassess and disposition





Prior Visits:


Notes and results from previous visits were reviewed.





Progress Notes:


[place]  reviewed, pt received 50 tablets/17 day supply of Oxycodone on 02/21/ 2018 and 7 tablets of Alprazolam on 02/20/2018.











- Medication Orders


Current Medication Orders: 











Discontinued Medications





Acetaminophen (Tylenol 325mg Tab)  650 mg PO STAT STA


   Stop: 03/01/18 05:19


   Last Admin: 03/01/18 05:26  Dose: 650 mg











- Scribe Statement


The provider has reviewed the documentation as recorded by the Johnathonibnorma Contreras





All medical record entries made by the Johnathonibnorma were at my direction and 

personally dictated by me. I have reviewed the chart and agree that the record 

accurately reflects my personal performance of the history, physical exam, 

medical decision making, and the department course for this patient. I have 

also personally directed, reviewed, and agree with the discharge instructions 

and disposition.





Disposition/Present on Arrival





- Present on Arrival


Any Indicators Present on Arrival: No


History of DVT/PE: No


History of Uncontrolled Diabetes: No


Urinary Catheter: No


History Surgical Site Infection Following: None





- Disposition


Have Diagnosis and Disposition been Completed?: Yes


Diagnosis: 


 Abdominal pain





Disposition: HOME/ ROUTINE


Disposition Time: 05:25


Condition: GOOD


Discharge Instructions (ExitCare):  Chronic Pain (DC)


Forms:  [employer] (English)

## 2018-03-05 ENCOUNTER — HOSPITAL ENCOUNTER (EMERGENCY)
Dept: HOSPITAL 42 - ED | Age: 23
Discharge: HOME | End: 2018-03-05
Payer: MEDICAID

## 2018-03-05 VITALS
DIASTOLIC BLOOD PRESSURE: 87 MMHG | TEMPERATURE: 98.1 F | OXYGEN SATURATION: 100 % | RESPIRATION RATE: 18 BRPM | SYSTOLIC BLOOD PRESSURE: 132 MMHG | HEART RATE: 90 BPM

## 2018-03-05 VITALS — BODY MASS INDEX: 17.8 KG/M2

## 2018-03-05 DIAGNOSIS — G89.29: Primary | ICD-10-CM

## 2018-03-05 DIAGNOSIS — R10.9: ICD-10-CM

## 2018-03-05 NOTE — ED PDOC
Arrival/HPI





- General


Chief Complaint: Pain, Chronic


Time Seen by Provider: 03/05/18 09:38


Historian: Patient





- History of Present Illness


Narrative History of Present Illness (Text): 





03/05/18 09:53


22 year old male, whose past medical history includes Crohn's disease on 

Remicade infusions, multiple intestino-cutaneous fistulae, chronic abdominal 

abscess, and opiate dependence, who presents to the Emergency department 

complaining of chronic abdominal pain and body aches that began this morning. 

Patient states he ran out of his pain medication 2 days ago, but has an 

appointment with his surgeon Dr. Garibay on 3/7/18. Patient denies any fever, 

chills, chest pain, shortness of breath, nausea, vomiting, diarrhea, urinary 

symptoms, back pain, neck pain, headache, dizziness, or any other complaints. 








Time/Duration: Other (this morning )


Symptom Onset: Sudden


Symptom Course: Unchanged


Activities at Onset: Light


Context: Home





Past Medical History





- Provider Review


Nursing Documentation Reviewed: Yes





- Past History


Past History: No Previous





- Infectious Disease


Hx of Infectious Diseases: None





- Tetanus Immunization


Tetanus Immunization: Unknown





- Cardiac


Hx Cardiac Disorders: No





- Pulmonary


Hx Respiratory Disorders: Yes


Hx Pneumonia: Yes





- Neurological


Hx Transient Ischemic Attacks (TIA): No





- HEENT


Hx HEENT Disorder: No





- Renal


Hx Renal Disorder: No





- Endocrine/Metabolic


Hx Endocrine Disorders: No





- Hematological/Oncological


Hx Blood Disorders: Yes


Hx Anemia: Yes





- Integumentary


Hx Dermatological Disorder: Yes


Other/Comment: ABDOMINAL WALL ABSCESS.





- Musculoskeletal/Rheumatological


Hx Musculoskeletal Disorders: No


Hx Falls: No





- Gastrointestinal


Hx Gastrointestinal Disorders: Yes


Hx Crohn's Disease: Yes





- Genitourinary/Gynecological


Hx Genitourinary Disorders: Yes


Hx Urinary Tract Infection: Yes





- Psychiatric


Hx Psychophysiologic Disorder: Yes


Hx Anxiety: Yes


Hx Substance Use: No





- Surgical History


Other/Comment: Colon resection





- Anesthesia


Hx Anesthesia: Yes


Hx Anesthesia Reactions: No


Hx Malignant Hyperthermia: No





- Suicidal Assessment


Feels Threatened In Home Enviroment: No





Family/Social History





- Physician Review


Nursing Documentation Reviewed: Yes


Family/Social History: No Known Family HX


Smoking Status: Never Smoked


Hx Alcohol Use: No


Hx Substance Use: No


Hx Substance Use Treatment: No





Allergies/Home Meds


Allergies/Adverse Reactions: 


Allergies





FISH Allergy (Verified 03/05/18 09:17)


 RASH


ketorolac [From Toradol] Allergy (Verified 03/05/18 09:17)


 RASH


shellfish derived Adverse Reaction (Verified 03/05/18 09:17)


 ANGIOEDEMA








Home Medications: 


 Home Meds











 Medication  Instructions  Recorded  Confirmed


 


Ramicade IV Q30D 01/30/18 














Review of Systems





- Review of Systems


Constitutional: absent: Fevers, Other (Chills)


Respiratory: absent: SOB


Cardiovascular: absent: Chest Pain


Gastrointestinal: Abdominal Pain.  absent: Diarrhea, Nausea, Vomiting


Genitourinary Male: absent: Dysuria, Frequency, Hematuria


Musculoskeletal: Arthralgias.  absent: Back Pain, Neck Pain


Neurological: absent: Headache, Dizziness





Physical Exam


Vital Signs Reviewed: Yes


Vital Signs











  Temp Pulse Resp BP Pulse Ox


 


 03/05/18 09:05  98.1 F  90  18  132/87  100











Temperature: Afebrile


Blood Pressure: Normal


Pulse: Regular


Respiratory Rate: Normal


Appearance: Positive for: Well-Appearing, Non-Toxic, Comfortable


Pain Distress: None


Mental Status: Positive for: Alert and Oriented X 3





- Systems Exam


Head: Present: Atraumatic, Normocephalic


Pupils: Present: PERRL


Extroacular Muscles: Present: EOMI


Conjunctiva: Present: Normal


Mouth: Present: Moist Mucous Membranes


Neck: Present: Normal Range of Motion


Respiratory/Chest: Present: Clear to Auscultation, Good Air Exchange.  No: 

Respiratory Distress, Accessory Muscle Use


Cardiovascular: Present: Regular Rate and Rhythm, Normal S1, S2.  No: Murmurs


Abdomen: Present: Normal Bowel Sounds, Scars (Surgical scars ).  No: Tenderness

, Distention, Peritoneal Signs


Back: Present: Normal Inspection


Upper Extremity: Present: Normal Inspection.  No: Cyanosis, Edema


Lower Extremity: Present: Normal Inspection.  No: Edema


Neurological: Present: GCS=15, CN II-XII Intact, Speech Normal


Skin: Present: Warm, Dry, Normal Color.  No: Rashes


Psychiatric: Present: Alert, Oriented x 3, Normal Insight, Normal Concentration





Medical Decision Making


ED Course and Treatment: 





03/05/18 09:53


Impression:


22 year old male presents complaining of chronic abdominal pain, requesting 

pain medications





Plan:


-- Percocet 


-- Reassess and disposition





Prior Visits:


Notes and results from previous visits were reviewed. Patient was last seen in 

the emergency department on 03/01/18 for chronical abdominal pain tonight. 

Requested pain and anxiety medication. Patient as discharged. 





Progress Notes:


03/05/18 09:58


On re-evaluation, patient feels better and is in no acute distress. I have 

discussed the results and plan with the patient, who expresses understanding. 

Patient in agreement with plan to be discharged home. Patient is stable for 

discharge. Patient was instructed to follow up with physician or return if 

symptoms worsen or new concerning symptoms arise. 








- Medication Orders


Current Medication Orders: 











Discontinued Medications





Oxycodone/Acetaminophen (Percocet 5/325 Mg Tab)  2 tab PO STAT STA


   Stop: 03/05/18 09:55


   Last Admin: 03/05/18 10:02  Dose: 2 tab





MAR Pain Assessment


 Document     03/05/18 10:02  EQ  (Rec: 03/05/18 10:02  EQ  [id number])


     Pain Reassessment


      Is this a pain reassessment?               No


     Sleep


      Is patient sleeping during reassessment?   No


     Presence of Pain


      Presence of Pain                           Yes














- Scribe Statement


The provider has reviewed the documentation as recorded by the Johnathonibnorma Claudio





Provider Scribe Attestation:


All medical record entries made by the Johnathonibnorma were at my direction and 

personally dictated by me. I have reviewed the chart and agree that the record 

accurately reflects my personal performance of the history, physical exam, 

medical decision making, and the department course for this patient. I have 

also personally directed, reviewed, and agree with the discharge instructions 

and disposition.








Disposition/Present on Arrival





- Present on Arrival


Any Indicators Present on Arrival: No


History of DVT/PE: No


History of Uncontrolled Diabetes: No


Urinary Catheter: No


History of Decub. Ulcer: No


History Surgical Site Infection Following: None





- Disposition


Have Diagnosis and Disposition been Completed?: Yes


Diagnosis: 


 Chronic abdominal pain





Disposition: HOME/ ROUTINE


Disposition Time: 09:05


Condition: GOOD


Discharge Instructions (ExitCare):  Chronic Pain (DC)


Additional Instructions: 





Thank you for letting us take care of you today. The emergency medical care you 

received today was directed at your acute symptoms. If you were prescribed any 

medication, please fill it and take as directed. It may take several days for 

your symptoms to resolve. Return to the Emergency Department if your symptoms 

worsen, do not improve, or if you have any other problems.





Please contact your doctor or call one of the physicians/clinics you have been 

referred to that are listed on the Patient Visit Information form that is 

included in your discharge packet. Bring any paperwork you were given at 

discharge with you along with any medications you are taking to your follow up 

visit. Our treatment cannot replace ongoing medical care by a primary care 

provider (PCP) outside of the emergency department.





Thank you for allowing the [facility] team to be part of your care today.

















Follow up with your surgeon for your chronic pain medication and further 

management.


Referrals: 


[facility], [Non-Staff] - Follow up with primary


Forms:  [employer] (English)

## 2018-03-25 ENCOUNTER — HOSPITAL ENCOUNTER (EMERGENCY)
Dept: HOSPITAL 42 - ED | Age: 23
Discharge: HOME | End: 2018-03-25
Payer: MEDICAID

## 2018-03-25 VITALS — BODY MASS INDEX: 18.1 KG/M2

## 2018-03-25 VITALS — TEMPERATURE: 97.6 F

## 2018-03-25 VITALS
SYSTOLIC BLOOD PRESSURE: 120 MMHG | HEART RATE: 72 BPM | DIASTOLIC BLOOD PRESSURE: 75 MMHG | RESPIRATION RATE: 18 BRPM | OXYGEN SATURATION: 100 %

## 2018-03-25 DIAGNOSIS — K63.2: ICD-10-CM

## 2018-03-25 DIAGNOSIS — R07.9: Primary | ICD-10-CM

## 2018-03-25 DIAGNOSIS — F11.10: ICD-10-CM

## 2018-03-25 DIAGNOSIS — G89.29: ICD-10-CM

## 2018-03-25 LAB — TROPONIN I SERPL-MCNC: < 0.01 NG/ML

## 2018-03-25 NOTE — CARD
--------------- APPROVED REPORT --------------





EKG Measurement

Heart [id number]

[place] [id number]

[id number]

[id number]

[id number]



<Conclusion>

Sinus bradycardia

Early repolarization

Otherwise normal ECG
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
## 2018-04-09 ENCOUNTER — HOSPITAL ENCOUNTER (EMERGENCY)
Dept: HOSPITAL 42 - ED | Age: 23
Discharge: LEFT BEFORE DISCHARGE | End: 2018-04-09
Payer: MEDICAID

## 2018-04-09 VITALS
SYSTOLIC BLOOD PRESSURE: 135 MMHG | RESPIRATION RATE: 20 BRPM | OXYGEN SATURATION: 98 % | DIASTOLIC BLOOD PRESSURE: 91 MMHG | TEMPERATURE: 97.8 F | HEART RATE: 87 BPM

## 2018-04-09 VITALS — BODY MASS INDEX: 18.1 KG/M2

## 2018-04-09 DIAGNOSIS — F10.10: ICD-10-CM

## 2018-04-09 DIAGNOSIS — Z02.89: Primary | ICD-10-CM

## 2018-04-16 ENCOUNTER — HOSPITAL ENCOUNTER (EMERGENCY)
Dept: HOSPITAL 42 - ED | Age: 23
Discharge: LEFT BEFORE DISCHARGE | End: 2018-04-16
Payer: MEDICAID

## 2018-04-16 VITALS — BODY MASS INDEX: 18.1 KG/M2

## 2018-04-16 VITALS
DIASTOLIC BLOOD PRESSURE: 65 MMHG | SYSTOLIC BLOOD PRESSURE: 110 MMHG | TEMPERATURE: 98.5 F | OXYGEN SATURATION: 100 % | HEART RATE: 71 BPM | RESPIRATION RATE: 18 BRPM

## 2018-04-16 DIAGNOSIS — R10.9: ICD-10-CM

## 2018-04-16 DIAGNOSIS — G89.29: Primary | ICD-10-CM

## 2018-04-16 NOTE — ED PDOC
Arrival/HPI





- General


Chief Complaint: GI Problem


Time Seen by Provider: 04/16/18 14:36





- History of Present Illness


Narrative History of Present Illness (Text): 





04/16/18 14:53


23 yo male, hx of IBD, chronic pain, presents with chronic abdominal pain and 

"not feeling himself". pt well known to er for drug seeking behavior. pt seen 

in er in [place]. pt denies any fevers, diarrhea. pt upon assessment is immediately 

requesting narcotic pain medcation, refusing labs. advise pt of narcotic pain 

policy and pt immediately elopes





Past Medical History





- Past History


Past History: No Previous





- Infectious Disease


Hx of Infectious Diseases: None





- Tetanus Immunization


Tetanus Immunization: Unknown





- Cardiac


Hx Cardiac Disorders: No





- Pulmonary


Hx Respiratory Disorders: Yes


Hx Pneumonia: Yes





- Neurological


Hx Neurological Disorder: No





- HEENT


Hx HEENT Disorder: No





- Renal


Hx Renal Disorder: No





- Endocrine/Metabolic


Hx Endocrine Disorders: No





- Hematological/Oncological


Hx Blood Disorders: Yes


Hx Anemia: Yes





- Integumentary


Hx Dermatological Disorder: Yes


Other/Comment: ABDOMINAL WALL ABSCESS.





- Musculoskeletal/Rheumatological


Hx Musculoskeletal Disorders: No





- Gastrointestinal


Hx Gastrointestinal Disorders: Yes


Hx Crohn's Disease: Yes





- Genitourinary/Gynecological


Hx Genitourinary Disorders: Yes


Hx Urinary Tract Infection: Yes





- Psychiatric


Hx Psychophysiologic Disorder: Yes


Hx Anxiety: Yes


Hx Substance Use: No





- Surgical History


Other/Comment: Colon resection





- Anesthesia


Hx Anesthesia: Yes


Hx Anesthesia Reactions: No


Hx Malignant Hyperthermia: No





- Suicidal Assessment


Feels Threatened In Home Enviroment: No





Family/Social History


Family/Social History: Unknown Family HX


Smoking Status: Never Smoked


Hx Alcohol Use: No


Hx Substance Use: No


Hx Substance Use Treatment: No





Allergies/Home Meds


Allergies/Adverse Reactions: 


Allergies





FISH Allergy (Verified 03/25/18 10:31)


 RASH


ketorolac [From Toradol] Allergy (Verified 03/25/18 10:31)


 RASH


shellfish derived Adverse Reaction (Verified 03/25/18 10:31)


 ANGIOEDEMA








Home Medications: 


 Home Meds











 Medication  Instructions  Recorded  Confirmed


 


No Known Home Med  04/16/18 04/16/18














Review of Systems





- Review of Systems


Constitutional: Normal


Eyes: Normal


ENT: Normal


Respiratory: Normal


Cardiovascular: Normal


Gastrointestinal: Abdominal Pain


Genitourinary Male: Normal


Musculoskeletal: Normal


Skin: Normal


Neurological: Normal


Endocrine: Normal


Hemo/Lymphatic: Normal


Psychiatric: Normal





Physical Exam


Vital Signs











  Temp Pulse Resp BP Pulse Ox


 


 04/16/18 14:26  98.5 F  71  18  110/65  100











Temperature: Afebrile


Blood Pressure: Normal


Pulse: Regular


Respiratory Rate: Normal


Appearance: Positive for: Well-Appearing, Non-Toxic, Comfortable


Pain Distress: None


Mental Status: Positive for: Alert and Oriented X 3





- Systems Exam


Head: Present: Atraumatic, Normocephalic


Pupils: Present: PERRL


Extroacular Muscles: Present: EOMI


Conjunctiva: Present: Normal


Mouth: Present: Moist Mucous Membranes


Neck: Present: Normal Range of Motion


Respiratory/Chest: Present: Clear to Auscultation, Good Air Exchange.  No: 

Respiratory Distress, Accessory Muscle Use


Cardiovascular: Present: Regular Rate and Rhythm, Normal S1, S2.  No: Murmurs


Abdomen: Present: Other (fistula no surrouding ertyehma drainage).  No: 

Tenderness, Distention, Peritoneal Signs, Rebound, Guarding


Back: Present: Normal Inspection


Upper Extremity: Present: Normal Inspection.  No: Cyanosis, Edema


Lower Extremity: Present: Normal Inspection.  No: Edema


Neurological: Present: GCS=15, CN II-XII Intact, Speech Normal


Skin: Present: Warm, Dry, Normal Color.  No: Rashes


Psychiatric: Present: Alert, Oriented x 3, Normal Insight, Normal Concentration





Medical Decision Making


ED Course and Treatment: 





04/16/18 14:54


suspect drug seeking behavior. pt eloped immediately after my assessment. 

offered labs and imaging, however pt elopes








- Medication Orders


Current Medication Orders: 











Discontinued Medications





Acetaminophen (Tylenol 325mg Tab)  650 mg PO STAT STA


   Stop: 04/16/18 14:51











Disposition/Present on Arrival





- Present on Arrival


Any Indicators Present on Arrival: No


History of DVT/PE: No


History of Uncontrolled Diabetes: No


Urinary Catheter: No


History of Decub. Ulcer: No


History Surgical Site Infection Following: None





- Disposition


Have Diagnosis and Disposition been Completed?: Yes


Diagnosis: 


 Abdominal pain, Chronic pain





Disposition: ELOPEMENT - ER ONLY


Disposition Time: 14:55


Patient Problems: 


 Current Active Problems











Problem Status Onset


 


Abdominal pain Acute  


 


Chronic pain Acute  











Condition: STABLE


Referrals: 


PCP,NO [Primary Care Provider] - Follow up with primary


Forms:  [employer] (English)

## 2018-04-17 ENCOUNTER — HOSPITAL ENCOUNTER (EMERGENCY)
Dept: HOSPITAL 14 - H.ER | Age: 23
Discharge: LEFT BEFORE DISCHARGE | End: 2018-04-17
Payer: MEDICAID

## 2018-04-17 VITALS
TEMPERATURE: 97 F | HEART RATE: 82 BPM | SYSTOLIC BLOOD PRESSURE: 125 MMHG | DIASTOLIC BLOOD PRESSURE: 62 MMHG | OXYGEN SATURATION: 100 % | RESPIRATION RATE: 18 BRPM

## 2018-04-17 VITALS — BODY MASS INDEX: 18.1 KG/M2

## 2018-04-17 DIAGNOSIS — G89.29: ICD-10-CM

## 2018-04-17 DIAGNOSIS — F41.9: ICD-10-CM

## 2018-04-17 DIAGNOSIS — R10.9: Primary | ICD-10-CM

## 2018-04-17 DIAGNOSIS — K50.90: ICD-10-CM

## 2018-04-17 LAB
ALBUMIN SERPL-MCNC: 4.3 G/DL (ref 3.5–5)
ALBUMIN/GLOB SERPL: 0.9 {RATIO} (ref 1–2.1)
ALT SERPL-CCNC: 30 U/L (ref 21–72)
AST SERPL-CCNC: 32 U/L (ref 17–59)
BASOPHILS # BLD AUTO: 0.1 K/UL (ref 0–0.2)
BASOPHILS NFR BLD: 1.2 % (ref 0–2)
BUN SERPL-MCNC: 10 MG/DL (ref 9–20)
CALCIUM SERPL-MCNC: 9.3 MG/DL (ref 8.4–10.2)
EOSINOPHIL # BLD AUTO: 0.1 K/UL (ref 0–0.7)
EOSINOPHIL NFR BLD: 2.1 % (ref 0–4)
ERYTHROCYTE [DISTWIDTH] IN BLOOD BY AUTOMATED COUNT: 20.6 % (ref 11.5–14.5)
GFR NON-AFRICAN AMERICAN: > 60
HGB BLD-MCNC: 10.2 G/DL (ref 12–18)
LIPASE SERPL-CCNC: 46 U/L (ref 23–300)
LYMPHOCYTES # BLD AUTO: 1.1 K/UL (ref 1–4.3)
LYMPHOCYTES NFR BLD AUTO: 23 % (ref 20–40)
MCH RBC QN AUTO: 22.1 PG (ref 27–31)
MCHC RBC AUTO-ENTMCNC: 31.1 G/DL (ref 33–37)
MCV RBC AUTO: 71.1 FL (ref 80–94)
MONOCYTES # BLD: 0.4 K/UL (ref 0–0.8)
MONOCYTES NFR BLD: 8.4 % (ref 0–10)
NEUTROPHILS # BLD: 3 K/UL (ref 1.8–7)
NEUTROPHILS NFR BLD AUTO: 65.3 % (ref 50–75)
NRBC BLD AUTO-RTO: 0.8 % (ref 0–0)
PLATELET # BLD: 633 K/UL (ref 130–400)
PMV BLD AUTO: 7.2 FL (ref 7.2–11.7)
RBC # BLD AUTO: 4.61 MIL/UL (ref 4.4–5.9)
WBC # BLD AUTO: 4.6 K/UL (ref 4.8–10.8)

## 2018-04-17 PROCEDURE — 83690 ASSAY OF LIPASE: CPT

## 2018-04-17 PROCEDURE — 99283 EMERGENCY DEPT VISIT LOW MDM: CPT

## 2018-04-17 PROCEDURE — 80053 COMPREHEN METABOLIC PANEL: CPT

## 2018-04-17 PROCEDURE — 74177 CT ABD & PELVIS W/CONTRAST: CPT

## 2018-04-17 PROCEDURE — 80320 DRUG SCREEN QUANTALCOHOLS: CPT

## 2018-04-17 PROCEDURE — 85025 COMPLETE CBC W/AUTO DIFF WBC: CPT

## 2018-04-17 NOTE — ED PDOC
- Laboratory Results


Result Diagrams: 


 04/17/18 06:50





 04/17/18 06:50





- ECG


O2 Sat by Pulse Oximetry: 100 (RA)





Medical Decision Making


Medical Decision Making: 


Time: 0700


Patient signed out by Dr. Luis Garcia to myself. Pending Abdominal/Pelvis CT.


labs reviewed,, wnl


Time: 1036


Patient refused to wait for the CT results. i told him i am getting results 

shortly and i will let him know. radiologist called me with the report. when i 

went to room to give him results, he had left.


He left before treatment was complete.





--------------------------------------------------------------------------------

----------------- 





Scribe Attestation:


Documented by Sarah Fitzpatrick, acting as a scribe for Celi Krueger MD.





Provider Scribe Attestation:


All medical record entries made by the Scribe were at my direction and 

personally dictated by me. I have reviewed the chart and agree that the record 

accurately reflects my personal performance of the history, physical exam, 

medical decision making, and the department course for this patient. I have 

also personally directed, reviewed, and agree with the discharge instructions 

and disposition.





Disposition





- Clinical Impression


Clinical Impression: 


 Abdominal discomfort








- POA


Present On Arrival: None





- Disposition


Disposition: Left W/O Treatment


Disposition Time: 10:30


Condition: FAIR


Forms:  [employer] (English)

## 2018-04-17 NOTE — CT
PROCEDURE:  CT Abdomen and Pelvis with contrast



HISTORY:

abd pain



COMPARISON:

4/14/2017



TECHNIQUE:

Contrast dose: 95 mL Omnipaque 300



Radiation dose:



Total exam DLP = 198.59 mGy-cm.



This CT exam was performed using one or more of the following dose 

reduction techniques: Automated exposure control, adjustment of the 

mA and/or kV according to patient size, and/or use of iterative 

reconstruction technique.



FINDINGS:



LOWER THORAX:

10 mm irregular nodule in the medial right lower lobe (series 5, 

image 1. This is only partially included in this examination.  

Uncertain significance.  Infectious, inflammatory or neoplastic 

muscle be considered. Follow-up advised.  Consider CT scan of the 

chest for further evaluation. 



LIVER:

Unremarkable. No gross lesion or ductal dilatation. 



GALLBLADDER AND BILE DUCTS:

Unremarkable. 



PANCREAS:

Unremarkable. No gross lesion or ductal dilatation.



SPLEEN:

Normal size.  Nonspecific 9 mm rounded low-attenuation lesion.  

Please note that the spleen is heterogeneously enhanced due to the 

early stage of scanning relative to contrast administration. 



ADRENALS:

Unremarkable. No mass. 



KIDNEYS AND URETERS:

Unremarkable. No hydronephrosis. No solid mass. 



VASCULATURE:

Unremarkable. No aortic aneurysm. 



BOWEL:

There are postoperative changes in the right lower quadrant of the 

abdomen with suture material and probable prior partial right 

colectomy. There is irregularity and deformity of the right psoas 

muscle and right iliopsoas muscle.  There is no collections seen 

within this OS.  There is some central high attenuation within this 

LS most clearly evident on 78 through 90.  Uncertain significance. 

Please note that on images 92 through 97, there is soft tissue 

density in the right anterior abdomen with central curvilinear 

enhancement, possibly representing the flight min or incipient 

abscess.  Followup is advised. No definite collection is identified.



There are no other abnormal bowel loops identified. 



APPENDIX:

Not identified.  Question of possible prior right hemicolectomy and 

appendectomy. 



PERITONEUM:

Unremarkable. No free fluid. No free air. 



LYMPH NODES:

Unremarkable. No enlarged lymph nodes. 



BLADDER:

Unremarkable. 



REPRODUCTIVE:

Normal prostate 



BONES:

No acute fracture. 



OTHER FINDINGS:

None.



IMPRESSION:

Abnormal right psoas and right iliacus LS.  Please note that in 

retrospect, on examination of 4/14/2017, there was gas and 

heterogeneous low-attenuation seen within the right iliopsoas muscle 

indicating possible abscess. The findings on current examination may 

reflect post inflammatory change.  Nevertheless, cannot rule out 

current inflammatory process involving the right iliopsoas.  Abnormal 

soft tissue density in the right anterior abdomen containing 

curvilinear high attenuation/ enhancement.  Possible phlegmon or 

incipient abscess.  Please correlate clinically. Postoperative 

changes in right lower quadrant.  Probable prior partial right 

hemicolectomy. Incidental irregular 9 mm nodule in medial right lower 

lobe partially included this examination.  Recommend follow-up 

evaluation.  Consider CT examination of the chest. Nonspecific 9 mm 

low-attenuation lesion within the spleen. Possible cyst, hemangioma 

or lymphangioma. 



At

## 2018-04-17 NOTE — ED PDOC
HPI: Abdomen


Time Seen by Provider: 04/17/18 06:08


Chief Complaint (Nursing): Abdominal Pain


Chief Complaint (Provider): Abdominal Pain


History Per: Patient


History/Exam Limitations: no limitations


Onset/Duration Of Symptoms: Sudden Onset


Additional Complaint(s): 





23 yo  male with a history of Crohn's disease and multiple 

abdominal surgeries, presents to the ED requesting narcotics for his abdominal 

pain, onset of just prior to arrival. Upon chart review, it shows multiple 

visits at [facility], where he made similar requests and was informed 

about the narcotics policy in place. He denies any further complaints and 

social history. 





Past Medical History


Reviewed: Historical Data, Nursing Documentation, Vital Signs


Vital Signs: 


 Last Vital Signs











Temp  97 F L  04/17/18 06:02


 


Pulse  82   04/17/18 06:02


 


Resp  18   04/17/18 06:02


 


BP  125/62   04/17/18 06:02


 


Pulse Ox  100   04/17/18 15:14














- Medical History


PMH: Anemia, Anxiety, Crohn's Disease, Pneumonia, Chronic Pain


   Denies: Chronic Kidney Disease, TIA





- Surgical History


Other surgeries: multiple abdominal surgeries





- Family History


Family History: States: Unknown Family Hx





- Social History


Current smoker - smoking cessation education provided: No


Ex-Smoker (has not smoked in the last 12 months): No


Alcohol: None


Drugs: Denies





- Immunization History


Hx Tetanus Toxoid Vaccination: No


Hx Influenza Vaccination: No


Hx Pneumococcal Vaccination: No





- Home Medications


Home Medications: 


 Ambulatory Orders











 Medication  Instructions  Recorded


 


No Known Home Med  04/16/18














- Allergies


Allergies/Adverse Reactions: 


 Allergies











Allergy/AdvReac Type Severity Reaction Status Date / Time


 


FISH Allergy  RASH Verified 03/25/18 10:31


 


ketorolac [From Toradol] Allergy  RASH Verified 03/25/18 10:31


 


shellfish derived AdvReac  ANGIOEDEMA Verified 03/25/18 10:31














Review of Systems


ROS Statement: Except As Marked, All Systems Reviewed And Found Negative


Constitutional: Negative for: Fever


Psych: Positive for: Other (request narcotics).  Negative for: Suicidal ideation





Physical Exam





- Reviewed


Nursing Documentation Reviewed: Yes


Vital Signs Reviewed: Yes





- Physical Exam


Appears: Positive for: Well, Non-toxic, No Acute Distress


Head Exam: Positive for: ATRAUMATIC, NORMAL INSPECTION, NORMOCEPHALIC


Skin: Positive for: Normal Color, Warm, DRY


Eye Exam: Positive for: EOMI, Normal appearance, PERRL


ENT: Positive for: Normal ENT Inspection


Neck: Positive for: Normal, Painless ROM


Cardiovascular/Chest: Positive for: Regular Rate, Rhythm.  Negative for: Murmur


Respiratory: Positive for: Normal Breath Sounds.  Negative for: Respiratory 

Distress


Gastrointestinal/Abdominal: Positive for: Normal Exam (mutiple surgical scars 

noted), Soft.  Negative for: Tenderness


Back: Positive for: Normal Inspection


Extremity: Positive for: Normal ROM.  Negative for: Pedal Edema, Deformity


Neurologic/Psych: Positive for: Alert, Oriented.  Negative for: Motor/Sensory 

Deficits





- Laboratory Results


Result Diagrams: 


 04/17/18 06:50





 04/17/18 06:50





- ECG


O2 Sat by Pulse Oximetry: 100 (RA)


Pulse Ox Interpretation: Normal





Medical Decision Making


Medical Decision Making: 





Time:


--06:20


Impression: 


--23 yo male with abdominal pain in setting of Crohn's Disease and drug seeking 

behavior


Plan:


--CT ABD & Pelvis IV Contrast


--Alcohol Seurm 


--Labs


--Drug Screen, Urien


--Lipase


--CBC


--Bentyl 20mg PO


---Heplock Insertion


--Urinalysis





Reassess


--Patient is bargaining with the provider to obtain Xanax.


Provider makes patient aware of the narcotics policy in place and states that 

he will only receive Bentyl.


Patient to be signed out to Dr. Krueger pending CT, labs, reeval. 





Scribe Attestation:


Documented by Antonio Bergman acting as a scribe for Luis Garcia MD. 





Provider Attestation:


All medical record entries made by the Scribe were at my direction and 

personally dictated by me. I have reviewed the chart and agree that the record 

accurately reflects my personal performance of the history, physical exam, 

medical decision making, and the department course for this patient. I have 

also personally directed, reviewed, and agree with the discharge instructions 

and disposition.





Disposition





- Clinical Impression


Clinical Impression: 


 Abdominal discomfort








- Disposition


Disposition: Transfer of Care


Disposition Time: 07:00


Condition: FAIR


Forms:  [employer] (English)


Patient Signed Over To: Celi Krueger

## 2018-04-18 ENCOUNTER — HOSPITAL ENCOUNTER (EMERGENCY)
Dept: HOSPITAL 31 - C.ER | Age: 23
Discharge: HOME | End: 2018-04-18
Payer: MEDICAID

## 2018-04-18 VITALS
SYSTOLIC BLOOD PRESSURE: 106 MMHG | HEART RATE: 68 BPM | OXYGEN SATURATION: 98 % | DIASTOLIC BLOOD PRESSURE: 61 MMHG | RESPIRATION RATE: 18 BRPM | TEMPERATURE: 98.6 F

## 2018-04-18 VITALS — BODY MASS INDEX: 18.1 KG/M2

## 2018-04-18 DIAGNOSIS — G89.29: Primary | ICD-10-CM

## 2018-04-18 NOTE — C.PDOC
History Of Present Illness


21YO MALE PRESENTS TO ED WITH:


CO PERSIST ABD PAIN X 3-4 DAYS. MULT PRIOR ER VISITS @ [facility] AND [facility] FOR SAME

, LAST EVAL 4/17. PT W WELL DOCUMENTED DRUG SEEKING BEHAVIOR. PT ELOPED PRIOR 

TO RECEIVING CT REPORT. CURRENT PAIN SIM TO PRIOR. CURRENTLY W HEALING ABD WALL 

ABSCESS S/P COLOSTOMY REVERSAL. ON REMICADE. NO FEVER, NVD. PS CURRENTLY TRYING 

TO FIND A PMD.





EXAM


NAD NONTOXIC


ABD MILD TEND SOFT NO R/G. 


SKIN HEALING DRAINING ABSCESS UMBILICAL AREA. NO ERYTHEMA, SWELL, TEND


REMAINDER NEG


Time Seen by Provider: 04/18/18 08:40


History Per: Patient


History/Exam Limitations: no limitations


Onset/Duration Of Symptoms: Days (3-4)


Current Symptoms Are (Timing): Still Present





Past Medical History


Reviewed: Historical Data, Nursing Documentation, Vital Signs


Vital Signs: 


 Last Vital Signs











Temp  98.6 F   04/18/18 08:40


 


Pulse  68   04/18/18 08:40


 


Resp  18   04/18/18 08:40


 


BP  106/61   04/18/18 08:40


 


Pulse Ox  98   04/18/18 08:40














- Medical History


PMH: Anemia, Anxiety, Crohn's Disease, Pneumonia, Chronic Pain


   Denies: Chronic Kidney Disease, TIA





- [facility] Procedures








TRANSFUSE NONAUT RED BLOOD CELLS IN PERIPH VEIN, PERC (04/10/17)








Family History: States: Unknown Family Hx





- Social History


Hx Tobacco Use: No


Hx Alcohol Use: No


Hx Substance Use: No





- Immunization History


Hx Tetanus Toxoid Vaccination: No


Hx Influenza Vaccination: No


Hx Pneumococcal Vaccination: No





Review Of Systems


Except As Marked, All Systems Reviewed And Found Negative.


Constitutional: Negative for: Fever, Chills


Gastrointestinal: Positive for: Abdominal Pain.  Negative for: Nausea, Vomiting

, Diarrhea





Physical Exam





- Physical Exam


Appears: Non-toxic, No Acute Distress


Skin: Normal Color, Warm, Dry


Head: Atraumatic, Normacephalic


Eye(s): bilateral: Normal Inspection, PERRL, EOMI


Neck: Normal ROM, Supple


Chest: Symmetrical


Cardiovascular: Rhythm Regular


Respiratory: Normal Breath Sounds, No Wheezing


Gastrointestinal/Abdominal: Soft, Tenderness (mild), No Guarding, No Rebound, 

Other (healing draining abscess to umbilical area, no erythema, edema or 

tenderness.)


Male Genital: Normal Inspection


Extremity: Normal ROM, No Deformity


Neurological/Psych: Oriented x3





Progress





- Re-Evaluation


Re-evaluation Note: 





04/18/18 09:09


ADVISED BY RN PT REFUSING LABS. PT ADVISED OF CT REPORT, NEED TO REEVAL FOR 

POSSIBLE WORSENING ABD FINDINGS. 





- Data Reviewed


Data Reviewed: Lab, Old records





Disposition


Counseled Patient/Family Regarding: Diagnosis, Need For Followup





- Disposition


Referrals: 


[facility] [Outside]


[facility] [Outside]


Rodolfo Silveira MD [Staff Provider] - 


Disposition: HOME/ ROUTINE


Disposition Time: 09:00


Condition: GOOD


Additional Instructions: 


YOU HAVE BEEN ADVISED OF THIS HOSPITAL'S NARCOTIC POLICY.





FOLLOW UP PAIN MANAGEMENT REFERRAL.


Instructions:  Chronic Pain


Forms:  [facility] (English)





- Clinical Impression


Clinical Impression: 


 Chronic pain








- Scribe Statement


The provider has reviewed the documentation as recorded by the Scribe (Danika Burroughs)


Provider Attestation: 


All medical record entries made by the Scribe were at my direction and 

personally dictated by me. I have reviewed the chart and agree that the record 

accurately reflects my personal performance of the history, physical exam, 

medical decision making, and the department course for this patient. I have 

also personally directed, reviewed, and agree with the discharge instructions 

and disposition.

## 2018-04-21 ENCOUNTER — HOSPITAL ENCOUNTER (EMERGENCY)
Dept: HOSPITAL 42 - ED | Age: 23
Discharge: LEFT BEFORE DISCHARGE | End: 2018-04-21
Payer: MEDICAID

## 2018-04-21 VITALS
SYSTOLIC BLOOD PRESSURE: 129 MMHG | DIASTOLIC BLOOD PRESSURE: 62 MMHG | OXYGEN SATURATION: 98 % | TEMPERATURE: 98.5 F | RESPIRATION RATE: 16 BRPM | HEART RATE: 91 BPM

## 2018-04-21 VITALS — BODY MASS INDEX: 18.1 KG/M2

## 2018-04-21 DIAGNOSIS — F11.20: ICD-10-CM

## 2018-04-21 DIAGNOSIS — R10.9: Primary | ICD-10-CM

## 2018-04-21 DIAGNOSIS — G89.29: ICD-10-CM

## 2018-04-21 NOTE — ED PDOC
Arrival/HPI





- General


Chief Complaint: Abdominal Pain


Time Seen by Provider: 04/21/18 07:44


Historian: Patient





- History of Present Illness


Narrative History of Present Illness (Text): 





04/21/18 07:59





A 22 year old male, whose past medical history includes lower back pain and 

chronic abdominal pain, ulcerative colitis, presents to the emergency 

department for persistent abdominal pain. The patient notes that he has had 

similar discomfort in the past and that he ran out of percocet and is asking 

for 2 percocet for the pain. When asking the patient why he does not go to his 

primary to help manage his pain, the patient states that he is currently 

waiting for a referral and will most likely only be seen until Wednesday. The 

patient has present similar symptoms/excuses in the past. When offered Tylenol/

ibuprofen/Advil, the patient refused these medications and states he can take 

percocet/oxycodone/vicodin. When telling the patient that he will not be 

prescribed opioids, he became belligerent and argumentative. The patients 

denies any fever, chills, sweats, nausea, headaches, vomiting, diarrhea, 

urinary changes, or any other complaints at this time. 


pt is here for further eval


pt's without other complaints





pt received his primary wound care/abdominal pain care at [facility]





Time/Duration: 1-3 hours, Other (chronic abd pain)


Symptom Onset: Gradual


Symptom Course: Unchanged


Quality: Other


Severity Level: Mild


Activities at Onset: Light


Context: Home





Past Medical History





- Provider Review


Nursing Documentation Reviewed: Yes





- Travel History


Have you recently traveled outside [place] w/in the past 3 mons?: No





- Past History


Past History: No Previous





- Infectious Disease


Hx of Infectious Diseases: None





- Tetanus Immunization


Tetanus Immunization: Unknown





- Cardiac


Hx Cardiac Disorders: No





- Pulmonary


Hx Pneumonia: Yes





- Neurological


Hx Transient Ischemic Attacks (TIA): No





- HEENT


Hx HEENT Disorder: No





- Renal


Hx Renal Disorder: No





- Endocrine/Metabolic


Hx Endocrine Disorders: No





- Hematological/Oncological


Hx Anemia: Yes





- Integumentary


Hx Dermatological Disorder: Yes


Other/Comment: ABDOMINAL WALL ABSCESS.





- Musculoskeletal/Rheumatological


Hx Musculoskeletal Disorders: No





- Gastrointestinal


Hx Crohn's Disease: Yes





- Genitourinary/Gynecological


Hx Genitourinary Disorders: Yes


Hx Urinary Tract Infection: Yes





- Psychiatric


Hx Anxiety: Yes


Hx Substance Use: No





- Surgical History


Other/Comment: Colon resection.  PT STATES "I'VE HAD MULTIPLE SURGERIES IN MY 

STOMACH"





- Anesthesia


Hx Anesthesia: Yes


Hx Anesthesia Reactions: No


Hx Malignant Hyperthermia: No





- Suicidal Assessment


Feels Threatened In Home Enviroment: No





Family/Social History





- Physician Review


Nursing Documentation Reviewed: Yes


Family/Social History: No Known Family HX


Smoking Status: Never Smoked


Hx Alcohol Use: No


Hx Substance Use: No


Hx Substance Use Treatment: No





Allergies/Home Meds


Allergies/Adverse Reactions: 


Allergies





FISH Allergy (Verified 04/21/18 05:53)


 RASH


ketorolac [From Toradol] Allergy (Verified 04/21/18 05:53)


 RASH


shellfish derived Adverse Reaction (Verified 04/21/18 05:53)


 ANGIOEDEMA








Home Medications: 


 Home Meds











 Medication  Instructions  Recorded  Confirmed


 


Remicade  04/18/18 














Review of Systems





- Physician Review


All systems were reviewed & negative as marked: Yes





- Review of Systems


Constitutional: absent: Fevers, Night Sweats


Eyes: Normal


ENT: Normal


Respiratory: Normal


Cardiovascular: Normal


Gastrointestinal: Abdominal Pain.  absent: Diarrhea, Nausea, Vomiting


Genitourinary Male: Normal.  absent: Urinary Output Changes


Musculoskeletal: Normal


Skin: Normal


Neurological: absent: Headache


Endocrine: Normal


Hemo/Lymphatic: Normal


Psychiatric: Normal





Physical Exam


Vital Signs Reviewed: Yes


Vital Signs











  Temp Pulse Resp BP Pulse Ox


 


 04/21/18 06:15  98.5 F  91 H  16  129/62  98











Temperature: Afebrile


Blood Pressure: Normal


Pulse: Regular


Respiratory Rate: Normal


Appearance: Positive for: Well-Appearing, Non-Toxic, Other (appearing 

comfortable prior to my evaluation, alert/awake, GCS = 15, oriented x 3)


Pain Distress: None


Mental Status: Positive for: Alert and Oriented X 3





- Systems Exam


Head: Present: Atraumatic, Normocephalic


Pupils: Present: PERRL, Other (no nystagmus, no photophobia, sclera anicteric)


Extroacular Muscles: Present: EOMI


Conjunctiva: Present: Normal


Ears: Present: Normal


Mouth: Present: Moist Mucous Membranes, Normal Teeth


Pharnyx: Present: Normal


Nose (External): Present: Atraumatic


Nose (Internal): Present: Normal Inspection


Neck: Present: Normal Range of Motion, Trachea Midline.  No: MIDLINE TENDERNESS


Respiratory/Chest: Present: Clear to Auscultation, Good Air Exchange, Other (

CTA b/l, no w/r/r).  No: Respiratory Distress, Accessory Muscle Use


Cardiovascular: Present: Regular Rate and Rhythm, Normal S1, S2.  No: Murmurs


Abdomen: Present: Normal Bowel Sounds, Other (noted similiar mid abd post surg 

scaring, well healed wound; noted slightly distal to the umbilicus with slight 

drainage (chronic, due to fistula); pt without any skin discoloration around 

the wound, no fluctuance, no induration, no gross tenderness on exam, no masses/

rebound/guarding/rigidity).  No: Tenderness, Distention, Peritoneal Signs


Back: Present: Normal Inspection.  No: CVA Tenderness, Midline Tenderness


Upper Extremity: Present: Normal Inspection, Normal ROM, NORMAL PULSES, 

Neurovascularly Intact.  No: Cyanosis, Edema


Lower Extremity: Present: Normal Inspection, NORMAL PULSES, Normal ROM, 

Neurovascularly Intact, Other (+ ambulatory).  No: Edema


Neurological: Present: GCS=15, CN II-XII Intact, Speech Normal


Skin: Present: Warm, Dry, Normal Color.  No: Rashes


Psychiatric: Present: Alert, Oriented x 3, Normal Insight, Normal Concentration





Medical Decision Making


ED Course and Treatment: 


04/21/18 08:02


Impression: A 22 year old male with persistent abdominal pain. 





Differential Diagnosis included but are not limited to:  chronic abd pain with 

chronic wound; pt is seeking pain control





Plan:





-- Reassess and disposition





Progress Notes:


 


discussed at length pt's likely condition with opioid dependency, pt agrees and 

states he is addicted and that he doesnt want to go into w/d and is asking for 

2 percocet; pt has had similar complaints before 


pt's current abd wound is unchanged and looks well, no acute pathology is noted


pt is offered tylenol/motrin/toradol, but pt refused and states he can only 

take opioids and only want opioids


i refused


pt became belligerent and started using profane language, directed at me


i instruct patient that we will obtain some labs and check him out in the 

meantime


pt appears angry





04/21/18 07:47


The patient stormed out of the emergency department after not being given the 

opioid he requested. The patient left before treatment completion. 





Re-evaluation Time: 07:47


Reassessment Condition: Unchanged





- Medication Orders


Current Medication Orders: 











Discontinued Medications





Acetaminophen (Tylenol 325mg Tab)  650 mg PO STAT STA


   Stop: 04/21/18 07:45











- Scribe Statement


The provider has reviewed the documentation as recorded by the Johnathonibnorma Schulz





Provider Scribe Attestation:


All medical record entries made by the Scribe were at my direction and 

personally dictated by me. I have reviewed the chart and agree that the record 

accurately reflects my personal performance of the history, physical exam, 

medical decision making, and the department course for this patient. I have 

also personally directed, reviewed, and agree with the discharge instructions 

and disposition.








Disposition/Present on Arrival





- Present on Arrival


Any Indicators Present on Arrival: No


History of DVT/PE: No


History of Uncontrolled Diabetes: No


Urinary Catheter: No


History of Decub. Ulcer: No


History Surgical Site Infection Following: None





- Disposition


Have Diagnosis and Disposition been Completed?: Yes


Diagnosis: 


 Chronic abdominal pain, Opioid dependence





Disposition: ELOPEMENT - ER ONLY


Disposition Time: 07:47


Condition: STABLE


Print Language: ENGLISH


Additional Instructions: 


Make sure to see your doctor in 1-2 days


DRINK PLENTY OF FLUIDS


take your medications as prescribed


RETURN TO ED IF worse pain, cant breath, persistent vomiting, high fever >101-

102 for hours, altered behavior, slurr speech, facial changes, focal weakness (

arm/leg or both), unable to urinate, heavy/persistent bleeding, passing out, 

chest pain, or other medical emergencies


Referrals: 


 Service [Outside] - Follow up with primary


Forms:  [facility] (English)

## 2018-06-12 ENCOUNTER — HOSPITAL ENCOUNTER (EMERGENCY)
Dept: HOSPITAL 42 - ED | Age: 23
Discharge: HOME | End: 2018-06-12
Payer: MEDICAID

## 2018-06-12 VITALS — RESPIRATION RATE: 18 BRPM

## 2018-06-12 VITALS
SYSTOLIC BLOOD PRESSURE: 118 MMHG | TEMPERATURE: 98.2 F | DIASTOLIC BLOOD PRESSURE: 70 MMHG | OXYGEN SATURATION: 99 % | HEART RATE: 82 BPM

## 2018-06-12 VITALS — BODY MASS INDEX: 19.1 KG/M2

## 2018-06-12 DIAGNOSIS — S93.402A: Primary | ICD-10-CM
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
## 2018-06-12 NOTE — ED PDOC
Arrival/HPI





- General


Chief Complaint: Pain, Chronic


Time Seen by Provider: 06/12/18 11:59


Historian: Patient





- History of Present Illness


Narrative History of Present Illness (Text): 





06/12/18 13:40


22 Year old male, who presents to the Emergency department complaining of left 

ankle pain s/p twisting it since 4 days ago after mis-stepping. Patient reports 

being [facility] and treated for facial abrasion and had an x-ray for 

his left ankle which came out negative. Patient is currently Complaining of 

persistent lateral left ankle and denies knee or hip pain. Patient also denies 

abdominal pain, fever, chills, and reports he is feeling anxious due to his PMH 

of this and recently running out of Xanax medication. No SI or HI. 


Time/Duration: < week


Symptom Onset: Sudden


Symptom Course: Unchanged





Past Medical History





- Provider Review


Nursing Documentation Reviewed: Yes





- Past History


Past History: No Previous





- Infectious Disease


Hx of Infectious Diseases: None





- Tetanus Immunization


Tetanus Immunization: Unknown





- Cardiac


Hx Cardiac Disorders: No





- Pulmonary


Hx Pneumonia: Yes





- Neurological


Hx Transient Ischemic Attacks (TIA): No





- HEENT


Hx HEENT Disorder: No





- Renal


Hx Renal Disorder: No





- Endocrine/Metabolic


Hx Endocrine Disorders: No





- Hematological/Oncological


Hx Anemia: Yes





- Integumentary


Hx Dermatological Disorder: Yes


Other/Comment: ABDOMINAL WALL ABSCESS.





- Musculoskeletal/Rheumatological


Hx Musculoskeletal Disorders: No





- Gastrointestinal


Hx Crohn's Disease: Yes





- Genitourinary/Gynecological


Hx Genitourinary Disorders: Yes


Hx Urinary Tract Infection: Yes





- Psychiatric


Hx Anxiety: Yes


Hx Substance Use: No





- Surgical History


Other/Comment: Colon resection.  PT STATES "I'VE HAD MULTIPLE SURGERIES IN MY 

STOMACH"





- Anesthesia


Hx Anesthesia: Yes


Hx Anesthesia Reactions: No


Hx Malignant Hyperthermia: No





- Suicidal Assessment


Feels Threatened In Home Enviroment: No





Family/Social History





- Physician Review


Nursing Documentation Reviewed: Yes


Family/Social History: Unknown Family HX


Smoking Status: Never Smoked


Hx Alcohol Use: No


Hx Substance Use: No


Hx Substance Use Treatment: No





Allergies/Home Meds


Allergies/Adverse Reactions: 


Allergies





FISH Allergy (Verified 04/21/18 05:53)


 RASH


ketorolac [From Toradol] Allergy (Verified 04/21/18 05:53)


 RASH


shellfish derived Adverse Reaction (Verified 04/21/18 05:53)


 ANGIOEDEMA








Home Medications: 


 Home Meds











 Medication  Instructions  Recorded  Confirmed


 


Remicade  04/18/18 














Review of Systems





- Review of Systems


Constitutional: absent: Fatigue, Fevers


Respiratory: absent: SOB


Cardiovascular: absent: Chest Pain


Gastrointestinal: absent: Abdominal Pain, Nausea, Vomiting, Appetite Changes


Genitourinary Male: absent: Frequency


Musculoskeletal: Other (laterl left ankle pain ).  absent: Back Pain


Skin: absent: Rash


Neurological: absent: Headache


Hemo/Lymphatic: absent: Easy Bleeding


Psychiatric: Anxiety.  absent: Depression, Suicidal Ideation





Physical Exam





- Physical Exam


Narrative Physical Exam (Text): 





06/12/18 


Head:  Atraumatic.  Normocephalic. 


Eyes:  PERRL.  EOMI.  Conjunctivae are not pale.


ENT:  Mucous membranes are moist and intact.  Oropharynx is clear and 

symmetric. Healing abrasion noted to right upper lip. No pus or bleeding. No 

facial bony tenderness.


Neck:  Supple.  Full ROM.  No JVD.  No lymphadenopathy. Nontender.


Cardiovascular:  Regular rate.  Regular rhythm.  No murmurs, rubs, or gallops.  

Distal pulses are 2+ and symmetric.


Pulmonary/Chest:  No evidence of respiratory distress.  Clear to auscultation 

bilaterally.  No wheezing, rales or rhonchi.


Abdominal:  Multiple abominal scars. NO PURULENT DRAINAGE. NONTENDER. No pus or 

erythema.


Back:  No CVA tenderness. No midline tenderness.


Extremities:  No edema.   No cyanosis.  No clubbing.  Pain on palpation of left 

ankle lateral malleolus. No achilles pain. No foot pain. No knee or calf pain. 

No hip pain.


Skin:  Skin is warm and dry. No abdominal wall cellulitis noted. Healing 

abrasion to lip.


Neurological:  Alert, awake, and oriented to person, place, time, and 

situation. Motor and sensory exam intact.


Psychiatric:  Good eye contact.  Anxious. Denies suicidal or homicidal ideation.


 


06/12/18 17:01





Vital Signs Reviewed: Yes


Vital Signs











  Temp Pulse Resp BP Pulse Ox


 


 06/12/18 13:47  98.2 F  82  18  118/70  99


 


 06/12/18 13:46  98.2 F  82  18  118/70  99


 


 06/12/18 11:23  98.4 F  79  18  121/56 L  100











Temperature: Afebrile


Blood Pressure: Hypotensive


Pulse: Regular


Respiratory Rate: Normal


Appearance: Positive for: Well-Appearing, Non-Toxic, Comfortable


Pain Distress: None


Mental Status: Positive for: Alert and Oriented X 3





Medical Decision Making


ED Course and Treatment: 





06/12/18 


Impression:


Patient with hx of ankle sprain he states four days ago, reports persistent 

pain.


Xrays of ankle taken due to persistent pain, no fracture noted.


No calf pain. No achilles pain.


NV intact.


He ambulates with no limp or discomfort. No edema or erythema noted.


Risks/side effects of pain medication reviewed with patient.





He reports anxiety, states he has prior hx of this, ran out of xanax.


He does not wish to speak to mental health states no depression or suicidal 

ideation.





Patient prescrbed 4 pills of xanax for anxiety and advised follow-up with his 

mental health practitioner.


Advised f/u with ortho for any persistent ankle pain, but on discharge he is 

ambulating without discomfort.





CURRENTLY NO ABDOMINAL PAIN, no fever or nausea or vomiting.




















- RAD Interpretation


Radiology Orders: 








06/12/18 12:00


ANKLE LEFT 3 VIEWS ROUTINE [RAD] Stat 














- Medication Orders


Current Medication Orders: 











Discontinued Medications





Oxycodone/Acetaminophen (Percocet 5/325 Mg Tab)  1 tab PO STAT STA


   Stop: 06/12/18 12:02


   Last Admin: 06/12/18 12:35  Dose: 1 tab





MAR Pain Assessment


 Document     06/12/18 12:35  HI  (Rec: 06/12/18 12:36  HI  [id number])


     Pain Reassessment


      Is this a pain reassessment?               No


     Sleep


      Is patient sleeping during reassessment?   No


     Presence of Pain


      Presence of Pain                           Yes


     Pain Scale Used


      Pain Scale Used                            Numeric


     Description


      Description                                Constant


      Intensity of Pain at present               7














- Scribe Statement


The provider has reviewed the documentation as recorded by the Amy Lin





Provider Scribe Attestation:


All medical record entries made by the Amy were at my direction and 

personally dictated by me. I have reviewed the chart and agree that the record 

accurately reflects my personal performance of the history, physical exam, 

medical decision making, and the department course for this patient. I have 

also personally directed, reviewed, and agree with the discharge instructions 

and disposition.





Disposition/Present on Arrival





- Present on Arrival


Any Indicators Present on Arrival: No


History of DVT/PE: No


History of Uncontrolled Diabetes: No


Urinary Catheter: No


History of Decub. Ulcer: No


History Surgical Site Infection Following: None





- Disposition


Have Diagnosis and Disposition been Completed?: Yes


Diagnosis: 


 Ankle sprain, Anxiety





Disposition: HOME/ ROUTINE


Disposition Time: 13:00


Patient Plan: Discharge


Condition: GOOD


Discharge Instructions (ExitCare):  Ankle Sprain (DC), Anxiety, Adult (DC)


Additional Instructions: 


For any abdominal pain, any fevers, any nausea/vomiting, any chills, any 

shaking or tremors, any new or persistent symptoms, get rechecked.





I HAVE REVIEWED RISKS AND SIDE EFFECTS OF MEDICATION with you as well as RISKS 

OF INAPPROPRIATE USAGE.





Take only as directed.





Follow-up with your physician in 1-2 days.


Prescriptions: 


Alprazolam [Xanax] 0.5 mg PO BID PRN #4 tab


 PRN Reason: Anxiety


Referrals: 


[facility] - Follow up with primary


Forms:  [employer] (English)

## 2018-06-12 NOTE — RAD
PROCEDURE:  Left Ankle Radiographs.



HISTORY:

left ankle injury  



COMPARISON:

None



FINDINGS:



BONES:

Normal. No fracture. 



JOINTS:

Normal. No osteoarthritis. Ankle mortise maintained. Talar dome intact



SOFT TISSUES:

Normal. 



OTHER FINDINGS:

None.



IMPRESSION:

Normal left ankle radiographs.

## 2019-02-06 ENCOUNTER — HOSPITAL ENCOUNTER (EMERGENCY)
Dept: HOSPITAL 42 - ED | Age: 24
Discharge: HOME | End: 2019-02-06
Payer: MEDICAID

## 2019-02-06 VITALS — OXYGEN SATURATION: 98 % | DIASTOLIC BLOOD PRESSURE: 78 MMHG | SYSTOLIC BLOOD PRESSURE: 102 MMHG | HEART RATE: 78 BPM

## 2019-02-06 VITALS — TEMPERATURE: 98.2 F | RESPIRATION RATE: 18 BRPM

## 2019-02-06 VITALS — BODY MASS INDEX: 19.1 KG/M2

## 2019-02-06 DIAGNOSIS — R05: Primary | ICD-10-CM

## 2019-02-06 DIAGNOSIS — R10.9: ICD-10-CM

## 2019-02-06 NOTE — ED PDOC
Arrival/HPI





- General


Chief Complaint: Abdominal Pain


Time Seen by Provider: 02/06/19 09:15


Historian: Patient





- History of Present Illness


Narrative History of Present Illness (Text): 





02/06/19 09:54


22yo male with pmhx of chron's disease bib EMS with complaint of generalized 

abdominal pain, intermittent diarrhea, nonproductive cough, sneezing, rhinorrhea

x one week. States he have not seen his GI for a while secondary to insurance 

issue, but has been able to rectify the problem and have an appointment with his

GI for his last Remicade injection. He denies fever, chills, nausea, vomiting, 

melena, hematemesis, sick contact, travel, any other complaint.











Past Medical History





- Provider Review


Nursing Documentation Reviewed: Yes





- Past History


Past History: No Previous





- Infectious Disease


Hx of Infectious Diseases: None





- Tetanus Immunization


Tetanus Immunization: Unknown





- Cardiac


Hx Cardiac Disorders: No





- Pulmonary


Hx Pneumonia: Yes





- Neurological


Hx Transient Ischemic Attacks (TIA): No





- HEENT


Hx HEENT Disorder: No





- Renal


Hx Renal Disorder: No





- Endocrine/Metabolic


Hx Endocrine Disorders: No





- Hematological/Oncological


Hx Anemia: Yes





- Integumentary


Hx Dermatological Disorder: Yes


Other/Comment: ABDOMINAL WALL ABSCESS.





- Musculoskeletal/Rheumatological


Hx Musculoskeletal Disorders: No





- Gastrointestinal


Hx Crohn's Disease: Yes





- Genitourinary/Gynecological


Hx Genitourinary Disorders: Yes


Hx Urinary Tract Infection: Yes





- Psychiatric


Hx Anxiety: Yes


Hx Substance Use: No





- Surgical History


Other/Comment: Colon resection.  PT STATES "I'VE HAD MULTIPLE SURGERIES IN MY 

STOMACH"





- Anesthesia


Hx Anesthesia: Yes


Hx Anesthesia Reactions: No


Hx Malignant Hyperthermia: No





- Suicidal Assessment


Feels Threatened In Home Enviroment: No





Family/Social History





- Physician Review


Nursing Documentation Reviewed: Yes


Family/Social History: Unknown Family HX


Smoking Status: Never Smoked


Hx Alcohol Use: No


Hx Substance Use: No


Hx Substance Use Treatment: No





Allergies/Home Meds


Allergies/Adverse Reactions: 


Allergies





FISH Allergy (Verified 04/21/18 05:53)


   RASH


ketorolac [From Toradol] Allergy (Verified 04/21/18 05:53)


   RASH


shellfish derived Adverse Reaction (Verified 04/21/18 05:53)


   ANGIOEDEMA








Home Medications: 


                                    Home Meds











 Medication  Instructions  Recorded  Confirmed


 


Remicade  04/18/18 














Review of Systems





- Physician Review


All systems were reviewed & negative as marked: Yes





- Review of Systems


Constitutional: Normal


Eyes: Normal


ENT: Rhinorrhea


Respiratory: Cough


Cardiovascular: Normal


Gastrointestinal: Abdominal Pain, Diarrhea.  absent: Constipation, Nausea, 

Vomiting, Hematochezia


Genitourinary Male: Normal


Musculoskeletal: Normal


Skin: Normal


Neurological: Normal


Endocrine: Normal


Hemo/Lymphatic: Normal


Psychiatric: Normal





Physical Exam


Vital Signs Reviewed: Yes





Vital Signs











  Temp Pulse Resp BP Pulse Ox


 


 02/06/19 09:24  98.2 F  86  18  100/57 L  100











Temperature: Afebrile


Blood Pressure: Normal


Pulse: Regular


Respiratory Rate: Normal


Appearance: Positive for: Well-Appearing, Non-Toxic, Comfortable


Pain Distress: None


Mental Status: Positive for: Alert and Oriented X 3





- Systems Exam


Head: Present: Atraumatic, Normocephalic


Pupils: Present: PERRL


Extroacular Muscles: Present: EOMI


Conjunctiva: Present: Normal


Ears: Present: Normal


Mouth: Present: Moist Mucous Membranes


Pharnyx: Present: Normal


Nose (Internal): Present: Normal Inspection


Neck: Present: Normal Range of Motion


Respiratory/Chest: Present: Clear to Auscultation, Good Air Exchange.  No: 

Respiratory Distress, Accessory Muscle Use, Wheezes, Decreased Breath Sounds, 

Rales, Retracting, Rhonchi, Tachypneic


Cardiovascular: Present: Regular Rate and Rhythm, Normal S1, S2.  No: Murmurs


Abdomen: Present: Tenderness (Diffuse), Normal Bowel Sounds, Guarding 

(Voluntary), Other (Soft. One open wound noted. No discharge. No erythema. No 

sign of infection noted).  No: Distention, Peritoneal Signs, Rebound, McBurney's

Point Tender, Rovsing's Sign Present


Back: Present: Normal Inspection


Upper Extremity: Present: Normal Inspection.  No: Cyanosis, Edema


Lower Extremity: Present: Normal Inspection.  No: Edema


Neurological: Present: GCS=15, CN II-XII Intact, Speech Normal


Skin: Present: Warm, Dry, Normal Color.  No: Rashes


Psychiatric: Present: Alert, Oriented x 3, Normal Insight, Normal Concentration





Medical Decision Making


ED Course and Treatment: 





02/06/19 10:28


22yo male in ED for abdominal pain, diarrhea, cough. rhinorrhea x one week





He was hemodynamically stable in ED. Noted drinking juice in ED. 





He asked for Dilaudid or 10mg of Percocet in ED. He was given a tab of Percocet 

and strongly advised to f/u with his PMD for opiates.





He declined chest xray and tessalon perles was given for cough.





DC home with tessalon and referred to his PMD





Disposition/Present on Arrival





- Present on Arrival


Any Indicators Present on Arrival: No


History of DVT/PE: No


History of Uncontrolled Diabetes: No


Urinary Catheter: No


History of Decub. Ulcer: No


History Surgical Site Infection Following: None





- Disposition


Have Diagnosis and Disposition been Completed?: Yes


Diagnosis: 


 Abdominal pain, Cough





Disposition: HOME/ ROUTINE


Disposition Time: 10:00


Patient Plan: Discharge


Condition: STABLE


Discharge Instructions (ExitCare):  Cough in Adults, Acute Abdomen (Belly Pain)


Additional Instructions: 


Follow up with your Doctor


Return to ED for any new or worsening symptoms


Prescriptions: 


Benzonatate [Tessalon Perle] 100 mg PO TID #20 capsule


Referrals: 


Kathleen Conti MD [Family Provider] - Follow up with primary


Forms:  [facility] (English)